# Patient Record
Sex: FEMALE | Race: WHITE | URBAN - METROPOLITAN AREA
[De-identification: names, ages, dates, MRNs, and addresses within clinical notes are randomized per-mention and may not be internally consistent; named-entity substitution may affect disease eponyms.]

---

## 2017-01-17 ENCOUNTER — OUTPATIENT (OUTPATIENT)
Dept: OUTPATIENT SERVICES | Facility: HOSPITAL | Age: 77
LOS: 1 days | Discharge: HOME | End: 2017-01-17

## 2017-06-27 DIAGNOSIS — E21.3 HYPERPARATHYROIDISM, UNSPECIFIED: ICD-10-CM

## 2018-09-27 ENCOUNTER — EMERGENCY (EMERGENCY)
Facility: HOSPITAL | Age: 78
LOS: 0 days | Discharge: HOME | End: 2018-09-27
Attending: EMERGENCY MEDICINE | Admitting: EMERGENCY MEDICINE
Payer: COMMERCIAL

## 2018-09-27 VITALS
HEART RATE: 95 BPM | TEMPERATURE: 97 F | DIASTOLIC BLOOD PRESSURE: 72 MMHG | OXYGEN SATURATION: 100 % | RESPIRATION RATE: 18 BRPM | SYSTOLIC BLOOD PRESSURE: 142 MMHG

## 2018-09-27 DIAGNOSIS — M25.561 PAIN IN RIGHT KNEE: ICD-10-CM

## 2018-09-27 DIAGNOSIS — Z98.890 OTHER SPECIFIED POSTPROCEDURAL STATES: ICD-10-CM

## 2018-09-27 DIAGNOSIS — M25.562 PAIN IN LEFT KNEE: ICD-10-CM

## 2018-09-27 PROCEDURE — 93970 EXTREMITY STUDY: CPT | Mod: 26

## 2018-09-27 NOTE — ED PROVIDER NOTE - ATTENDING CONTRIBUTION TO CARE
Pt is a 79yo female with hx of b/l arthroscopic knee surgery 20 years ago who comes in for 6 months of progressive b/l knee pain.  Worse in the last few days.  No trauma, no fall.  Pain mostly behind knee.  No fever.    Exam: no effusion, no bony tenderness, no calf tenderness, no LE edema, no 2+ DP pulse, stable pelvis, no rash  Imp: chronic pain  Plan: xr, duplex, ortho f/u

## 2018-09-27 NOTE — ED ADULT NURSE NOTE - NSIMPLEMENTINTERV_GEN_ALL_ED
Implemented All Fall Risk Interventions:  [place] to call system. Call bell, personal items and telephone within reach. Instruct patient to call for assistance. Room bathroom lighting operational. Non-slip footwear when patient is off stretcher. Physically safe environment: no spills, clutter or unnecessary equipment. Stretcher in lowest position, wheels locked, appropriate side rails in place. Provide visual cue, wrist band, yellow gown, etc. Monitor gait and stability. Monitor for mental status changes and reorient to person, place, and time. Review medications for side effects contributing to fall risk. Reinforce activity limits and safety measures with patient and family.

## 2018-09-27 NOTE — ED PROVIDER NOTE - MUSCULOSKELETAL, MLM
Spine appears normal, R LE : full ROM no tenderness no anterior or posterior drawer sign no laxity normal sensation 2+ distal pulses; L LE full ROM, no tenderness, no anterior or posterior drawer sign no laxity normal sensation 2+ distal pulses, range of motion is not limited, no muscle or joint tenderness

## 2018-09-27 NOTE — ED PROVIDER NOTE - NEUROLOGICAL, MLM
Alert and oriented x 3, 5/5 UE and LE strength, able to ambulate with help, normal gait, no focal deficits, no motor or sensory deficits.

## 2018-09-27 NOTE — ED PROVIDER NOTE - PROGRESS NOTE DETAILS
Discussed with patient and son need to follow up with Ortho. Will give them a referral. Discussed knee exercises and use of motrin and tylenol for pain. Discussed signs and symptoms to return to the ED for. Patient and son understand and agree with discharge plan

## 2018-09-27 NOTE — ED PROVIDER NOTE - OBJECTIVE STATEMENT
79 yo F with no hx of arthroscopy of bilateral knee, cortisol shot to the left knee 6 months ago, presents today for evaluation of bilateral knee pain, ongoing for 3 months, associated with "inability to bear weight on the left side". No fever, no chills, no headache, no nausea, no vomiting, no diarrhea, no trauma, no abdominal pain, no chest pain, no back pain, no rash, no URI, no recent travel. Patient states that she started slowing down when she walked. Per son at bedside, patient did not want to go see Dr. Joy, so he brought her to the ED. No other symptoms.

## 2020-02-02 ENCOUNTER — EMERGENCY (EMERGENCY)
Facility: HOSPITAL | Age: 80
LOS: 0 days | Discharge: HOME | End: 2020-02-02
Attending: EMERGENCY MEDICINE | Admitting: EMERGENCY MEDICINE
Payer: MEDICARE

## 2020-02-02 VITALS
TEMPERATURE: 98 F | DIASTOLIC BLOOD PRESSURE: 79 MMHG | SYSTOLIC BLOOD PRESSURE: 169 MMHG | RESPIRATION RATE: 16 BRPM | OXYGEN SATURATION: 98 %

## 2020-02-02 VITALS
RESPIRATION RATE: 18 BRPM | OXYGEN SATURATION: 99 % | HEART RATE: 88 BPM | SYSTOLIC BLOOD PRESSURE: 142 MMHG | DIASTOLIC BLOOD PRESSURE: 80 MMHG | TEMPERATURE: 98 F

## 2020-02-02 DIAGNOSIS — M79.89 OTHER SPECIFIED SOFT TISSUE DISORDERS: ICD-10-CM

## 2020-02-02 DIAGNOSIS — R41.82 ALTERED MENTAL STATUS, UNSPECIFIED: ICD-10-CM

## 2020-02-02 DIAGNOSIS — R44.3 HALLUCINATIONS, UNSPECIFIED: ICD-10-CM

## 2020-02-02 DIAGNOSIS — R45.1 RESTLESSNESS AND AGITATION: ICD-10-CM

## 2020-02-02 LAB
ALBUMIN SERPL ELPH-MCNC: 4.1 G/DL — SIGNIFICANT CHANGE UP (ref 3.5–5.2)
ALP SERPL-CCNC: 88 U/L — SIGNIFICANT CHANGE UP (ref 30–115)
ALT FLD-CCNC: 8 U/L — SIGNIFICANT CHANGE UP (ref 0–41)
ANION GAP SERPL CALC-SCNC: 13 MMOL/L — SIGNIFICANT CHANGE UP (ref 7–14)
APPEARANCE UR: CLEAR — SIGNIFICANT CHANGE UP
AST SERPL-CCNC: 15 U/L — SIGNIFICANT CHANGE UP (ref 0–41)
BACTERIA # UR AUTO: NEGATIVE — SIGNIFICANT CHANGE UP
BASOPHILS # BLD AUTO: 0.02 K/UL — SIGNIFICANT CHANGE UP (ref 0–0.2)
BASOPHILS NFR BLD AUTO: 0.3 % — SIGNIFICANT CHANGE UP (ref 0–1)
BILIRUB SERPL-MCNC: 0.7 MG/DL — SIGNIFICANT CHANGE UP (ref 0.2–1.2)
BILIRUB UR-MCNC: NEGATIVE — SIGNIFICANT CHANGE UP
BUN SERPL-MCNC: 18 MG/DL — SIGNIFICANT CHANGE UP (ref 10–20)
CALCIUM SERPL-MCNC: 9.4 MG/DL — SIGNIFICANT CHANGE UP (ref 8.5–10.1)
CHLORIDE SERPL-SCNC: 105 MMOL/L — SIGNIFICANT CHANGE UP (ref 98–110)
CO2 SERPL-SCNC: 23 MMOL/L — SIGNIFICANT CHANGE UP (ref 17–32)
COLOR SPEC: YELLOW — SIGNIFICANT CHANGE UP
CREAT SERPL-MCNC: 0.6 MG/DL — LOW (ref 0.7–1.5)
DIFF PNL FLD: ABNORMAL
EOSINOPHIL # BLD AUTO: 0.05 K/UL — SIGNIFICANT CHANGE UP (ref 0–0.7)
EOSINOPHIL NFR BLD AUTO: 0.8 % — SIGNIFICANT CHANGE UP (ref 0–8)
EPI CELLS # UR: 3 /HPF — SIGNIFICANT CHANGE UP (ref 0–5)
GLUCOSE SERPL-MCNC: 121 MG/DL — HIGH (ref 70–99)
GLUCOSE UR QL: NEGATIVE — SIGNIFICANT CHANGE UP
HCT VFR BLD CALC: 36.6 % — LOW (ref 37–47)
HGB BLD-MCNC: 11.9 G/DL — LOW (ref 12–16)
HYALINE CASTS # UR AUTO: 4 /LPF — SIGNIFICANT CHANGE UP (ref 0–7)
IMM GRANULOCYTES NFR BLD AUTO: 0.6 % — HIGH (ref 0.1–0.3)
KETONES UR-MCNC: SIGNIFICANT CHANGE UP
LACTATE SERPL-SCNC: 1.7 MMOL/L — SIGNIFICANT CHANGE UP (ref 0.7–2)
LEUKOCYTE ESTERASE UR-ACNC: ABNORMAL
LIDOCAIN IGE QN: 11 U/L — SIGNIFICANT CHANGE UP (ref 7–60)
LYMPHOCYTES # BLD AUTO: 1.08 K/UL — LOW (ref 1.2–3.4)
LYMPHOCYTES # BLD AUTO: 16.4 % — LOW (ref 20.5–51.1)
MAGNESIUM SERPL-MCNC: 1.9 MG/DL — SIGNIFICANT CHANGE UP (ref 1.8–2.4)
MCHC RBC-ENTMCNC: 26.3 PG — LOW (ref 27–31)
MCHC RBC-ENTMCNC: 32.5 G/DL — SIGNIFICANT CHANGE UP (ref 32–37)
MCV RBC AUTO: 81 FL — SIGNIFICANT CHANGE UP (ref 81–99)
MONOCYTES # BLD AUTO: 0.34 K/UL — SIGNIFICANT CHANGE UP (ref 0.1–0.6)
MONOCYTES NFR BLD AUTO: 5.2 % — SIGNIFICANT CHANGE UP (ref 1.7–9.3)
NEUTROPHILS # BLD AUTO: 5.04 K/UL — SIGNIFICANT CHANGE UP (ref 1.4–6.5)
NEUTROPHILS NFR BLD AUTO: 76.7 % — HIGH (ref 42.2–75.2)
NITRITE UR-MCNC: NEGATIVE — SIGNIFICANT CHANGE UP
NRBC # BLD: 0 /100 WBCS — SIGNIFICANT CHANGE UP (ref 0–0)
NT-PROBNP SERPL-SCNC: 911 PG/ML — HIGH (ref 0–300)
PH UR: 6.5 — SIGNIFICANT CHANGE UP (ref 5–8)
PLATELET # BLD AUTO: 264 K/UL — SIGNIFICANT CHANGE UP (ref 130–400)
POTASSIUM SERPL-MCNC: 3.7 MMOL/L — SIGNIFICANT CHANGE UP (ref 3.5–5)
POTASSIUM SERPL-SCNC: 3.7 MMOL/L — SIGNIFICANT CHANGE UP (ref 3.5–5)
PROT SERPL-MCNC: 7.3 G/DL — SIGNIFICANT CHANGE UP (ref 6–8)
PROT UR-MCNC: SIGNIFICANT CHANGE UP
RBC # BLD: 4.52 M/UL — SIGNIFICANT CHANGE UP (ref 4.2–5.4)
RBC # FLD: 14.2 % — SIGNIFICANT CHANGE UP (ref 11.5–14.5)
RBC CASTS # UR COMP ASSIST: 3 /HPF — SIGNIFICANT CHANGE UP (ref 0–4)
SODIUM SERPL-SCNC: 141 MMOL/L — SIGNIFICANT CHANGE UP (ref 135–146)
SP GR SPEC: 1.02 — SIGNIFICANT CHANGE UP (ref 1.01–1.02)
TROPONIN T SERPL-MCNC: <0.01 NG/ML — SIGNIFICANT CHANGE UP
UROBILINOGEN FLD QL: ABNORMAL
WBC # BLD: 6.57 K/UL — SIGNIFICANT CHANGE UP (ref 4.8–10.8)
WBC # FLD AUTO: 6.57 K/UL — SIGNIFICANT CHANGE UP (ref 4.8–10.8)
WBC UR QL: 8 /HPF — HIGH (ref 0–5)

## 2020-02-02 PROCEDURE — 93010 ELECTROCARDIOGRAM REPORT: CPT

## 2020-02-02 PROCEDURE — 71046 X-RAY EXAM CHEST 2 VIEWS: CPT | Mod: 26

## 2020-02-02 PROCEDURE — 70450 CT HEAD/BRAIN W/O DYE: CPT | Mod: 26

## 2020-02-02 PROCEDURE — 99285 EMERGENCY DEPT VISIT HI MDM: CPT

## 2020-02-02 NOTE — ED PROVIDER NOTE - OBJECTIVE STATEMENT
78y/o F w/ hx of dementia and anxiety lives at home is brought by family for concern pt with dec sleeping at night, concerns of hallucinations that have been worsening over the past year, worse this week.  pt with baseline leg swelling after knee replacement surgery multiple years ago. no fevers or chills. no urinary symptoms. no cough, congesiton, runny nose.

## 2020-02-02 NOTE — ED ADULT TRIAGE NOTE - CHIEF COMPLAINT QUOTE
Pt BIB family for changes in mental status x 2 days. Pt's family suspect she is hallucinating. Pt also has swelling to b/l lower extremities.

## 2020-02-02 NOTE — ED ADULT NURSE NOTE - OBJECTIVE STATEMENT
Pt brought in by family for change in mental status, state pt was having visual hallucinations, family states pt has a history of this. Family also states pt c/o chest pain. Pt is Icelandic speaking. Denies fever, chills, SOB.

## 2020-02-02 NOTE — ED ADULT TRIAGE NOTE - TEMPERATURE IN CELSIUS (DEGREES C)
Bedside shift change report given to Carmen Gilbert RN (oncoming nurse) by Adan Engle RN (offgoing nurse). Report included the following information SBAR, Kardex, Intake/Output, MAR, Accordion, Recent Results, Med Rec Status and Cardiac Rhythm a fib. 36.6

## 2020-02-02 NOTE — ED PROVIDER NOTE - ATTENDING CONTRIBUTION TO CARE
78 yo F with PMH of demential presents to ED for decreased sleep and hallucinations which have gotten progressively worse over the past few years. Her family states that she hallucinates that people are in the room and that things are crawling on her. She also has become more restless at night and does not sleep through the entire night.   No fever, chills, recent illness, cough, rhinorrhea.     They have not followed up with her neurologist in years.     Concern for worsening of her dementia however, will ru out infection.

## 2020-02-02 NOTE — ED PROVIDER NOTE - CARE PROVIDER_API CALL
Bautista Perrin)  Neurology  [address] [place] [address], [address]  [place] [address]  Phone: [phone]  Fax: [phone]  Follow Up Time:

## 2020-02-02 NOTE — ED PROVIDER NOTE - NS ED ROS FT
Constitutional: (-) fever  Eyes/ENT: (-) blurry vision, (-) epistaxis  Cardiovascular: (-) chest pain, (-) syncope  Respiratory: (-) cough, (-) shortness of breath  Gastrointestinal: (-) vomiting, (-) diarrhea  Musculoskeletal: (-) neck pain, (-) back pain, (-) joint pain  Integumentary: (-) rash, (-) edema  Neurological: (-) headache, (+) altered mental status  Psychiatric: (+) hallucinations  Allergic/Immunologic: (-) pruritus

## 2020-02-02 NOTE — ED PROVIDER NOTE - NSFOLLOWUPCLINICS_GEN_ALL_ED_FT
[facility]  Medicine  [address], [place]   Phone: [phone]  Fax:   Follow Up Time:

## 2020-02-02 NOTE — ED PROVIDER NOTE - PATIENT PORTAL LINK FT
You can access the FollowMyHealth Patient Portal offered by [facility] by registering at the following website: [email or web address]. By joining [employer]’s FollowMyHealth portal, you will also be able to view your health information using other applications (apps) compatible with our system.

## 2020-02-02 NOTE — ED PROVIDER NOTE - CLINICAL SUMMARY MEDICAL DECISION MAKING FREE TEXT BOX
78 yo F with PMH of dementia presented to ED for gradual mental status changes over the past 3 years. Patient does not have any signs of infection. Mental status change is most likely due to progression of her dementia. DC home with neurology fu.

## 2020-02-03 LAB
CULTURE RESULTS: SIGNIFICANT CHANGE UP
SPECIMEN SOURCE: SIGNIFICANT CHANGE UP

## 2020-05-15 ENCOUNTER — EMERGENCY (EMERGENCY)
Facility: HOSPITAL | Age: 80
LOS: 0 days | Discharge: HOME | End: 2020-05-15
Attending: EMERGENCY MEDICINE | Admitting: EMERGENCY MEDICINE
Payer: MEDICARE

## 2020-05-15 VITALS
SYSTOLIC BLOOD PRESSURE: 127 MMHG | OXYGEN SATURATION: 100 % | DIASTOLIC BLOOD PRESSURE: 78 MMHG | HEART RATE: 101 BPM | RESPIRATION RATE: 18 BRPM

## 2020-05-15 VITALS
SYSTOLIC BLOOD PRESSURE: 114 MMHG | HEART RATE: 96 BPM | DIASTOLIC BLOOD PRESSURE: 70 MMHG | RESPIRATION RATE: 18 BRPM | TEMPERATURE: 98 F | OXYGEN SATURATION: 100 %

## 2020-05-15 DIAGNOSIS — F03.90 UNSPECIFIED DEMENTIA WITHOUT BEHAVIORAL DISTURBANCE: ICD-10-CM
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
## 2020-05-15 DIAGNOSIS — Z04.3 ENCOUNTER FOR EXAMINATION AND OBSERVATION FOLLOWING OTHER ACCIDENT: ICD-10-CM

## 2020-05-15 DIAGNOSIS — R53.83 OTHER FATIGUE: ICD-10-CM

## 2020-05-15 DIAGNOSIS — S00.03XA CONTUSION OF SCALP, INITIAL ENCOUNTER: ICD-10-CM

## 2020-05-15 LAB
ALBUMIN SERPL ELPH-MCNC: 3.7 G/DL — SIGNIFICANT CHANGE UP (ref 3.5–5.2)
ALP SERPL-CCNC: 75 U/L — SIGNIFICANT CHANGE UP (ref 30–115)
ALT FLD-CCNC: 9 U/L — SIGNIFICANT CHANGE UP (ref 0–41)
ANION GAP SERPL CALC-SCNC: 18 MMOL/L — HIGH (ref 7–14)
APPEARANCE UR: CLEAR — SIGNIFICANT CHANGE UP
APTT BLD: 24.8 SEC — LOW (ref 27–39.2)
AST SERPL-CCNC: 19 U/L — SIGNIFICANT CHANGE UP (ref 0–41)
BASE EXCESS BLDV CALC-SCNC: 3.9 MMOL/L — HIGH (ref -2–2)
BASOPHILS # BLD AUTO: 0.03 K/UL — SIGNIFICANT CHANGE UP (ref 0–0.2)
BASOPHILS NFR BLD AUTO: 0.3 % — SIGNIFICANT CHANGE UP (ref 0–1)
BILIRUB SERPL-MCNC: 0.8 MG/DL — SIGNIFICANT CHANGE UP (ref 0.2–1.2)
BILIRUB UR-MCNC: NEGATIVE — SIGNIFICANT CHANGE UP
BUN SERPL-MCNC: 16 MG/DL — SIGNIFICANT CHANGE UP (ref 10–20)
CA-I SERPL-SCNC: 1.07 MMOL/L — LOW (ref 1.12–1.3)
CALCIUM SERPL-MCNC: 9.4 MG/DL — SIGNIFICANT CHANGE UP (ref 8.5–10.1)
CHLORIDE SERPL-SCNC: 103 MMOL/L — SIGNIFICANT CHANGE UP (ref 98–110)
CO2 SERPL-SCNC: 21 MMOL/L — SIGNIFICANT CHANGE UP (ref 17–32)
COLOR SPEC: SIGNIFICANT CHANGE UP
CREAT SERPL-MCNC: 0.9 MG/DL — SIGNIFICANT CHANGE UP (ref 0.7–1.5)
DIFF PNL FLD: NEGATIVE — SIGNIFICANT CHANGE UP
EOSINOPHIL # BLD AUTO: 0.16 K/UL — SIGNIFICANT CHANGE UP (ref 0–0.7)
EOSINOPHIL NFR BLD AUTO: 1.8 % — SIGNIFICANT CHANGE UP (ref 0–8)
ETHANOL SERPL-MCNC: <10 MG/DL — SIGNIFICANT CHANGE UP
GAS PNL BLDV: 140 MMOL/L — SIGNIFICANT CHANGE UP (ref 136–145)
GAS PNL BLDV: SIGNIFICANT CHANGE UP
GLUCOSE SERPL-MCNC: 161 MG/DL — HIGH (ref 70–99)
GLUCOSE UR QL: NEGATIVE — SIGNIFICANT CHANGE UP
HCO3 BLDV-SCNC: 27 MMOL/L — SIGNIFICANT CHANGE UP (ref 22–29)
HCT VFR BLD CALC: 44.3 % — SIGNIFICANT CHANGE UP (ref 37–47)
HCT VFR BLDA CALC: 41.4 % — SIGNIFICANT CHANGE UP (ref 34–44)
HGB BLD CALC-MCNC: 13.5 G/DL — LOW (ref 14–18)
HGB BLD-MCNC: 14.6 G/DL — SIGNIFICANT CHANGE UP (ref 12–16)
IMM GRANULOCYTES NFR BLD AUTO: 0.3 % — SIGNIFICANT CHANGE UP (ref 0.1–0.3)
INR BLD: 1.09 RATIO — SIGNIFICANT CHANGE UP (ref 0.65–1.3)
KETONES UR-MCNC: NEGATIVE — SIGNIFICANT CHANGE UP
LACTATE BLDV-MCNC: 1.7 MMOL/L — HIGH (ref 0.5–1.6)
LACTATE SERPL-SCNC: 3.4 MMOL/L — HIGH (ref 0.7–2)
LEUKOCYTE ESTERASE UR-ACNC: NEGATIVE — SIGNIFICANT CHANGE UP
LYMPHOCYTES # BLD AUTO: 1.3 K/UL — SIGNIFICANT CHANGE UP (ref 1.2–3.4)
LYMPHOCYTES # BLD AUTO: 14.5 % — LOW (ref 20.5–51.1)
MCHC RBC-ENTMCNC: 28.3 PG — SIGNIFICANT CHANGE UP (ref 27–31)
MCHC RBC-ENTMCNC: 33 G/DL — SIGNIFICANT CHANGE UP (ref 32–37)
MCV RBC AUTO: 86 FL — SIGNIFICANT CHANGE UP (ref 81–99)
MONOCYTES # BLD AUTO: 0.37 K/UL — SIGNIFICANT CHANGE UP (ref 0.1–0.6)
MONOCYTES NFR BLD AUTO: 4.1 % — SIGNIFICANT CHANGE UP (ref 1.7–9.3)
NEUTROPHILS # BLD AUTO: 7.07 K/UL — HIGH (ref 1.4–6.5)
NEUTROPHILS NFR BLD AUTO: 79 % — HIGH (ref 42.2–75.2)
NITRITE UR-MCNC: NEGATIVE — SIGNIFICANT CHANGE UP
NRBC # BLD: 0 /100 WBCS — SIGNIFICANT CHANGE UP (ref 0–0)
PCO2 BLDV: 37 MMHG — LOW (ref 41–51)
PH BLDV: 7.48 — HIGH (ref 7.26–7.43)
PH UR: 7.5 — SIGNIFICANT CHANGE UP (ref 5–8)
PLATELET # BLD AUTO: 215 K/UL — SIGNIFICANT CHANGE UP (ref 130–400)
PO2 BLDV: 25 MMHG — SIGNIFICANT CHANGE UP (ref 20–40)
POTASSIUM BLDV-SCNC: 6.2 MMOL/L — HIGH (ref 3.3–5.6)
POTASSIUM SERPL-MCNC: 3.9 MMOL/L — SIGNIFICANT CHANGE UP (ref 3.5–5)
POTASSIUM SERPL-SCNC: 3.9 MMOL/L — SIGNIFICANT CHANGE UP (ref 3.5–5)
PROT SERPL-MCNC: 6.6 G/DL — SIGNIFICANT CHANGE UP (ref 6–8)
PROT UR-MCNC: NEGATIVE — SIGNIFICANT CHANGE UP
PROTHROM AB SERPL-ACNC: 12.5 SEC — SIGNIFICANT CHANGE UP (ref 9.95–12.87)
RBC # BLD: 5.15 M/UL — SIGNIFICANT CHANGE UP (ref 4.2–5.4)
RBC # FLD: 15.6 % — HIGH (ref 11.5–14.5)
SAO2 % BLDV: 51 % — SIGNIFICANT CHANGE UP
SODIUM SERPL-SCNC: 142 MMOL/L — SIGNIFICANT CHANGE UP (ref 135–146)
SP GR SPEC: 1.02 — SIGNIFICANT CHANGE UP (ref 1.01–1.02)
TROPONIN T SERPL-MCNC: <0.01 NG/ML — SIGNIFICANT CHANGE UP
UROBILINOGEN FLD QL: SIGNIFICANT CHANGE UP
WBC # BLD: 8.96 K/UL — SIGNIFICANT CHANGE UP (ref 4.8–10.8)
WBC # FLD AUTO: 8.96 K/UL — SIGNIFICANT CHANGE UP (ref 4.8–10.8)

## 2020-05-15 PROCEDURE — 93010 ELECTROCARDIOGRAM REPORT: CPT

## 2020-05-15 PROCEDURE — 74177 CT ABD & PELVIS W/CONTRAST: CPT | Mod: 26

## 2020-05-15 PROCEDURE — 70450 CT HEAD/BRAIN W/O DYE: CPT | Mod: 26

## 2020-05-15 PROCEDURE — 71045 X-RAY EXAM CHEST 1 VIEW: CPT | Mod: 26

## 2020-05-15 PROCEDURE — 71260 CT THORAX DX C+: CPT | Mod: 26

## 2020-05-15 PROCEDURE — 99285 EMERGENCY DEPT VISIT HI MDM: CPT

## 2020-05-15 PROCEDURE — 72125 CT NECK SPINE W/O DYE: CPT | Mod: 26

## 2020-05-15 PROCEDURE — 72170 X-RAY EXAM OF PELVIS: CPT | Mod: 26

## 2020-05-15 RX ORDER — SODIUM CHLORIDE 9 MG/ML
1000 INJECTION INTRAMUSCULAR; INTRAVENOUS; SUBCUTANEOUS ONCE
Refills: 0 | Status: COMPLETED | OUTPATIENT
Start: 2020-05-15 | End: 2020-05-15

## 2020-05-15 RX ADMIN — SODIUM CHLORIDE 1000 MILLILITER(S): 9 INJECTION INTRAMUSCULAR; INTRAVENOUS; SUBCUTANEOUS at 08:30

## 2020-05-15 RX ADMIN — SODIUM CHLORIDE 1000 MILLILITER(S): 9 INJECTION INTRAMUSCULAR; INTRAVENOUS; SUBCUTANEOUS at 09:30

## 2020-05-15 NOTE — ED PROVIDER NOTE - CARE PROVIDER_API CALL
[facility]  [address] [place] [address]  Phone: [phone]  Fax: [phone]  Established Patient  Follow Up Time: 1-3 Days

## 2020-05-15 NOTE — ED ADULT TRIAGE NOTE - CHIEF COMPLAINT QUOTE
Pt was found on floor outside by EMS, does not know how she fell, aox1, disoriented to time, place, situation. GCS 14. Pt unable to state if she is on blood thinners, pt has c collar in place and hematoma to L side of head. Pt c/o "body pain". Trauma alert activated in triage.

## 2020-05-15 NOTE — ED ADULT NURSE NOTE - OBJECTIVE STATEMENT
found outside BIBA, bruising on left side of head and eye, trauma alert initiated at 0810AM. A&Ox1 (self), unable to recall history. found outside her house BIBA for possible fall, bruising on left side of head and eye, trauma alert initiated at 0810AM. A&Ox1 (self), unable to recall history.

## 2020-05-15 NOTE — ED PROVIDER NOTE - CLINICAL SUMMARY MEDICAL DECISION MAKING FREE TEXT BOX
Pt evaluated after sign out. Comfortable. CT/labs reviewed. Spoke with Dr. Vallejo, wants pt discharged, will see her in the office tomorrow. Family is notified and comfortable with plans. Will discharge. Family is coming to pick her up. Pt ambulated in the ED with assistance without difficulty.

## 2020-05-15 NOTE — ED PROVIDER NOTE - OBJECTIVE STATEMENT
81 Y/O F ANXIETY, DEMENTIA FOUND SITTING ON THE GROUND BY BYSTANDERS. PT WAS FOUND WITH HER WALKER. + HEAD TRAUMA. UNKNOWN LOC. PT C/O WHOLE BODY PAIN. PT POOR HISTORIAN. PT DOES RECALL FALLING. AS PER DAUGHTER IN LAW BY TELEPHONE, PT WANDERED FM HER HOME WHERE SHE LIVES WITH HER FAMILY. PT WITH ONE WEEK OF INCREASED CONFUSION AND LETHARGY. NORMAL APPETITE. NORMAL PO INTAKE. NO V/D. NO FEVER, CHILLS, COUGH. 81 Y/O F ANXIETY, DEMENTIA, HYPOTHYROIDIOSM FOUND SITTING ON THE GROUND BY BYSTANDERS. PT WAS FOUND WITH HER WALKER. + HEAD TRAUMA. UNKNOWN LOC. PT C/O WHOLE BODY PAIN. PT POOR HISTORIAN. PT DOES RECALL FALLING. AS PER DAUGHTER IN LAW BY TELEPHONE, PT WANDERED FM HER HOME WHERE SHE LIVES WITH HER FAMILY. PT WITH ONE WEEK OF INCREASED CONFUSION AND LETHARGY. NORMAL APPETITE. NORMAL PO INTAKE. NO V/D. NO FEVER, CHILLS, COUGH.

## 2020-05-15 NOTE — CONSULT NOTE ADULT - ASSESSMENT
ASSESSMENT:  80y old f s/p found down    PLAN:    No evidence of acute traumatic injury ASSESSMENT:  80y old f s/p found down    PLAN:    No evidence of acute traumatic injury  ED planning d/c, cleared from trauma perspective   Pt has f/u with 1ary Dr. Velásquez  D/w  Gave

## 2020-05-15 NOTE — ED PROVIDER NOTE - PROGRESS NOTE DETAILS
TELEPHONE D/W SON, PT WITH PARANOID THOUGHTS AND CONFUSION X 2-3 YEARS, WORSENING RECENTLY, X2-3 WEEKS. JORI MAGANA IS PMD. TELEPHONE D/W SON, PT WITH PARANOID THOUGHTS AND CONFUSION X 2-3 YEARS, WORSENING RECENTLY, X2-3 WEEKS. JORI MAGANA IS PMD. [phone] PT SIGNED OUT TO DR. STEWART, FOLLOW UP CT SCANS, LABS, REASSESS AND DISPO. AG: trauma w/u neg, spoke w/ Dr Vallejo who can see pt tomorrow in his office, spoke w/ son Naren who agrees w/ plan.

## 2020-05-15 NOTE — ED PROVIDER NOTE - NSFOLLOWUPINSTRUCTIONS_ED_ALL_ED_FT
Fall Prevention in the Home, Adult  Falls can cause injuries. They can happen to people of all ages. There are many things you can do to make your home safe and to help prevent falls. Ask for help when making these changes, if needed.    What actions can I take to prevent falls?  General Instructions     Use good lighting in all rooms. Replace any light bulbs that burn out.  Turn on the lights when you go into a dark area. Use night-lights.  Keep items that you use often in easy-to-reach places. Lower the shelves around your home if necessary.  Set up your furniture so you have a clear path. Avoid moving your furniture around.  Do not have throw rugs and other things on the floor that can make you trip.  Avoid walking on wet floors.  If any of your floors are uneven, fix them.  Add color or contrast paint or tape to clearly sharri and help you see:  Any grab bars or handrails.  First and last steps of stairways.  Where the edge of each step is.  If you use a stepladder:  Make sure that it is fully opened. Do not climb a closed stepladder.  Make sure that both sides of the stepladder are locked into place.  Ask someone to hold the stepladder for you while you use it.  If there are any pets around you, be aware of where they are.  What can I do in the bathroom?     Keep the floor dry. Clean up any water that spills onto the floor as soon as it happens.  Remove soap buildup in the tub or shower regularly.  Use non-skid mats or decals on the floor of the tub or shower.  Attach bath mats securely with double-sided, non-slip rug tape.  If you need to sit down in the shower, use a plastic, non-slip stool.  Install grab bars by the toilet and in the tub and shower. Do not use towel bars as grab bars.  What can I do in the bedroom?     Make sure that you have a light by your bed that is easy to reach.  Do not use any sheets or blankets that are too big for your bed. They should not hang down onto the floor.  Have a firm chair that has side arms. You can use this for support while you get dressed.  What can I do in the kitchen?     Clean up any spills right away.  If you need to reach something above you, use a strong step stool that has a grab bar.  Keep electrical cords out of the way.  Do not use floor polish or wax that makes floors slippery. If you must use wax, use non-skid floor wax.  What can I do with my stairs?     Do not leave any items on the stairs.  Make sure that you have a light switch at the top of the stairs and the bottom of the stairs. If you do not have them, ask someone to add them for you.  Make sure that there are handrails on both sides of the stairs, and use them. Fix handrails that are broken or loose. Make sure that handrails are as long as the stairways.  Install non-slip stair treads on all stairs in your home.  Avoid having throw rugs at the top or bottom of the stairs. If you do have throw rugs, attach them to the floor with carpet tape.  Choose a carpet that does not hide the edge of the steps on the stairway.  Check any carpeting to make sure that it is firmly attached to the stairs. Fix any carpet that is loose or worn.  What can I do on the outside of my home?     Use bright outdoor lighting.  Regularly fix the edges of walkways and driveways and fix any cracks.  Remove anything that might make you trip as you walk through a door, such as a raised step or threshold.  Trim any bushes or trees on the path to your home.  Regularly check to see if handrails are loose or broken. Make sure that both sides of any steps have handrails.  Install guardrails along the edges of any raised decks and porches.  Clear walking paths of anything that might make someone trip, such as tools or rocks.  Have any leaves, snow, or ice cleared regularly.  Use sand or salt on walking paths during winter.  Clean up any spills in your garage right away. This includes grease or oil spills.  What other actions can I take?     Wear shoes that:  Have a low heel. Do not wear high heels.  Have rubber bottoms.  Are comfortable and fit you well.  Are closed at the toe. Do not wear open-toe sandals.  Use tools that help you move around (mobility aids) if they are needed. These include:  Canes.  Walkers.  Scooters.  Crutches.  Review your medicines with your doctor. Some medicines can make you feel dizzy. This can increase your chance of falling.  Ask your doctor what other things you can do to help prevent falls.    Where to find more information  Centers for Disease Control and Prevention[place]: https://cdc.gov  [place] on Aging: https://la6fmit.janie.nih.gov  Contact a doctor if:  You are afraid of falling at home.  You feel weak, drowsy, or dizzy at home.  You fall at home.  Summary  There are many simple things that you can do to make your home safe and to help prevent falls.  Ways to make your home safe include removing tripping hazards and installing grab bars in the bathroom.  Ask for help when making these changes in your home.  This information is not intended to replace advice given to you by your health care provider. Make sure you discuss any questions you have with your health care provider.

## 2020-05-15 NOTE — ED PROVIDER NOTE - PHYSICAL EXAMINATION
CONSTITUTIONAL: Well-appearing; well-nourished; in no apparent distress.   HEAD: + abrasions to L frontal scalp and L forehead. + L scalp hematoma.   EYES: PERRL; EOM intact. Conjunctiva normal B/L.   ENT: Normal pharynx with no tonsillar hypertrophy. + dry mucous membranes.   NECK: Supple; non-tender; no cervical lymphadenopathy. No midline c spine tenderness.   CHEST: Normal chest excursion with respiration. Nontender. no crepitus.   CARDIOVASCULAR: Normal S1, S2; no murmurs, rubs, or gallops.   RESPIRATORY: Normal chest excursion with respiration; breath sounds clear and equal bilaterally; no wheezes, rhonchi, or rales.  GI/: Normal bowel sounds; non-distended; non-tender.  BACK: No evidence of trauma or deformity. Non-tender to palpation. No CVA tenderness.   EXT: Normal ROM in all four extremities; non-tender to palpation; distal pulses are normal. No leg edema B/L.   SKIN: Normal for age and race; warm; dry; good turgor.  NEURO: A & O x 4; CN 2-12 intact. Grossly unremarkable.

## 2020-05-15 NOTE — CONSULT NOTE ADULT - SUBJECTIVE AND OBJECTIVE BOX
80y f  80y f    TRAUMA ACTIVATION LEVEL:  2; alert    MECHANISM OF INJURY:      [] Blunt  	[] MVC	[] Fall	[] Pedestrian Struck	[] Motorcycle   [] Assault   [] Bicycle collision  [] Sports injury     [] Penetrating    [x] found down  	[] Gun Shot Wound 		[] Stab Wound    GCS: 	E: 4	V: 4	M: 6      HPI:  80y old f s/p found sitting on sidewalk with walker at her side, BIBEMS after wandering away from home. +HT, ?LOC, -AC. Pt A&O x1, complaining of pain all over her body, does not recall fall.       PAST MEDICAL & SURGICAL HISTORY:      Allergies    No Known Allergies      Home Medications:      ROS: 10-system review is otherwise negative except HPI above.      Primary Survey:    A - airway intact  B - bilateral breath sounds and good chest rise  C - palpable pulses in all extremities  D - GCS 15 on arrival, VALLE  Exposure obtained    Vital Signs Last 24 Hrs  T(C): 35.8 (15 May 2020 08:19), Max: 36.6 (15 May 2020 08:12)  T(F): 96.5 (15 May 2020 08:19), Max: 97.9 (15 May 2020 08:12)  HR: 101 (15 May 2020 08:35) (96 - 115)  BP: 127/78 (15 May 2020 08:35) (102/83 - 140/80)  BP(mean): 98 (15 May 2020 08:35) (98 - 98)  RR: 18 (15 May 2020 08:35) (18 - 18)  SpO2: 100% (15 May 2020 08:35) (100% - 100%)    Secondary Survey:   General: NAD  HEENT: Normocephalic, atraumatic, EOMI, PEERLA. no scalp lacerations. Small forehead abrasion, no hematoma  Neck: Soft, midline trachea. no cspine tenderness  Chest: No chest wall tenderness. or subq  emphysema   Cardiac: S1, S2, RRR  Respiratory: Bilateral breath sounds, clear and equal bilaterally  Abdomen: Soft, non-distended, non-tender, no rebound,   Groin: Normal appearing, pelvis stable   Ext: palp radial b/l UE, b/l DP palp in Lower Extrem.   Back: no TTP, no palpable runoff/stepoff/deformity  Rectal: No gio blood, COLLIN with good tone    FAST    Procedures:    LABS:  Labs:  CAPILLARY BLOOD GLUCOSE      POCT Blood Glucose.: 137 mg/dL (15 May 2020 08:18)                          14.6   8.96  )-----------( 215      ( 15 May 2020 08:23 )             44.3       Auto Immature Granulocyte %: 0.3 % (05-15-20 @ 08:23)  Auto Neutrophil %: 79.0 % (05-15-20 @ 08:23)    05-15    142  |  103  |  16  ----------------------------<  161<H>  3.9   |  21  |  0.9      Calcium, Total Serum: 9.4 mg/dL (05-15-20 @ 08:23)      LFTs:             6.6  | 0.8  | 19       ------------------[75      ( 15 May 2020 08:23 )  3.7  | x    | 9           Lipase:x      Amylase:x         Lactate, Blood: 3.4 mmol/L (05-15-20 @ 08:23)      Coags:     12.50  ----< 1.09    ( 15 May 2020 08:23 )     24.8        CARDIAC MARKERS ( 15 May 2020 08:47 )  x     / <0.01 ng/mL / x     / x     / x            Alcohol, Blood: <10 mg/dL (05-15-20 @ 08:23)    Urinalysis Basic - ( 15 May 2020 08:21 )    Color: Light Yellow / Appearance: Clear / S.021 / pH: x  Gluc: x / Ketone: Negative  / Bili: Negative / Urobili: <2 mg/dL   Blood: x / Protein: Negative / Nitrite: Negative   Leuk Esterase: Negative / RBC: x / WBC x   Sq Epi: x / Non Sq Epi: x / Bacteria: x      Alcohol, Blood: <10 mg/dL (05-15-20 @ 08:23)      RADIOLOGY & ADDITIONAL STUDIES:    < from: CT Abdomen and Pelvis w/ IV Cont (05.15.20 @ 09:13) >  IMPRESSION:     1.  No evidence of acute traumatic injury within the chest, abdomen, or pelvis.    < end of copied text >    < from: CT Cervical Spine No Cont (05.15.20 @ 09:02) >  IMPRESSION:    1.  No acute fracture demonstrated.    2.  Diffuse osteopenia.    3.  Multilevel degenerative changes.      < end of copied text >    < from: CT Head No Cont (05.15.20 @ 09:02) >  IMPRESSION:    In comparison with the prior noncontrast CT scan of the brain dated 2020:    No acute intracranial hemorrhage.    Stable examination.    < end of copied text >      < from: Xray Chest 1 View AP/PA (05.15.20 @ 08:49) >  Impression:      No radiographic evidence of acute cardiopulmonary disease.    < end of copied text >    ---------------------------------------------------------------------------------------

## 2020-05-15 NOTE — ED PROVIDER NOTE - CARE PLAN
Principal Discharge DX:	Fall Principal Discharge DX:	Fall  Secondary Diagnosis:	Hematoma  Secondary Diagnosis:	Traumatic injury of head, initial encounter

## 2020-05-15 NOTE — ED PROVIDER NOTE - PATIENT PORTAL LINK FT
You can access the FollowMyHealth Patient Portal offered by [facility] by registering at the following website: [email or web address]. By joining [employer]’s FollowMyHealth portal, you will also be able to view your health information using other applications (apps) compatible with our system.

## 2020-05-15 NOTE — ED ADULT NURSE NOTE - INTERVENTIONS DEFINITIONS
Physically safe environment: no spills, clutter or unnecessary equipment/Review medications for side effects contributing to fall risk/Non-slip footwear when patient is off stretcher/Monitor for mental status changes and reorient to person, place, and time/Stretcher in lowest position, wheels locked, appropriate side rails in place/Monitor gait and stability/Reinforce activity limits and safety measures with patient and family/Provide visual cue, wrist band, yellow gown, etc./Provide visual clues: red socks

## 2020-05-16 LAB — SARS-COV-2 RNA SPEC QL NAA+PROBE: SIGNIFICANT CHANGE UP

## 2020-06-12 ENCOUNTER — INPATIENT (INPATIENT)
Facility: HOSPITAL | Age: 80
LOS: 3 days | Discharge: NURSING, REHAB OR LONG-TERM CARE | End: 2020-06-16
Payer: MEDICARE

## 2020-06-12 VITALS — WEIGHT: 132.28 LBS

## 2020-06-12 LAB
ALBUMIN SERPL ELPH-MCNC: 3.6 G/DL — SIGNIFICANT CHANGE UP (ref 3.5–5.2)
ALP SERPL-CCNC: 81 U/L — SIGNIFICANT CHANGE UP (ref 30–115)
ALT FLD-CCNC: 5 U/L — SIGNIFICANT CHANGE UP (ref 0–41)
ANION GAP SERPL CALC-SCNC: 14 MMOL/L — SIGNIFICANT CHANGE UP (ref 7–14)
APTT BLD: 23.6 SEC — CRITICAL LOW (ref 27–39.2)
AST SERPL-CCNC: 15 U/L — SIGNIFICANT CHANGE UP (ref 0–41)
BASOPHILS # BLD AUTO: 0.03 K/UL — SIGNIFICANT CHANGE UP (ref 0–0.2)
BASOPHILS NFR BLD AUTO: 0.4 % — SIGNIFICANT CHANGE UP (ref 0–1)
BILIRUB SERPL-MCNC: 0.6 MG/DL — SIGNIFICANT CHANGE UP (ref 0.2–1.2)
BUN SERPL-MCNC: 25 MG/DL — HIGH (ref 10–20)
CALCIUM SERPL-MCNC: 9 MG/DL — SIGNIFICANT CHANGE UP (ref 8.5–10.1)
CHLORIDE SERPL-SCNC: 104 MMOL/L — SIGNIFICANT CHANGE UP (ref 98–110)
CO2 SERPL-SCNC: 20 MMOL/L — SIGNIFICANT CHANGE UP (ref 17–32)
CREAT SERPL-MCNC: 0.7 MG/DL — SIGNIFICANT CHANGE UP (ref 0.7–1.5)
EOSINOPHIL # BLD AUTO: 0.05 K/UL — SIGNIFICANT CHANGE UP (ref 0–0.7)
EOSINOPHIL NFR BLD AUTO: 0.7 % — SIGNIFICANT CHANGE UP (ref 0–8)
GLUCOSE SERPL-MCNC: 173 MG/DL — HIGH (ref 70–99)
HCT VFR BLD CALC: 41.1 % — SIGNIFICANT CHANGE UP (ref 37–47)
HGB BLD-MCNC: 14.1 G/DL — SIGNIFICANT CHANGE UP (ref 12–16)
IMM GRANULOCYTES NFR BLD AUTO: 0.3 % — SIGNIFICANT CHANGE UP (ref 0.1–0.3)
INR BLD: 1.06 RATIO — SIGNIFICANT CHANGE UP (ref 0.65–1.3)
LYMPHOCYTES # BLD AUTO: 0.91 K/UL — LOW (ref 1.2–3.4)
LYMPHOCYTES # BLD AUTO: 12.2 % — LOW (ref 20.5–51.1)
MCHC RBC-ENTMCNC: 30.3 PG — SIGNIFICANT CHANGE UP (ref 27–31)
MCHC RBC-ENTMCNC: 34.3 G/DL — SIGNIFICANT CHANGE UP (ref 32–37)
MCV RBC AUTO: 88.2 FL — SIGNIFICANT CHANGE UP (ref 81–99)
MONOCYTES # BLD AUTO: 0.24 K/UL — SIGNIFICANT CHANGE UP (ref 0.1–0.6)
MONOCYTES NFR BLD AUTO: 3.2 % — SIGNIFICANT CHANGE UP (ref 1.7–9.3)
NEUTROPHILS # BLD AUTO: 6.23 K/UL — SIGNIFICANT CHANGE UP (ref 1.4–6.5)
NEUTROPHILS NFR BLD AUTO: 83.2 % — HIGH (ref 42.2–75.2)
NRBC # BLD: 0 /100 WBCS — SIGNIFICANT CHANGE UP (ref 0–0)
PLATELET # BLD AUTO: 231 K/UL — SIGNIFICANT CHANGE UP (ref 130–400)
POTASSIUM SERPL-MCNC: 4 MMOL/L — SIGNIFICANT CHANGE UP (ref 3.5–5)
POTASSIUM SERPL-SCNC: 4 MMOL/L — SIGNIFICANT CHANGE UP (ref 3.5–5)
PROT SERPL-MCNC: 6.2 G/DL — SIGNIFICANT CHANGE UP (ref 6–8)
PROTHROM AB SERPL-ACNC: 12.2 SEC — SIGNIFICANT CHANGE UP (ref 9.95–12.87)
RBC # BLD: 4.66 M/UL — SIGNIFICANT CHANGE UP (ref 4.2–5.4)
RBC # FLD: 13.8 % — SIGNIFICANT CHANGE UP (ref 11.5–14.5)
SARS-COV-2 RNA SPEC QL NAA+PROBE: SIGNIFICANT CHANGE UP
SODIUM SERPL-SCNC: 138 MMOL/L — SIGNIFICANT CHANGE UP (ref 135–146)
TROPONIN T SERPL-MCNC: <0.01 NG/ML — SIGNIFICANT CHANGE UP
WBC # BLD: 7.48 K/UL — SIGNIFICANT CHANGE UP (ref 4.8–10.8)
WBC # FLD AUTO: 7.48 K/UL — SIGNIFICANT CHANGE UP (ref 4.8–10.8)

## 2020-06-12 PROCEDURE — 99285 EMERGENCY DEPT VISIT HI MDM: CPT

## 2020-06-12 PROCEDURE — 93010 ELECTROCARDIOGRAM REPORT: CPT

## 2020-06-12 PROCEDURE — 70450 CT HEAD/BRAIN W/O DYE: CPT | Mod: 26

## 2020-06-12 PROCEDURE — 71045 X-RAY EXAM CHEST 1 VIEW: CPT | Mod: 26

## 2020-06-12 RX ORDER — ENOXAPARIN SODIUM 100 MG/ML
60 INJECTION SUBCUTANEOUS
Refills: 0 | Status: DISCONTINUED | OUTPATIENT
Start: 2020-06-12 | End: 2020-06-15

## 2020-06-12 RX ORDER — METOPROLOL TARTRATE 50 MG
25 TABLET ORAL
Refills: 0 | Status: DISCONTINUED | OUTPATIENT
Start: 2020-06-12 | End: 2020-06-13

## 2020-06-12 RX ORDER — ENOXAPARIN SODIUM 100 MG/ML
60 INJECTION SUBCUTANEOUS ONCE
Refills: 0 | Status: COMPLETED | OUTPATIENT
Start: 2020-06-12 | End: 2020-06-12

## 2020-06-12 RX ORDER — QUETIAPINE FUMARATE 200 MG/1
50 TABLET, FILM COATED ORAL
Refills: 0 | Status: DISCONTINUED | OUTPATIENT
Start: 2020-06-12 | End: 2020-06-16

## 2020-06-12 RX ADMIN — ENOXAPARIN SODIUM 60 MILLIGRAM(S): 100 INJECTION SUBCUTANEOUS at 18:26

## 2020-06-12 RX ADMIN — Medication 25 MILLIGRAM(S): at 21:30

## 2020-06-12 NOTE — H&P ADULT - NSHPLABSRESULTS_GEN_ALL_CORE
CBC Full  -  ( 12 Jun 2020 16:50 )  WBC Count : 7.48 K/uL  RBC Count : 4.66 M/uL  Hemoglobin : 14.1 g/dL  Hematocrit : 41.1 %  Platelet Count - Automated : 231 K/uL  Mean Cell Volume : 88.2 fL  Mean Cell Hemoglobin : 30.3 pg  Mean Cell Hemoglobin Concentration : 34.3 g/dL  Auto Neutrophil # : 6.23 K/uL  Auto Lymphocyte # : 0.91 K/uL  Auto Monocyte # : 0.24 K/uL  Auto Eosinophil # : 0.05 K/uL  Auto Basophil # : 0.03 K/uL  Auto Neutrophil % : 83.2 %  Auto Lymphocyte % : 12.2 %  Auto Monocyte % : 3.2 %  Auto Eosinophil % : 0.7 %  Auto Basophil % : 0.4 %    06-12    138  |  104  |  25<H>  ----------------------------<  173<H>  4.0   |  20  |  0.7    Ca    9.0      12 Jun 2020 16:50    TPro  6.2  /  Alb  3.6  /  TBili  0.6  /  DBili  x   /  AST  15  /  ALT  5   /  AlkPhos  81  06-12

## 2020-06-12 NOTE — ED PROVIDER NOTE - OBJECTIVE STATEMENT
Pt BIBA s/p syncopal episode in the shower. she is unsure how long it lasted, but called to son who helped her out of tub and called EMS. pt reports prior syncopal episodes for which she did not come to the ED. pt has h/o thyroid disease and is on synthroid; denies other medical problems. pt feels well now and denies fever/chill/HA/dizziness/chest pain/palpitation/sob/abd pain/n/v/d/ black stool/bloody stool/urinary sxs

## 2020-06-12 NOTE — ED ADULT NURSE NOTE - OBJECTIVE STATEMENT
pt states "my whole body hurts and I feel weaker for a whole year. I cant walk and my  takes care of me at home"

## 2020-06-12 NOTE — ED ADULT NURSE NOTE - CHIEF COMPLAINT QUOTE
BIBA for syncopal episode at home. as per EMS, pt was in the shower when she "passed out", pt's son caught her  FS =156 in triage

## 2020-06-12 NOTE — ED ADULT TRIAGE NOTE - CHIEF COMPLAINT QUOTE
BIBA for syncopal episode at home. as per EMS, pt was in the shower when she "passed out", pt's son caught her BIBA for syncopal episode at home. as per EMS, pt was in the shower when she "passed out", pt's son caught her  FS =156 in triage

## 2020-06-12 NOTE — ED PROVIDER NOTE - PROGRESS NOTE DETAILS
additional hx obtained by sonNaren; pt with no known h/o a.fib and not on AC despite 2 prior EKG in EMR (feb and may 2020); also reports pt has recently been experiencing dementia and having hallucinations at night, so has been taking a sleeping pill. last month, was found wandering on the street and had eval in ED. requesting to see psych during admission.

## 2020-06-12 NOTE — ED PROVIDER NOTE - ATTENDING CONTRIBUTION TO CARE
79yo F history of Hypothyroidism Dementia presenting after a syncopal episode inf the shower. No head injury/trauma, was caught. Currently asymptomatic, no complaints. Per discussion with family, they are concerned that her dementia is getting worse, that she is developing paranoia and hallucinations   Constitutional: Well appearing. No acute distress. Non toxic.   Eyes: PERRLA. Extraocular movements intact, no entrapment. Conjunctiva normal.   ENT: No nasal discharge. Moist mucus membranes.   Neck: Supple, non tender, full range of motion.   CV: RRR no murmurs, rubs, or gallops. +S1S2.   Pulm: Clear to auscultation bilaterally. Normal work of breathing.  Abd: soft NT ND +BS.   Ext: Warm and well perfused x4, moving all extremities, no edema.   Psy: Cooperative, appropriate.   Skin: Warm, dry, no rash  Neuro: CN2-12 grossly intact no sensory or motor deficits throughout, no drift

## 2020-06-12 NOTE — H&P ADULT - ASSESSMENT
Mrs Ford is 79 y/o F with PMH of mood disorder with hallucination , hyperthyroidism , Afib ?now presenting with syncope. Per son pt was in shower when she had a witness collapse. pt fell unconscious with uprolling of eyes. son denies any seizure like activity , chest pain or palpitations. On presentation to ED pt was found to be in rapid afib. Per son no h/o of bleeding so she was started on therapeutic lovenox with rate control.    #) Syncope  --  Ddx (neuro mediated vs orthostatic vs cardiovascular)  --  r/o other causes for loc (seizure / tia / metabolic (low glu, o2, co2))  --  Admit tele   --  check monitor for arrhythmic events  --  Check orthostatic vs  --  check eeg,  tte     #) New Onset Afib  - Rate control metoprolol / cardizime  - Lovenox therapeutic    #) Hyperthyroidism  - Contine methimazole 5 mg  - f/u TSH levels    #) Hallucinations  -continue quietapine    #) Diet DASH    DVT PPX on lovenox    Dispo Acute Mrs Ford is 81 y/o F with PMH of mood disorder with hallucination , hyperthyroidism , Afib ?now presenting with syncope. Per son pt was in shower when she had a witness collapse. pt fell unconscious with uprolling of eyes. son denies any seizure like activity , chest pain or palpitations. On presentation to ED pt was found to be in rapid afib. Per son no h/o of bleeding so she was started on therapeutic lovenox with rate control.    #) Syncope  --  Ddx (neuro mediated vs orthostatic vs cardiovascular)  no evid of ACS or acute stroke.  doubt seizure  --  r/o other causes for loc (seizure / tia / metabolic (low glu, o2, co2))  --  Admit tele   --  check monitor for arrhythmic events  --  Check orthostatic vs  --  check eeg,  tte     #) New Onset Afib  - Rate control metoprolol / cardizime  - Lovenox therapeutic    #) Hyperthyroidism  - Contine methimazole 5 mg  - f/u TSH levels    #) Hallucinations  -continue quietapine    #) Diet DASH    DVT PPX on lovenox    Dispo Acute

## 2020-06-12 NOTE — ED ADULT NURSE REASSESSMENT NOTE - NS ED NURSE REASSESS COMMENT FT1
Assumed care of patient. Patient sitting on stretcher AAOx4 in no acute distress. Respirations easy and unlabored. She offers no complaints at this time. She presents for syncopal episode while in the bathroom. No obvious signs of injury or trauma. She is being admitted to telemetry unit for new onset atrial fibrillation. Awaiting admission bed assignment at this time. Continuous cardiac monitor, blood pressure, and oxygen saturation monitoring applied. Heart rate 90's to 100's at this time. Patient denies need for assistance, call bell in reach, will continue to monitor patient.

## 2020-06-12 NOTE — ED PROVIDER NOTE - PHYSICAL EXAMINATION
CONSTITUTIONAL: Well-appearing; well-nourished; in no apparent distress.   EYES: PERRL; EOM intact.   CARDIOVASCULAR: Normal S1, S2; no murmurs, rubs, or gallops.   RESPIRATORY: Normal chest excursion with respiration; breath sounds clear and equal bilaterally; no wheezes, rhonchi, or rales.  GI/: non-distended; non-tender; no palpable organomegaly.   MS: No evidence of trauma or deformity. Non-tender to palpation. No scoliosis. No CVA tenderness. Normal ROM in all four extremities; non-tender to palpation; distal pulses are normal.   SKIN: Normal for age and race; warm; dry; good turgor; no apparent lesions or exudate.   NEURO/PSYCH: A & O x 4; grossly unremarkable. mood and manner are appropriate. Grooming and personal hygiene are appropriate. No apparent thoughts of harm to self or others.

## 2020-06-12 NOTE — ED ADULT NURSE REASSESSMENT NOTE - NS ED NURSE REASSESS COMMENT FT1
Patient has been assigned a telemetry admission bed. Report called to receiving RN. Patient stable and ready for transport. Awaiting patient transporter for assistance with transfer. Assessment unchanged. Patient denies need for assistance, call bell in reach, will continue to monitor patient until transfer to inpatient unit.

## 2020-06-12 NOTE — H&P ADULT - NSHPPHYSICALEXAM_GEN_ALL_CORE
PHYSICAL EXAM:  GENERAL: NAD, well-developed  HEAD:  Atraumatic, Normocephalic  EYES: EOMI, PERRLA, conjunctiva and sclera clear  NECK: Supple, No JVD  CHEST/LUNG: Clear to auscultation bilaterally; No wheeze  HEART: Regular rate and rhythm; No murmurs, rubs, or gallops  ABDOMEN: Soft, Nontender, Nondistended; Bowel sounds present  EXTREMITIES:  2+ Peripheral Pulses, No clubbing, cyanosis, or edema

## 2020-06-12 NOTE — H&P ADULT - HISTORY OF PRESENT ILLNESS
Mrs Ford is 81 y/o F with PMH of mood disorder with hallucination , hyperthyroidism , Afib ?now presenting with syncope. Per son pt was in shower when she had a witness collapse. pt fell unconscious with uprolling of eyes. son denies any seizure like activity , chest pain or palpitations. On presentation to ED pt was found to be in rapid afib. Per son no h/o of bleeding so she was started on therapeutic lovenox with rate control. Mrs Ford is 79 y/o F with PMH of mood disorder with hallucination , hyperthyroidism , Afib ?now presenting with syncope. Per son pt was in shower when she had a witness collapse. pt fell unconscious with uprolling of eyes. son denies any seizure like activity , chest pain or palpitations. On presentation to ED pt was found to be in rapid afib. Per son no h/o of bleeding so she was started on therapeutic lovenox with rate control.  denies cp

## 2020-06-12 NOTE — ED PROVIDER NOTE - NS ED ROS FT
Constitutional: no fever, chills, no recent weight loss, change in appetite or malaise  Eyes: no redness/discharge/pain/vision changes  ENT: no rhinorrhea/ear pain/sore throat  Cardiac: No chest pain, SOB or edema.  Respiratory: No cough or respiratory distress  GI: No nausea, vomiting, diarrhea or abdominal pain.  : No dysuria, frequency, urgency or hematuria  MS: no pain to back or extremities, no loss of ROM, no weakness  Neuro: No headache or weakness.   Skin: No skin rash.  Endocrine: No history of diabetes.  Except as documented in the HPI, all other systems are negative.

## 2020-06-12 NOTE — ED PROVIDER NOTE - CLINICAL SUMMARY MEDICAL DECISION MAKING FREE TEXT BOX
79yo F history of Hypothyroidism Dementia presenting after a syncopal episode inf the shower. No head injury/trauma, was caught. Currently asymptomatic, no complaints. Per discussion with family, they are concerned that her dementia is getting worse, that she is developing paranoia and hallucinations. labs ekg imaging reviewed. ?new onset afib. pt not currently anticoagulated, will start lovenox, admit for further workup

## 2020-06-13 LAB
ANION GAP SERPL CALC-SCNC: 14 MMOL/L — SIGNIFICANT CHANGE UP (ref 7–14)
BUN SERPL-MCNC: 25 MG/DL — HIGH (ref 10–20)
CALCIUM SERPL-MCNC: 9.4 MG/DL — SIGNIFICANT CHANGE UP (ref 8.5–10.1)
CHLORIDE SERPL-SCNC: 104 MMOL/L — SIGNIFICANT CHANGE UP (ref 98–110)
CO2 SERPL-SCNC: 22 MMOL/L — SIGNIFICANT CHANGE UP (ref 17–32)
CREAT SERPL-MCNC: 0.7 MG/DL — SIGNIFICANT CHANGE UP (ref 0.7–1.5)
GLUCOSE SERPL-MCNC: 123 MG/DL — HIGH (ref 70–99)
HCT VFR BLD CALC: 41.9 % — SIGNIFICANT CHANGE UP (ref 37–47)
HGB BLD-MCNC: 13.8 G/DL — SIGNIFICANT CHANGE UP (ref 12–16)
MCHC RBC-ENTMCNC: 28.8 PG — SIGNIFICANT CHANGE UP (ref 27–31)
MCHC RBC-ENTMCNC: 32.9 G/DL — SIGNIFICANT CHANGE UP (ref 32–37)
MCV RBC AUTO: 87.3 FL — SIGNIFICANT CHANGE UP (ref 81–99)
NRBC # BLD: 0 /100 WBCS — SIGNIFICANT CHANGE UP (ref 0–0)
PLATELET # BLD AUTO: 231 K/UL — SIGNIFICANT CHANGE UP (ref 130–400)
POTASSIUM SERPL-MCNC: 3.5 MMOL/L — SIGNIFICANT CHANGE UP (ref 3.5–5)
POTASSIUM SERPL-SCNC: 3.5 MMOL/L — SIGNIFICANT CHANGE UP (ref 3.5–5)
RBC # BLD: 4.8 M/UL — SIGNIFICANT CHANGE UP (ref 4.2–5.4)
RBC # FLD: 13.6 % — SIGNIFICANT CHANGE UP (ref 11.5–14.5)
SODIUM SERPL-SCNC: 140 MMOL/L — SIGNIFICANT CHANGE UP (ref 135–146)
TROPONIN T SERPL-MCNC: <0.01 NG/ML — SIGNIFICANT CHANGE UP
TROPONIN T SERPL-MCNC: <0.01 NG/ML — SIGNIFICANT CHANGE UP
TSH SERPL-MCNC: 0.02 UIU/ML — LOW (ref 0.27–4.2)
WBC # BLD: 7.44 K/UL — SIGNIFICANT CHANGE UP (ref 4.8–10.8)
WBC # FLD AUTO: 7.44 K/UL — SIGNIFICANT CHANGE UP (ref 4.8–10.8)

## 2020-06-13 PROCEDURE — 93306 TTE W/DOPPLER COMPLETE: CPT | Mod: 26

## 2020-06-13 RX ORDER — METOPROLOL TARTRATE 50 MG
50 TABLET ORAL DAILY
Refills: 0 | Status: DISCONTINUED | OUTPATIENT
Start: 2020-06-13 | End: 2020-06-13

## 2020-06-13 RX ORDER — METOPROLOL TARTRATE 50 MG
25 TABLET ORAL ONCE
Refills: 0 | Status: COMPLETED | OUTPATIENT
Start: 2020-06-13 | End: 2020-06-13

## 2020-06-13 RX ORDER — APIXABAN 2.5 MG/1
1 TABLET, FILM COATED ORAL
Qty: 60 | Refills: 0
Start: 2020-06-13 | End: 2020-07-12

## 2020-06-13 RX ORDER — METOPROLOL TARTRATE 50 MG
25 TABLET ORAL ONCE
Refills: 0 | Status: DISCONTINUED | OUTPATIENT
Start: 2020-06-13 | End: 2020-06-13

## 2020-06-13 RX ORDER — METOPROLOL TARTRATE 50 MG
50 TABLET ORAL DAILY
Refills: 0 | Status: DISCONTINUED | OUTPATIENT
Start: 2020-06-14 | End: 2020-06-16

## 2020-06-13 RX ADMIN — Medication 25 MILLIGRAM(S): at 18:07

## 2020-06-13 RX ADMIN — QUETIAPINE FUMARATE 50 MILLIGRAM(S): 200 TABLET, FILM COATED ORAL at 05:31

## 2020-06-13 RX ADMIN — Medication 25 MILLIGRAM(S): at 05:32

## 2020-06-13 RX ADMIN — QUETIAPINE FUMARATE 50 MILLIGRAM(S): 200 TABLET, FILM COATED ORAL at 17:18

## 2020-06-13 RX ADMIN — ENOXAPARIN SODIUM 60 MILLIGRAM(S): 100 INJECTION SUBCUTANEOUS at 17:17

## 2020-06-13 RX ADMIN — ENOXAPARIN SODIUM 60 MILLIGRAM(S): 100 INJECTION SUBCUTANEOUS at 05:31

## 2020-06-13 NOTE — PHYSICAL THERAPY INITIAL EVALUATION ADULT - THERAPY FREQUENCY, PT EVAL
"Colorectal Surgery Follow-up    Subjective     Citlali Gifford is a 63 y.o. female who is due for follow-up colon cancer screening.  Citlali has a history of colon polyps.  She had a polyp removed in the sigmoid colon  that had focal high-grade dysplasia.  In  she had another adenomatous polyp removed from the sigmoid colon.  Her last colonoscopy was 2015 and 4 polyps were removed at that time.  Because of her previous history of high-grade dysplasia and the number of polyps a 3 year colonoscopy was recommended.    She is not having any bowel issues such as blood in the stool or change in her bowel habits.  She has a family history of colon cancer in her paternal grandfather in his early 50s.  Her father had colon polyps.    Medical History:   Past Medical History:   Diagnosis Date   • Hyperthyroidism    • Type 2 diabetes mellitus (CMS/HCC) (HCC)        Surgical History:   Past Surgical History:   Procedure Laterality Date   •  SECTION     • CHOLECYSTECTOMY     • DILATION AND EVACUATION     • GALLBLADDER SURGERY     • HERNIA REPAIR     • HYSTERECTOMY         Allergies: Sulfa (sulfonamide antibiotics); Wellbutrin [bupropion hcl]; and Augmentin [amoxicillin-pot clavulanate]    Current Medications:  •  acarbose  •  aspirin  •  b complex vitamins  •  blood sugar diagnostic  •  cholecalciferol (vitamin D3)  •  choline fenofibrate  •  ESCITALOPRAM OXALATE (LEXAPRO ORAL)  •  estradiol  •  lancets  •  metFORMIN  •  omega-3 fatty acids-fish oil  •  quinapril  •  sertraline  •  simvastatin  •  TRULICITY  •  azelastine  •  DULAGLUTIDE (TRULICITY SUBQ)  •  LORazepam  •  METFORMIN HCL (METFORMIN ORAL)  •  naproxen  •  sertraline    Objective     Physicial Exam  /77 (BP Location: Right forearm, Patient Position: Sitting)   Pulse 82   Temp 36.7 °C (98.1 °F) (Temporal)   Ht 1.549 m (5' 1\")   Wt 97.1 kg (214 lb)   BMI 40.43 kg/m²     Physical Exam   Constitutional: She is oriented to person, " place, and time. She appears well-developed and well-nourished.   HENT:   Head: Normocephalic and atraumatic.   Eyes: Conjunctivae and EOM are normal. Pupils are equal, round, and reactive to light.   Neck: Normal range of motion. Neck supple.   Cardiovascular: Normal rate, regular rhythm and normal heart sounds.    Pulmonary/Chest: Breath sounds normal. She has no wheezes.   Abdominal: Soft. She exhibits no distension and no mass. There is no tenderness.   Musculoskeletal: Normal range of motion.   Lymphadenopathy:     She has no cervical adenopathy.   Neurological: She is alert and oriented to person, place, and time. No cranial nerve deficit.   Skin: Skin is warm and dry. No rash noted.   Psychiatric: She has a normal mood and affect.   Vitals reviewed.          Assessment     Problem List     History of adenomatous polyp of colon - Primary    Current Assessment & Plan     We reviewed her previous colonoscopies and history.  She is due for her next colonoscopy. We discussed the reason for colonoscopy and cancer screening.  We discussed the procedure and the risks and benefits of colonoscopy including the risk of perforation and bleeding from a polyp site.  We discussed bowel prep instructions.  she understands these issues and consents.    She did the MiraLAX prep previously.  Because of her diabetes, she did not want to use Gatorade and we will use smart water.           Relevant Orders    Case request operating room: FLEXIBLE COLONOSCOPY FOR COLORECTAL CANCER SCREENING HIGH RISK (Completed)                Liam Toribio MD     3-5x/week

## 2020-06-13 NOTE — PHYSICAL THERAPY INITIAL EVALUATION ADULT - GAIT DEVIATIONS NOTED, PT EVAL
decreased kennedi/decreased velocity of limb motion/decreased stride length/decreased weight-shifting ability/decreased step length

## 2020-06-13 NOTE — ED ADULT NURSE REASSESSMENT NOTE - NS ED NURSE REASSESS COMMENT FT1
Patient transported to  via stretcher by ED PCA accompanied by this RN with continuous cardiac monitor applied. Patient AAOx4 in no acute distress and assessment unchanged at time of transfer.

## 2020-06-13 NOTE — CONSULT NOTE ADULT - ASSESSMENT
IMPRESSION: Rehab of 79 y/o  f  ptn  rehab  for  syncope  debility      PRECAUTIONS: [ x ] Cardiac  [  ] Respiratory  [  ] Seizures [  ] Contact Isolation  [  ] Droplet Isolation  [ FALL ] Other    Weight Bearing Status:     RECOMMENDATION:    Out of Bed to Chair     DVT/Decubiti Prophylaxis    REHAB PLAN:     [  xx ] Bedside P/T 3-5 times a week   [   ]   Bedside O/T  2-3 times a week             [   ] No Rehab Therapy Indicated                   [   ]  Speech Therapy   Conditioning/ROM                                    ADL  Bed Mobility                                               Conditioning/ROM  Transfers                                                     Bed Mobility  Sitting /Standing Balance                         Transfers                                        Gait Training                                               Sitting/Standing Balance  Stair Training [   ]Applicable                    Home equipment Eval                                                                        Splinting  [   ] Only      GOALS:   ADL   [ x ]   Independent                    Transfers  [  x ] Independent                          Ambulation  [  x ] Independent     [    ] With device                            [x  ]  CG                                                         [x  ]  CG                                                                  [ x  ] CG                            [    ] Min A                                                   [   ] Min A                                                              [   ] Min  A          DISCHARGE PLAN:   [   ]  Good candidate for Intensive Rehabilitation/Hospital based-4A [facility]                                             Will tolerate 3hrs Intensive Rehab Daily                                       [   xx ]  Short Term Rehab in Skilled Nursing Facility                                       [    ]  Home with Outpatient or VN services                                         [    ]  Possible Candidate for Intensive Hospital based Rehab

## 2020-06-13 NOTE — PHYSICAL THERAPY INITIAL EVALUATION ADULT - GENERAL OBSERVATIONS, REHAB EVAL
encountered OOB in chair in [place], in NAD + rock and roll restraint [id number] pt speaks Haitian. Haitian speaking staff person assisted with translation after 2 unsuccessful attempts to use translating service. pt could not hear/understand.

## 2020-06-13 NOTE — CONSULT NOTE ADULT - SUBJECTIVE AND OBJECTIVE BOX
HPI: 79 y/o F with PMH of mood disorder with hallucination , hyperthyroidism , Afib ?now presenting with syncope. Per son pt was in shower when she had a witness collapse. pt fell unconscious with uprolling of eyes. son denies any seizure like activity , chest pain or palpitations. On presentation to ED pt was found to be in rapid a fib. Per son no h/o of bleeding so she was started on therapeutic lovenox with rate control. ptn  seen and exam  at  beds side  oob  to  chair  nad  slight  confused  but  nad  f.u  simple  command    PTN  REFERRED TO ACUTE  REHAB  FOR  EVAL AND  TX   PAST MEDICAL & SURGICAL HISTORY:  Thyroid disease      Hospital Course:    TODAY'S SUBJECTIVE & REVIEW OF SYMPTOMS:     Constitutional WNL   Cardio WNL   Resp WNL   GI WNL  Heme WNL  Endo WNL  Skin WNL  MSK WNL  Neuro WNL  Cognitive WNL  Psych WNL      MEDICATIONS  (STANDING):  enoxaparin Injectable 60 milliGRAM(s) SubCutaneous two times a day  methimazole 5 milliGRAM(s) Oral daily  metoprolol tartrate 25 milliGRAM(s) Oral two times a day  QUEtiapine Oral Tab/Cap - Peds 50 milliGRAM(s) Oral two times a day    MEDICATIONS  (PRN):      FAMILY HISTORY:      Allergies    No Known Allergies    Intolerances        SOCIAL HISTORY:    [  ] Etoh  [  ] Smoking  [  ] Substance abuse     Home Environment:  [  ] Home Alone  [  x] Lives with Family  [  ] Home Health Aid    Dwelling:  [  ] Apartment  [ x ] Private House  [  ] Adult Home  [  ] Skilled Nursing Facility      [  ] Short Term  [  ] Long Term  [x  ] Stairs       Elevator [  ]    FUNCTIONAL STATUS PTA: (Check all that apply)  Ambulation: [ x  ]Independent    [  ] Dependent     [  ] Non-Ambulatory  Assistive Device: [  ] SA Cane  [  ]  Q Cane  [  ] Walker  [  ]  Wheelchair  ADL : [x] Independent  [  ]  Dependent       Vital Signs Last 24 Hrs  T(C): 36.5 (13 Jun 2020 04:47), Max: 36.9 (12 Jun 2020 20:38)  T(F): 97.7 (13 Jun 2020 04:47), Max: 98.4 (12 Jun 2020 20:38)  HR: 106 (13 Jun 2020 04:47) (88 - 107)  BP: 124/80 (13 Jun 2020 04:47) (96/66 - 134/79)  BP(mean): --  RR: 17 (13 Jun 2020 04:47) (16 - 20)  SpO2: 100% (13 Jun 2020 01:00) (96% - 100%)      PHYSICAL EXAM: Alert & Oriented X2  GENERAL: NAD, well-groomed, well-developed  HEAD:  Atraumatic, Normocephalic  EYES: EOMI, PERRLA, conjunctiva and sclera clear  NECK: Supple, No JVD, Normal thyroid  CHEST/LUNG: Clear to percussion bilaterally; No rales, rhonchi, wheezing, or rubs  HEART: Regular rate and rhythm; No murmurs, rubs, or gallops  ABDOMEN: Soft, Nontender, Nondistended; Bowel sounds present  EXTREMITIES:  2+ Peripheral Pulses, No clubbing, cyanosis, or edema    NERVOUS SYSTEM:  Cranial Nerves 2-12 intact [x ] Abnormal  [  ]  ROM: WFL all extremities [ x ]  Abnormal [  ]  Motor Strength: WFL all extremities  [x  ]  Abnormal [  ]  Sensation: intact to light touch [ x ] Abnormal [  ]  Reflexes: Symmetric [ x ]  Abnormal [  ]    FUNCTIONAL STATUS:  Bed Mobility: Independent [  ]  Supervision [  ]  Needs Assistance [ x ]  N/A [  ]  Transfers: Independent [  ]  Supervision [  ]  Needs Assistance [ x ]  N/A [  ]   Ambulation: Independent [  ]  Supervision [  ]  Needs Assistance [x  ]  N/A [  ]  ADL: Independent [  ] Requires Assistance [  ] N/A [ x ]  SEE PT/OT IE NOTES    LABS:                        13.8   7.44  )-----------( 231      ( 13 Jun 2020 07:05 )             41.9     06-13    140  |  104  |  25<H>  ----------------------------<  123<H>  3.5   |  22  |  0.7    Ca    9.4      13 Jun 2020 07:05    TPro  6.2  /  Alb  3.6  /  TBili  0.6  /  DBili  x   /  AST  15  /  ALT  5   /  AlkPhos  81  06-12    PT/INR - ( 12 Jun 2020 16:50 )   PT: 12.20 sec;   INR: 1.06 ratio         PTT - ( 12 Jun 2020 16:50 )  PTT:23.6 sec      RADIOLOGY & ADDITIONAL STUDIES:  < from: CT Head No Cont (06.12.20 @ 17:06) >  IMPRESSION:  1.  No evidence of acute intracranial pathology.  2.  Chronic microvascular ischemic changes.    < end of copied text >    Assesment:

## 2020-06-14 ENCOUNTER — TRANSCRIPTION ENCOUNTER (OUTPATIENT)
Age: 80
End: 2020-06-14

## 2020-06-14 PROCEDURE — 95819 EEG AWAKE AND ASLEEP: CPT | Mod: 26

## 2020-06-14 RX ADMIN — QUETIAPINE FUMARATE 50 MILLIGRAM(S): 200 TABLET, FILM COATED ORAL at 17:25

## 2020-06-14 RX ADMIN — Medication 50 MILLIGRAM(S): at 05:38

## 2020-06-14 RX ADMIN — ENOXAPARIN SODIUM 60 MILLIGRAM(S): 100 INJECTION SUBCUTANEOUS at 17:25

## 2020-06-14 RX ADMIN — ENOXAPARIN SODIUM 60 MILLIGRAM(S): 100 INJECTION SUBCUTANEOUS at 05:37

## 2020-06-14 RX ADMIN — QUETIAPINE FUMARATE 50 MILLIGRAM(S): 200 TABLET, FILM COATED ORAL at 05:38

## 2020-06-14 NOTE — DISCHARGE NOTE NURSING/CASE MANAGEMENT/SOCIAL WORK - PATIENT PORTAL LINK FT
You can access the FollowMyHealth Patient Portal offered by [facility] by registering at the following website: [email or web address]. By joining [employer]’s FollowMyHealth portal, you will also be able to view your health information using other applications (apps) compatible with our system.

## 2020-06-14 NOTE — PROGRESS NOTE ADULT - SUBJECTIVE AND OBJECTIVE BOX
CHILANGO PEMBERTON 80y Female  MRN#: [id number]   CODE STATUS: FULL     SUBJECTIVE  Patient is a 80y old Female who presents with a chief complaint of Syncope (13 Jun 2020 11:24)  Currently admitted to medicine with the primary diagnosis of Syncope    Today is hospital day 2d.    OBJECTIVE  PAST MEDICAL & SURGICAL HISTORY  Thyroid disease    ALLERGIES:  No Known Allergies    MEDICATIONS:  STANDING MEDICATIONS  enoxaparin Injectable 60 milliGRAM(s) SubCutaneous two times a day  methimazole 5 milliGRAM(s) Oral daily  metoprolol succinate ER 50 milliGRAM(s) Oral daily  QUEtiapine Oral Tab/Cap - Peds 50 milliGRAM(s) Oral two times a day    PRN MEDICATIONS      VITAL SIGNS: Last 24 Hours  T(C): 36.1 (14 Jun 2020 05:20), Max: 37 (13 Jun 2020 13:24)  T(F): 97 (14 Jun 2020 05:20), Max: 98.6 (13 Jun 2020 13:24)  HR: 88 (14 Jun 2020 05:20) (88 - 101)  BP: 97/54 (14 Jun 2020 05:20) (97/54 - 139/84)  BP(mean): --  RR: 18 (14 Jun 2020 05:20) (17 - 18)  SpO2: --    LABS:                        13.8   7.44  )-----------( 231      ( 13 Jun 2020 07:05 )             41.9     06-13    140  |  104  |  25<H>  ----------------------------<  123<H>  3.5   |  22  |  0.7    Ca    9.4      13 Jun 2020 07:05    TPro  6.2  /  Alb  3.6  /  TBili  0.6  /  DBili  x   /  AST  15  /  ALT  5   /  AlkPhos  81  06-12    PT/INR - ( 12 Jun 2020 16:50 )   PT: 12.20 sec;   INR: 1.06 ratio         PTT - ( 12 Jun 2020 16:50 )  PTT:23.6 sec      CARDIAC MARKERS ( 13 Jun 2020 07:05 )  x     / <0.01 ng/mL / x     / x     / x      CARDIAC MARKERS ( 12 Jun 2020 23:32 )  x     / <0.01 ng/mL / x     / x     / x      CARDIAC MARKERS ( 12 Jun 2020 16:50 )  x     / <0.01 ng/mL / x     / x     / x          RADIOLOGY:  < from: CT Head No Cont (06.12.20 @ 17:06) >    IMPRESSION:  1.  No evidence of acute intracranial pathology.  2.  Chronic microvascular ischemic changes.    < end of copied text >    CXR  Impression:    Ill-defined hazy opacity is noted in the right upper lobe. An early infiltrate cannot be excluded.    PHYSICAL EXAM:  GENERAL: NAD, well-developed, AAOx3  HEENT:  Atraumatic, Normocephalic. EOMI, PERRLA, conjunctiva and sclera clear, No JVD  PULMONARY: Clear to auscultation bilaterally; No wheeze  CARDIOVASCULAR: Regular rate and rhythm; No murmurs, rubs, or gallops  GASTROINTESTINAL: Soft, Nontender, Nondistended; Bowel sounds present  MUSCULOSKELETAL:  2+ Peripheral Pulses, No clubbing, cyanosis, or edema  NEUROLOGY: non-focal  SKIN: No rashes or lesions      ADMISSION SUMMARY  Patient is a 80y old Female who presents with a chief complaint of Syncope (13 Jun 2020 11:24)  Currently admitted to medicine with the primary diagnosis of Syncope    ASSESSMENT & PLAN  81 y/o F with PMH of mood disorder with hallucination, hyperthyroidism , Afib ?now presenting with syncope. Per son pt was in shower when she had a witness collapse. pt fell unconscious with uprolling of eyes. son denies any seizure like activity , chest pain or palpitations. On presentation to ED pt was found to be in rapid Afib. Per son no h/o of bleeding so she was started on therapeutic lovenox with rate control.    #) Syncope  --  Ddx (neuro mediated vs orthostatic vs cardiovascular)  no evid of ACS or acute stroke.  doubt seizure  -- r/o other causes for loc (seizure / tia / metabolic (low glu, o2, co2))  --  check monitor for arrhythmic events  --  Check orthostatic vs  --  check eeg,  tte     #) New Onset Afib  - rate-controlled with metoprolol  - therapeutic lovenox     #) Hyperthyroidism  - Contine methimazole 5 mg  - TSH levels reduced but need to follow fT3, T4    #) Hallucinations  -continue quetiapine    #) Diet DASH    -DVT PPX- lovenox  -Dispo- Acute CHILANGO PEMBERTON 80y Female  MRN#: [id number]   CODE STATUS: FULL     SUBJECTIVE  Patient is a 80y old Female who presents with a chief complaint of Syncope (13 Jun 2020 11:24)  Currently admitted to medicine with the primary diagnosis of Syncope    Today is hospital day 2d. PCA translated in Italian for interview - patient complains of dizziness.     OBJECTIVE  PAST MEDICAL & SURGICAL HISTORY  Thyroid disease    ALLERGIES:  No Known Allergies    MEDICATIONS:  STANDING MEDICATIONS  enoxaparin Injectable 60 milliGRAM(s) SubCutaneous two times a day  methimazole 5 milliGRAM(s) Oral daily  metoprolol succinate ER 50 milliGRAM(s) Oral daily  QUEtiapine Oral Tab/Cap - Peds 50 milliGRAM(s) Oral two times a day    PRN MEDICATIONS      VITAL SIGNS: Last 24 Hours  T(C): 36.1 (14 Jun 2020 05:20), Max: 37 (13 Jun 2020 13:24)  T(F): 97 (14 Jun 2020 05:20), Max: 98.6 (13 Jun 2020 13:24)  HR: 88 (14 Jun 2020 05:20) (88 - 101)  BP: 97/54 (14 Jun 2020 05:20) (97/54 - 139/84)  BP(mean): --  RR: 18 (14 Jun 2020 05:20) (17 - 18)  SpO2: --    LABS:                        13.8   7.44  )-----------( 231      ( 13 Jun 2020 07:05 )             41.9     06-13    140  |  104  |  25<H>  ----------------------------<  123<H>  3.5   |  22  |  0.7    Ca    9.4      13 Jun 2020 07:05    TPro  6.2  /  Alb  3.6  /  TBili  0.6  /  DBili  x   /  AST  15  /  ALT  5   /  AlkPhos  81  06-12    PT/INR - ( 12 Jun 2020 16:50 )   PT: 12.20 sec;   INR: 1.06 ratio         PTT - ( 12 Jun 2020 16:50 )  PTT:23.6 sec      CARDIAC MARKERS ( 13 Jun 2020 07:05 )  x     / <0.01 ng/mL / x     / x     / x      CARDIAC MARKERS ( 12 Jun 2020 23:32 )  x     / <0.01 ng/mL / x     / x     / x      CARDIAC MARKERS ( 12 Jun 2020 16:50 )  x     / <0.01 ng/mL / x     / x     / x          RADIOLOGY:  < from: CT Head No Cont (06.12.20 @ 17:06) >    IMPRESSION:  1.  No evidence of acute intracranial pathology.  2.  Chronic microvascular ischemic changes.    < end of copied text >    CXR  Impression:    Ill-defined hazy opacity is noted in the right upper lobe. An early infiltrate cannot be excluded.    PHYSICAL EXAM:  GENERAL: NAD, AAOx3 with slight confusion (knows year but not month)   HEENT: EOMI   PULMONARY: Clear to auscultation bilaterally; No wheeze  CARDIOVASCULAR: Regular rate and rhythm  GASTROINTESTINAL: Soft, Nontender, Nondistended  MUSCULOSKELETAL:  2+ Peripheral Pulses. No edema  NEUROLOGY: non-focal  SKIN: No rashes     ADMISSION SUMMARY  Patient is a 80y old Female who presents with a chief complaint of Syncope (13 Jun 2020 11:24)  Currently admitted to medicine with the primary diagnosis of Syncope    ASSESSMENT & PLAN  81 y/o F with PMH of mood disorder with hallucination, hyperthyroidism , Afib ?now presenting with syncope. Per son pt was in shower when she had a witness collapse. pt fell unconscious with uprolling of eyes. son denies any seizure like activity, chest pain or palpitations. On presentation to ED pt was found to be in rapid Afib. Per son no h/o of bleeding so she was started on therapeutic lovenox with rate control.    #) Syncope  - CT head negative for acute changes  - CXR- ill defined hazy opacity   - Check monitor for arrhythmic events  - Check orthostatic vitals   - f/u EEG, TTE  - seen by physiatry, recommend SNF      #) New Onset Afib  - rate-controlled with metoprolol  - therapeutic lovenox     #) Hyperthyroidism  - Contine methimazole 5 mg  - TSH levels reduced but need to follow fT3, T4    #) Hallucinations  -continue quetiapine    #) Diet- DASH    -DVT PPX- lovenox  -Dispo- acute, will likely go to SNF CHILANGO PEMBERTON 80y Female  MRN#: [id number]   CODE STATUS: FULL     SUBJECTIVE  Patient is a 80y old Female who presents with a chief complaint of Syncope (13 Jun 2020 11:24)  Currently admitted to medicine with the primary diagnosis of Syncope    Today is hospital day 2d. PCA translated in Colombian for interview - patient complains of dizziness.     OBJECTIVE  PAST MEDICAL & SURGICAL HISTORY  Thyroid disease    ALLERGIES:  No Known Allergies    MEDICATIONS:  STANDING MEDICATIONS  enoxaparin Injectable 60 milliGRAM(s) SubCutaneous two times a day  methimazole 5 milliGRAM(s) Oral daily  metoprolol succinate ER 50 milliGRAM(s) Oral daily  QUEtiapine Oral Tab/Cap - Peds 50 milliGRAM(s) Oral two times a day    PRN MEDICATIONS      VITAL SIGNS: Last 24 Hours  T(C): 36.1 (14 Jun 2020 05:20), Max: 37 (13 Jun 2020 13:24)  T(F): 97 (14 Jun 2020 05:20), Max: 98.6 (13 Jun 2020 13:24)  HR: 88 (14 Jun 2020 05:20) (88 - 101)  BP: 97/54 (14 Jun 2020 05:20) (97/54 - 139/84)  BP(mean): --  RR: 18 (14 Jun 2020 05:20) (17 - 18)  SpO2: --    LABS:                        13.8   7.44  )-----------( 231      ( 13 Jun 2020 07:05 )             41.9     06-13    140  |  104  |  25<H>  ----------------------------<  123<H>  3.5   |  22  |  0.7    Ca    9.4      13 Jun 2020 07:05    TPro  6.2  /  Alb  3.6  /  TBili  0.6  /  DBili  x   /  AST  15  /  ALT  5   /  AlkPhos  81  06-12    PT/INR - ( 12 Jun 2020 16:50 )   PT: 12.20 sec;   INR: 1.06 ratio         PTT - ( 12 Jun 2020 16:50 )  PTT:23.6 sec      CARDIAC MARKERS ( 13 Jun 2020 07:05 )  x     / <0.01 ng/mL / x     / x     / x      CARDIAC MARKERS ( 12 Jun 2020 23:32 )  x     / <0.01 ng/mL / x     / x     / x      CARDIAC MARKERS ( 12 Jun 2020 16:50 )  x     / <0.01 ng/mL / x     / x     / x          RADIOLOGY:  < from: CT Head No Cont (06.12.20 @ 17:06) >    IMPRESSION:  1.  No evidence of acute intracranial pathology.  2.  Chronic microvascular ischemic changes.    < end of copied text >    CXR  Impression:    Ill-defined hazy opacity is noted in the right upper lobe. An early infiltrate cannot be excluded.    PHYSICAL EXAM:  GENERAL: NAD, AAOx3 with slight confusion (knows year but not month)   HEENT: EOMI   PULMONARY: Clear to auscultation bilaterally; No wheeze  CARDIOVASCULAR: Regular rate and rhythm  GASTROINTESTINAL: Soft, Nontender, Nondistended  MUSCULOSKELETAL:  2+ Peripheral Pulses. No edema  NEUROLOGY: non-focal  SKIN: No rashes     ADMISSION SUMMARY  Patient is a 80y old Female who presents with a chief complaint of Syncope (13 Jun 2020 11:24)  Currently admitted to medicine with the primary diagnosis of Syncope    ASSESSMENT & PLAN  81 y/o F with PMH of mood disorder with hallucination, hyperthyroidism , Afib ?now presenting with syncope. Per son pt was in shower when she had a witness collapse. pt fell unconscious with uprolling of eyes. son denies any seizure like activity, chest pain or palpitations. On presentation to ED pt was found to be in rapid Afib. Per son no h/o of bleeding so she was started on therapeutic lovenox with rate control.    #) Syncope  likely 2/2 AF with RVR 2/2 hyperthyroid.  will increase Methimazole to 5mg po q12, FT4 and T3 are not necessary to increase dose  hyperthyroidism is simply not controlled here  - CT head negative for acute changes  - CXR- ill defined hazy opacity , will follow  - Tele reveals no evidence of malignant arrhythmias   -   - f/u EEG result =Normal  , TTE - nl LVEF and mod to severe MR - nothing acute here  - seen by physiatry, recommend SNF      #) New Onset Afib  - rate-controlled with metoprolol  - therapeutic lovenox , please d/c in AM and start Eliquis 5mg po q12 as no acute stroke on repeat CT brain      #) Hyperthyroidism  - Contine methimazole 5 mg but increase so 5mg po q12, see above      #) Hallucinations  -continue quetiapine    #) Diet- DASH    -DVT PPX- lovenox  -Dispo- acute, will likely go to SNF             PLAN - d/c to Rehab once bed available

## 2020-06-15 ENCOUNTER — TRANSCRIPTION ENCOUNTER (OUTPATIENT)
Age: 80
End: 2020-06-15

## 2020-06-15 LAB
ANION GAP SERPL CALC-SCNC: 16 MMOL/L — HIGH (ref 7–14)
BUN SERPL-MCNC: 19 MG/DL — SIGNIFICANT CHANGE UP (ref 10–20)
CALCIUM SERPL-MCNC: 9.5 MG/DL — SIGNIFICANT CHANGE UP (ref 8.5–10.1)
CHLORIDE SERPL-SCNC: 100 MMOL/L — SIGNIFICANT CHANGE UP (ref 98–110)
CO2 SERPL-SCNC: 23 MMOL/L — SIGNIFICANT CHANGE UP (ref 17–32)
CREAT SERPL-MCNC: 0.6 MG/DL — LOW (ref 0.7–1.5)
GLUCOSE SERPL-MCNC: 84 MG/DL — SIGNIFICANT CHANGE UP (ref 70–99)
HCT VFR BLD CALC: 41.6 % — SIGNIFICANT CHANGE UP (ref 37–47)
HGB BLD-MCNC: 13.3 G/DL — SIGNIFICANT CHANGE UP (ref 12–16)
MAGNESIUM SERPL-MCNC: 1.9 MG/DL — SIGNIFICANT CHANGE UP (ref 1.8–2.4)
MCHC RBC-ENTMCNC: 28.5 PG — SIGNIFICANT CHANGE UP (ref 27–31)
MCHC RBC-ENTMCNC: 32 G/DL — SIGNIFICANT CHANGE UP (ref 32–37)
MCV RBC AUTO: 89.1 FL — SIGNIFICANT CHANGE UP (ref 81–99)
NRBC # BLD: 0 /100 WBCS — SIGNIFICANT CHANGE UP (ref 0–0)
PLATELET # BLD AUTO: 254 K/UL — SIGNIFICANT CHANGE UP (ref 130–400)
POTASSIUM SERPL-MCNC: 3.7 MMOL/L — SIGNIFICANT CHANGE UP (ref 3.5–5)
POTASSIUM SERPL-SCNC: 3.7 MMOL/L — SIGNIFICANT CHANGE UP (ref 3.5–5)
RBC # BLD: 4.67 M/UL — SIGNIFICANT CHANGE UP (ref 4.2–5.4)
RBC # FLD: 13.9 % — SIGNIFICANT CHANGE UP (ref 11.5–14.5)
SODIUM SERPL-SCNC: 139 MMOL/L — SIGNIFICANT CHANGE UP (ref 135–146)
T3FREE SERPL-MCNC: 5.12 PG/ML — HIGH (ref 1.8–4.6)
T4 AB SER-ACNC: 13.1 UG/DL — HIGH (ref 4.6–12)
TSH SERPL-MCNC: 0.01 UIU/ML — LOW (ref 0.27–4.2)
WBC # BLD: 6.75 K/UL — SIGNIFICANT CHANGE UP (ref 4.8–10.8)
WBC # FLD AUTO: 6.75 K/UL — SIGNIFICANT CHANGE UP (ref 4.8–10.8)

## 2020-06-15 RX ORDER — SODIUM CHLORIDE 9 MG/ML
250 INJECTION INTRAMUSCULAR; INTRAVENOUS; SUBCUTANEOUS ONCE
Refills: 0 | Status: COMPLETED | OUTPATIENT
Start: 2020-06-15 | End: 2020-06-15

## 2020-06-15 RX ORDER — METOPROLOL TARTRATE 50 MG
1 TABLET ORAL
Qty: 30 | Refills: 0
Start: 2020-06-15 | End: 2020-07-14

## 2020-06-15 RX ORDER — APIXABAN 2.5 MG/1
5 TABLET, FILM COATED ORAL
Refills: 0 | Status: DISCONTINUED | OUTPATIENT
Start: 2020-06-15 | End: 2020-06-16

## 2020-06-15 RX ORDER — METHIMAZOLE 10 MG/1
1 TABLET ORAL
Qty: 0 | Refills: 0 | DISCHARGE

## 2020-06-15 RX ORDER — METHIMAZOLE 10 MG/1
1 TABLET ORAL
Qty: 0 | Refills: 0 | DISCHARGE
Start: 2020-06-15

## 2020-06-15 RX ADMIN — Medication 50 MILLIGRAM(S): at 06:09

## 2020-06-15 RX ADMIN — ENOXAPARIN SODIUM 60 MILLIGRAM(S): 100 INJECTION SUBCUTANEOUS at 06:09

## 2020-06-15 RX ADMIN — QUETIAPINE FUMARATE 50 MILLIGRAM(S): 200 TABLET, FILM COATED ORAL at 17:59

## 2020-06-15 RX ADMIN — APIXABAN 5 MILLIGRAM(S): 2.5 TABLET, FILM COATED ORAL at 18:00

## 2020-06-15 RX ADMIN — SODIUM CHLORIDE 500 MILLILITER(S): 9 INJECTION INTRAMUSCULAR; INTRAVENOUS; SUBCUTANEOUS at 15:21

## 2020-06-15 RX ADMIN — QUETIAPINE FUMARATE 50 MILLIGRAM(S): 200 TABLET, FILM COATED ORAL at 06:10

## 2020-06-15 NOTE — DISCHARGE NOTE PROVIDER - NSDCCPCAREPLAN_GEN_ALL_CORE_FT
PRINCIPAL DISCHARGE DIAGNOSIS  Diagnosis: Syncope  Assessment and Plan of Treatment: You were admitted for a fall where you lost consciousness. Your work-up revealed that your hyperthyroidism needed a higher dose of medication so we increased your methimazole. You had an echo and an EEG that were generally normal. Please follow up with your primary care doctor. PRINCIPAL DISCHARGE DIAGNOSIS  Diagnosis: Syncope  Assessment and Plan of Treatment: You were admitted for a fall where you lost consciousness. Your work-up revealed that your hyperthyroidism needed a higher dose of medication so we increased your methimazole. You had an echo and an EEG that were normal. Please follow up with your primary care doctor.

## 2020-06-15 NOTE — DISCHARGE NOTE PROVIDER - HOSPITAL COURSE
79 y/o F with PMH of mood disorder with hallucination, hyperthyroidism , Afib ?now presenting with syncope. Per son pt was in shower when she had a witness collapse. pt fell unconscious with uprolling of eyes. son denies any seizure like activity, chest pain or palpitations. On presentation to ED pt was found to be in rapid Afib. Per son no h/o of bleeding so she was started on therapeutic lovenox with rate control.        #) Syncope likely 2/2 AF with RVR 2/2 hyperthyroid.    -increased methimazole to 5mg po q12, FT4 and T3 are not necessary to increase dose, hyperthyroidism is simply not controlled here    - CT head negative for acute changes    - CXR- ill defined hazy opacity, will follow    - Tele reveals no evidence of malignant arrhythmias     - f/u EEG result =Normal    - TTE - nl LVEF and mod to severe MR - nothing acute here    - seen by physiatry, recommend SNF          #) New Onset Afib    - rate-controlled with metoprolol    - restarted Eliquis 5mg po q12 as no acute stroke on repeat CT brain        #) Hyperthyroidism    - Continue methimazole 5 mg but increase so 5mg po q12,         #) Hallucinations    -continue quetiapine        #) Diet- DASH        -DVT PPX- lovenox    -Dispo- acute, will likely go to SNF          PLAN - d/c to Rehab once bed available, will d/w family, unable to reach her son again today.

## 2020-06-15 NOTE — DISCHARGE NOTE PROVIDER - NSDCMRMEDTOKEN_GEN_ALL_CORE_FT
methIMAzole 5 mg oral tablet: 1 tab(s) orally   QUEtiapine 50 mg oral tablet: 1 tab(s) orally 2 times a day methIMAzole 5 mg oral tablet: 1 tab(s) orally   metoprolol succinate 50 mg oral tablet, extended release: 1 tab(s) orally once a day  QUEtiapine 50 mg oral tablet: 1 tab(s) orally 2 times a day apixaban 5 mg oral tablet: 1 tab(s) orally 2 times a day   methIMAzole 5 mg oral tablet: 1 tab(s) orally   metoprolol succinate 50 mg oral tablet, extended release: 1 tab(s) orally once a day  QUEtiapine 50 mg oral tablet: 1 tab(s) orally 2 times a day apixaban 5 mg oral tablet: 1 tab(s) orally 2 times a day   methIMAzole 5 mg oral tablet: 1 tab(s) orally 2 times a day   metoprolol succinate 50 mg oral tablet, extended release: 1 tab(s) orally once a day  QUEtiapine 50 mg oral tablet: 1 tab(s) orally 2 times a day

## 2020-06-15 NOTE — DISCHARGE NOTE PROVIDER - CARE PROVIDER_API CALL
Rupert Vallejo [facility]  [address] [place] [address]  Phone: [phone]  Fax: [phone]  Follow Up Time: 2 weeks

## 2020-06-15 NOTE — PROGRESS NOTE ADULT - SUBJECTIVE AND OBJECTIVE BOX
CHILANGO PEMBERTON 80y Female  MRN#: [id number]   CODE STATUS: FULL       SUBJECTIVE  Patient is a 80y old Female who presents with a chief complaint of Syncope (15 Simon 2020 09:18)  Currently admitted to medicine with the primary diagnosis of Syncope    Today is hospital day 3d.    OBJECTIVE  PAST MEDICAL & SURGICAL HISTORY  Thyroid disease    ALLERGIES:  No Known Allergies    MEDICATIONS:  STANDING MEDICATIONS  apixaban 5 milliGRAM(s) Oral two times a day  methimazole 5 milliGRAM(s) Oral <User Schedule>  metoprolol succinate ER 50 milliGRAM(s) Oral daily  QUEtiapine Oral Tab/Cap - Peds 50 milliGRAM(s) Oral two times a day    PRN MEDICATIONS      VITAL SIGNS: Last 24 Hours  T(C): 36.6 (15 Simon 2020 05:12), Max: 36.6 (15 Simon 2020 05:12)  T(F): 97.8 (15 Simon 2020 05:12), Max: 97.8 (15 Simon 2020 05:12)  HR: 53 (15 Simon 2020 05:12) (53 - 111)  BP: 118/59 (15 Simon 2020 05:12) (117/77 - 119/72)  BP(mean): --  RR: 18 (15 Simon 2020 05:12) (16 - 18)  SpO2: --    LABS:                        13.3   6.75  )-----------( 254      ( 15 Simon 2020 04:52 )             41.6     06-15    139  |  100  |  19  ----------------------------<  84  3.7   |  23  |  0.6<L>    Ca    9.5      15 Simon 2020 04:52  Mg     1.9     06-15    RADIOLOGY:  < from: EEG (06.14.20 @ 15:30) >  Impression  Normal    Clinical Correlation & Recommendations   A normal EEG does not exclude the possibility of seizure disorder. Clinical correlation is recommended.    < end of copied text >      PHYSICAL EXAM:  GENERAL: NAD, well-developed, AAOx3  HEENT:  Atraumatic, Normocephalic. EOMI, PERRLA, conjunctiva and sclera clear, No JVD  PULMONARY: Clear to auscultation bilaterally; No wheeze  CARDIOVASCULAR: Regular rate and rhythm; No murmurs, rubs, or gallops  GASTROINTESTINAL: Soft, Nontender, Nondistended; Bowel sounds present  MUSCULOSKELETAL:  2+ Peripheral Pulses, No clubbing, cyanosis, or edema  NEUROLOGY: non-focal  SKIN: No rashes or lesions      ADMISSION SUMMARY  Patient is a 80y old Female who presents with a chief complaint of Syncope (15 Simon 2020 09:18)  Currently admitted to medicine with the primary diagnosis of Syncope    ASSESSMENT & PLAN CHILANGO PEMBERTON 80y Female  MRN#: [id number]   CODE STATUS: FULL       SUBJECTIVE  Patient is a 80y old Female who presents with a chief complaint of Syncope (15 Simon 2020 09:18)  Currently admitted to medicine with the primary diagnosis of Syncope    Today is hospital day 3d.    OBJECTIVE  PAST MEDICAL & SURGICAL HISTORY  Thyroid disease    ALLERGIES:  No Known Allergies    MEDICATIONS:  STANDING MEDICATIONS  apixaban 5 milliGRAM(s) Oral two times a day  methimazole 5 milliGRAM(s) Oral <User Schedule>  metoprolol succinate ER 50 milliGRAM(s) Oral daily  QUEtiapine Oral Tab/Cap - Peds 50 milliGRAM(s) Oral two times a day    PRN MEDICATIONS      VITAL SIGNS: Last 24 Hours  T(C): 36.6 (15 Simon 2020 05:12), Max: 36.6 (15 Simon 2020 05:12)  T(F): 97.8 (15 Simon 2020 05:12), Max: 97.8 (15 Simon 2020 05:12)  HR: 53 (15 Simon 2020 05:12) (53 - 111)  BP: 118/59 (15 Simon 2020 05:12) (117/77 - 119/72)  BP(mean): --  RR: 18 (15 Simon 2020 05:12) (16 - 18)  SpO2: --    LABS:                        13.3   6.75  )-----------( 254      ( 15 Simon 2020 04:52 )             41.6     06-15    139  |  100  |  19  ----------------------------<  84  3.7   |  23  |  0.6<L>    Ca    9.5      15 Simon 2020 04:52  Mg     1.9     06-15    RADIOLOGY:  < from: EEG (06.14.20 @ 15:30) >  Impression  Normal    Clinical Correlation & Recommendations   A normal EEG does not exclude the possibility of seizure disorder. Clinical correlation is recommended.    < end of copied text >      PHYSICAL EXAM:  GENERAL: NAD, AAOx3 with slight confusion (knows year but not month)   HEENT: EOMI   PULMONARY: Clear to auscultation bilaterally; No wheeze  CARDIOVASCULAR: Regular rate and rhythm  GASTROINTESTINAL: Soft, Nontender, Nondistended  MUSCULOSKELETAL:  2+ Peripheral Pulses. No edema  NEUROLOGY: non-focal  SKIN: No rashes    ADMISSION SUMMARY  Patient is a 80y old Female who presents with a chief complaint of Syncope (15 Simon 2020 09:18)  Currently admitted to medicine with the primary diagnosis of Syncope    ASSESSMENT & PLAN  79 y/o F with PMH of mood disorder with hallucination, hyperthyroidism , Afib ?now presenting with syncope. Per son pt was in shower when she had a witness collapse. pt fell unconscious with uprolling of eyes. son denies any seizure like activity, chest pain or palpitations. On presentation to ED pt was found to be in rapid Afib. Per son no h/o of bleeding so she was started on therapeutic lovenox with rate control.    #) Syncope likely 2/2 AF with RVR 2/2 hyperthyroid.  -increased methimazole to 5mg po q12, FT4 and T3 are not necessary to increase dose, hyperthyroidism is simply not controlled here  - CT head negative for acute changes  - CXR- ill defined hazy opacity, will follow  - Tele reveals no evidence of malignant arrhythmias   - f/u EEG result =Normal  - TTE - nl LVEF and mod to severe MR - nothing acute here  - seen by physiatry, recommend SNF      #) New Onset Afib  - rate-controlled with metoprolol  - restarted Eliquis 5mg po q12 as no acute stroke on repeat CT brain    #) Hyperthyroidism  - Continue methimazole 5 mg but increase so 5mg po q12, see above    #) Hallucinations  -continue quetiapine    #) Diet- DASH    -DVT PPX- lovenox  -Dispo- acute, will likely go to SNF      PLAN - d/c to Rehab once bed available CHILANGO PEMBERTON 80y Female  MRN#: [id number]   CODE STATUS: FULL       SUBJECTIVE  Patient is a 80y old Female who presents with a chief complaint of Syncope (15 Simon 2020 09:18)  Currently admitted to medicine with the primary diagnosis of Syncope    Today is hospital day 3d.    OBJECTIVE  PAST MEDICAL & SURGICAL HISTORY  Thyroid disease    ALLERGIES:  No Known Allergies    MEDICATIONS:  STANDING MEDICATIONS  apixaban 5 milliGRAM(s) Oral two times a day  methimazole 5 milliGRAM(s) Oral <User Schedule>  metoprolol succinate ER 50 milliGRAM(s) Oral daily  QUEtiapine Oral Tab/Cap - Peds 50 milliGRAM(s) Oral two times a day    PRN MEDICATIONS      VITAL SIGNS: Last 24 Hours  T(C): 36.6 (15 Simon 2020 05:12), Max: 36.6 (15 Simon 2020 05:12)  T(F): 97.8 (15 Simon 2020 05:12), Max: 97.8 (15 Simon 2020 05:12)  HR: 53 (15 Simon 2020 05:12) (53 - 111)  BP: 118/59 (15 Simon 2020 05:12) (117/77 - 119/72)  BP(mean): --  RR: 18 (15 Simon 2020 05:12) (16 - 18)  SpO2: --    LABS:                        13.3   6.75  )-----------( 254      ( 15 Simon 2020 04:52 )             41.6     06-15    139  |  100  |  19  ----------------------------<  84  3.7   |  23  |  0.6<L>    Ca    9.5      15 Simon 2020 04:52  Mg     1.9     06-15    RADIOLOGY:  < from: EEG (06.14.20 @ 15:30) >  Impression  Normal    Clinical Correlation & Recommendations   A normal EEG does not exclude the possibility of seizure disorder. Clinical correlation is recommended.    < end of copied text >      PHYSICAL EXAM:  GENERAL: NAD, AAOx3 with slight confusion (knows year but not month)   HEENT: EOMI   PULMONARY: Clear to auscultation bilaterally; No wheeze  CARDIOVASCULAR: Regular rate and rhythm  GASTROINTESTINAL: Soft, Nontender, Nondistended  MUSCULOSKELETAL:  2+ Peripheral Pulses. No edema  NEUROLOGY: non-focal  SKIN: No rashes    ADMISSION SUMMARY  Patient is a 80y old Female who presents with a chief complaint of Syncope (15 Simon 2020 09:18)  Currently admitted to medicine with the primary diagnosis of Syncope    ASSESSMENT & PLAN  79 y/o F with PMH of mood disorder with hallucination, hyperthyroidism , Afib ?now presenting with syncope. Per son pt was in shower when she had a witness collapse. pt fell unconscious with uprolling of eyes. son denies any seizure like activity, chest pain or palpitations. On presentation to ED pt was found to be in rapid Afib. Per son no h/o of bleeding so she was started on therapeutic lovenox with rate control.    #) Syncope likely 2/2 AF with RVR 2/2 hyperthyroid.  -increased methimazole to 5mg po q12, FT4 and T3 are not necessary to increase dose, hyperthyroidism is simply not controlled here  - CT head negative for acute changes  - CXR- ill defined hazy opacity, will follow  - Tele reveals no evidence of malignant arrhythmias   - f/u EEG result =Normal  - TTE - nl LVEF and mod to severe MR - nothing acute here  - seen by physiatry, recommend SNF      #) New Onset Afib  - rate-controlled with metoprolol  - restarted Eliquis 5mg po q12 as no acute stroke on repeat CT brain    #) Hyperthyroidism  - Continue methimazole 5 mg but increase so 5mg po q12,     #) Hallucinations  -continue quetiapine    #) Diet- DASH    -DVT PPX- lovenox  -Dispo- acute, will likely go to SNF      PLAN - d/c to Rehab once bed available, will d/w family, unable to reach her son again today.

## 2020-06-16 VITALS
SYSTOLIC BLOOD PRESSURE: 97 MMHG | RESPIRATION RATE: 18 BRPM | TEMPERATURE: 96 F | DIASTOLIC BLOOD PRESSURE: 59 MMHG | HEART RATE: 99 BPM

## 2020-06-16 LAB
ANION GAP SERPL CALC-SCNC: 14 MMOL/L — SIGNIFICANT CHANGE UP (ref 7–14)
BUN SERPL-MCNC: 16 MG/DL — SIGNIFICANT CHANGE UP (ref 10–20)
CALCIUM SERPL-MCNC: 8.8 MG/DL — SIGNIFICANT CHANGE UP (ref 8.5–10.1)
CHLORIDE SERPL-SCNC: 106 MMOL/L — SIGNIFICANT CHANGE UP (ref 98–110)
CO2 SERPL-SCNC: 21 MMOL/L — SIGNIFICANT CHANGE UP (ref 17–32)
CREAT SERPL-MCNC: 0.5 MG/DL — LOW (ref 0.7–1.5)
GLUCOSE SERPL-MCNC: 80 MG/DL — SIGNIFICANT CHANGE UP (ref 70–99)
HCT VFR BLD CALC: 35.1 % — LOW (ref 37–47)
HGB BLD-MCNC: 11.4 G/DL — LOW (ref 12–16)
MCHC RBC-ENTMCNC: 28.8 PG — SIGNIFICANT CHANGE UP (ref 27–31)
MCHC RBC-ENTMCNC: 32.5 G/DL — SIGNIFICANT CHANGE UP (ref 32–37)
MCV RBC AUTO: 88.6 FL — SIGNIFICANT CHANGE UP (ref 81–99)
NRBC # BLD: 0 /100 WBCS — SIGNIFICANT CHANGE UP (ref 0–0)
PLATELET # BLD AUTO: 225 K/UL — SIGNIFICANT CHANGE UP (ref 130–400)
POTASSIUM SERPL-MCNC: 3.8 MMOL/L — SIGNIFICANT CHANGE UP (ref 3.5–5)
POTASSIUM SERPL-SCNC: 3.8 MMOL/L — SIGNIFICANT CHANGE UP (ref 3.5–5)
RBC # BLD: 3.96 M/UL — LOW (ref 4.2–5.4)
RBC # FLD: 13.7 % — SIGNIFICANT CHANGE UP (ref 11.5–14.5)
SODIUM SERPL-SCNC: 141 MMOL/L — SIGNIFICANT CHANGE UP (ref 135–146)
WBC # BLD: 6.35 K/UL — SIGNIFICANT CHANGE UP (ref 4.8–10.8)
WBC # FLD AUTO: 6.35 K/UL — SIGNIFICANT CHANGE UP (ref 4.8–10.8)

## 2020-06-16 RX ORDER — METHIMAZOLE 10 MG/1
1 TABLET ORAL
Qty: 60 | Refills: 0
Start: 2020-06-16 | End: 2020-07-15

## 2020-06-16 RX ORDER — APIXABAN 2.5 MG/1
1 TABLET, FILM COATED ORAL
Qty: 60 | Refills: 0
Start: 2020-06-16 | End: 2020-07-15

## 2020-06-16 RX ADMIN — APIXABAN 5 MILLIGRAM(S): 2.5 TABLET, FILM COATED ORAL at 05:25

## 2020-06-16 RX ADMIN — QUETIAPINE FUMARATE 50 MILLIGRAM(S): 200 TABLET, FILM COATED ORAL at 05:25

## 2020-06-16 RX ADMIN — Medication 50 MILLIGRAM(S): at 05:25

## 2020-06-16 NOTE — CHART NOTE - NSCHARTNOTEFT_GEN_A_CORE
<<<RESIDENT DISCHARGE NOTE>>>     CHILANGO PEMBERTON  MRN-[id number]    VITAL SIGNS:  T(F): 96.2 (06-16-20 @ 13:01), Max: 97.6 (06-16-20 @ 05:34)  HR: 99 (06-16-20 @ 13:01)  BP: 97/59 (06-16-20 @ 13:01)  SpO2: 96% (06-16-20 @ 08:15)    PHYSICAL EXAMINATION:  patient seen and examined this AM.   doing well - stable for discharge home.  updated son at [phone]     TEST RESULTS:                        11.4   6.35  )-----------( 225      ( 16 Jun 2020 05:31 )             35.1       06-16    141  |  106  |  16  ----------------------------<  80  3.8   |  21  |  0.5<L>    Ca    8.8      16 Jun 2020 05:31  Mg     1.9     06-15        FINAL DISCHARGE INTERVIEW:  Resident(s) Present: (Name: Dejuan Adames DO)     DISCHARGE MEDICATION RECONCILIATION  reviewed with Attending (Name: Dr. Vallejo)     DISPOSITION:   [  ] Home,    [  ] Home with Visiting Nursing Services,   [ X   ]  SNF/ NH,    [   ] Acute Rehab (4A),   [   ] Other (Specify:_________)

## 2020-06-18 DIAGNOSIS — R00.0 TACHYCARDIA, UNSPECIFIED: ICD-10-CM

## 2020-06-18 DIAGNOSIS — R44.3 HALLUCINATIONS, UNSPECIFIED: ICD-10-CM

## 2020-06-18 DIAGNOSIS — R55 SYNCOPE AND COLLAPSE: ICD-10-CM

## 2020-06-18 DIAGNOSIS — E05.90 THYROTOXICOSIS, UNSPECIFIED WITHOUT THYROTOXIC CRISIS OR STORM: ICD-10-CM

## 2020-06-18 DIAGNOSIS — I48.91 UNSPECIFIED ATRIAL FIBRILLATION: ICD-10-CM

## 2020-06-18 DIAGNOSIS — R53.81 OTHER MALAISE: ICD-10-CM

## 2020-06-18 DIAGNOSIS — F39 UNSPECIFIED MOOD [AFFECTIVE] DISORDER: ICD-10-CM

## 2020-06-18 DIAGNOSIS — R26.9 UNSPECIFIED ABNORMALITIES OF GAIT AND MOBILITY: ICD-10-CM

## 2020-07-23 NOTE — ED ADULT NURSE NOTE - NS ED NURSE DC INFO COMPLEXITY
Noted and appreciated  Was she symptomatic? Has she been feeling a racing heart   Will discuss further at follow up appt  OK to schedule sooner as needed     Simple: Patient demonstrates quick and easy understanding

## 2020-08-10 NOTE — ED PROVIDER NOTE - DATE/TIME 3
15-May-2020 11:17 Community stressors that increase the risk of destabilization/Substance abuse/History of being victimized/traumatized

## 2020-12-01 NOTE — ED ADULT NURSE NOTE - PAIN: PRESENCE, MLM
complains of pain/discomfort Hydroxyzine Counseling: Patient advised that the medication is sedating and not to drive a car after taking this medication.  Patient informed of potential adverse effects including but not limited to dry mouth, urinary retention, and blurry vision.  The patient verbalized understanding of the proper use and possible adverse effects of hydroxyzine.  All of the patient's questions and concerns were addressed.

## 2021-11-16 NOTE — ED PROVIDER NOTE - PHYSICAL EXAMINATION
Impression: S/P Posterior Vitrectomy; Laser OD - 1 Day. Encounter for surgical aftercare following surgery on a sense organ  Z48.810. Plan: RTC as scheduled for post op with Dr. Krystal Garcia or ASAP should they experience a decrease in vision, pain, or any worsening of condition.  Continue Ofloxacin and Prednisolone as directed VITAL SIGNS: I have reviewed nursing notes and confirm.  CONSTITUTIONAL: Well-developed; well-nourished; in no acute distress. pt comfortable. alert and orietned x 3  SKIN: skin exam is warm and dry, no acute rash.   HEAD: Normocephalic; atraumatic.  EYES:  EOM intact; conjunctiva and sclera clear.  ENT: No nasal discharge; airway clear. moist oral mucosa;   NECK: Supple; non tender.  CARD: S1, S2 normal; no murmurs, gallops, or rubs. Regular rate and rhythm. posterior tibial and radial pulses 2+  RESP: No wheezes, rales or rhonchi. cta b/l. no use of accessory muscles. no retractions  ABD: Normal bowel sounds; soft; non-distended; non-tender; no rebound. negative psoas, rovsign's and murphys.  EXT: Normal ROM. No  cyanosis or nonpitting edema to legs b/l.   BACK: No cva tenderness  LYMPH: No acute cervical adenopathy.  NEURO: Alert, oriented, grossly unremarkable.  CN 2-12 intact. normal gait. normal romberg's.  sensory grossly intact to face, upper and lower extremity.  5/5 strength to , extension and flexion at elbow, flexion at hip, extension and flexion at knees.  PSYCH: Cooperative, appropriate.  . VITAL SIGNS: I have reviewed nursing notes and confirm.  CONSTITUTIONAL: Well-developed; well-nourished; in no acute distress. pt comfortable. alert and orietned x 3  SKIN: skin exam is warm and dry, no acute rash.   HEAD: Normocephalic; atraumatic.  EYES:  EOM intact; conjunctiva and sclera clear.  ENT: No nasal discharge; airway clear. moist oral mucosa;   NECK: Supple; non tender.  CARD: S1, S2 normal; no murmurs, gallops, or rubs. Regular rate and rhythm. posterior tibial and radial pulses 2+  RESP: No wheezes, rales or rhonchi. cta b/l. no use of accessory muscles. no retractions  ABD: Normal bowel sounds; soft; non-distended; non-tender; no rebound. negative psoas, rovsign's and murphys.  EXT: Normal ROM. No  cyanosis or nonpitting edema to legs b/l.   BACK: No cva tenderness  LYMPH: No acute cervical adenopathy.  NEURO: Alert, oriented, grossly unremarkable.  CN 2-12 intact. normal gait. normal romberg's.  sensory grossly intact to face, upper and lower extremity.  5/5 strength to , extension and flexion at elbow, flexion at hip, extension and flexion at knees. finger to nose b/l.   PSYCH: Cooperative, appropriate.  .

## 2022-03-14 ENCOUNTER — EMERGENCY (EMERGENCY)
Facility: HOSPITAL | Age: 82
LOS: 0 days | Discharge: HOME | End: 2022-03-14
Attending: EMERGENCY MEDICINE | Admitting: EMERGENCY MEDICINE
Payer: MEDICARE

## 2022-03-14 VITALS
SYSTOLIC BLOOD PRESSURE: 122 MMHG | RESPIRATION RATE: 18 BRPM | HEART RATE: 88 BPM | OXYGEN SATURATION: 99 % | DIASTOLIC BLOOD PRESSURE: 67 MMHG

## 2022-03-14 VITALS
DIASTOLIC BLOOD PRESSURE: 67 MMHG | HEART RATE: 89 BPM | SYSTOLIC BLOOD PRESSURE: 118 MMHG | WEIGHT: 115.08 LBS | TEMPERATURE: 96 F | OXYGEN SATURATION: 99 % | RESPIRATION RATE: 18 BRPM

## 2022-03-14 DIAGNOSIS — R55 SYNCOPE AND COLLAPSE: ICD-10-CM

## 2022-03-14 DIAGNOSIS — R45.1 RESTLESSNESS AND AGITATION: ICD-10-CM

## 2022-03-14 DIAGNOSIS — F20.9 SCHIZOPHRENIA, UNSPECIFIED: ICD-10-CM

## 2022-03-14 DIAGNOSIS — I48.91 UNSPECIFIED ATRIAL FIBRILLATION: ICD-10-CM

## 2022-03-14 DIAGNOSIS — I10 ESSENTIAL (PRIMARY) HYPERTENSION: ICD-10-CM

## 2022-03-14 DIAGNOSIS — Z79.01 LONG TERM (CURRENT) USE OF ANTICOAGULANTS: ICD-10-CM

## 2022-03-14 LAB
ALBUMIN SERPL ELPH-MCNC: 3.9 G/DL — SIGNIFICANT CHANGE UP (ref 3.5–5.2)
ALP SERPL-CCNC: 88 U/L — SIGNIFICANT CHANGE UP (ref 30–115)
ALT FLD-CCNC: 8 U/L — SIGNIFICANT CHANGE UP (ref 0–41)
ANION GAP SERPL CALC-SCNC: 14 MMOL/L — SIGNIFICANT CHANGE UP (ref 7–14)
AST SERPL-CCNC: 20 U/L — SIGNIFICANT CHANGE UP (ref 0–41)
BASOPHILS # BLD AUTO: 0.04 K/UL — SIGNIFICANT CHANGE UP (ref 0–0.2)
BASOPHILS NFR BLD AUTO: 0.5 % — SIGNIFICANT CHANGE UP (ref 0–1)
BILIRUB SERPL-MCNC: 0.6 MG/DL — SIGNIFICANT CHANGE UP (ref 0.2–1.2)
BUN SERPL-MCNC: 24 MG/DL — HIGH (ref 10–20)
CALCIUM SERPL-MCNC: 9.4 MG/DL — SIGNIFICANT CHANGE UP (ref 8.5–10.1)
CHLORIDE SERPL-SCNC: 103 MMOL/L — SIGNIFICANT CHANGE UP (ref 98–110)
CO2 SERPL-SCNC: 21 MMOL/L — SIGNIFICANT CHANGE UP (ref 17–32)
CREAT SERPL-MCNC: 0.7 MG/DL — SIGNIFICANT CHANGE UP (ref 0.7–1.5)
EGFR: 88 ML/MIN/1.73M2 — SIGNIFICANT CHANGE UP
EOSINOPHIL # BLD AUTO: 0.07 K/UL — SIGNIFICANT CHANGE UP (ref 0–0.7)
EOSINOPHIL NFR BLD AUTO: 0.8 % — SIGNIFICANT CHANGE UP (ref 0–8)
GLUCOSE SERPL-MCNC: 136 MG/DL — HIGH (ref 70–99)
HCT VFR BLD CALC: 40.5 % — SIGNIFICANT CHANGE UP (ref 37–47)
HGB BLD-MCNC: 13 G/DL — SIGNIFICANT CHANGE UP (ref 12–16)
IMM GRANULOCYTES NFR BLD AUTO: 0.3 % — SIGNIFICANT CHANGE UP (ref 0.1–0.3)
LYMPHOCYTES # BLD AUTO: 2.06 K/UL — SIGNIFICANT CHANGE UP (ref 1.2–3.4)
LYMPHOCYTES # BLD AUTO: 23.4 % — SIGNIFICANT CHANGE UP (ref 20.5–51.1)
MAGNESIUM SERPL-MCNC: 2.2 MG/DL — SIGNIFICANT CHANGE UP (ref 1.8–2.4)
MCHC RBC-ENTMCNC: 27.3 PG — SIGNIFICANT CHANGE UP (ref 27–31)
MCHC RBC-ENTMCNC: 32.1 G/DL — SIGNIFICANT CHANGE UP (ref 32–37)
MCV RBC AUTO: 85.1 FL — SIGNIFICANT CHANGE UP (ref 81–99)
MONOCYTES # BLD AUTO: 0.49 K/UL — SIGNIFICANT CHANGE UP (ref 0.1–0.6)
MONOCYTES NFR BLD AUTO: 5.6 % — SIGNIFICANT CHANGE UP (ref 1.7–9.3)
NEUTROPHILS # BLD AUTO: 6.1 K/UL — SIGNIFICANT CHANGE UP (ref 1.4–6.5)
NEUTROPHILS NFR BLD AUTO: 69.4 % — SIGNIFICANT CHANGE UP (ref 42.2–75.2)
NRBC # BLD: 0 /100 WBCS — SIGNIFICANT CHANGE UP (ref 0–0)
PLATELET # BLD AUTO: 273 K/UL — SIGNIFICANT CHANGE UP (ref 130–400)
POTASSIUM SERPL-MCNC: 4.8 MMOL/L — SIGNIFICANT CHANGE UP (ref 3.5–5)
POTASSIUM SERPL-SCNC: 4.8 MMOL/L — SIGNIFICANT CHANGE UP (ref 3.5–5)
PROT SERPL-MCNC: 6.8 G/DL — SIGNIFICANT CHANGE UP (ref 6–8)
RBC # BLD: 4.76 M/UL — SIGNIFICANT CHANGE UP (ref 4.2–5.4)
RBC # FLD: 14.8 % — HIGH (ref 11.5–14.5)
SODIUM SERPL-SCNC: 138 MMOL/L — SIGNIFICANT CHANGE UP (ref 135–146)
TROPONIN T SERPL-MCNC: <0.01 NG/ML — SIGNIFICANT CHANGE UP
WBC # BLD: 8.79 K/UL — SIGNIFICANT CHANGE UP (ref 4.8–10.8)
WBC # FLD AUTO: 8.79 K/UL — SIGNIFICANT CHANGE UP (ref 4.8–10.8)

## 2022-03-14 PROCEDURE — 70450 CT HEAD/BRAIN W/O DYE: CPT | Mod: 26,MA

## 2022-03-14 PROCEDURE — 71045 X-RAY EXAM CHEST 1 VIEW: CPT | Mod: 26

## 2022-03-14 PROCEDURE — 93010 ELECTROCARDIOGRAM REPORT: CPT

## 2022-03-14 PROCEDURE — 99285 EMERGENCY DEPT VISIT HI MDM: CPT

## 2022-03-14 RX ORDER — SODIUM CHLORIDE 9 MG/ML
500 INJECTION, SOLUTION INTRAVENOUS ONCE
Refills: 0 | Status: COMPLETED | OUTPATIENT
Start: 2022-03-14 | End: 2022-03-14

## 2022-03-14 RX ADMIN — SODIUM CHLORIDE 500 MILLILITER(S): 9 INJECTION, SOLUTION INTRAVENOUS at 10:49

## 2022-03-14 NOTE — ED PROVIDER NOTE - PROGRESS NOTE DETAILS
Discussed all available results with Pt.  Pt understands results, plan of care, outpt follow up as discussed, and signs and symptoms for ED return.  Pt is comfortable with discharge. DC home. BG: Talked to Dr. Vallejo. Dr. Vallejo endorsed pt can be followed outpatient. Will see pt tomorrow. BG: Pt reassessed. Pt. stable and comfortable in bed. Talked to son that urine pending. Son endorsed he does not want to wait and rather do urine at Dr. Vallejo's office. Risk and benefit explained

## 2022-03-14 NOTE — ED PROVIDER NOTE - NSFOLLOWUPINSTRUCTIONS_ED_ALL_ED_FT
raise (elevate) your feet above the level of your heart. Breathe deeply and steadily. Wait until all the symptoms have passed.      •Keep all follow-up visits as told by your health care provider. This is important.        Get help right away if you:    •Have a severe headache.      •Faint once or repeatedly.      •Have pain in your chest, abdomen, or back.      •Have a very fast or irregular heartbeat (palpitations).      •Have pain when you breathe.      •Are bleeding from your mouth or rectum, or you have black or tarry stool.      •Have a seizure.      •Are confused.      •Have trouble walking.      •Have severe weakness.      •Have vision problems.      These symptoms may represent a serious problem that is an emergency. Do not wait to see if your symptoms will go away. Get medical help right away. Call your local emergency services (911 in the [place] Do not drive yourself to the hospital.       Summary    •Syncope refers to a condition in which a person temporarily loses consciousness. It is caused by a sudden decrease in blood flow to the brain.      •Signs that you may be about to faint include dizziness, feeling light-headed, feeling nauseous, sudden vision changes, or cold, clammy skin.      •Although most causes of syncope are not dangerous, syncope can be a sign of a serious medical problem. If you faint, get medical help right away.      This information is not intended to replace advice given to you by your health care provider. Make sure you discuss any questions you have with your health care provider.

## 2022-03-14 NOTE — ED PROVIDER NOTE - DOMESTIC TRAVEL HIGH RISK QUESTION
Impression: Type 2 diabetes mellitus without complications: Q24.4. Plan: Diabetes type II: no background retinopathy, no signs of neovascularization noted. Discussed ocular and systemic benefits of blood sugar control.
No

## 2022-03-14 NOTE — ED PROVIDER NOTE - PHYSICAL EXAMINATION
CONSTITUTIONAL: NAD  SKIN: Warm dry  HEAD: NCAT  EYES: NL inspection  ENT: MMM  NECK: Supple; non tender.  CARD: afib  RESP: CTAB  ABD: S/NT no R/G  EXT: no pedal edema  NEURO: altered, poor historian   PSYCH: Cooperative, appropriate.

## 2022-03-14 NOTE — ED PROVIDER NOTE - CARE PROVIDER_API CALL
Rupert Vallejo (MD)  Medicine  [address] [place] [address]  Phone: [phone]  Fax: [phone]  Follow Up Time: 1-3 Days

## 2022-03-14 NOTE — ED PROVIDER NOTE - OBJECTIVE STATEMENT
81 y f, pmh of schizophernia, afib, htn, pw syncope. Per daughter in law, pt was in a chair, when she had LOC lasting for about a minute, then returned to baseline. Pt is a poor historian, and only answers questions vaguely when asked by daughter in law, however, it was endorsed to be her baseline. Pt had similar syncopal episodes a couple of years ago, but did not last as long. Denies f/c, cough, n/v/d. 81 y f, pmh of schizophernia, afib on eliquis, htn, pw syncope. Per daughter in law, pt was in a chair, when she had LOC lasting for about a minute, then returned to baseline. Pt is a poor historian, and only answers questions vaguely when asked by daughter in law, however, it was endorsed to be her baseline. Pt had similar syncopal episodes a couple of years ago, but did not last as long. Denies f/c, cough, n/v/d.

## 2022-03-14 NOTE — ED PROVIDER NOTE - PATIENT PORTAL LINK FT
You can access the FollowMyHealth Patient Portal offered by [facility] by registering at the following website: [email or web address]. By joining [employer]’s FollowMyHealth portal, you will also be able to view your health information using other applications (apps) compatible with our system.

## 2022-03-14 NOTE — ED PROVIDER NOTE - ATTENDING CONTRIBUTION TO CARE
I personally evaluated patient. I agree with the findings and plan with all documentation on chart except as documented  in my note.    81-year-old female past medical history of A. fib on Eliquis, hypertension, schizophrenia who presents to the emergency department status post syncopal episode.  This was witnessed by daughter along and lasted about a minute.  No witnessed seizure activity.  No signs of significant trauma.  Son and daughter-in-law spoken to at separate times and patient also has increased episodes of agitation at home and hallucinations.  This is been going on for very long time per the son.  Patient has had prior episodes of syncope and was worked up in the emergency department.  No fever or recent illness.  Patient is currently at baseline, and patient is alert and oriented x2 and a poor historian.    On exam, vital signs reviewed.  Patient is nontoxic-appearing.  Patient is a nonfocal neuro exam.  Patient is in rate controlled A. fib.  Patient is clear lungs bilaterally.  Patient denies any suicidal homicidal ideations.  EKG performed, chest x-ray done, labs sent.  ED work-up reviewed and Dr. HSIEH called and case discussed in detail.  He will continue work-up and management of patient.  Patient to follow-up in his office tomorrow.  Son spoken to in detail about plan and ED work-up.  Signs on board with plan.  Will also give follow-up with behavioral health for worsening hallucinations at home.  Son does not believe patient requires placement at this time.  Symptoms have been ongoing for a long time and patient does not require emergent psych evaluation.    Full DC instructions discussed and patient knows when to seek immediate medical attention.  Patient has proper follow up.  All results discussed and patient aware they may require further work up.  Proper follow up ensured. Limitations of ED work up discussed.  Medications administered and prescribed/OTC home meds discussed.  All questions and concerns from patient or family addressed. Understanding of instructions verbalized.

## 2022-03-14 NOTE — ED PROVIDER NOTE - CLINICAL SUMMARY MEDICAL DECISION MAKING FREE TEXT BOX
81-year-old female past medical history of A. fib on Eliquis, hypertension, schizophrenia who presents to the emergency department status post syncopal episode.  This was witnessed by daughter along and lasted about a minute.  No witnessed seizure activity.  No signs of significant trauma.  Son and daughter-in-law spoken to at separate times and patient also has increased episodes of agitation at home and hallucinations.  This is been going on for very long time per the son.  Patient has had prior episodes of syncope and was worked up in the emergency department.  No fever or recent illness.  Patient is currently at baseline, and patient is alert and oriented x2 and a poor historian.    On exam, vital signs reviewed.  Patient is nontoxic-appearing.  Patient is a nonfocal neuro exam.  Patient is in rate controlled A. fib.  Patient is clear lungs bilaterally.  Patient denies any suicidal homicidal ideations.  EKG performed, chest x-ray done, labs sent.  ED work-up reviewed and Dr. HSIEH called and case discussed in detail.  He will continue work-up and management of patient.  Patient to follow-up in his office tomorrow.  Son spoken to in detail about plan and ED work-up.  Signs on board with plan.  Will also give follow-up with behavioral health for worsening hallucinations at home.  Son does not believe patient requires placement at this time.  Symptoms have been ongoing for a long time and patient does not require emergent psych evaluation.    Full DC instructions discussed and patient knows when to seek immediate medical attention.  Patient has proper follow up.  All results discussed and patient aware they may require further work up.  Proper follow up ensured. Limitations of ED work up discussed.  Medications administered and prescribed/OTC home meds discussed.  All questions and concerns from patient or family addressed. Understanding of instructions verbalized.

## 2022-03-14 NOTE — ED ADULT NURSE NOTE - OBJECTIVE STATEMENT
pt had 2 episodes of syncope over the past 2 days. pt with hx of paranoia and is withdrawn at times as per daughter in law.

## 2022-03-14 NOTE — ED PROVIDER NOTE - NSFOLLOWUPCLINICS_GEN_ALL_ED_FT
[facility]  OP [facility]  [address] [place] [id number]  Phone: [phone]  Fax:   Follow Up Time: 1-3 Days

## 2022-03-15 PROBLEM — E07.9 DISORDER OF THYROID, UNSPECIFIED: Chronic | Status: ACTIVE | Noted: 2020-06-12

## 2022-04-16 NOTE — ED PROVIDER NOTE - NSFOLLOWUPINSTRUCTIONS_ED_ALL_ED_FT
18
Dementia Caregiver Guide  Dementia is a term used to describe a number of symptoms that affect memory and thinking. The most common symptoms include:  Memory loss.Trouble with language and communication.Trouble concentrating.Poor judgment.Problems with reasoning.Child-like behavior and language.Extreme anxiety.Angry outbursts.Wandering from home or public places.Dementia usually gets worse slowly over time. In the early stages, people with dementia can stay independent and safe with some help. In later stages, they need help with daily tasks such as dressing, grooming, and using the bathroom.  How to help the person with dementia cope  Dementia can be frightening and confusing. Here are some tips to help the person with dementia cope with changes caused by the disease.  General tips     Keep the person on track with his or her routine.Try to identify areas where the person may need help.Be supportive, patient, calm, and encouraging.Gently remind the person that adjusting to changes takes time.Help with the tasks that the person has asked for help with.Keep the person involved in daily tasks and decisions as much as possible.Encourage conversation, but try not to get frustrated or harried if the person struggles to find words or does not seem to appreciate your help.Communication tips     When the person is talking or seems frustrated, make eye contact and hold the person's hand.Ask specific questions that need yes or no answers.Use simple words, short sentences, and a calm voice. Only give one direction at a time.When offering choices, limit them to just 1 or 2.Avoid correcting the person in a negative way.If the person is struggling to find the right words, gently try to help him or her.How to recognize symptoms of stress  Symptoms of stress in caregivers include:  Feeling frustrated or angry with the person with dementia.Denying that the person has dementia or that his or her symptoms will not improve.Feeling hopeless and unappreciated.Difficulty sleeping.Difficulty concentrating.Feeling anxious, irritable, or depressed.Developing stress-related health problems.Feeling like you have too little time for your own life.Follow these instructions at home:     Make sure that you and the person you are caring for:  Get regular sleep.Exercise regularly.Eat regular, nutritious meals.Drink enough fluid to keep your urine clear or pale yellow.Take over-the-counter and prescription medicines only as told by your health care providers.Attend all scheduled health care appointments.Join a support group with others who are caregivers.Ask about respite care resources so that you can have a regular break from the stress of caregiving.Look for signs of stress in yourself and in the person you are caring for. If you notice signs of stress, take steps to manage it.Consider any safety risks and take steps to avoid them.Organize medications in a pill box for each day of the week.Create a plan to handle any legal or financial matters. Get legal or financial advice if needed.Keep a calendar in a central location to remind the person of appointments or other activities.Tips for reducing the risk of injury  Keep floors clear of clutter. Remove rugs, magazine racks, and floor lamps.Keep hallways well lit, especially at night.Put a handrail and nonslip mat in the bathtub or shower.Put childproof locks on cabinets that contain dangerous items, such as medicines, alcohol, guns, toxic cleaning items, sharp tools or utensils, matches, and lighters.Put the locks in places where the person cannot see or reach them easily. This will help ensure that the person does not wander out of the house and get lost.Be prepared for emergencies. Keep a list of emergency phone numbers and addresses in a convenient area.Remove car keys and lock garage doors so that the person does not try to get in the car and drive.Have the person wear a bracelet that tracks locations and identifies the person as having memory problems. This should be worn at all times for safety.Where to find support:  Many individuals and organizations offer support. These include:  Support groups for people with dementia and for caregivers.Counselors or therapists.Home health care services.Adult day care centers.Where to find more information  Alzheimer[employer] Association: www.alz.org  Contact a health care provider if:  The person's health is rapidly getting worse.You are no longer able to care for the person.Caring for the person is affecting your physical and emotional health.The person threatens himself or herself, you, or anyone else.Summary  Dementia is a term used to describe a number of symptoms that affect memory and thinking.Dementia usually gets worse slowly over time.Take steps to reduce the person's risk of injury, and to plan for future care

## 2023-06-30 NOTE — ED ADULT NURSE NOTE - BREATHING
Anesthesia Pre Eval Note    Anesthesia ROS/Med Hx        Anesthetic Complication History:  Patient does not have a history of anesthetic complications      Pulmonary Review:    Positive for asthma    Neuro/Psych Review:    Positive for psychiatric history - Anxiety (ADHD)    Cardiovascular Review:  Patient does not have a cardiovascular history       GI/HEPATIC/RENAL Review:  Patient does not have a GI/hepatic/renalhistory       End/Other Review:  Patient does not have an endo/other history    Additional Results:     ALLERGIES:  No Known Allergies       No results found for: WBC, RBC, HGB, HCT, MCV, MCH, MCHC, RDWCV, SODIUM, POTASSIUM, CHLORIDE, CO2, GLUCOSE, BUN, CREATININE, GFRESTIMATE, EGFRNONAFR, GFRA, GFRNA, CALCIUM, HCG, PLT, PTT, INR   Past Medical History:  No date: Allergy  No date: Anxiety  No date: Asthma  No date: RAD (reactive airway disease)    History reviewed. No pertinent surgical history.       Prior to Admission medications :  Medication albuterol 108 (90 Base) MCG/ACT inhaler, Sig INHALE 1 PUFF INTO THE LUNGS EVERY 4 HOURS AS NEEDED FOR SHORTNESS OF BREATH OR WHEEZING, Start Date 5/4/23, End Date , Taking? , Authorizing Provider Kaelyn Wright MD         Patient Vitals in the past 24 hrs:  06/30/23 0956, Height:5' 5.75\" (1.67 m), Weight:(!) 77 kg (169 lb 12.1 oz)  06/30/23 0948, BP:113/70, Temp:37.5 °C (99.5 °F), Temp src:Oral, Pulse:92, Resp:16, SpO2:100 %  06/30/23 0941, Height:5' 5.75\" (1.67 m), Weight:(!) 77 kg (169 lb 12.1 oz)      Relevant Problems   Development   (+) ADHD (attention deficit hyperactivity disorder), combined type      Endo   (+) Obesity without serious comorbidity with body mass index (BMI) in 95th to 98th percentile for age in pediatric patient      Neuro/Psych   (+) ADHD (attention deficit hyperactivity disorder), combined type      Pulmonary   (+) Mild intermittent asthma       Physical Exam     Airway   Mallampati: II  TM Distance: >3 FB  Neck ROM: Full  Neck:  Non-tender and Able to place in sniff position  TMJ Mobility: Good    Cardiovascular  Cardiovascular exam normal  Cardio Rhythm: Regular  Cardio Rate: Normal    Head Assessment  Head assessment: Normocephalic and Atraumatic    General Assessment  General Assessment: Alert and oriented and No acute distress    Dental Exam    Patient has:  Denied broken/chipped/loose teeth    Pulmonary Exam  Pulmonary exam normal  Breath sounds clear to auscultation:  Yes    Abdominal Exam  Abdominal exam normal      Anesthesia Plan:    ASA Status: 2  Anesthesia Type: General    Induction: Intravenous  Preferred Airway Type: LMA  Maintenance: Inhalational    Post-op Pain Management: Per Surgeon      Checklist  Reviewed: NPO Status, Allergies, Medications, Problem list and Past Med History  Consent/Risks Discussed Statement:  The proposed anesthetic plan, including its risks and benefits, have been discussed with the Patient and Mother along with the risks and benefits of alternatives. Questions were encouraged and answered and the patient and/or representative understands and agrees to proceed.        I discussed with the patient (and/or patient's legal representative) the risks and benefits of the proposed anesthesia plan, General, which may include services performed by other anesthesia providers.    Alternative anesthesia plans, if available, were reviewed with the patient (and/or patient's legal representative). Discussion has been held with the patient (and/or patient's legal representative) regarding risks of anesthesia, which include Nausea, Vomiting, Dental Injury, Hypotension, Post-op Intubation, Sore Throat and Allergic Reaction and emergent situations that may require change in anesthesia plan.    The patient (and/or patient's legal representative) has indicated understanding, his/her questions have been answered, and he/she wishes to proceed with the planned anesthetic.    Blood Products: Not Anticipated     spontaneous

## 2023-07-20 NOTE — ED PROVIDER NOTE - CONDITION AT DISCHARGE:
Satisfactory Libtayo Counseling- I discussed with the patient the risks of Libtayo including but not limited to nausea, vomiting, diarrhea, and bone or muscle pain.  The patient verbalized understanding of the proper use and possible adverse effects of Libtayo.  All of the patient's questions and concerns were addressed.

## 2023-09-12 ENCOUNTER — INPATIENT (INPATIENT)
Facility: HOSPITAL | Age: 83
LOS: 14 days | Discharge: NURSING, REHAB OR LONG-TERM CARE | DRG: 23 | End: 2023-09-27
Attending: STUDENT IN AN ORGANIZED HEALTH CARE EDUCATION/TRAINING PROGRAM | Admitting: NEUROLOGICAL SURGERY
Payer: MEDICARE

## 2023-09-12 ENCOUNTER — TRANSCRIPTION ENCOUNTER (OUTPATIENT)
Age: 83
End: 2023-09-12

## 2023-09-12 VITALS
HEART RATE: 90 BPM | RESPIRATION RATE: 17 BRPM | WEIGHT: 102.74 LBS | DIASTOLIC BLOOD PRESSURE: 80 MMHG | TEMPERATURE: 98 F | OXYGEN SATURATION: 96 % | SYSTOLIC BLOOD PRESSURE: 155 MMHG

## 2023-09-12 DIAGNOSIS — I63.9 CEREBRAL INFARCTION, UNSPECIFIED: ICD-10-CM

## 2023-09-12 LAB
ALBUMIN SERPL ELPH-MCNC: 3.5 G/DL — SIGNIFICANT CHANGE UP (ref 3.5–5.2)
ALBUMIN SERPL ELPH-MCNC: 3.9 G/DL — SIGNIFICANT CHANGE UP (ref 3.5–5.2)
ALP SERPL-CCNC: 81 U/L — SIGNIFICANT CHANGE UP (ref 30–115)
ALP SERPL-CCNC: 91 U/L — SIGNIFICANT CHANGE UP (ref 30–115)
ALT FLD-CCNC: 6 U/L — SIGNIFICANT CHANGE UP (ref 0–41)
ALT FLD-CCNC: 6 U/L — SIGNIFICANT CHANGE UP (ref 0–41)
ANION GAP SERPL CALC-SCNC: 15 MMOL/L — HIGH (ref 7–14)
ANION GAP SERPL CALC-SCNC: 16 MMOL/L — HIGH (ref 7–14)
APTT BLD: 25.7 SEC — LOW (ref 27–39.2)
APTT BLD: 32.8 SEC — SIGNIFICANT CHANGE UP (ref 27–39.2)
AST SERPL-CCNC: 13 U/L — SIGNIFICANT CHANGE UP (ref 0–41)
AST SERPL-CCNC: 13 U/L — SIGNIFICANT CHANGE UP (ref 0–41)
BASOPHILS # BLD AUTO: 0.02 K/UL — SIGNIFICANT CHANGE UP (ref 0–0.2)
BASOPHILS # BLD AUTO: 0.05 K/UL — SIGNIFICANT CHANGE UP (ref 0–0.2)
BASOPHILS NFR BLD AUTO: 0.1 % — SIGNIFICANT CHANGE UP (ref 0–1)
BASOPHILS NFR BLD AUTO: 0.5 % — SIGNIFICANT CHANGE UP (ref 0–1)
BILIRUB SERPL-MCNC: 0.7 MG/DL — SIGNIFICANT CHANGE UP (ref 0.2–1.2)
BILIRUB SERPL-MCNC: 0.9 MG/DL — SIGNIFICANT CHANGE UP (ref 0.2–1.2)
BLD GP AB SCN SERPL QL: SIGNIFICANT CHANGE UP
BUN SERPL-MCNC: 22 MG/DL — HIGH (ref 10–20)
BUN SERPL-MCNC: 28 MG/DL — HIGH (ref 10–20)
CALCIUM SERPL-MCNC: 8.2 MG/DL — LOW (ref 8.4–10.5)
CALCIUM SERPL-MCNC: 9.3 MG/DL — SIGNIFICANT CHANGE UP (ref 8.4–10.4)
CHLORIDE SERPL-SCNC: 106 MMOL/L — SIGNIFICANT CHANGE UP (ref 98–110)
CHLORIDE SERPL-SCNC: 107 MMOL/L — SIGNIFICANT CHANGE UP (ref 98–110)
CK MB CFR SERPL CALC: 1.2 NG/ML — SIGNIFICANT CHANGE UP (ref 0.6–6.3)
CK SERPL-CCNC: 24 U/L — SIGNIFICANT CHANGE UP (ref 0–225)
CO2 SERPL-SCNC: 18 MMOL/L — SIGNIFICANT CHANGE UP (ref 17–32)
CO2 SERPL-SCNC: 22 MMOL/L — SIGNIFICANT CHANGE UP (ref 17–32)
CREAT SERPL-MCNC: 0.5 MG/DL — LOW (ref 0.7–1.5)
CREAT SERPL-MCNC: 0.7 MG/DL — SIGNIFICANT CHANGE UP (ref 0.7–1.5)
EGFR: 87 ML/MIN/1.73M2 — SIGNIFICANT CHANGE UP
EGFR: 94 ML/MIN/1.73M2 — SIGNIFICANT CHANGE UP
EOSINOPHIL # BLD AUTO: 0.01 K/UL — SIGNIFICANT CHANGE UP (ref 0–0.7)
EOSINOPHIL # BLD AUTO: 0.2 K/UL — SIGNIFICANT CHANGE UP (ref 0–0.7)
EOSINOPHIL NFR BLD AUTO: 0.1 % — SIGNIFICANT CHANGE UP (ref 0–8)
EOSINOPHIL NFR BLD AUTO: 1.9 % — SIGNIFICANT CHANGE UP (ref 0–8)
GAS PNL BLDA: SIGNIFICANT CHANGE UP
GLUCOSE BLDC GLUCOMTR-MCNC: 182 MG/DL — HIGH (ref 70–99)
GLUCOSE SERPL-MCNC: 156 MG/DL — HIGH (ref 70–99)
GLUCOSE SERPL-MCNC: 196 MG/DL — HIGH (ref 70–99)
HCT VFR BLD CALC: 37.9 % — SIGNIFICANT CHANGE UP (ref 37–47)
HCT VFR BLD CALC: 43.2 % — SIGNIFICANT CHANGE UP (ref 37–47)
HGB BLD-MCNC: 12.4 G/DL — SIGNIFICANT CHANGE UP (ref 12–16)
HGB BLD-MCNC: 14.1 G/DL — SIGNIFICANT CHANGE UP (ref 12–16)
IMM GRANULOCYTES NFR BLD AUTO: 0.3 % — SIGNIFICANT CHANGE UP (ref 0.1–0.3)
IMM GRANULOCYTES NFR BLD AUTO: 0.5 % — HIGH (ref 0.1–0.3)
INR BLD: 1.01 RATIO — SIGNIFICANT CHANGE UP (ref 0.65–1.3)
INR BLD: 1.08 RATIO — SIGNIFICANT CHANGE UP (ref 0.65–1.3)
LYMPHOCYTES # BLD AUTO: 0.86 K/UL — LOW (ref 1.2–3.4)
LYMPHOCYTES # BLD AUTO: 2.66 K/UL — SIGNIFICANT CHANGE UP (ref 1.2–3.4)
LYMPHOCYTES # BLD AUTO: 25 % — SIGNIFICANT CHANGE UP (ref 20.5–51.1)
LYMPHOCYTES # BLD AUTO: 6.1 % — LOW (ref 20.5–51.1)
MAGNESIUM SERPL-MCNC: 1.9 MG/DL — SIGNIFICANT CHANGE UP (ref 1.8–2.4)
MCHC RBC-ENTMCNC: 29.4 PG — SIGNIFICANT CHANGE UP (ref 27–31)
MCHC RBC-ENTMCNC: 29.4 PG — SIGNIFICANT CHANGE UP (ref 27–31)
MCHC RBC-ENTMCNC: 32.6 G/DL — SIGNIFICANT CHANGE UP (ref 32–37)
MCHC RBC-ENTMCNC: 32.7 G/DL — SIGNIFICANT CHANGE UP (ref 32–37)
MCV RBC AUTO: 89.8 FL — SIGNIFICANT CHANGE UP (ref 81–99)
MCV RBC AUTO: 90 FL — SIGNIFICANT CHANGE UP (ref 81–99)
MONOCYTES # BLD AUTO: 0.64 K/UL — HIGH (ref 0.1–0.6)
MONOCYTES # BLD AUTO: 0.68 K/UL — HIGH (ref 0.1–0.6)
MONOCYTES NFR BLD AUTO: 4.6 % — SIGNIFICANT CHANGE UP (ref 1.7–9.3)
MONOCYTES NFR BLD AUTO: 6.4 % — SIGNIFICANT CHANGE UP (ref 1.7–9.3)
NEUTROPHILS # BLD AUTO: 12.39 K/UL — HIGH (ref 1.4–6.5)
NEUTROPHILS # BLD AUTO: 7.04 K/UL — HIGH (ref 1.4–6.5)
NEUTROPHILS NFR BLD AUTO: 65.9 % — SIGNIFICANT CHANGE UP (ref 42.2–75.2)
NEUTROPHILS NFR BLD AUTO: 88.6 % — HIGH (ref 42.2–75.2)
NRBC # BLD: 0 /100 WBCS — SIGNIFICANT CHANGE UP (ref 0–0)
NRBC # BLD: 0 /100 WBCS — SIGNIFICANT CHANGE UP (ref 0–0)
PHOSPHATE SERPL-MCNC: 3.7 MG/DL — SIGNIFICANT CHANGE UP (ref 2.1–4.9)
PLATELET # BLD AUTO: 243 K/UL — SIGNIFICANT CHANGE UP (ref 130–400)
PLATELET # BLD AUTO: 258 K/UL — SIGNIFICANT CHANGE UP (ref 130–400)
PMV BLD: 9.1 FL — SIGNIFICANT CHANGE UP (ref 7.4–10.4)
PMV BLD: 9.2 FL — SIGNIFICANT CHANGE UP (ref 7.4–10.4)
POTASSIUM SERPL-MCNC: 3.8 MMOL/L — SIGNIFICANT CHANGE UP (ref 3.5–5)
POTASSIUM SERPL-MCNC: 3.9 MMOL/L — SIGNIFICANT CHANGE UP (ref 3.5–5)
POTASSIUM SERPL-SCNC: 3.8 MMOL/L — SIGNIFICANT CHANGE UP (ref 3.5–5)
POTASSIUM SERPL-SCNC: 3.9 MMOL/L — SIGNIFICANT CHANGE UP (ref 3.5–5)
PROT SERPL-MCNC: 6 G/DL — SIGNIFICANT CHANGE UP (ref 6–8)
PROT SERPL-MCNC: 7 G/DL — SIGNIFICANT CHANGE UP (ref 6–8)
PROTHROM AB SERPL-ACNC: 11.5 SEC — SIGNIFICANT CHANGE UP (ref 9.95–12.87)
PROTHROM AB SERPL-ACNC: 12.3 SEC — SIGNIFICANT CHANGE UP (ref 9.95–12.87)
RBC # BLD: 4.22 M/UL — SIGNIFICANT CHANGE UP (ref 4.2–5.4)
RBC # BLD: 4.8 M/UL — SIGNIFICANT CHANGE UP (ref 4.2–5.4)
RBC # FLD: 12.7 % — SIGNIFICANT CHANGE UP (ref 11.5–14.5)
RBC # FLD: 12.8 % — SIGNIFICANT CHANGE UP (ref 11.5–14.5)
SODIUM SERPL-SCNC: 141 MMOL/L — SIGNIFICANT CHANGE UP (ref 135–146)
SODIUM SERPL-SCNC: 143 MMOL/L — SIGNIFICANT CHANGE UP (ref 135–146)
TROPONIN T SERPL-MCNC: <0.01 NG/ML — SIGNIFICANT CHANGE UP
TROPONIN T SERPL-MCNC: <0.01 NG/ML — SIGNIFICANT CHANGE UP
WBC # BLD: 10.66 K/UL — SIGNIFICANT CHANGE UP (ref 4.8–10.8)
WBC # BLD: 13.99 K/UL — HIGH (ref 4.8–10.8)
WBC # FLD AUTO: 10.66 K/UL — SIGNIFICANT CHANGE UP (ref 4.8–10.8)
WBC # FLD AUTO: 13.99 K/UL — HIGH (ref 4.8–10.8)

## 2023-09-12 PROCEDURE — 94003 VENT MGMT INPAT SUBQ DAY: CPT

## 2023-09-12 PROCEDURE — 97530 THERAPEUTIC ACTIVITIES: CPT | Mod: GO

## 2023-09-12 PROCEDURE — 82553 CREATINE MB FRACTION: CPT

## 2023-09-12 PROCEDURE — 87040 BLOOD CULTURE FOR BACTERIA: CPT

## 2023-09-12 PROCEDURE — 82330 ASSAY OF CALCIUM: CPT

## 2023-09-12 PROCEDURE — 86901 BLOOD TYPING SEROLOGIC RH(D): CPT

## 2023-09-12 PROCEDURE — 80048 BASIC METABOLIC PNL TOTAL CA: CPT

## 2023-09-12 PROCEDURE — 80053 COMPREHEN METABOLIC PANEL: CPT

## 2023-09-12 PROCEDURE — 81001 URINALYSIS AUTO W/SCOPE: CPT

## 2023-09-12 PROCEDURE — 92523 SPEECH SOUND LANG COMPREHEN: CPT | Mod: GN

## 2023-09-12 PROCEDURE — 93306 TTE W/DOPPLER COMPLETE: CPT

## 2023-09-12 PROCEDURE — 87641 MR-STAPH DNA AMP PROBE: CPT

## 2023-09-12 PROCEDURE — 85610 PROTHROMBIN TIME: CPT

## 2023-09-12 PROCEDURE — 70498 CT ANGIOGRAPHY NECK: CPT | Mod: 26,MA

## 2023-09-12 PROCEDURE — 97166 OT EVAL MOD COMPLEX 45 MIN: CPT | Mod: GO

## 2023-09-12 PROCEDURE — 83540 ASSAY OF IRON: CPT

## 2023-09-12 PROCEDURE — 83986 ASSAY PH BODY FLUID NOS: CPT

## 2023-09-12 PROCEDURE — 83935 ASSAY OF URINE OSMOLALITY: CPT

## 2023-09-12 PROCEDURE — 76937 US GUIDE VASCULAR ACCESS: CPT | Mod: 26

## 2023-09-12 PROCEDURE — 84100 ASSAY OF PHOSPHORUS: CPT

## 2023-09-12 PROCEDURE — 85730 THROMBOPLASTIN TIME PARTIAL: CPT

## 2023-09-12 PROCEDURE — 0225U NFCT DS DNA&RNA 21 SARSCOV2: CPT

## 2023-09-12 PROCEDURE — 97167 OT EVAL HIGH COMPLEX 60 MIN: CPT | Mod: GO

## 2023-09-12 PROCEDURE — 80061 LIPID PANEL: CPT

## 2023-09-12 PROCEDURE — 85014 HEMATOCRIT: CPT

## 2023-09-12 PROCEDURE — 84300 ASSAY OF URINE SODIUM: CPT

## 2023-09-12 PROCEDURE — C1887: CPT

## 2023-09-12 PROCEDURE — 82962 GLUCOSE BLOOD TEST: CPT

## 2023-09-12 PROCEDURE — 87640 STAPH A DNA AMP PROBE: CPT

## 2023-09-12 PROCEDURE — 84443 ASSAY THYROID STIM HORMONE: CPT

## 2023-09-12 PROCEDURE — C1757: CPT

## 2023-09-12 PROCEDURE — 92610 EVALUATE SWALLOWING FUNCTION: CPT | Mod: GN

## 2023-09-12 PROCEDURE — 71045 X-RAY EXAM CHEST 1 VIEW: CPT

## 2023-09-12 PROCEDURE — 97116 GAIT TRAINING THERAPY: CPT | Mod: GP

## 2023-09-12 PROCEDURE — 83605 ASSAY OF LACTIC ACID: CPT

## 2023-09-12 PROCEDURE — 61645 PERQ ART M-THROMBECT &/NFS: CPT

## 2023-09-12 PROCEDURE — 0042T: CPT | Mod: MA

## 2023-09-12 PROCEDURE — 97129 THER IVNTJ 1ST 15 MIN: CPT | Mod: GO

## 2023-09-12 PROCEDURE — 92526 ORAL FUNCTION THERAPY: CPT | Mod: GN

## 2023-09-12 PROCEDURE — 92507 TX SP LANG VOICE COMM INDIV: CPT | Mod: GN

## 2023-09-12 PROCEDURE — 82550 ASSAY OF CK (CPK): CPT

## 2023-09-12 PROCEDURE — 36415 COLL VENOUS BLD VENIPUNCTURE: CPT

## 2023-09-12 PROCEDURE — 61645 PERQ ART M-THROMBECT &/NFS: CPT | Mod: 80

## 2023-09-12 PROCEDURE — 83880 ASSAY OF NATRIURETIC PEPTIDE: CPT

## 2023-09-12 PROCEDURE — 84145 PROCALCITONIN (PCT): CPT

## 2023-09-12 PROCEDURE — 84133 ASSAY OF URINE POTASSIUM: CPT

## 2023-09-12 PROCEDURE — 85025 COMPLETE CBC W/AUTO DIFF WBC: CPT

## 2023-09-12 PROCEDURE — 82570 ASSAY OF URINE CREATININE: CPT

## 2023-09-12 PROCEDURE — 84295 ASSAY OF SERUM SODIUM: CPT

## 2023-09-12 PROCEDURE — 86900 BLOOD TYPING SEROLOGIC ABO: CPT

## 2023-09-12 PROCEDURE — 82803 BLOOD GASES ANY COMBINATION: CPT

## 2023-09-12 PROCEDURE — 82436 ASSAY OF URINE CHLORIDE: CPT

## 2023-09-12 PROCEDURE — 83550 IRON BINDING TEST: CPT

## 2023-09-12 PROCEDURE — 84132 ASSAY OF SERUM POTASSIUM: CPT

## 2023-09-12 PROCEDURE — 70450 CT HEAD/BRAIN W/O DYE: CPT | Mod: 26,77,59

## 2023-09-12 PROCEDURE — 70496 CT ANGIOGRAPHY HEAD: CPT | Mod: 26,MA

## 2023-09-12 PROCEDURE — 94640 AIRWAY INHALATION TREATMENT: CPT

## 2023-09-12 PROCEDURE — 82728 ASSAY OF FERRITIN: CPT

## 2023-09-12 PROCEDURE — 85018 HEMOGLOBIN: CPT

## 2023-09-12 PROCEDURE — 84481 FREE ASSAY (FT-3): CPT

## 2023-09-12 PROCEDURE — 70450 CT HEAD/BRAIN W/O DYE: CPT

## 2023-09-12 PROCEDURE — 97110 THERAPEUTIC EXERCISES: CPT | Mod: GP

## 2023-09-12 PROCEDURE — 99291 CRITICAL CARE FIRST HOUR: CPT | Mod: 25

## 2023-09-12 PROCEDURE — 92612 ENDOSCOPY SWALLOW (FEES) VID: CPT | Mod: GN

## 2023-09-12 PROCEDURE — 83036 HEMOGLOBIN GLYCOSYLATED A1C: CPT

## 2023-09-12 PROCEDURE — 87186 SC STD MICRODIL/AGAR DIL: CPT

## 2023-09-12 PROCEDURE — 93005 ELECTROCARDIOGRAM TRACING: CPT

## 2023-09-12 PROCEDURE — 93010 ELECTROCARDIOGRAM REPORT: CPT

## 2023-09-12 PROCEDURE — 97162 PT EVAL MOD COMPLEX 30 MIN: CPT | Mod: GP

## 2023-09-12 PROCEDURE — 84439 ASSAY OF FREE THYROXINE: CPT

## 2023-09-12 PROCEDURE — 94760 N-INVAS EAR/PLS OXIMETRY 1: CPT

## 2023-09-12 PROCEDURE — 74176 CT ABD & PELVIS W/O CONTRAST: CPT

## 2023-09-12 PROCEDURE — C9399: CPT

## 2023-09-12 PROCEDURE — C1769: CPT

## 2023-09-12 PROCEDURE — 87086 URINE CULTURE/COLONY COUNT: CPT

## 2023-09-12 PROCEDURE — 85027 COMPLETE CBC AUTOMATED: CPT

## 2023-09-12 PROCEDURE — 70450 CT HEAD/BRAIN W/O DYE: CPT | Mod: 26,MA,59

## 2023-09-12 PROCEDURE — 84445 ASSAY OF TSI GLOBULIN: CPT

## 2023-09-12 PROCEDURE — 97535 SELF CARE MNGMENT TRAINING: CPT | Mod: GO

## 2023-09-12 PROCEDURE — 71045 X-RAY EXAM CHEST 1 VIEW: CPT | Mod: 26,76

## 2023-09-12 PROCEDURE — 84156 ASSAY OF PROTEIN URINE: CPT

## 2023-09-12 PROCEDURE — 61645 PERQ ART M-THROMBECT &/NFS: CPT | Mod: RT

## 2023-09-12 PROCEDURE — 31500 INSERT EMERGENCY AIRWAY: CPT

## 2023-09-12 PROCEDURE — 87070 CULTURE OTHR SPECIMN AEROBIC: CPT

## 2023-09-12 PROCEDURE — 93970 EXTREMITY STUDY: CPT

## 2023-09-12 PROCEDURE — 86850 RBC ANTIBODY SCREEN: CPT

## 2023-09-12 PROCEDURE — 84540 ASSAY OF URINE/UREA-N: CPT

## 2023-09-12 PROCEDURE — 84484 ASSAY OF TROPONIN QUANT: CPT

## 2023-09-12 PROCEDURE — 84480 ASSAY TRIIODOTHYRONINE (T3): CPT

## 2023-09-12 PROCEDURE — 83735 ASSAY OF MAGNESIUM: CPT

## 2023-09-12 PROCEDURE — 81003 URINALYSIS AUTO W/O SCOPE: CPT

## 2023-09-12 PROCEDURE — 83615 LACTATE (LD) (LDH) ENZYME: CPT

## 2023-09-12 PROCEDURE — C1894: CPT

## 2023-09-12 PROCEDURE — 84436 ASSAY OF TOTAL THYROXINE: CPT

## 2023-09-12 RX ORDER — POLYETHYLENE GLYCOL 3350 17 G/17G
17 POWDER, FOR SOLUTION ORAL EVERY 12 HOURS
Refills: 0 | Status: DISCONTINUED | OUTPATIENT
Start: 2023-09-12 | End: 2023-09-16

## 2023-09-12 RX ORDER — DILTIAZEM HCL 120 MG
5 CAPSULE, EXT RELEASE 24 HR ORAL ONCE
Refills: 0 | Status: COMPLETED | OUTPATIENT
Start: 2023-09-12 | End: 2023-09-12

## 2023-09-12 RX ORDER — FAMOTIDINE 10 MG/ML
20 INJECTION INTRAVENOUS
Refills: 0 | Status: DISCONTINUED | OUTPATIENT
Start: 2023-09-12 | End: 2023-09-13

## 2023-09-12 RX ORDER — TENECTEPLASE 50 MG
12 KIT INTRAVENOUS ONCE
Refills: 0 | Status: COMPLETED | OUTPATIENT
Start: 2023-09-12 | End: 2023-09-12

## 2023-09-12 RX ORDER — ROCURONIUM BROMIDE 10 MG/ML
100 VIAL (ML) INTRAVENOUS ONCE
Refills: 0 | Status: COMPLETED | OUTPATIENT
Start: 2023-09-12 | End: 2023-09-12

## 2023-09-12 RX ORDER — PROPOFOL 10 MG/ML
20 INJECTION, EMULSION INTRAVENOUS
Qty: 1000 | Refills: 0 | Status: DISCONTINUED | OUTPATIENT
Start: 2023-09-12 | End: 2023-09-12

## 2023-09-12 RX ORDER — SODIUM CHLORIDE 9 MG/ML
10 INJECTION INTRAMUSCULAR; INTRAVENOUS; SUBCUTANEOUS ONCE
Refills: 0 | Status: COMPLETED | OUTPATIENT
Start: 2023-09-12 | End: 2023-09-12

## 2023-09-12 RX ORDER — ONDANSETRON 8 MG/1
4 TABLET, FILM COATED ORAL ONCE
Refills: 0 | Status: COMPLETED | OUTPATIENT
Start: 2023-09-12 | End: 2023-09-12

## 2023-09-12 RX ORDER — MAGNESIUM SULFATE 500 MG/ML
2 VIAL (ML) INJECTION ONCE
Refills: 0 | Status: COMPLETED | OUTPATIENT
Start: 2023-09-12 | End: 2023-09-12

## 2023-09-12 RX ORDER — PHENYLEPHRINE HYDROCHLORIDE 10 MG/ML
0.2 INJECTION INTRAVENOUS
Qty: 40 | Refills: 0 | Status: DISCONTINUED | OUTPATIENT
Start: 2023-09-12 | End: 2023-09-14

## 2023-09-12 RX ORDER — SENNA PLUS 8.6 MG/1
2 TABLET ORAL AT BEDTIME
Refills: 0 | Status: DISCONTINUED | OUTPATIENT
Start: 2023-09-12 | End: 2023-09-16

## 2023-09-12 RX ORDER — SODIUM CHLORIDE 9 MG/ML
1000 INJECTION INTRAMUSCULAR; INTRAVENOUS; SUBCUTANEOUS
Refills: 0 | Status: DISCONTINUED | OUTPATIENT
Start: 2023-09-12 | End: 2023-09-14

## 2023-09-12 RX ORDER — SODIUM CHLORIDE 9 MG/ML
1000 INJECTION INTRAMUSCULAR; INTRAVENOUS; SUBCUTANEOUS ONCE
Refills: 0 | Status: COMPLETED | OUTPATIENT
Start: 2023-09-12 | End: 2023-09-12

## 2023-09-12 RX ORDER — SUGAMMADEX 100 MG/ML
200 INJECTION, SOLUTION INTRAVENOUS ONCE
Refills: 0 | Status: COMPLETED | OUTPATIENT
Start: 2023-09-12 | End: 2023-09-12

## 2023-09-12 RX ORDER — DEXMEDETOMIDINE HYDROCHLORIDE IN 0.9% SODIUM CHLORIDE 4 UG/ML
0.2 INJECTION INTRAVENOUS
Qty: 400 | Refills: 0 | Status: DISCONTINUED | OUTPATIENT
Start: 2023-09-12 | End: 2023-09-14

## 2023-09-12 RX ORDER — POTASSIUM CHLORIDE 20 MEQ
40 PACKET (EA) ORAL ONCE
Refills: 0 | Status: COMPLETED | OUTPATIENT
Start: 2023-09-12 | End: 2023-09-12

## 2023-09-12 RX ORDER — ETOMIDATE 2 MG/ML
14 INJECTION INTRAVENOUS ONCE
Refills: 0 | Status: COMPLETED | OUTPATIENT
Start: 2023-09-12 | End: 2023-09-12

## 2023-09-12 RX ORDER — CHLORHEXIDINE GLUCONATE 213 G/1000ML
15 SOLUTION TOPICAL EVERY 12 HOURS
Refills: 0 | Status: DISCONTINUED | OUTPATIENT
Start: 2023-09-12 | End: 2023-09-14

## 2023-09-12 RX ADMIN — ETOMIDATE 14 MILLIGRAM(S): 2 INJECTION INTRAVENOUS at 17:53

## 2023-09-12 RX ADMIN — TENECTEPLASE 1728 MILLIGRAM(S): KIT at 17:43

## 2023-09-12 RX ADMIN — Medication 100 MILLIGRAM(S): at 17:54

## 2023-09-12 RX ADMIN — SUGAMMADEX 200 MILLIGRAM(S): 100 INJECTION, SOLUTION INTRAVENOUS at 21:30

## 2023-09-12 RX ADMIN — SODIUM CHLORIDE 10 MILLILITER(S): 9 INJECTION INTRAMUSCULAR; INTRAVENOUS; SUBCUTANEOUS at 17:43

## 2023-09-12 RX ADMIN — PHENYLEPHRINE HYDROCHLORIDE 3.5 MICROGRAM(S)/KG/MIN: 10 INJECTION INTRAVENOUS at 22:00

## 2023-09-12 RX ADMIN — ONDANSETRON 4 MILLIGRAM(S): 8 TABLET, FILM COATED ORAL at 17:20

## 2023-09-12 NOTE — H&P ADULT - ASSESSMENT
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 Received TNK @ 1743. Taken to IR for thrombectomy, s/p thrombectomy of right SCA/BA with TICI 2C x3 passes. Admitted to Neuro ICU for further monitoring.     PLAN:    NEURO:  Neuro checks per protocol   Neuroendovascular following  CT Head post thrombectomy performed - pending read  No AC/AP at this time  Analgesia: Precedex for RASS goal 0 to -1   Stroke core measures   Activity: [] OOB as tolerated [X] Bedrest [x] PT [x] OT [] PMNR [] Bed in Chair Position     PULM:  Lung protective measures  SAT/SBT as tolerated  CXR in am   HOB 30 degrees    CV: Afib  SBP goal: 110-140 - on amadeo; wean as tolerates   Echo - pending  EKG - showing Afib with RVR   Cardiac enzymes - pending    RENAL:  Fluids: NS @ 50 cc/hr x 10 hours   Primafit - bladder scans Q6 hours  Replete electrolytes as needed   Normonatremia     GI:  Diet: NPO x meds   GI prophylaxis [] not indicated [x] PPI [] other:  Bowel regimen [] colace [x] senna [x] other: Miralax     ENDO:   Goal euglycemia (-180)  TSH/Free t4 pending  Lipid profile - pending  HgBA1C pending     HEME/ONC:  VTE prophylaxis: [X] SCDs [] chemoprophylaxis  Holding AC/AP for now   LE duplex - pending    ID:  Maintain normothermia       CODE STATUS:  [X] Full Code [] DNR [] DNI [] Palliative/Comfort Care    DISPOSITION:  [X] ICU [] Stroke Unit [] Floor [] EMU [] RCU [] PCU    [] Patient is at high risk of neurologic deterioration/death due to:     Time seen:  Time spent: ___ [] critical care minutes

## 2023-09-12 NOTE — ED ADULT TRIAGE NOTE - CHIEF COMPLAINT QUOTE
Pt here with AMS starting 1615. pt went to bathroom daughter noticed she was taking longer and found pt slumped over and minimally responsive. stroke code activated.

## 2023-09-12 NOTE — ED PROVIDER NOTE - OBJECTIVE STATEMENT
83-year-old female past medical history of schizophrenia, A-fib, HTN, presents to the ED due to unresponsiveness.  Patient was last seen normal at 4 PM today.  Patient walked herself to the bathroom.  Patient was then found slumped in the bathroom.  Patient is not on anticoagulation.  As per EMS, patient has been moving her right upper and lower extremities but has not moved her left side.  Stroke code called in triage.

## 2023-09-12 NOTE — ED PROVIDER NOTE - NS_EDPROVIDERDISPOUSERTYPE_ED_A_ED
"Patient called and he is screaming \"shut up. Shut up.\" and then he says \"get the hell out of the car. Shut the Fuck up. You are annoying me.\" asked Miller who he was talking to. He said these caretakers. \"i'm so done with them and i'm withdrawing 3,200 to give them to go away. \" Miller reports that he is done with this writer and Jannae. We are nice, but.. then started yelling at caretakers again.   Kathryn requested the call be ended so she can get him home safely and him to calm down.   Will call patient back.    Message to DR Schneider, dr noyola, dr mohr and olena.   " Attending Attestation (For Attendings USE Only)...

## 2023-09-12 NOTE — BRIEF OPERATIVE NOTE - OPERATION/FINDINGS
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   Preliminary findings:   Official IR neuro procedure note to follow.

## 2023-09-12 NOTE — BRIEF OPERATIVE NOTE - COMMENTS
Please follow post NI orders for neuro checks, distal pulses, vitals, and arteriotomy site checks placed for total of 24 hour recovery period following procedure.   Keep HOB elevated, right wrist straight and immobile for x 6hr. Continue monitoring distal pulses of left radial/ulnar access. TR band inflated post procedure.      Patient brought to neuro ICU post procedure s/p [facility] transported with IR nursing, ACP, anesthesia, and respiratory teams.   Left snuffbox radial and left ulnar wrist site CDI, TR band in place at time of transport to ICU. Mild swelling left radial site. Pulses intact.     Please notify provider with any signs of bleeding or hematoma at arteriotomy site, change in mental status, vitals outside parameters, or absent distal pulses.   Management per neuro ICU. Signout given via spectra and at bedside.   [id number]    Time of occlusion visualization - 1839  Time at occlusion - 1841   First pass - 1853   Second pass - 1913   Third pass - 1921   Time of TICI recanalization TICI 2C 1928

## 2023-09-12 NOTE — H&P ADULT - HISTORY OF PRESENT ILLNESS
The patient is an 83-year-old female with a past history of mood disorder, hallucination, hyperthyroidism, Afib, with a prior admission for syncope/ collapse at home, who presented with dysarthria, confusion, and left weakness and was noted to have a right perfusion deficit on CTP. The patient is a code NI actual, and will be taken for a planned diagnostic cerebral angiogram + mechanical thrombectomy with Dr. Mckeon.     Admission scores:   NIHSS 32

## 2023-09-12 NOTE — PRE-ANESTHESIA EVALUATION ADULT - NSANTHPMHFT_GEN_ALL_CORE
Note delayed due to emergent patient care.    83F with PMH schizophrenia, BPD, hyperthyroidism on no home meds presented as NI actual with facial droop, intubated and sedated in ER. Found to be in rapid AFib. Brought to IR suite for emergent thrombectomy.

## 2023-09-12 NOTE — ED PROVIDER NOTE - ATTENDING CONTRIBUTION TO CARE
83yF BIBEMS as stroke code - pt was last seen normal ~4pm - went to the bathroom and was found slumped in her chair (or on the ground?).  Not on a/c.  Pt unresponsive and not moving her L side.

## 2023-09-12 NOTE — ED ADULT NURSE NOTE - OBJECTIVE STATEMENT
Patient is a 83 year old female coming in for AMS- as per daughter patient was found in the bathroom altered- stroke code called

## 2023-09-12 NOTE — H&P ADULT - NSHPPHYSICALEXAM_GEN_ALL_CORE
PHYSICAL EXAM:  GENERAL: well built, well nourished  HEAD:  Atraumatic, Normocephalic  EYES: EOMI, PERRLA, conjunctiva and sclera clear  ENT: No tonsillar erythema, exudates, or enlargement; Moist mucous membranes, Good dentition, No lesions  NECK: Supple, No JVD, Normal thyroid, no enlarged nodes  CHEST/LUNG: B/L good air entry; No rales, rhonchi, or wheezing  HEART: S1S2 normal, no S3, Regular rate and rhythm; No murmurs, rubs, or gallops  ABDOMEN: Soft, Nontender, Nondistended; Bowel sounds present  EXTREMITIES:  2+ Peripheral Pulses, No clubbing, cyanosis, or edema  LYMPH: No lymphadenopathy noted  SKIN: No rashes or lesions  NEURO: Intubated, off sedation, PERRL, no command following, no gag/cough/corneals, no movement in all extremities - patient given paralytics prior to arrival to ICU - will ask anesthesia to reverse

## 2023-09-12 NOTE — ED PROVIDER NOTE - PROGRESS NOTE DETAILS
EK - pt w/ NIHSS 32 - CTH w/o hemorrhage - d/w neuro and neurointervention - top of basilar artery occlusion occluding PCA.  D/w pt son on the phone and daughter in law - pt's son is HCP and agrees to intubation (pt unconscious, now w/ snoring and shallow respirations and recurrent vomiting unable to protect her airway) and agrees to TNK and neurointervention.  Pt intubated and taken to IR.

## 2023-09-12 NOTE — ED PROVIDER NOTE - PHYSICAL EXAMINATION
PHYSICAL EXAM: I have reviewed current vital signs.  GENERAL: NAD, well-nourished; well-developed.  HEAD:  Normocephalic, atraumatic.  EYES: PERRL, conjunctiva and sclera clear.  ENT: MMM, no erythema/exudates.  NECK: Supple, no JVD.  CHEST/LUNG: Patient is breathing spontaneously, clear to auscultation bilaterally.  HEART: Regular rate and rhythm, normal S1 and S2; no murmurs, rubs, or gallops.  ABDOMEN: Soft, nondistended.  EXTREMITIES:  2+ peripheral pulses; no clubbing, cyanosis, or edema.  PSYCH: Patient is unresponsive.  NEUROLOGY: Unresponsive to stimulus.  SKIN: Warm and dry.

## 2023-09-12 NOTE — ED ADULT NURSE NOTE - NSFALLHARMRISKINTERV_ED_ALL_ED
Assistance OOB with selected safe patient handling equipment if applicable/Communicate risk of Fall with Harm to all staff, patient, and family/Provide visual cue: red socks, yellow wristband, yellow gown, etc/Reinforce activity limits and safety measures with patient and family/Bed in lowest position, wheels locked, appropriate side rails in place/Call bell, personal items and telephone in reach/Instruct patient to call for assistance before getting out of bed/chair/stretcher/Non-slip footwear applied when patient is off stretcher/[place] to call system/Physically safe environment - no spills, clutter or unnecessary equipment/Purposeful Proactive Rounding/Room/bathroom lighting operational, light cord in reach

## 2023-09-12 NOTE — STROKE CODE NOTE - NSMDCONSULT QTN_Y FT
The patient is an 83 year old female with PMH of schizophrenia/bipolar disorder who presented to the ED BIBEMS due to unresponsiveness, not following commands, not moving left side, nonverbal. Stroke prenotification activated at 1659. Patient arrived shortly after.   Patient found to have fixed right gaze preference, left facial droop, left sided plegia, nonverbal, not following commands. NIHSS 32.   As per family at bedside, patient lives with them and walks without walker at baseline, also A&Ox3. Patient was last seen normal at 16:15 when she was talking and walking. She walked to the bathroom at this time and shortly after noticed that she did not come back for The patient is an 83 year old female with PMH of schizophrenia/bipolar disorder who presented to the ED BIBEMS due to unresponsiveness, not following commands, not moving left side, nonverbal. Stroke prenotification activated at 1659. Patient arrived shortly after.   As per family at bedside, patient lives with them and walks without walker at baseline, also A&Ox3. Patient was last seen normal at 16:15 when she was talking and walking. She walked to the bathroom at this time and shortly after noticed that she did not come back for while so they went to check on her and she was unresponsive and slumped over on the toilet, therefore, called EMS.   Patient found to have fixed right gaze preference, left facial droop, left sided plegia, nonverbal, not following commands. NIHSS 32.   As per EMR, patient had documented afib in the past and was previously on AC, however, was stopped many years ago due to the patient inability to tolerate the medication as per family. The patient is only taking mirtazapine and risperidone as per family.   CTH with no hemorrhage. CTP with The patient is an 83 year old female with PMH of schizophrenia/bipolar disorder who presented to the ED BIBEMS due to unresponsiveness, not following commands, not moving left side, nonverbal. Stroke prenotification activated at 1659. Patient arrived shortly after.   As per family at bedside, patient lives with them and walks without walker at baseline, also A&Ox3. Patient was last seen normal at 16:15 when she was talking and walking. She walked to the bathroom at this time and shortly after noticed that she did not come back for while so they went to check on her and she was unresponsive and slumped over on the toilet, therefore, called EMS.   Patient found to have fixed right gaze preference, left facial droop, left sided plegia, nonverbal, not following commands. NIHSS 32.   As per EMR, patient had documented afib in the past and was previously on AC, however, was stopped many years ago due to the patient inability to tolerate the medication as per family. The patient is only taking mirtazapine and risperidone as per family.   While in the CT scanner, Code NI Alert was activated.   CTH with no hemorrhage. CTA with proximal right PCA occlusion with distal reconstitution & severe right superior cerebellar stenosis. Code NI actual was activated and Neuro IR team was called who came to evaluate the patient.   Prior to being taken for thrombectomy, patient was given tenecteplase at 1743. Risk benefit discussion occurred between patient's family members including decision maker who is the patient's son. The patient is an 83 year old female with PMH of schizophrenia/bipolar disorder who presented to the ED BIBEMS due to unresponsiveness, not following commands, not moving left side, nonverbal. Stroke prenotification activated at 1659. Patient arrived shortly after.   As per family at bedside, patient lives with them and walks without walker at baseline, also A&Ox3. Patient was last seen normal at 16:15 when she was talking and walking. She walked to the bathroom at this time and shortly after noticed that she did not come back for while so they went to check on her and she was unresponsive and slumped over on the toilet, therefore, called EMS.   Patient found to have fixed right gaze preference, left facial droop, left sided plegia, nonverbal, not following commands. NIHSS 32.   As per EMR, patient had documented afib in the past and was previously on AC, however, was stopped many years ago due to the patient inability to tolerate the medication as per family. The patient is only taking mirtazapine and risperidone as per family.   While in the CT scanner, Code NI Alert was activated.   CTH with no hemorrhage. CTA with proximal right PCA occlusion with distal reconstitution & severe right superior cerebellar stenosis. Code NI actual was activated and Neuro IR team was called who came to evaluate the patient.   Prior to being taken for thrombectomy, patient was given tenecteplase at 1743. Risk benefit discussion occurred between patient's family members including decision maker who is the patient's son. Dose (11.5mg) and weight (46 kg) was confirmed with RN and ED attending Dr. Klerman. The patient is an 83 year old female with PMH of schizophrenia/bipolar disorder who presented to the ED BIBEMS due to unresponsiveness, not following commands, not moving left side, nonverbal. Stroke prenotification activated at 1659. Patient arrived shortly after.   As per family at bedside, patient lives with them and walks without walker at baseline, also A&Ox3. Patient was last seen normal at 16:15 when she was talking and walking. She walked to the bathroom at this time and shortly after noticed that she did not come back for while so they went to check on her and she was unresponsive and slumped over on the toilet, therefore, called EMS.   Patient found to have fixed right gaze preference, left facial droop, left sided plegia, nonverbal, not following commands. NIHSS 32.   As per EMR, patient had documented afib in the past and was previously on AC, however, was stopped many years ago due to the patient inability to tolerate the medication as per family. The patient is only taking mirtazapine and risperidone as per family.   While in the CT scanner, Code NI Alert was activated.   CTH with no hemorrhage. CTA with proximal right PCA occlusion with distal reconstitution & severe right superior cerebellar stenosis. Code NI actual was activated and Neuro IR team was called who came to evaluate the patient.   Prior to being taken for thrombectomy, patient was given tenecteplase at 1743. Risk benefit discussion occurred between patient's family members including decision maker who is the patient's son. Dose (12 mg), mL (2.4mL) and weight (46.6 kg) was confirmed with RN and ED attending Dr. Klerman. BP was 131/81 prior to administration of tenecteplase.   Patient was then taken to Neuro IR for mechanical thrombectomy.    The above was discussed with stroke attending, Dr. Cordoba.

## 2023-09-12 NOTE — ED PROVIDER NOTE - CLINICAL SUMMARY MEDICAL DECISION MAKING FREE TEXT BOX
83yF dementia (afib formerly on a/c but not currently) p/w unresponsiveness that started <1hr prior to arrival.  Pt w/ NIHSS 32 on arrival, still protecting her airway, then decompensated during initial imaging (recurrent vomiting, increasingly shallow respirations w/ irreg chest wall/abd movements) and was intubated on return from CT.  TNK given.  D/w neuro and neuro interventional and pt taken for emergent intervention.  Will adm to neuro crit.

## 2023-09-12 NOTE — H&P ADULT - NSHPLABSRESULTS_GEN_ALL_CORE
ICU Vital Signs Last 24 Hrs  T(C): 36.4 (12 Sep 2023 19:29), Max: 36.4 (12 Sep 2023 17:00)  T(F): 97.5 (12 Sep 2023 17:58), Max: 97.5 (12 Sep 2023 17:00)  HR: 133 (12 Sep 2023 19:29) (89 - 133)  BP: 143/91 (12 Sep 2023 19:29) (131/81 - 214/107)  BP(mean): --  ABP: --  ABP(mean): --  RR: 20 (12 Sep 2023 19:29) (16 - 20)  SpO2: 100% (12 Sep 2023 19:29) (96% - 100%)      LABS:  Na: 143 (09-12 @ 17:19)  K: 3.8 (09-12 @ 17:19)  Cl: 106 (09-12 @ 17:19)  CO2: 22 (09-12 @ 17:19)  BUN: 28 (09-12 @ 17:19)  Cr: 0.7 (09-12 @ 17:19)  Glu: 156(09-12 @ 17:19)    Hgb: 14.1 (09-12 @ 17:19)  Hct: 43.2 (09-12 @ 17:19)  WBC: 10.66 (09-12 @ 17:19)  Plt: 258 (09-12 @ 17:19)    INR: 1.01 09-12-23 @ 17:19  PTT: 25.7 09-12-23 @ 17:19          LIVER FUNCTIONS - ( 12 Sep 2023 17:19 )  Alb: 3.9 g/dL / Pro: 7.0 g/dL / ALK PHOS: 91 U/L / ALT: 6 U/L / AST: 13 U/L / GGT: x           ABG - ( 12 Sep 2023 19:28 )  pH, Arterial: 7.43  pH, Blood: x     /  pCO2: 32    /  pO2: 194   / HCO3: 21    / Base Excess: -2.6  /  SaO2: 100.0     Mode: AC/ CMV (Assist Control/ Continuous Mandatory Ventilation), RR (machine): 14, TV (machine): 350, FiO2: 100, PEEP: 8, ITime: 1, MAP: 11, PIP: 21    MEDICATIONS  (STANDING):  chlorhexidine 0.12% Liquid 15 milliLiter(s) Oral Mucosa every 12 hours  phenylephrine    Infusion 0.2 MICROgram(s)/kG/Min (3.5 mL/Hr) IV Continuous <Continuous>  propofol Infusion 20 MICROgram(s)/kG/Min (5.59 mL/Hr) IV Continuous <Continuous>  sugammadex Injectable 200 milliGRAM(s) IV Push once    MEDICATIONS  (PRN):

## 2023-09-12 NOTE — CHART NOTE - NSCHARTNOTEFT_GEN_A_CORE
The patient is an 83-year-old female with a past history of mood disorder, hallucination, hyperthyroidism, Afib, with a prior admission for syncope/ collapse at home, who presented with dysarthria, confusion, and left weakness and was noted to have a right perfusion deficit on CTP. The patient is a code NI actual, and will be taken for a planned diagnostic cerebral angiogram + mechanical thrombectomy with Dr. Mckeon.   NIHSS 32 The patient is an 83-year-old female with a past history of mood disorder, hallucination, hyperthyroidism, Afib, with a prior admission for syncope/ collapse at home, who presented with dysarthria, confusion, and left weakness and was noted to have a right perfusion deficit on CTP. The patient is a code NI actual, and will be taken for a planned diagnostic cerebral angiogram + mechanical thrombectomy with Dr. Mckeon.   NIHSS 32    < from: CT Brain Stroke Protocol (09.12.23 @ 17:19) >    IMPRESSION:  No acute intracranial pathology. No evidence of midline shift or   intracranial hemorrhage.  Chronic microvascular changes. Chronic left parieto-occipital infarct.  < end of copied text >    CTP with perfusion mismatch right posterior core 11cc mismatch ratio 5.7cc. CTA with evidence of basilar artery occlusion.   TNK administered 5:43 pm     Pt intubated for airway protection in ED and brought to interventional radiology for emergent mechanical thrombectomy with the neuroendovascular team.   To be admitted to neuro ICU post procedure.  [id number]

## 2023-09-13 LAB
A1C WITH ESTIMATED AVERAGE GLUCOSE RESULT: 5.3 % — SIGNIFICANT CHANGE UP (ref 4–5.6)
ALBUMIN SERPL ELPH-MCNC: 3.2 G/DL — LOW (ref 3.5–5.2)
ALP SERPL-CCNC: 77 U/L — SIGNIFICANT CHANGE UP (ref 30–115)
ALT FLD-CCNC: 6 U/L — SIGNIFICANT CHANGE UP (ref 0–41)
ANION GAP SERPL CALC-SCNC: 10 MMOL/L — SIGNIFICANT CHANGE UP (ref 7–14)
ANION GAP SERPL CALC-SCNC: 12 MMOL/L — SIGNIFICANT CHANGE UP (ref 7–14)
APPEARANCE UR: CLEAR — SIGNIFICANT CHANGE UP
APTT BLD: 27 SEC — SIGNIFICANT CHANGE UP (ref 27–39.2)
AST SERPL-CCNC: 17 U/L — SIGNIFICANT CHANGE UP (ref 0–41)
BASOPHILS # BLD AUTO: 0 K/UL — SIGNIFICANT CHANGE UP (ref 0–0.2)
BASOPHILS NFR BLD AUTO: 0 % — SIGNIFICANT CHANGE UP (ref 0–1)
BILIRUB SERPL-MCNC: 0.6 MG/DL — SIGNIFICANT CHANGE UP (ref 0.2–1.2)
BILIRUB UR-MCNC: NEGATIVE — SIGNIFICANT CHANGE UP
BUN SERPL-MCNC: 17 MG/DL — SIGNIFICANT CHANGE UP (ref 10–20)
BUN SERPL-MCNC: 19 MG/DL — SIGNIFICANT CHANGE UP (ref 10–20)
CALCIUM SERPL-MCNC: 8 MG/DL — LOW (ref 8.4–10.4)
CALCIUM SERPL-MCNC: 8.2 MG/DL — LOW (ref 8.4–10.5)
CHLORIDE SERPL-SCNC: 110 MMOL/L — SIGNIFICANT CHANGE UP (ref 98–110)
CHLORIDE SERPL-SCNC: 111 MMOL/L — HIGH (ref 98–110)
CHOLEST SERPL-MCNC: 143 MG/DL — SIGNIFICANT CHANGE UP
CO2 SERPL-SCNC: 18 MMOL/L — SIGNIFICANT CHANGE UP (ref 17–32)
CO2 SERPL-SCNC: 20 MMOL/L — SIGNIFICANT CHANGE UP (ref 17–32)
COLOR SPEC: YELLOW — SIGNIFICANT CHANGE UP
CREAT SERPL-MCNC: 0.5 MG/DL — LOW (ref 0.7–1.5)
CREAT SERPL-MCNC: 0.6 MG/DL — LOW (ref 0.7–1.5)
DIFF PNL FLD: ABNORMAL
EGFR: 90 ML/MIN/1.73M2 — SIGNIFICANT CHANGE UP
EGFR: 94 ML/MIN/1.73M2 — SIGNIFICANT CHANGE UP
EOSINOPHIL # BLD AUTO: 0 K/UL — SIGNIFICANT CHANGE UP (ref 0–0.7)
EOSINOPHIL NFR BLD AUTO: 0 % — SIGNIFICANT CHANGE UP (ref 0–8)
ESTIMATED AVERAGE GLUCOSE: 105 MG/DL — SIGNIFICANT CHANGE UP (ref 68–114)
GAS PNL BLDA: SIGNIFICANT CHANGE UP
GLUCOSE BLDC GLUCOMTR-MCNC: 110 MG/DL — HIGH (ref 70–99)
GLUCOSE SERPL-MCNC: 105 MG/DL — HIGH (ref 70–99)
GLUCOSE SERPL-MCNC: 124 MG/DL — HIGH (ref 70–99)
GLUCOSE UR QL: NEGATIVE MG/DL — SIGNIFICANT CHANGE UP
HCT VFR BLD CALC: 36.7 % — LOW (ref 37–47)
HDLC SERPL-MCNC: 41 MG/DL — LOW
HGB BLD-MCNC: 11.7 G/DL — LOW (ref 12–16)
IMM GRANULOCYTES NFR BLD AUTO: 0.4 % — HIGH (ref 0.1–0.3)
INR BLD: 1 RATIO — SIGNIFICANT CHANGE UP (ref 0.65–1.3)
KETONES UR-MCNC: 15 MG/DL
LACTATE SERPL-SCNC: 2.8 MMOL/L — HIGH (ref 0.7–2)
LACTATE SERPL-SCNC: 3.5 MMOL/L — HIGH (ref 0.7–2)
LEUKOCYTE ESTERASE UR-ACNC: NEGATIVE — SIGNIFICANT CHANGE UP
LIPID PNL WITH DIRECT LDL SERPL: 78 MG/DL — SIGNIFICANT CHANGE UP
LYMPHOCYTES # BLD AUTO: 0.77 K/UL — LOW (ref 1.2–3.4)
LYMPHOCYTES # BLD AUTO: 5.1 % — LOW (ref 20.5–51.1)
MAGNESIUM SERPL-MCNC: 2.4 MG/DL — SIGNIFICANT CHANGE UP (ref 1.8–2.4)
MCHC RBC-ENTMCNC: 29.5 PG — SIGNIFICANT CHANGE UP (ref 27–31)
MCHC RBC-ENTMCNC: 31.9 G/DL — LOW (ref 32–37)
MCV RBC AUTO: 92.4 FL — SIGNIFICANT CHANGE UP (ref 81–99)
MONOCYTES # BLD AUTO: 0.87 K/UL — HIGH (ref 0.1–0.6)
MONOCYTES NFR BLD AUTO: 5.8 % — SIGNIFICANT CHANGE UP (ref 1.7–9.3)
MRSA PCR RESULT.: NEGATIVE — SIGNIFICANT CHANGE UP
NEUTROPHILS # BLD AUTO: 13.29 K/UL — HIGH (ref 1.4–6.5)
NEUTROPHILS NFR BLD AUTO: 88.7 % — HIGH (ref 42.2–75.2)
NITRITE UR-MCNC: NEGATIVE — SIGNIFICANT CHANGE UP
NON HDL CHOLESTEROL: 102 MG/DL — SIGNIFICANT CHANGE UP
NRBC # BLD: 0 /100 WBCS — SIGNIFICANT CHANGE UP (ref 0–0)
NT-PROBNP SERPL-SCNC: 984 PG/ML — HIGH (ref 0–300)
PH UR: 6 — SIGNIFICANT CHANGE UP (ref 5–8)
PHOSPHATE SERPL-MCNC: 3.2 MG/DL — SIGNIFICANT CHANGE UP (ref 2.1–4.9)
PLATELET # BLD AUTO: 214 K/UL — SIGNIFICANT CHANGE UP (ref 130–400)
PMV BLD: 9.1 FL — SIGNIFICANT CHANGE UP (ref 7.4–10.4)
POTASSIUM SERPL-MCNC: 4.7 MMOL/L — SIGNIFICANT CHANGE UP (ref 3.5–5)
POTASSIUM SERPL-MCNC: 5.4 MMOL/L — HIGH (ref 3.5–5)
POTASSIUM SERPL-SCNC: 4.7 MMOL/L — SIGNIFICANT CHANGE UP (ref 3.5–5)
POTASSIUM SERPL-SCNC: 5.4 MMOL/L — HIGH (ref 3.5–5)
PROT SERPL-MCNC: 5.9 G/DL — LOW (ref 6–8)
PROT UR-MCNC: NEGATIVE MG/DL — SIGNIFICANT CHANGE UP
PROTHROM AB SERPL-ACNC: 11.4 SEC — SIGNIFICANT CHANGE UP (ref 9.95–12.87)
RBC # BLD: 3.97 M/UL — LOW (ref 4.2–5.4)
RBC # FLD: 12.8 % — SIGNIFICANT CHANGE UP (ref 11.5–14.5)
SODIUM SERPL-SCNC: 140 MMOL/L — SIGNIFICANT CHANGE UP (ref 135–146)
SODIUM SERPL-SCNC: 141 MMOL/L — SIGNIFICANT CHANGE UP (ref 135–146)
SP GR SPEC: >1.03 — HIGH (ref 1–1.03)
T4 FREE SERPL-MCNC: 2.1 NG/DL — HIGH (ref 0.9–1.8)
TRIGL SERPL-MCNC: 120 MG/DL — SIGNIFICANT CHANGE UP
TROPONIN T SERPL-MCNC: <0.01 NG/ML — SIGNIFICANT CHANGE UP
TSH SERPL-MCNC: 0.03 UIU/ML — LOW (ref 0.27–4.2)
UROBILINOGEN FLD QL: 1 MG/DL — SIGNIFICANT CHANGE UP (ref 0.2–1)
WBC # BLD: 14.99 K/UL — HIGH (ref 4.8–10.8)
WBC # FLD AUTO: 14.99 K/UL — HIGH (ref 4.8–10.8)

## 2023-09-13 PROCEDURE — 70450 CT HEAD/BRAIN W/O DYE: CPT | Mod: 26

## 2023-09-13 PROCEDURE — 93970 EXTREMITY STUDY: CPT | Mod: 26

## 2023-09-13 PROCEDURE — 93306 TTE W/DOPPLER COMPLETE: CPT | Mod: 26

## 2023-09-13 PROCEDURE — 99291 CRITICAL CARE FIRST HOUR: CPT

## 2023-09-13 RX ORDER — INSULIN HUMAN 100 [IU]/ML
10 INJECTION, SOLUTION SUBCUTANEOUS ONCE
Refills: 0 | Status: COMPLETED | OUTPATIENT
Start: 2023-09-13 | End: 2023-09-13

## 2023-09-13 RX ORDER — SODIUM CHLORIDE 9 MG/ML
1000 INJECTION INTRAMUSCULAR; INTRAVENOUS; SUBCUTANEOUS ONCE
Refills: 0 | Status: COMPLETED | OUTPATIENT
Start: 2023-09-13 | End: 2023-09-13

## 2023-09-13 RX ORDER — FENTANYL CITRATE 50 UG/ML
25 INJECTION INTRAVENOUS ONCE
Refills: 0 | Status: DISCONTINUED | OUTPATIENT
Start: 2023-09-13 | End: 2023-09-13

## 2023-09-13 RX ORDER — ENOXAPARIN SODIUM 100 MG/ML
30 INJECTION SUBCUTANEOUS EVERY 24 HOURS
Refills: 0 | Status: DISCONTINUED | OUTPATIENT
Start: 2023-09-13 | End: 2023-09-14

## 2023-09-13 RX ORDER — DEXTROSE 50 % IN WATER 50 %
50 SYRINGE (ML) INTRAVENOUS ONCE
Refills: 0 | Status: COMPLETED | OUTPATIENT
Start: 2023-09-13 | End: 2023-09-13

## 2023-09-13 RX ORDER — ACETAMINOPHEN 500 MG
650 TABLET ORAL EVERY 6 HOURS
Refills: 0 | Status: DISCONTINUED | OUTPATIENT
Start: 2023-09-13 | End: 2023-09-16

## 2023-09-13 RX ORDER — FAMOTIDINE 10 MG/ML
20 INJECTION INTRAVENOUS DAILY
Refills: 0 | Status: DISCONTINUED | OUTPATIENT
Start: 2023-09-13 | End: 2023-09-15

## 2023-09-13 RX ADMIN — SENNA PLUS 2 TABLET(S): 8.6 TABLET ORAL at 21:57

## 2023-09-13 RX ADMIN — Medication 40 MILLIEQUIVALENT(S): at 00:50

## 2023-09-13 RX ADMIN — SODIUM CHLORIDE 1000 MILLILITER(S): 9 INJECTION INTRAMUSCULAR; INTRAVENOUS; SUBCUTANEOUS at 00:51

## 2023-09-13 RX ADMIN — FENTANYL CITRATE 25 MICROGRAM(S): 50 INJECTION INTRAVENOUS at 21:16

## 2023-09-13 RX ADMIN — CHLORHEXIDINE GLUCONATE 15 MILLILITER(S): 213 SOLUTION TOPICAL at 17:41

## 2023-09-13 RX ADMIN — POLYETHYLENE GLYCOL 3350 17 GRAM(S): 17 POWDER, FOR SOLUTION ORAL at 05:17

## 2023-09-13 RX ADMIN — SODIUM CHLORIDE 1000 MILLILITER(S): 9 INJECTION INTRAMUSCULAR; INTRAVENOUS; SUBCUTANEOUS at 05:48

## 2023-09-13 RX ADMIN — Medication 650 MILLIGRAM(S): at 18:50

## 2023-09-13 RX ADMIN — Medication 650 MILLIGRAM(S): at 12:15

## 2023-09-13 RX ADMIN — Medication 650 MILLIGRAM(S): at 12:47

## 2023-09-13 RX ADMIN — INSULIN HUMAN 10 UNIT(S): 100 INJECTION, SOLUTION SUBCUTANEOUS at 08:49

## 2023-09-13 RX ADMIN — FENTANYL CITRATE 25 MICROGRAM(S): 50 INJECTION INTRAVENOUS at 21:31

## 2023-09-13 RX ADMIN — CHLORHEXIDINE GLUCONATE 15 MILLILITER(S): 213 SOLUTION TOPICAL at 05:17

## 2023-09-13 RX ADMIN — SODIUM CHLORIDE 1000 MILLILITER(S): 9 INJECTION INTRAMUSCULAR; INTRAVENOUS; SUBCUTANEOUS at 01:31

## 2023-09-13 RX ADMIN — Medication 25 GRAM(S): at 00:51

## 2023-09-13 RX ADMIN — Medication 650 MILLIGRAM(S): at 19:20

## 2023-09-13 RX ADMIN — POLYETHYLENE GLYCOL 3350 17 GRAM(S): 17 POWDER, FOR SOLUTION ORAL at 17:41

## 2023-09-13 RX ADMIN — FAMOTIDINE 20 MILLIGRAM(S): 10 INJECTION INTRAVENOUS at 12:11

## 2023-09-13 RX ADMIN — SODIUM CHLORIDE 50 MILLILITER(S): 9 INJECTION INTRAMUSCULAR; INTRAVENOUS; SUBCUTANEOUS at 22:51

## 2023-09-13 RX ADMIN — Medication 50 MILLILITER(S): at 08:46

## 2023-09-13 NOTE — PROGRESS NOTE ADULT - SUBJECTIVE AND OBJECTIVE BOX
SUMMARY:HPI:  The patient is an 83-year-old female with a past history of mood disorder, hallucination, hyperthyroidism, Afib, with a prior admission for syncope/ collapse at home, who presented with dysarthria, confusion, and left weakness and was noted to have a right perfusion deficit on CTP. The patient is a code NI actual, and will be taken for a planned diagnostic cerebral angiogram + mechanical thrombectomy with Dr. Mckeon.     S/P TNK - 1743 - 9/12/23    Admission scores:   NIHSS 32  mRS- 1      OVERNIGHT EVENTS:  RVR / Hypotensive  ( while on propfol )  transitioned to precedex and placed on neosynephrine .         REVIEW OF SYSTEMS: [ ] Unable to Assess due to neurologic exam   [ ] All ROS addressed below are non-contributory, except:  Neuro: [ ] Headache [ ] Back pain [ ] Numbness [ ] Weakness [ ] Ataxia [ ] Dizziness [ ] Aphasia [ ] Dysarthria [ ] Visual disturbance  Resp: [ ] Shortness of breath/dyspnea, [ ] Orthopnea [ ] Cough  CV: [ ] Chest pain [ ] Palpitation [ ] Lightheadedness [ ] Syncope  Renal: [ ] Thirst [ ] Edema  GI: [ ] Nausea [ ] Emesis [ ] Abdominal pain [ ] Constipation [ ] Diarrhea  Hem: [ ] Hematemesis [ ] bright red blood per rectum  ID: [ ] Fever [ ] Chills [ ] Dysuria  ENT: [ ] Rhinorrhea      VITALS: [X ] Reviewed  Vital Signs Last 24 Hrs  T(C): 36.7 (13 Sep 2023 12:00), Max: 37.6 (13 Sep 2023 08:00)  T(F): 98.1 (13 Sep 2023 12:00), Max: 99.7 (13 Sep 2023 08:00)  HR: 117 (13 Sep 2023 12:30) (79 - 140)  BP: 142/77 (13 Sep 2023 12:30) (88/55 - 214/107)  BP(mean): 102 (13 Sep 2023 12:30) (64 - 117)  RR: 25 (13 Sep 2023 12:30) (4 - 32)  SpO2: 100% (13 Sep 2023 12:30) (88% - 100%)    Parameters below as of 13 Sep 2023 12:00  Patient On (Oxygen Delivery Method): room air    A/C - TV- 350/ 14/40/8   ABG - ( 13 Sep 2023 03:43 )  pH, Arterial: 7.39  pH, Blood: x     /  pCO2: 28    /  pO2: 227   / HCO3: 17    / Base Excess: -6.9  /  SaO2: 99.3        12 Sep 2023 07:01  -  13 Sep 2023 07:00  --------------------------------------------------------  IN:    Dexmedetomidine: 16.4 mL    Enteral Tube Flush: 50 mL    IV PiggyBack: 50 mL    Phenylephrine: 145.4 mL    sodium chloride 0.9%: 450 mL    Sodium Chloride 0.9% Bolus: 2000 mL  Total IN: 2711.8 mL    OUT:    Indwelling Catheter - Urethral (mL): 1320 mL  Total OUT: 1320 mL    Total NET: 1391.8 mL      13 Sep 2023 07:01  -  13 Sep 2023 12:48  --------------------------------------------------------  IN:    Phenylephrine: 72.4 mL    sodium chloride 0.9%: 250 mL  Total IN: 322.4 mL    OUT:    Indwelling Catheter - Urethral (mL): 400 mL  Total OUT: 400 mL    Total NET: -77.6 mL    MEDICATIONS  (STANDING):  chlorhexidine 0.12% Liquid 15 milliLiter(s) Oral Mucosa every 12 hours  dexMEDEtomidine Infusion 0.2 MICROgram(s)/kG/Hr (2.33 mL/Hr) IV Continuous <Continuous>  famotidine    Tablet 20 milliGRAM(s) Oral daily  phenylephrine    Infusion 0.2 MICROgram(s)/kG/Min (3.5 mL/Hr) IV Continuous <Continuous>  polyethylene glycol 3350 17 Gram(s) Oral every 12 hours  senna 2 Tablet(s) Oral at bedtime  sodium chloride 0.9%. 1000 milliLiter(s) (50 mL/Hr) IV Continuous <Continuous>    MEDICATIONS  (PRN):  acetaminophen     Tablet .. 650 milliGRAM(s) Oral every 6 hours PRN Temp greater or equal to 38C (100.4F), Mild Pain (1 - 3)          09-13    140  |  110  |  19  ----------------------------<  124<H>  5.4<H>   |  18  |  0.6<L>    Ca    8.0<L>      13 Sep 2023 06:10  Phos  3.2     09-13  Mg     2.4     09-13    TPro  5.9<L>  /  Alb  3.2<L>  /  TBili  0.6  /  DBili  x   /  AST  17  /  ALT  6   /  AlkPhos  77  09-13                          11.7   14.99 )-----------( 214      ( 13 Sep 2023 06:10 )             36.7         CAPILLARY BLOOD GLUCOSE  133 (12 Sep 2023 18:24)      POCT Blood Glucose.: 110 mg/dL (13 Sep 2023 08:47)  POCT Blood Glucose.: 182 mg/dL (12 Sep 2023 22:26)  POCT Blood Glucose.: 133 mg/dL (12 Sep 2023 17:05)        LABS:  PT/INR - ( 13 Sep 2023 06:10 )   PT: 11.40 sec;   INR: 1.00 ratio         PTT - ( 13 Sep 2023 06:10 )  PTT:27.0 sec      STROKE CORE MEASURES:   HGBA1C  LDL- 78   TRig- 120   TSH       IMAGING/DATA: [ X] Reviewed     Xray Chest 1 View-PORTABLE IMMEDIATE (Xray Chest 1 View-PORTABLE IMMEDIATE .) (09.12.23 @ 22:26) >  Impression:    No radiographic evidence of acute cardiopulmonary disease.    CT Head No Cont (09.12.23 @ 20:33) >  IMPRESSION:  THE FOLLOWING REFLECTS THE AMENDED FINAL IMPRESSION:    1.  Residual IV contrast present from CTA and angiogram/thrombectomy.    2.  Parenchymal hyperdensity within bilateral thalami, left occipital and   temporal lobes, and cerebellar hemispheres; and sulcal hyperdensity   within the left occipital region. This likely predominantly reflects   contrast staining; underlying hemorrhage cannotbe excluded.    3.  Patchy appearance of the cerebellum suspicious for underlying   infarcts.    4.  Stable chronic microvascular ischemic changes and chronic left   parieto-occipital infarct.        : CT Angio Neck Stroke Protocol w/ IV Cont (09.12.23 @ 17:40) >  IMPRESSION:    CT PERFUSION:  Ischemic tissue volume estimate of 63 cc in the area of the right   occipital/cerebellar areas, with core infarct estimate of 11 cc in the   area of the right occipital/cerebellar areas.    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    Severe stenosis of the right superior cerebellar artery.            EXAMINATION:  PHYSICAL EXAM:    Constitutional: No Acute Distress     Neurological: Awake, alert oriented to person, place and time, Following Commands, PERRL, EOMI, No Gaze Preference, Face Symmetrical, Speech Fluent, No dysmetria, No ataxia, No nystagmus     Motor exam:          Upper extremity                         Delt     Bicep     Tricep    HG                                                 R         5/5        5/5        5/5       5/5                                               L          5/5        5/5        5/5       5/5          Lower extremity                        HF         KF        KE       DF         PF                                                  R        5/5        5/5        5/5       5/5         5/5                                               L         5/5        5/5       5/5       5/5          5/5                                                 Sensation: [ ] intact to light touch  [ ] decreased:     Reflexes: Deep Tendon Reflexes Intact     Pulmonary: Clear to Auscultation, No rales, No rhonchi, No wheezes     Cardiovascular: S1, S2, Regular rate and rhythm     Gastrointestinal: Soft, Non-tender, Non-distended     Extremities: No calf tenderness     Incision:

## 2023-09-13 NOTE — PROGRESS NOTE ADULT - ASSESSMENT
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 Received TNK @ 1743. Taken to IR for thrombectomy, s/p thrombectomy of right SCA/BA with TICI 2C x3 passes. Admitted to Neuro ICU for further monitoring.     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   Neuro checks per protocol   Neuroendovascular following  CT Head post thrombectomy this afternoon   Stroke Core Measures- as above   Start AC in 24- 48 hrs after review CT/MRI   Analgesia: Precedex for RASS goal 0 to -1   Stroke core measures   Activity:  [X] Bedrest [x] PT [x] OT [] PMNR [] Bed in Chair Position     PULM: Resp failure secondary to neuro injury   Lung protective measures   SAT/SBT as tolerated  Wean FIO2 to maintain Sats > 94%  CXR - Clear   HOB 30 degrees    CV: Afib/ flutter - S/P RVR  S/P Hypotension   Wean Neosynephrine   SBP goal: 110-140 - on amadeo; wean as tolerates   SHEILA   ProBNP   Troponin - < .01 x2 neg   Echo -   TTE Echo Complete w/o Contrast w/ Doppler (09.13.23 @ 08:30) >  Summary:   1. LV Ejection Fraction by Ibarra's Method with a biplane EF of 63 %.   2. Normal global left ventricular systolic function.   3. Moderate to severe mitral valve regurgitation.   4. Mild-moderate tricuspid regurgitation.   5. Estimated pulmonary artery systolic pressure is 35.9 mmHg assuming a   right atrial pressure of 8 mmHg, which is consistent with borderline   pulmonary hypertension.   6. Normal left atrial size.   7. LA volume Index is 27.0 ml/m² ml/m2.   8. Normal right atrial size.   9. Compared to 6/13/2020 echo there is no significant change.  EKG - showing Afib with RVR   Cardiac enzymes - pending    RENAL: Urinary retention / Hyperkalemia NAG met acidosis   Chem -7 with lactate - 4pm   D/C merida this PM   Replete electrolytes as needed   Normonatremic     GI:  Diet: NPO x meds   GI prophylaxis [] not indicated [x] PPI [] other:  Bowel regimen [] colace [x] senna [x] other: Miralax     ENDO:   Goal euglycemia (-180)  TSH/Free t4 pending  Lipid profile - pending  HgBA1C pending     HEME/ONC:  VTE prophylaxis: [X] SCDs [] chemoprophylaxis- hold AC due recent TNK - F/U CT brain if Neg start AC   Holding AC/AP for now   LE duplex - pending    ID:  Maintain normothermia   procalcitonin   UA, CXR , - neg   LFT - NL   Hold ABX for Now       CODE STATUS:  [X] Full Code [] DNR [] DNI [] Palliative/Comfort Care    DISPOSITION:  [X] ICU [] Stroke Unit [] Floor [] EMU [] RCU [] PCU

## 2023-09-13 NOTE — PROGRESS NOTE ADULT - SUBJECTIVE AND OBJECTIVE BOX
Neuroendovascular Progress Note:    HPI:  The patient is an 83-year-old female with a past history of mood disorder, hallucination, hyperthyroidism, Afib, with a prior admission for syncope/ collapse at home, who presented with dysarthria, confusion, and left weakness and was noted to have a right perfusion deficit on CTP. The patient is a code NI actual, and will be taken for a planned diagnostic cerebral angiogram + mechanical thrombectomy with Dr. Mckeon and Dr. Mendez. TICI 2c achieved in 3 passes. Pt is also s/p TNK administration. Dressing changed today. Encounter conducted with family/ interpretation for [place]ian language assistance (patient is more responsive to family at bedside than to interpeter).    Admission scores:   NIHSS 32   (12 Sep 2023 21:42)    Past Medical History:  Thyroid disease    24-Hour Events: None    Medication:  chlorhexidine 0.12% Liquid 15 milliLiter(s) Oral Mucosa every 12 hours  dexMEDEtomidine Infusion 0.2 MICROgram(s)/kG/Hr IV Continuous <Continuous>  famotidine    Tablet 20 milliGRAM(s) Oral daily  phenylephrine    Infusion 0.2 MICROgram(s)/kG/Min IV Continuous <Continuous>  polyethylene glycol 3350 17 Gram(s) Oral every 12 hours  senna 2 Tablet(s) Oral at bedtime  sodium chloride 0.9%. 1000 milliLiter(s) IV Continuous <Continuous>    No Known Allergies    Recent Vitals:  T(F): 100.6 (09-13-23 @ 20:00), Max: 101.1 (09-13-23 @ 14:15)  HR: 114 (09-13-23 @ 20:00) (79 - 140)  BP: 140/69 (09-13-23 @ 20:00) (88/55 - 165/82)  RR: 17 (09-13-23 @ 20:00) (4 - 32)  SpO2: 100% (09-13-23 @ 20:00) (88% - 100%)    Recent Labs:                        11.7   14.99 )-----------( 214      ( 13 Sep 2023 06:10 )             36.7     09-13    141  |  111<H>  |  17  ----------------------------<  105<H>  4.7   |  20  |  0.5<L>    Ca    8.2<L>      13 Sep 2023 18:28  Phos  3.2     09-13  Mg     2.4     09-13    TPro  5.9<L>  /  Alb  3.2<L>  /  TBili  0.6  /  DBili  x   /  AST  17  /  ALT  6   /  AlkPhos  77  09-13    PT/INR - ( 13 Sep 2023 06:10 )   PT: 11.40 sec;   INR: 1.00 ratio       PTT - ( 13 Sep 2023 06:10 )  PTT:27.0 sec  Urinalysis Basic - ( 13 Sep 2023 18:28 )    Color: x / Appearance: x / SG: x / pH: x  Gluc: 105 mg/dL / Ketone: x  / Bili: x / Urobili: x   Blood: x / Protein: x / Nitrite: x   Leuk Esterase: x / RBC: x / WBC x   Sq Epi: x / Non Sq Epi: x / Bacteria: x    LIVER FUNCTIONS - ( 13 Sep 2023 06:10 )  Alb: 3.2 g/dL / Pro: 5.9 g/dL / ALK PHOS: 77 U/L / ALT: 6 U/L / AST: 17 U/L / GGT: x           Exam:  General: Intubated off sedation  Neuro: EO to voice, moves fingers/ toes and opens/ closes eyes on command, PERRL, RUE some movement antigravity with drift to bed. RLE drift to bed, LUE some movement within plane of bed, LLE drift to bed. WD x 4 extremities to noxious, +blink to threat x 4 quadrants b/l.   Extremities: LUE arteriotomy site c/d/i with no sign of bleeding/ hematoma formation, +ecchymosis surrounding arteriotomy site, b/l hands cold with no temperature or color discrepancies, LUE radial pulse obtained with doppler.     Radiology:   CT Head No Cont:   ACC: [id number] EXAM:  CT BRAIN   ORDERED BY: YOVANY DESIR     PROCEDURE DATE:  09/12/2023    INTERPRETATION:  CLINICAL INDICATION: Stroke, status post thrombectomy.    TECHNIQUE: CT of the head was performed without the administration of   intravenous contrast.    COMPARISON: CT head 9/12/2023 at 5:13 PM    FINDINGS:    Contrast from recent CTA and angiogram/thrombectomy is visualized   throughout the cerebral vasculature and dural reflections.    Hyperdensity within the bilateral thalami, left occipital and temporal   lobes, cerebellum, and left occipital sulci.    Patchy appearance of the cerebellar hemispheres.    Stable left parieto-occipital chronic infarct. Stable chronic   microvascular changes.    There is no evidence of hydrocephalus or midline shift.    The visualized paranasal sinuses and mastoids are clear.      IMPRESSION:  THE FOLLOWING REFLECTS THE AMENDED FINAL IMPRESSION:    1.  Residual IV contrast present from CTA and angiogram/thrombectomy.    2.  Parenchymal hyperdensity within bilateral thalami, left occipital and   temporal lobes, and cerebellar hemispheres; and sulcal hyperdensity   within the left occipital region. This likely predominantly reflects   contrast staining; underlying hemorrhage cannotbe excluded.    3.  Patchy appearance of the cerebellum suspicious for underlying   infarcts.    4.  Stable chronic microvascular ischemic changes and chronic left   parieto-occipital infarct.    --- End of Report ---    MEKA WISDOM MD; Resident Radiologist  This document has been electronically signed.  HERI SPANGLER MD; Attending Radiologist  This document has been electronically signed. Sep 13 2023  8:44AM (09-12-23 @ 20:33)    Assessment:   The patient is an 83-year-old female with a past history of mood disorder, hallucination, hyperthyroidism, Afib, with a prior admission for syncope/ collapse at home, who presented with dysarthria, confusion, and left weakness and was noted to have a right perfusion deficit on CTP. The patient is a code NI actual, and will be taken for a planned diagnostic cerebral angiogram + mechanical thrombectomy with Dr. Mckeon and Dr. Mendez. TICI 2c achieved in 3 passes. Pt is also s/p TNK administration.    Suggestions:  - Awaiting MRI brain  - Continue to monitor the LUE arteriotomy site for signs of bleeding/ hematoma formation/ infection/ absent or diminished radial pulses or temperature/ color changes.  - [id number] Neuroendovascular if there is any acute change in NIHSS/ exam

## 2023-09-14 LAB
ALBUMIN SERPL ELPH-MCNC: 2.8 G/DL — LOW (ref 3.5–5.2)
ALP SERPL-CCNC: 68 U/L — SIGNIFICANT CHANGE UP (ref 30–115)
ALT FLD-CCNC: <5 U/L — SIGNIFICANT CHANGE UP (ref 0–41)
ANION GAP SERPL CALC-SCNC: 12 MMOL/L — SIGNIFICANT CHANGE UP (ref 7–14)
APPEARANCE UR: CLEAR — SIGNIFICANT CHANGE UP
APTT BLD: 119.5 SEC — CRITICAL HIGH (ref 27–39.2)
AST SERPL-CCNC: 17 U/L — SIGNIFICANT CHANGE UP (ref 0–41)
BASOPHILS # BLD AUTO: 0.03 K/UL — SIGNIFICANT CHANGE UP (ref 0–0.2)
BASOPHILS NFR BLD AUTO: 0.2 % — SIGNIFICANT CHANGE UP (ref 0–1)
BILIRUB SERPL-MCNC: 0.7 MG/DL — SIGNIFICANT CHANGE UP (ref 0.2–1.2)
BILIRUB UR-MCNC: NEGATIVE — SIGNIFICANT CHANGE UP
BUN SERPL-MCNC: 18 MG/DL — SIGNIFICANT CHANGE UP (ref 10–20)
CALCIUM SERPL-MCNC: 8.3 MG/DL — LOW (ref 8.4–10.5)
CHLORIDE SERPL-SCNC: 109 MMOL/L — SIGNIFICANT CHANGE UP (ref 98–110)
CO2 SERPL-SCNC: 17 MMOL/L — SIGNIFICANT CHANGE UP (ref 17–32)
COLOR SPEC: YELLOW — SIGNIFICANT CHANGE UP
CREAT SERPL-MCNC: 0.5 MG/DL — LOW (ref 0.7–1.5)
DIFF PNL FLD: NEGATIVE — SIGNIFICANT CHANGE UP
EGFR: 94 ML/MIN/1.73M2 — SIGNIFICANT CHANGE UP
EOSINOPHIL # BLD AUTO: 0.03 K/UL — SIGNIFICANT CHANGE UP (ref 0–0.7)
EOSINOPHIL NFR BLD AUTO: 0.2 % — SIGNIFICANT CHANGE UP (ref 0–8)
GAS PNL BLDA: SIGNIFICANT CHANGE UP
GLUCOSE SERPL-MCNC: 105 MG/DL — HIGH (ref 70–99)
GLUCOSE UR QL: NEGATIVE MG/DL — SIGNIFICANT CHANGE UP
GRAM STN FLD: SIGNIFICANT CHANGE UP
HCT VFR BLD CALC: 31.7 % — LOW (ref 37–47)
HGB BLD-MCNC: 10.1 G/DL — LOW (ref 12–16)
IMM GRANULOCYTES NFR BLD AUTO: 0.5 % — HIGH (ref 0.1–0.3)
KETONES UR-MCNC: 15 MG/DL
LEUKOCYTE ESTERASE UR-ACNC: NEGATIVE — SIGNIFICANT CHANGE UP
LYMPHOCYTES # BLD AUTO: 1.74 K/UL — SIGNIFICANT CHANGE UP (ref 1.2–3.4)
LYMPHOCYTES # BLD AUTO: 11.3 % — LOW (ref 20.5–51.1)
MAGNESIUM SERPL-MCNC: 2.2 MG/DL — SIGNIFICANT CHANGE UP (ref 1.8–2.4)
MCHC RBC-ENTMCNC: 29.6 PG — SIGNIFICANT CHANGE UP (ref 27–31)
MCHC RBC-ENTMCNC: 31.9 G/DL — LOW (ref 32–37)
MCV RBC AUTO: 93 FL — SIGNIFICANT CHANGE UP (ref 81–99)
MONOCYTES # BLD AUTO: 1.29 K/UL — HIGH (ref 0.1–0.6)
MONOCYTES NFR BLD AUTO: 8.4 % — SIGNIFICANT CHANGE UP (ref 1.7–9.3)
NEUTROPHILS # BLD AUTO: 12.24 K/UL — HIGH (ref 1.4–6.5)
NEUTROPHILS NFR BLD AUTO: 79.4 % — HIGH (ref 42.2–75.2)
NITRITE UR-MCNC: NEGATIVE — SIGNIFICANT CHANGE UP
NRBC # BLD: 0 /100 WBCS — SIGNIFICANT CHANGE UP (ref 0–0)
OSMOLALITY UR: 849 MOS/KG — SIGNIFICANT CHANGE UP (ref 50–1200)
PH UR: 5.5 — SIGNIFICANT CHANGE UP (ref 5–8)
PHOSPHATE SERPL-MCNC: 2.9 MG/DL — SIGNIFICANT CHANGE UP (ref 2.1–4.9)
PLATELET # BLD AUTO: 163 K/UL — SIGNIFICANT CHANGE UP (ref 130–400)
PMV BLD: 9.2 FL — SIGNIFICANT CHANGE UP (ref 7.4–10.4)
POTASSIUM SERPL-MCNC: 4.5 MMOL/L — SIGNIFICANT CHANGE UP (ref 3.5–5)
POTASSIUM SERPL-SCNC: 4.5 MMOL/L — SIGNIFICANT CHANGE UP (ref 3.5–5)
POTASSIUM UR-SCNC: 63 MMOL/L — SIGNIFICANT CHANGE UP
PROCALCITONIN SERPL-MCNC: 0.15 NG/ML — HIGH (ref 0.02–0.1)
PROT SERPL-MCNC: 5.4 G/DL — LOW (ref 6–8)
PROT UR-MCNC: NEGATIVE MG/DL — SIGNIFICANT CHANGE UP
RBC # BLD: 3.41 M/UL — LOW (ref 4.2–5.4)
RBC # FLD: 13.1 % — SIGNIFICANT CHANGE UP (ref 11.5–14.5)
SODIUM SERPL-SCNC: 138 MMOL/L — SIGNIFICANT CHANGE UP (ref 135–146)
SODIUM UR-SCNC: 141 MMOL/L — SIGNIFICANT CHANGE UP
SP GR SPEC: 1.02 — SIGNIFICANT CHANGE UP (ref 1–1.03)
SPECIMEN SOURCE: SIGNIFICANT CHANGE UP
UROBILINOGEN FLD QL: 1 MG/DL — SIGNIFICANT CHANGE UP (ref 0.2–1)
WBC # BLD: 15.41 K/UL — HIGH (ref 4.8–10.8)
WBC # FLD AUTO: 15.41 K/UL — HIGH (ref 4.8–10.8)

## 2023-09-14 PROCEDURE — 99291 CRITICAL CARE FIRST HOUR: CPT

## 2023-09-14 PROCEDURE — 71045 X-RAY EXAM CHEST 1 VIEW: CPT | Mod: 26

## 2023-09-14 PROCEDURE — 99222 1ST HOSP IP/OBS MODERATE 55: CPT

## 2023-09-14 RX ORDER — SODIUM CHLORIDE 9 MG/ML
250 INJECTION INTRAMUSCULAR; INTRAVENOUS; SUBCUTANEOUS ONCE
Refills: 0 | Status: COMPLETED | OUTPATIENT
Start: 2023-09-14 | End: 2023-09-14

## 2023-09-14 RX ORDER — CHLORHEXIDINE GLUCONATE 213 G/1000ML
1 SOLUTION TOPICAL DAILY
Refills: 0 | Status: DISCONTINUED | OUTPATIENT
Start: 2023-09-14 | End: 2023-09-14

## 2023-09-14 RX ORDER — LABETALOL HCL 100 MG
10 TABLET ORAL ONCE
Refills: 0 | Status: COMPLETED | OUTPATIENT
Start: 2023-09-14 | End: 2023-09-14

## 2023-09-14 RX ORDER — SODIUM CHLORIDE 9 MG/ML
500 INJECTION INTRAMUSCULAR; INTRAVENOUS; SUBCUTANEOUS ONCE
Refills: 0 | Status: COMPLETED | OUTPATIENT
Start: 2023-09-14 | End: 2023-09-14

## 2023-09-14 RX ORDER — CHLORHEXIDINE GLUCONATE 213 G/1000ML
1 SOLUTION TOPICAL DAILY
Refills: 0 | Status: DISCONTINUED | OUTPATIENT
Start: 2023-09-14 | End: 2023-09-27

## 2023-09-14 RX ORDER — SODIUM CHLORIDE 9 MG/ML
1000 INJECTION INTRAMUSCULAR; INTRAVENOUS; SUBCUTANEOUS
Refills: 0 | Status: DISCONTINUED | OUTPATIENT
Start: 2023-09-14 | End: 2023-09-16

## 2023-09-14 RX ORDER — HEPARIN SODIUM 5000 [USP'U]/ML
550 INJECTION INTRAVENOUS; SUBCUTANEOUS
Qty: 25000 | Refills: 0 | Status: DISCONTINUED | OUTPATIENT
Start: 2023-09-14 | End: 2023-09-19

## 2023-09-14 RX ADMIN — SODIUM CHLORIDE 500 MILLILITER(S): 9 INJECTION INTRAMUSCULAR; INTRAVENOUS; SUBCUTANEOUS at 03:40

## 2023-09-14 RX ADMIN — ENOXAPARIN SODIUM 30 MILLIGRAM(S): 100 INJECTION SUBCUTANEOUS at 00:00

## 2023-09-14 RX ADMIN — POLYETHYLENE GLYCOL 3350 17 GRAM(S): 17 POWDER, FOR SOLUTION ORAL at 19:23

## 2023-09-14 RX ADMIN — SODIUM CHLORIDE 500 MILLILITER(S): 9 INJECTION INTRAMUSCULAR; INTRAVENOUS; SUBCUTANEOUS at 07:15

## 2023-09-14 RX ADMIN — POLYETHYLENE GLYCOL 3350 17 GRAM(S): 17 POWDER, FOR SOLUTION ORAL at 05:30

## 2023-09-14 RX ADMIN — Medication 10 MILLIGRAM(S): at 10:23

## 2023-09-14 RX ADMIN — Medication 650 MILLIGRAM(S): at 16:45

## 2023-09-14 RX ADMIN — SENNA PLUS 2 TABLET(S): 8.6 TABLET ORAL at 21:33

## 2023-09-14 RX ADMIN — CHLORHEXIDINE GLUCONATE 1 APPLICATION(S): 213 SOLUTION TOPICAL at 12:19

## 2023-09-14 RX ADMIN — HEPARIN SODIUM 5.5 UNIT(S)/HR: 5000 INJECTION INTRAVENOUS; SUBCUTANEOUS at 12:24

## 2023-09-14 RX ADMIN — SODIUM CHLORIDE 1000 MILLILITER(S): 9 INJECTION INTRAMUSCULAR; INTRAVENOUS; SUBCUTANEOUS at 23:48

## 2023-09-14 RX ADMIN — Medication 650 MILLIGRAM(S): at 16:12

## 2023-09-14 RX ADMIN — CHLORHEXIDINE GLUCONATE 15 MILLILITER(S): 213 SOLUTION TOPICAL at 05:30

## 2023-09-14 NOTE — DIETITIAN INITIAL EVALUATION ADULT - PERTINENT MEDS FT
MEDICATIONS  (STANDING):  chlorhexidine 2% Cloths 1 Application(s) Topical daily  famotidine    Tablet 20 milliGRAM(s) Oral daily  heparin  Infusion 550 Unit(s)/Hr (3.5 mL/Hr) IV Continuous <Continuous>  methimazole 5 milliGRAM(s) Oral <User Schedule>  polyethylene glycol 3350 17 Gram(s) Oral every 12 hours  senna 2 Tablet(s) Oral at bedtime  sodium chloride 0.9%. 1000 milliLiter(s) (60 mL/Hr) IV Continuous <Continuous>    MEDICATIONS  (PRN):  acetaminophen     Tablet .. 650 milliGRAM(s) Oral every 6 hours PRN Temp greater or equal to 38C (100.4F), Mild Pain (1 - 3)

## 2023-09-14 NOTE — DIETITIAN INITIAL EVALUATION ADULT - NSFNSGIIOFT_GEN_A_CORE
BMI calculated using 9/12 dosing wt 46.6 kg.    IBW: 45.4 kg.    09-13-23 @ 07:01  -  09-14-23 @ 07:00  --------------------------------------------------------  OUT:  Total OUT: 0 mL    Total NET: 220 mL      09-14-23 @ 07:01  -  09-14-23 @ 23:26  --------------------------------------------------------  OUT:  Total OUT: 0 mL    Total NET: 80 mL

## 2023-09-14 NOTE — PROGRESS NOTE ADULT - SUBJECTIVE AND OBJECTIVE BOX
SUMMARY:HPI:  The patient is an 83-year-old female with a past history of mood disorder, hallucination, hyperthyroidism, Afib, with a prior admission for syncope/ collapse at home, who presented with dysarthria, confusion, and left weakness and was noted to have a right perfusion deficit on CTP. The patient is a code NI actual, and will be taken for a planned diagnostic cerebral angiogram + mechanical thrombectomy with Dr. Mckeon.     S/P TNK - 1743 - 9/12/23    Admission scores:   NIHSS 32  mRS- 1      OVERNIGHT EVENTS:  RVR / Hypotensive  ( while on propfol )  transitioned to precedex and placed on neosynephrine .         REVIEW OF SYSTEMS: [ ] Unable to Assess due to neurologic exam   [ ] All ROS addressed below are non-contributory, except:  Neuro: [ ] Headache [ ] Back pain [ ] Numbness [ ] Weakness [ ] Ataxia [ ] Dizziness [ ] Aphasia [ ] Dysarthria [ ] Visual disturbance  Resp: [ ] Shortness of breath/dyspnea, [ ] Orthopnea [ ] Cough  CV: [ ] Chest pain [ ] Palpitation [ ] Lightheadedness [ ] Syncope  Renal: [ ] Thirst [ ] Edema  GI: [ ] Nausea [ ] Emesis [ ] Abdominal pain [ ] Constipation [ ] Diarrhea  Hem: [ ] Hematemesis [ ] bright red blood per rectum  ID: [ ] Fever [ ] Chills [ ] Dysuria  ENT: [ ] Rhinorrhea    ICU Vital Signs Last 24 Hrs  T(C): 37.9 (14 Sep 2023 04:00), Max: 38.4 (13 Sep 2023 14:15)  T(F): 100.2 (14 Sep 2023 04:00), Max: 101.1 (13 Sep 2023 14:15)  HR: 109 (14 Sep 2023 07:00) (91 - 127)  BP: 144/76 (14 Sep 2023 07:00) (103/57 - 160/82)  BP(mean): 103 (14 Sep 2023 07:00) (75 - 113)  RR: 16 (14 Sep 2023 07:00) (14 - 27)  SpO2: 100% (14 Sep 2023 07:00) (95% - 100%)    O2 Parameters below as of 14 Sep 2023 06:00  Patient On (Oxygen Delivery Method): ventilator    O2 Concentration (%): 30        Parameters below as of 13 Sep 2023 12:00  Patient On (Oxygen Delivery Method): room air    A/C - TV- 350/ 14/40/8   ABG - ( 14 Sep 2023 03:10 )  pH, Arterial: 7.43  pH, Blood: x     /  pCO2: 28    /  pO2: 161   / HCO3: 19    / Base Excess: -4.5  /  SaO2: 99.3                    13 Sep 2023 07:01  -  14 Sep 2023 07:00  --------------------------------------------------------  IN:    Peptamen A.F.: 220 mL    Phenylephrine: 105.8 mL    sodium chloride 0.9%: 1000 mL    sodium chloride 0.9%: 240 mL    Sodium Chloride 0.9% Bolus: 500 mL  Total IN: 2065.8 mL    OUT:    Indwelling Catheter - Urethral (mL): 1005 mL  Total OUT: 1005 mL    Total NET: 1060.8 mL        MEDICATIONS  (STANDING):  chlorhexidine 0.12% Liquid 15 milliLiter(s) Oral Mucosa every 12 hours  chlorhexidine 2% Cloths 1 Application(s) Topical daily  chlorhexidine 2% Cloths 1 Application(s) Topical daily  dexMEDEtomidine Infusion 0.2 MICROgram(s)/kG/Hr (2.33 mL/Hr) IV Continuous <Continuous>  enoxaparin Injectable 30 milliGRAM(s) SubCutaneous every 24 hours  famotidine    Tablet 20 milliGRAM(s) Oral daily  phenylephrine    Infusion 0.2 MICROgram(s)/kG/Min (3.5 mL/Hr) IV Continuous <Continuous>  polyethylene glycol 3350 17 Gram(s) Oral every 12 hours  senna 2 Tablet(s) Oral at bedtime  sodium chloride 0.9%. 1000 milliLiter(s) (60 mL/Hr) IV Continuous <Continuous>    MEDICATIONS  (PRN):  acetaminophen     Tablet .. 650 milliGRAM(s) Oral every 6 hours PRN Temp greater or equal to 38C (100.4F), Mild Pain (1 - 3)              09-13    140  |  110  |  19  ----------------------------<  124<H>  5.4<H>   |  18  |  0.6<L>    Ca    8.0<L>      13 Sep 2023 06:10  Phos  3.2     09-13  Mg     2.4     09-13    TPro  5.9<L>  /  Alb  3.2<L>  /  TBili  0.6  /  DBili  x   /  AST  17  /  ALT  6   /  AlkPhos  77  09-13                          11.7   14.99 )-----------( 214      ( 13 Sep 2023 06:10 )             36.7         CAPILLARY BLOOD GLUCOSE  CAPILLARY BLOOD GLUCOSE      POCT Blood Glucose.: 110 mg/dL (13 Sep 2023 08:47)  )        STROKE CORE MEASURES:   HGBA1C  LDL- 78   TRig- 120   TSH       IMAGING/DATA: [ X] Reviewed     Xray Chest 1 View-PORTABLE IMMEDIATE (Xray Chest 1 View-PORTABLE IMMEDIATE .) (09.12.23 @ 22:26) >  Impression:    No radiographic evidence of acute cardiopulmonary disease.    CT Head No Cont (09.12.23 @ 20:33) >  IMPRESSION:  THE FOLLOWING REFLECTS THE AMENDED FINAL IMPRESSION:    1.  Residual IV contrast present from CTA and angiogram/thrombectomy.    2.  Parenchymal hyperdensity within bilateral thalami, left occipital and   temporal lobes, and cerebellar hemispheres; and sulcal hyperdensity   within the left occipital region. This likely predominantly reflects   contrast staining; underlying hemorrhage cannotbe excluded.    3.  Patchy appearance of the cerebellum suspicious for underlying   infarcts.    4.  Stable chronic microvascular ischemic changes and chronic left   parieto-occipital infarct.        : CT Angio Neck Stroke Protocol w/ IV Cont (09.12.23 @ 17:40) >  IMPRESSION:    CT PERFUSION:  Ischemic tissue volume estimate of 63 cc in the area of the right   occipital/cerebellar areas, with core infarct estimate of 11 cc in the   area of the right occipital/cerebellar areas.    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    Severe stenosis of the right superior cerebellar artery.            EXAMINATION:  PHYSICAL EXAM:    Constitutional: No Acute Distress     Neurological: Awake, alert oriented to person, place and time, Following Commands, PERRL, EOMI, No Gaze Preference, Face Symmetrical, Speech Fluent, No dysmetria, No ataxia, No nystagmus     Motor exam:          Upper extremity                         Delt     Bicep     Tricep    HG                                                 R         5/5        5/5        5/5       5/5                                               L          5/5        5/5        5/5       5/5          Lower extremity                        HF         KF        KE       DF         PF                                                  R        5/5 5/5 5/5 5/5 5/5                                               L         5/5 5/5 5/5 5/5 5/5                                                 Sensation: [ ] intact to light touch  [ ] decreased:     Reflexes: Deep Tendon Reflexes Intact     Pulmonary: Clear to Auscultation, No rales, No rhonchi, No wheezes     Cardiovascular: S1, S2, Regular rate and rhythm     Gastrointestinal: Soft, Non-tender, Non-distended     Extremities: No calf tenderness     Incision:    SUMMARY:HPI:  The patient is an 83-year-old female with a past history of mood disorder, hallucination, hyperthyroidism, Afib, with a prior admission for syncope/ collapse at home, who presented with dysarthria, confusion, and left weakness and was noted to have a right perfusion deficit on CTP. The patient is a code NI actual, and will be taken for a planned diagnostic cerebral angiogram + mechanical thrombectomy with Dr. Mckeon.     S/P TNK - 1743 - 9/12/23    Admission scores:   NIHSS 32  mRS- 1      OVERNIGHT EVENTS:  Low urine output given 250cc x2         REVIEW OF SYSTEMS: [ X] Unable to Assess due to neurologic exam     Neuro: [ ] Headache [ ] Back pain [ ] Numbness [ ] Weakness [ ] Ataxia [ ] Dizziness [ ] Aphasia [ ] Dysarthria [ ] Visual disturbance  Resp: [ ] Shortness of breath/dyspnea, [ ] Orthopnea [ ] Cough  CV: [ ] Chest pain [ ] Palpitation [ ] Lightheadedness [ ] Syncope  Renal: [ ] Thirst [ ] Edema  GI: [ ] Nausea [ ] Emesis [ ] Abdominal pain [ ] Constipation [ ] Diarrhea  Hem: [ ] Hematemesis [ ] bright red blood per rectum  ID: [ ] Fever [ ] Chills [ ] Dysuria  ENT: [ ] Rhinorrhea    ICU Vital Signs Last 24 Hrs  T(C): 37.9 (14 Sep 2023 04:00), Max: 38.4 (13 Sep 2023 14:15)  T(F): 100.2 (14 Sep 2023 04:00), Max: 101.1 (13 Sep 2023 14:15)  HR: 109 (14 Sep 2023 07:00) (91 - 127)  BP: 144/76 (14 Sep 2023 07:00) (103/57 - 160/82)  BP(mean): 103 (14 Sep 2023 07:00) (75 - 113)  RR: 16 (14 Sep 2023 07:00) (14 - 27)  SpO2: 100% (14 Sep 2023 07:00) (95% - 100%)      O2 Concentration (%): 30    A/C - TV- 350/ 14/40/8   ABG - ( 14 Sep 2023 03:10 )  pH, Arterial: 7.43  pH, Blood: x     /  pCO2: 28    /  pO2: 161   / HCO3: 19    / Base Excess: -4.5  /  SaO2: 99.3  - Metabolic acidosis with Resp alkalosis   CPAP - 0900 on 9/14/23      13 Sep 2023 07:01  -  14 Sep 2023 07:00  --------------------------------------------------------  IN:    Peptamen A.F.: 220 mL    Phenylephrine: 105.8 mL    sodium chloride 0.9%: 1000 mL    sodium chloride 0.9%: 240 mL    Sodium Chloride 0.9% Bolus: 500 mL  Total IN: 2065.8 mL    OUT:    Indwelling Catheter - Urethral (mL): 1005 mL  Total OUT: 1005 mL    Total NET: 1060.8 mL        MEDICATIONS  (STANDING):  chlorhexidine 0.12% Liquid 15 milliLiter(s) Oral Mucosa every 12 hours  chlorhexidine 2% Cloths 1 Application(s) Topical daily  chlorhexidine 2% Cloths 1 Application(s) Topical daily  dexMEDEtomidine Infusion 0.2 MICROgram(s)/kG/Hr (2.33 mL/Hr) IV Continuous <Continuous>  enoxaparin Injectable 30 milliGRAM(s) SubCutaneous every 24 hours  famotidine    Tablet 20 milliGRAM(s) Oral daily  phenylephrine    Infusion 0.2 MICROgram(s)/kG/Min (3.5 mL/Hr) IV Continuous <Continuous>  polyethylene glycol 3350 17 Gram(s) Oral every 12 hours  senna 2 Tablet(s) Oral at bedtime  sodium chloride 0.9%. 1000 milliLiter(s) (60 mL/Hr) IV Continuous <Continuous>    MEDICATIONS  (PRN):  acetaminophen     Tablet .. 650 milliGRAM(s) Oral every 6 hours PRN Temp greater or equal to 38C (100.4F), Mild Pain (1 - 3)        09-14    138  |  109  |  18  ----------------------------<  105<H>  4.5   |  17  |  0.5<L>    Ca    8.3<L>      14 Sep 2023 05:19  Phos  2.9     09-14  Mg     2.2     09-14    TPro  5.4<L>  /  Alb  2.8<L>  /  TBili  0.7  /  DBili  x   /  AST  17  /  ALT  <5  /  AlkPhos  68  09-14 09-13    140  |  110  |  19  ----------------------------<  124<H> AG- 12  5.4<H>   |  18  |  0.6<L>    Ca    8.0<L>      13 Sep 2023 06:10  Phos  3.2     09-13  Mg     2.4     09-13    TPro  5.9<L>  /  Alb  3.2<L>  /  TBili  0.6  /  DBili  x   /  AST  17  /  ALT  6   /  AlkPhos  77  09-13                          10.1   15.41 )-----------( 163      ( 14 Sep 2023 05:19 )             31.7                             11.7   14.99 )-----------( 214      ( 13 Sep 2023 06:10 )             36.7         CAPILLARY BLOOD GLUCOSE  CAPILLARY BLOOD GLUCOSE      POCT Blood Glucose.: 110 mg/dL (13 Sep 2023 08:47)        STROKE CORE MEASURES:   HGBA1C- 5.3  LDL- 78   TRig- 120   TSH-.03   Free T4 - 2.1       IMAGING/DATA: [ X] Reviewed     Xray Chest 1 View-PORTABLE IMMEDIATE (Xray Chest 1 View-PORTABLE IMMEDIATE .) (09.12.23 @ 22:26) >  Impression:    No radiographic evidence of acute cardiopulmonary disease.    CT Head No Cont (09.12.23 @ 20:33) >  IMPRESSION:  THE FOLLOWING REFLECTS THE AMENDED FINAL IMPRESSION:    1.  Residual IV contrast present from CTA and angiogram/thrombectomy.    2.  Parenchymal hyperdensity within bilateral thalami, left occipital and   temporal lobes, and cerebellar hemispheres; and sulcal hyperdensity   within the left occipital region. This likely predominantly reflects   contrast staining; underlying hemorrhage cannotbe excluded.    3.  Patchy appearance of the cerebellum suspicious for underlying   infarcts.    4.  Stable chronic microvascular ischemic changes and chronic left   parieto-occipital infarct.        CT Angio Neck Stroke Protocol w/ IV Cont (09.12.23 @ 17:40) >  IMPRESSION:    CT PERFUSION:  Ischemic tissue volume estimate of 63 cc in the area of the right   occipital/cerebellar areas, with core infarct estimate of 11 cc in the   area of the right occipital/cerebellar areas.    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    Severe stenosis of the right superior cerebellar artery.            EXAMINATION:  PHYSICAL EXAM:    Constitutional: No Acute Distress     Neurological: Awake, alert oriented to person, place and time, Following Commands, PERRL, EOMI, No Gaze Preference, Face Symmetrical, Speech Fluent, No dysmetria, No ataxia, No nystagmus     Motor exam:          Upper extremity                         Delt     Bicep     Tricep    HG                                                 R         5/5        5/5        5/5       5/5                                               L          5/5        5/5        5/5       5/5          Lower extremity                        HF         KF        KE       DF         PF                                                  R        5/5        5/5        5/5       5/5         5/5                                               L         5/5        5/5       5/5       5/5          5/5                                                 Sensation: [ ] intact to light touch  [ ] decreased:     Reflexes: Deep Tendon Reflexes Intact     Pulmonary: Clear to Auscultation, No rales, No rhonchi, No wheezes     Cardiovascular: S1, S2, Regular rate and rhythm     Gastrointestinal: Soft, Non-tender, Non-distended     Extremities: No calf tenderness     Incision:

## 2023-09-14 NOTE — CONSULT NOTE ADULT - ASSESSMENT
83-year-old female with a past history of mood disorder, hallucination, hyperthyroidism, Afib, with a prior admission for syncope/ collapse at home, who presented with dysarthria, confusion, and left weakness and was noted to have a right perfusion deficit on CTP currently intubated     Impression  #BA ischemic stroke s/p TNK s/p thrombectomy  #SHEILA evaluation     Plan  - NPO AM   - patient is clear for SHEILA   83-year-old female with a past history of mood disorder, hallucination, hyperthyroidism, Afib, with a prior admission for syncope/ collapse at home, who presented with dysarthria, confusion, and left weakness and was noted to have a right perfusion deficit on CTP currently intubated .    Impression  #BA ischemic stroke s/p TNK s/p thrombectomy  #SHEILA evaluation     Plan  - NPO past midnight  - There are no current contraindications for this patient to proceed to SHEILA

## 2023-09-14 NOTE — DIETITIAN INITIAL EVALUATION ADULT - ADD RECOMMEND
Male
Recommendation:  (1) If pt to remain intubated, and plan to initiate tube feed, would initiate Peptamen AF at 10 mL/hr. Increase by 10 mL q4-6hrs as tolerated to goal rate 40 mL/hr. Provide 50 mL flushes q4hrs.Tube feed regimen at goal to provide 1152 kcal, 72 g protein, 1078 mL free H2O.  (2) If pt extubated, consult SLP services prior to diet advance to po to evaluate swallow function. Diet order texture/consistency per SLP.

## 2023-09-14 NOTE — DIETITIAN INITIAL EVALUATION ADULT - NSPROEDAREADYLEARN_GEN_A_NUR
Education deferred at this time; pt unable to participate in nutrition interview in setting of intubation.

## 2023-09-14 NOTE — DIETITIAN INITIAL EVALUATION ADULT - OTHER INFO
Pertinent Medical Information: Pt presented with dysarthria, confusion, and left weakness and was noted to have a right perfusion deficit on CTP.    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 Pt now POD #2  s/p thrombectomy of right SCA/BA with TICI 2C x3 passes. Respiratory failure 2/2 neuro injury; intubated at this time.    PMH includes mood disorder, hallucination, hyperthyroidism, Afib, with a prior admission for syncope/ collapse at home.

## 2023-09-14 NOTE — PROGRESS NOTE ADULT - ASSESSMENT
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 Received TNK @ 1743. Taken to IR for thrombectomy, s/p thrombectomy of right SCA/BA with TICI 2C x3 passes. Admitted to Neuro ICU for further monitoring.     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   Neuro checks per protocol   Neuroendovascular following  CT Head post thrombectomy this afternoon   Stroke Core Measures- as above   Start AC in 24- 48 hrs after review CT/MRI   Analgesia: Precedex for RASS goal 0 to -1   Stroke core measures   Activity:  [X] Bedrest [x] PT [x] OT [] PMNR [] Bed in Chair Position     PULM: Resp failure secondary to neuro injury   Lung protective measures   SAT/SBT as tolerated  Wean FIO2 to maintain Sats > 94%  CXR - Clear   HOB 30 degrees    CV: Afib/ flutter - S/P RVR  S/P Hypotension   Wean Neosynephrine   SBP goal: 110-140 - on amadeo; wean as tolerates   SHEILA   ProBNP   Troponin - < .01 x2 neg   Echo -   TTE Echo Complete w/o Contrast w/ Doppler (09.13.23 @ 08:30) >  Summary:   1. LV Ejection Fraction by Ibarra's Method with a biplane EF of 63 %.   2. Normal global left ventricular systolic function.   3. Moderate to severe mitral valve regurgitation.   4. Mild-moderate tricuspid regurgitation.   5. Estimated pulmonary artery systolic pressure is 35.9 mmHg assuming a   right atrial pressure of 8 mmHg, which is consistent with borderline   pulmonary hypertension.   6. Normal left atrial size.   7. LA volume Index is 27.0 ml/m² ml/m2.   8. Normal right atrial size.   9. Compared to 6/13/2020 echo there is no significant change.  EKG - showing Afib with RVR   Cardiac enzymes - pending    RENAL: Urinary retention / Hyperkalemia NAG met acidosis   Chem -7 with lactate - 4pm   D/C merida this PM   Replete electrolytes as needed   Normonatremic     GI:  Diet: NPO x meds   GI prophylaxis [] not indicated [x] PPI [] other:  Bowel regimen [] colace [x] senna [x] other: Miralax     ENDO:   Goal euglycemia (-180)  TSH/Free t4 pending  Lipid profile - pending  HgBA1C pending     HEME/ONC:  VTE prophylaxis: [X] SCDs [] chemoprophylaxis- hold AC due recent TNK - F/U CT brain if Neg start AC   Holding AC/AP for now   LE duplex - pending    ID:  Maintain normothermia   procalcitonin   UA, CXR , - neg   LFT - NL   Hold ABX for Now       CODE STATUS:  [X] Full Code [] DNR [] DNI [] Palliative/Comfort Care    DISPOSITION:  [X] ICU [] Stroke Unit [] Floor [] EMU [] RCU [] PCU     83-year-old female with a past history of mood disorder, hallucination, hyperthyroidism, Afib no a/c  who presented with dysarthria, confusion, and left weakness, NIH 32, CTA showed complete occlusion of basilar artery. Received TNK @ 1743. Taken to IR for thrombectomy,  POD #2  s/p thrombectomy of right SCA/BA with TICI 2C x3 passes.     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 No Bolus   F/U CT in 24 hrs or less if Change in Exam   Stroke Core Measures- as above   MRI of brain  Analgesia: Precedex for RASS goal 0 to -1   Activity:  [X] Activities as tolerated [x] PT [x] OT     PULM:  S/P Resp failure secondary to neuro injury   Lung protective measures   SAT/SBT as tolerated  Wean FIO2 to maintain Sats > 94%  Trial on T piece - check ABG - 1-2 hr  CXR - Clear   HOB 30 degrees    CV: Afib/ flutter - S/P RVR  S/P Hypotension   SBP goal: 110-140 - on amadeo; wean as tolerates   ProBNP   Troponin - < .01 x2 neg   Echo -   TTE Echo Complete w/o Contrast w/ Doppler (09.13.23 @ 08:30) >  Summary:   1. LV Ejection Fraction by Ibarra's Method with a biplane EF of 63 %.   2. Normal global left ventricular systolic function.   3. Moderate to severe mitral valve regurgitation.   4. Mild-moderate tricuspid regurgitation.   5. Estimated pulmonary artery systolic pressure is 35.9 mmHg assuming a   right atrial pressure of 8 mmHg, which is consistent with borderline   pulmonary hypertension.   6. Normal left atrial size.   7. LA volume Index is 27.0 ml/m² ml/m2.   8. Normal right atrial size.   9. Compared to 6/13/2020 echo there is no significant change.  EKG - showing Afib with RVR   Cardiac enzymes - pending    RENAL: Urinary retention / Hyperkalemia NAG met acidosis  Check urine - electrolytes - Na, K, Cl and PH    Chem -7 with lactate - 4pm   D/C merida this PM   Replete electrolytes as needed   Na- 135- 145     GI:  Diet: NPO  for possible extubation   GI prophylaxis[x] famotidine   Bowel regimen [] colace [x] senna [x] other: Miralax     ENDO:  Hyperthyroid  Methimazole - 5mg q 12- monitor CBC   Goal euglycemia (-180)  Lipid profile - as above   HgBA1C- 5.3     HEME/ONC:  VTE prophylaxis: [X] SCDs [X] chemoprophylaxis- on AC   LE duplex - pending    ID:  Maintain normothermia   Cultures 9/12/23  procalcitonin- 0.15  Blood culture- neg   MRSA - neg    UA- neg , CXR , - neg   LFT - NL   Hold ABX for Now       CODE STATUS:  [X] Full Code [] DNR [] DNI [] Palliative/Comfort Care    DISPOSITION:  [X] ICU

## 2023-09-14 NOTE — CONSULT NOTE ADULT - SUBJECTIVE AND OBJECTIVE BOX
Outpt cardiologist:    HPI:  The patient is an 83-year-old female with a past history of mood disorder, hallucination, hyperthyroidism, Afib, with a prior admission for syncope/ collapse at home, who presented with dysarthria, confusion, and left weakness and was noted to have a right perfusion deficit on CTP. The patient is a code NI actual, and will be taken for a planned diagnostic cerebral angiogram + mechanical thrombectomy with Dr. Mckeon.     Admission scores:   NIHSS 32   (12 Sep 2023 21:42)    ---  cardio fellow additional notes:      Cardiology consulted for evaluation of SHEILA   Patient was seen and examined at bedside. Patient is currently intubated and sedated  Spoke with family - Naren Bolanos - Patient has no Hx of cardiac problems - no hx of CP, SOB, PARKER, orthopnea, LE swelling,  No hx of anemia, Liver cirrhosis or gastric problems    Telemetry review showed NSR  Trops x3 neg -    TTE EF 63% - NL LVSF - Mod/sev MR   LE duplex negative for DVT         PAST MEDICAL & SURGICAL HISTORY  Thyroid disease        FAMILY HISTORY:  FAMILY HISTORY:      SOCIAL HISTORY:  Social History:      ALLERGIES:  No Known Allergies      MEDICATIONS:  chlorhexidine 2% Cloths 1 Application(s) Topical daily  dexMEDEtomidine Infusion 0.2 MICROgram(s)/kG/Hr (2.33 mL/Hr) IV Continuous <Continuous>  famotidine    Tablet 20 milliGRAM(s) Oral daily  heparin  Infusion 550 Unit(s)/Hr (5.5 mL/Hr) IV Continuous <Continuous>  methimazole 5 milliGRAM(s) Oral <User Schedule>  phenylephrine    Infusion 0.2 MICROgram(s)/kG/Min (3.5 mL/Hr) IV Continuous <Continuous>  polyethylene glycol 3350 17 Gram(s) Oral every 12 hours  senna 2 Tablet(s) Oral at bedtime  sodium chloride 0.9%. 1000 milliLiter(s) (60 mL/Hr) IV Continuous <Continuous>    PRN:  acetaminophen     Tablet .. 650 milliGRAM(s) Oral every 6 hours PRN      HOME MEDICATIONS:  Home Medications:  QUEtiapine 50 mg oral tablet: 1 tab(s) orally 2 times a day (12 Jun 2020 20:55)      VITALS:   T(F): 100.4 (09-14 @ 16:00), Max: 101.1 (09-13 @ 14:15)  HR: 103 (09-14 @ 16:00) (79 - 140)  BP: 124/79 (09-14 @ 16:00) (88/55 - 214/107)  BP(mean): 97 (09-14 @ 16:00) (64 - 117)  RR: 20 (09-14 @ 16:00) (4 - 32)  SpO2: 100% (09-14 @ 16:00) (88% - 100%)    I&O's Summary    13 Sep 2023 07:01  -  14 Sep 2023 07:00  --------------------------------------------------------  IN: 2065.8 mL / OUT: 1005 mL / NET: 1060.8 mL    14 Sep 2023 07:01  -  14 Sep 2023 16:39  --------------------------------------------------------  IN: 445.5 mL / OUT: 25 mL / NET: 420.5 mL        REVIEW OF SYSTEMS:  Unable to obtain - patient is intubated     PHYSICAL EXAM:  General: Not in distress.  Non-toxic appearing.   Cardio: regular, S1, S2, no murmur  Pulm: B/L BS.  No wheezing / crackles / rales  Abdomen: Soft, non-tender, non-distended. Normoactive bowel sounds  Extremities: No edema b/l le  Neuro: A&O x3. No focal deficits    LABS:                        10.1   15.41 )-----------( 163      ( 14 Sep 2023 05:19 )             31.7     09-14    138  |  109  |  18  ----------------------------<  105<H>  4.5   |  17  |  0.5<L>    Ca    8.3<L>      14 Sep 2023 05:19  Phos  2.9     09-14  Mg     2.2     09-14    TPro  5.4<L>  /  Alb  2.8<L>  /  TBili  0.7  /  DBili  x   /  AST  17  /  ALT  <5  /  AlkPhos  68  09-14    PT/INR - ( 13 Sep 2023 06:10 )   PT: 11.40 sec;   INR: 1.00 ratio         PTT - ( 13 Sep 2023 06:10 )  PTT:27.0 sec  Troponin T, Serum: <0.01 ng/mL (09-13-23 @ 18:28)  Lactate, Blood: 2.8 mmol/L *H* (09-13-23 @ 18:28)    CARDIAC MARKERS ( 13 Sep 2023 18:28 )  x     / <0.01 ng/mL / x     / x     / x      CARDIAC MARKERS ( 12 Sep 2023 22:33 )  x     / <0.01 ng/mL / 24 U/L / x     / 1.2 ng/mL  CARDIAC MARKERS ( 12 Sep 2023 17:19 )  x     / <0.01 ng/mL / x     / x     / x            Troponin trend:      09-13 Chol 143 LDL -- HDL 41<L> Trig 120      RADIOLOGY:  -CXR:  -TTE:  -CCTA:  -STRESS TEST:  -CATHETERIZATION:  -OTHER:  ECG:      TELEMETRY EVENTS:   Outpt cardiologist:    HPI:  The patient is an 83-year-old female with a past history of mood disorder, hallucination, hyperthyroidism, Afib, with a prior admission for syncope/ collapse at home, who presented with dysarthria, confusion, and left weakness and was noted to have a right perfusion deficit on CTP. The patient is a code NI actual, and will be taken for a planned diagnostic cerebral angiogram + mechanical thrombectomy with Dr. Mckeon.     Admission scores:   NIHSS 32   (12 Sep 2023 21:42)    ---  cardio fellow additional notes:      Cardiology consulted for evaluation of SHEILA   Patient was seen and examined at bedside. Patient is currently intubated and sedated  Spoke with family - Naren Bolanos - Patient has no Hx of cardiac problems - no hx of CP, SOB, PARKER, orthopnea, LE swelling,  No hx of anemia, Liver cirrhosis or gastric problems    Telemetry review showed NSR  Trops x3 neg -    TTE EF 63% - NL LVSF - Mod/sev MR   LE duplex negative for DVT         PAST MEDICAL & SURGICAL HISTORY  Thyroid disease        FAMILY HISTORY:  FAMILY HISTORY: unable to obtain      SOCIAL HISTORY:  Social History: unable to obtain      ALLERGIES:  No Known Allergies      MEDICATIONS:  chlorhexidine 2% Cloths 1 Application(s) Topical daily  dexMEDEtomidine Infusion 0.2 MICROgram(s)/kG/Hr (2.33 mL/Hr) IV Continuous <Continuous>  famotidine    Tablet 20 milliGRAM(s) Oral daily  heparin  Infusion 550 Unit(s)/Hr (5.5 mL/Hr) IV Continuous <Continuous>  methimazole 5 milliGRAM(s) Oral <User Schedule>  phenylephrine    Infusion 0.2 MICROgram(s)/kG/Min (3.5 mL/Hr) IV Continuous <Continuous>  polyethylene glycol 3350 17 Gram(s) Oral every 12 hours  senna 2 Tablet(s) Oral at bedtime  sodium chloride 0.9%. 1000 milliLiter(s) (60 mL/Hr) IV Continuous <Continuous>    PRN:  acetaminophen     Tablet .. 650 milliGRAM(s) Oral every 6 hours PRN      HOME MEDICATIONS:  Home Medications:  QUEtiapine 50 mg oral tablet: 1 tab(s) orally 2 times a day (12 Jun 2020 20:55)      VITALS:   T(F): 100.4 (09-14 @ 16:00), Max: 101.1 (09-13 @ 14:15)  HR: 103 (09-14 @ 16:00) (79 - 140)  BP: 124/79 (09-14 @ 16:00) (88/55 - 214/107)  BP(mean): 97 (09-14 @ 16:00) (64 - 117)  RR: 20 (09-14 @ 16:00) (4 - 32)  SpO2: 100% (09-14 @ 16:00) (88% - 100%)    I&O's Summary    13 Sep 2023 07:01  -  14 Sep 2023 07:00  --------------------------------------------------------  IN: 2065.8 mL / OUT: 1005 mL / NET: 1060.8 mL    14 Sep 2023 07:01  -  14 Sep 2023 16:39  --------------------------------------------------------  IN: 445.5 mL / OUT: 25 mL / NET: 420.5 mL        REVIEW OF SYSTEMS:  Unable to obtain - patient is intubated     PHYSICAL EXAM:  General: Not in distress.  Non-toxic appearing  Skin: intact, warm  HEENT: EOMI, PERRLA  Cardio: regular, S1, S2, no murmur  Pulm: B/L BS.  No wheezing / crackles / rales  Abdomen: Soft, non-tender, non-distended. Normoactive bowel sounds  Extremities: No edema b/l le  Neuro: A&O x3. No focal deficits    LABS:                        10.1   15.41 )-----------( 163      ( 14 Sep 2023 05:19 )             31.7     09-14    138  |  109  |  18  ----------------------------<  105<H>  4.5   |  17  |  0.5<L>    Ca    8.3<L>      14 Sep 2023 05:19  Phos  2.9     09-14  Mg     2.2     09-14    TPro  5.4<L>  /  Alb  2.8<L>  /  TBili  0.7  /  DBili  x   /  AST  17  /  ALT  <5  /  AlkPhos  68  09-14    PT/INR - ( 13 Sep 2023 06:10 )   PT: 11.40 sec;   INR: 1.00 ratio         PTT - ( 13 Sep 2023 06:10 )  PTT:27.0 sec  Troponin T, Serum: <0.01 ng/mL (09-13-23 @ 18:28)  Lactate, Blood: 2.8 mmol/L *H* (09-13-23 @ 18:28)    CARDIAC MARKERS ( 13 Sep 2023 18:28 )  x     / <0.01 ng/mL / x     / x     / x      CARDIAC MARKERS ( 12 Sep 2023 22:33 )  x     / <0.01 ng/mL / 24 U/L / x     / 1.2 ng/mL  CARDIAC MARKERS ( 12 Sep 2023 17:19 )  x     / <0.01 ng/mL / x     / x     / x            Troponin trend:      09-13 Chol 143 LDL -- HDL 41<L> Trig 120      RADIOLOGY:  -CXR:  -TTE:  -CCTA:  -STRESS TEST:  -CATHETERIZATION:  -OTHER:  ECG:      TELEMETRY EVENTS:

## 2023-09-14 NOTE — DIETITIAN INITIAL EVALUATION ADULT - OTHER CALCULATIONS
Energy: 1193 kcal/day ([facility] 2003b using Ve 7.3 & Tmax 24 hours 38.1 C)

## 2023-09-14 NOTE — DIETITIAN INITIAL EVALUATION ADULT - NSFNSGIASSESSMENTFT_GEN_A_CORE
last BM 9/14 (loose); no nausea/vomiting reported at this time; no abd distention reported at this time

## 2023-09-14 NOTE — DIETITIAN INITIAL EVALUATION ADULT - PERTINENT LABORATORY DATA
09-14    138  |  109  |  18  ----------------------------<  105<H>  4.5   |  17  |  0.5<L>    Ca    8.3<L>      14 Sep 2023 05:19  Phos  2.9     09-14  Mg     2.2     09-14    TPro  5.4<L>  /  Alb  2.8<L>  /  TBili  0.7  /  DBili  x   /  AST  17  /  ALT  <5  /  AlkPhos  68  09-14  A1C with Estimated Average Glucose Result: 5.3 % (09-13-23 @ 05:32)

## 2023-09-14 NOTE — DIETITIAN INITIAL EVALUATION ADULT - NUTRITIONGOAL OUTCOME1
Nutrition regimen determined & initiated as soon as medically feasible; pt to meet >85% & <105% of estimated nutrient needs over next 3-5 days.    Pt at high nutrition risk; RD to follow-up in 3-5 days.    Monitor: Skin, labs, BM, wt, nutrition focused physical findings, body composition, diet order, GI.

## 2023-09-14 NOTE — OCCUPATIONAL THERAPY INITIAL EVALUATION ADULT - SPECIFY REASON(S)
Case discussed in Neurocrit IDT rounds, per team, pt not medically cleared to participate in OT today. Will hold and initiate OT when pt medically cleared.
Case discussed with neurocrit team pt intubated and sedated, will hold today

## 2023-09-15 LAB
ALBUMIN SERPL ELPH-MCNC: 2.7 G/DL — LOW (ref 3.5–5.2)
ALP SERPL-CCNC: 63 U/L — SIGNIFICANT CHANGE UP (ref 30–115)
ALT FLD-CCNC: <5 U/L — SIGNIFICANT CHANGE UP (ref 0–41)
ANION GAP SERPL CALC-SCNC: 12 MMOL/L — SIGNIFICANT CHANGE UP (ref 7–14)
APPEARANCE UR: CLEAR — SIGNIFICANT CHANGE UP
APTT BLD: 28.1 SEC — SIGNIFICANT CHANGE UP (ref 27–39.2)
AST SERPL-CCNC: 17 U/L — SIGNIFICANT CHANGE UP (ref 0–41)
BACTERIA # UR AUTO: NEGATIVE /HPF — SIGNIFICANT CHANGE UP
BASOPHILS # BLD AUTO: 0.03 K/UL — SIGNIFICANT CHANGE UP (ref 0–0.2)
BASOPHILS # BLD AUTO: 0.03 K/UL — SIGNIFICANT CHANGE UP (ref 0–0.2)
BASOPHILS NFR BLD AUTO: 0.2 % — SIGNIFICANT CHANGE UP (ref 0–1)
BASOPHILS NFR BLD AUTO: 0.3 % — SIGNIFICANT CHANGE UP (ref 0–1)
BILIRUB SERPL-MCNC: 0.7 MG/DL — SIGNIFICANT CHANGE UP (ref 0.2–1.2)
BILIRUB UR-MCNC: NEGATIVE — SIGNIFICANT CHANGE UP
BLD GP AB SCN SERPL QL: SIGNIFICANT CHANGE UP
BUN SERPL-MCNC: 12 MG/DL — SIGNIFICANT CHANGE UP (ref 10–20)
CALCIUM SERPL-MCNC: 8.1 MG/DL — LOW (ref 8.4–10.5)
CAST: 0 /LPF — SIGNIFICANT CHANGE UP (ref 0–4)
CHLORIDE SERPL-SCNC: 110 MMOL/L — SIGNIFICANT CHANGE UP (ref 98–110)
CHLORIDE UR-SCNC: 132 — SIGNIFICANT CHANGE UP
CO2 SERPL-SCNC: 17 MMOL/L — SIGNIFICANT CHANGE UP (ref 17–32)
COLOR SPEC: YELLOW — SIGNIFICANT CHANGE UP
CREAT ?TM UR-MCNC: 26 MG/DL — SIGNIFICANT CHANGE UP
CREAT ?TM UR-MCNC: 72 MG/DL — SIGNIFICANT CHANGE UP
CREAT SERPL-MCNC: <0.5 MG/DL — LOW (ref 0.7–1.5)
DIFF PNL FLD: NEGATIVE — SIGNIFICANT CHANGE UP
EGFR: 106 ML/MIN/1.73M2 — SIGNIFICANT CHANGE UP
EOSINOPHIL # BLD AUTO: 0.09 K/UL — SIGNIFICANT CHANGE UP (ref 0–0.7)
EOSINOPHIL # BLD AUTO: 0.12 K/UL — SIGNIFICANT CHANGE UP (ref 0–0.7)
EOSINOPHIL NFR BLD AUTO: 0.8 % — SIGNIFICANT CHANGE UP (ref 0–8)
EOSINOPHIL NFR BLD AUTO: 0.9 % — SIGNIFICANT CHANGE UP (ref 0–8)
GLUCOSE SERPL-MCNC: 81 MG/DL — SIGNIFICANT CHANGE UP (ref 70–99)
GLUCOSE UR QL: NEGATIVE MG/DL — SIGNIFICANT CHANGE UP
HCT VFR BLD CALC: 26.5 % — LOW (ref 37–47)
HCT VFR BLD CALC: 28.9 % — LOW (ref 37–47)
HGB BLD-MCNC: 8.5 G/DL — LOW (ref 12–16)
HGB BLD-MCNC: 9.1 G/DL — LOW (ref 12–16)
IMM GRANULOCYTES NFR BLD AUTO: 0.5 % — HIGH (ref 0.1–0.3)
IMM GRANULOCYTES NFR BLD AUTO: 0.8 % — HIGH (ref 0.1–0.3)
KETONES UR-MCNC: 40 MG/DL
LEUKOCYTE ESTERASE UR-ACNC: ABNORMAL
LYMPHOCYTES # BLD AUTO: 1.46 K/UL — SIGNIFICANT CHANGE UP (ref 1.2–3.4)
LYMPHOCYTES # BLD AUTO: 12.8 % — LOW (ref 20.5–51.1)
LYMPHOCYTES # BLD AUTO: 16.6 % — LOW (ref 20.5–51.1)
LYMPHOCYTES # BLD AUTO: 2.13 K/UL — SIGNIFICANT CHANGE UP (ref 1.2–3.4)
MAGNESIUM SERPL-MCNC: 1.9 MG/DL — SIGNIFICANT CHANGE UP (ref 1.8–2.4)
MCHC RBC-ENTMCNC: 28.8 PG — SIGNIFICANT CHANGE UP (ref 27–31)
MCHC RBC-ENTMCNC: 29.3 PG — SIGNIFICANT CHANGE UP (ref 27–31)
MCHC RBC-ENTMCNC: 31.5 G/DL — LOW (ref 32–37)
MCHC RBC-ENTMCNC: 32.1 G/DL — SIGNIFICANT CHANGE UP (ref 32–37)
MCV RBC AUTO: 91.4 FL — SIGNIFICANT CHANGE UP (ref 81–99)
MCV RBC AUTO: 91.5 FL — SIGNIFICANT CHANGE UP (ref 81–99)
MONOCYTES # BLD AUTO: 0.79 K/UL — HIGH (ref 0.1–0.6)
MONOCYTES # BLD AUTO: 0.87 K/UL — HIGH (ref 0.1–0.6)
MONOCYTES NFR BLD AUTO: 6.8 % — SIGNIFICANT CHANGE UP (ref 1.7–9.3)
MONOCYTES NFR BLD AUTO: 6.9 % — SIGNIFICANT CHANGE UP (ref 1.7–9.3)
NEUTROPHILS # BLD AUTO: 8.96 K/UL — HIGH (ref 1.4–6.5)
NEUTROPHILS # BLD AUTO: 9.58 K/UL — HIGH (ref 1.4–6.5)
NEUTROPHILS NFR BLD AUTO: 75 % — SIGNIFICANT CHANGE UP (ref 42.2–75.2)
NEUTROPHILS NFR BLD AUTO: 78.4 % — HIGH (ref 42.2–75.2)
NITRITE UR-MCNC: NEGATIVE — SIGNIFICANT CHANGE UP
NRBC # BLD: 0 /100 WBCS — SIGNIFICANT CHANGE UP (ref 0–0)
NRBC # BLD: 0 /100 WBCS — SIGNIFICANT CHANGE UP (ref 0–0)
OSMOLALITY UR: 416 MOS/KG — SIGNIFICANT CHANGE UP (ref 50–1200)
PH UR: 5.5 — SIGNIFICANT CHANGE UP (ref 5–8)
PHOSPHATE SERPL-MCNC: 2 MG/DL — LOW (ref 2.1–4.9)
PLATELET # BLD AUTO: 166 K/UL — SIGNIFICANT CHANGE UP (ref 130–400)
PLATELET # BLD AUTO: 199 K/UL — SIGNIFICANT CHANGE UP (ref 130–400)
PMV BLD: 10 FL — SIGNIFICANT CHANGE UP (ref 7.4–10.4)
PMV BLD: 9.5 FL — SIGNIFICANT CHANGE UP (ref 7.4–10.4)
POTASSIUM SERPL-MCNC: 3.9 MMOL/L — SIGNIFICANT CHANGE UP (ref 3.5–5)
POTASSIUM SERPL-SCNC: 3.9 MMOL/L — SIGNIFICANT CHANGE UP (ref 3.5–5)
POTASSIUM UR-SCNC: 22 MMOL/L — SIGNIFICANT CHANGE UP
PROT ?TM UR-MCNC: 13 MG/DLG/24H — SIGNIFICANT CHANGE UP
PROT ?TM UR-MCNC: 5 MG/DLG/24H — SIGNIFICANT CHANGE UP
PROT SERPL-MCNC: 4.8 G/DL — LOW (ref 6–8)
PROT UR-MCNC: NEGATIVE MG/DL — SIGNIFICANT CHANGE UP
PROT/CREAT UR-RTO: 0.2 RATIO — SIGNIFICANT CHANGE UP (ref 0–0.2)
PROT/CREAT UR-RTO: 0.2 RATIO — SIGNIFICANT CHANGE UP (ref 0–0.2)
RBC # BLD: 2.9 M/UL — LOW (ref 4.2–5.4)
RBC # BLD: 3.16 M/UL — LOW (ref 4.2–5.4)
RBC # FLD: 12.8 % — SIGNIFICANT CHANGE UP (ref 11.5–14.5)
RBC # FLD: 12.8 % — SIGNIFICANT CHANGE UP (ref 11.5–14.5)
RBC CASTS # UR COMP ASSIST: 0 /HPF — SIGNIFICANT CHANGE UP (ref 0–4)
SODIUM SERPL-SCNC: 139 MMOL/L — SIGNIFICANT CHANGE UP (ref 135–146)
SODIUM UR-SCNC: 107 MMOL/L — SIGNIFICANT CHANGE UP
SP GR SPEC: 1.01 — SIGNIFICANT CHANGE UP (ref 1–1.03)
SQUAMOUS # UR AUTO: 1 /HPF — SIGNIFICANT CHANGE UP (ref 0–5)
UROBILINOGEN FLD QL: 1 MG/DL — SIGNIFICANT CHANGE UP (ref 0.2–1)
UUN UR-MCNC: 327 MG/DL — SIGNIFICANT CHANGE UP
UUN UR-MCNC: 861 MG/DL — SIGNIFICANT CHANGE UP
WBC # BLD: 11.42 K/UL — HIGH (ref 4.8–10.8)
WBC # BLD: 12.8 K/UL — HIGH (ref 4.8–10.8)
WBC # FLD AUTO: 11.42 K/UL — HIGH (ref 4.8–10.8)
WBC # FLD AUTO: 12.8 K/UL — HIGH (ref 4.8–10.8)
WBC UR QL: 2 /HPF — SIGNIFICANT CHANGE UP (ref 0–5)

## 2023-09-15 PROCEDURE — 74176 CT ABD & PELVIS W/O CONTRAST: CPT | Mod: 26

## 2023-09-15 PROCEDURE — 71045 X-RAY EXAM CHEST 1 VIEW: CPT | Mod: 26

## 2023-09-15 PROCEDURE — 71045 X-RAY EXAM CHEST 1 VIEW: CPT | Mod: 26,77

## 2023-09-15 PROCEDURE — 99232 SBSQ HOSP IP/OBS MODERATE 35: CPT

## 2023-09-15 PROCEDURE — 99291 CRITICAL CARE FIRST HOUR: CPT

## 2023-09-15 RX ORDER — MAGNESIUM SULFATE 500 MG/ML
2 VIAL (ML) INJECTION ONCE
Refills: 0 | Status: COMPLETED | OUTPATIENT
Start: 2023-09-15 | End: 2023-09-15

## 2023-09-15 RX ORDER — DILTIAZEM HCL 120 MG
10 CAPSULE, EXT RELEASE 24 HR ORAL ONCE
Refills: 0 | Status: COMPLETED | OUTPATIENT
Start: 2023-09-15 | End: 2023-09-15

## 2023-09-15 RX ORDER — OLANZAPINE 15 MG/1
2.5 TABLET, FILM COATED ORAL ONCE
Refills: 0 | Status: COMPLETED | OUTPATIENT
Start: 2023-09-15 | End: 2023-09-15

## 2023-09-15 RX ORDER — SODIUM,POTASSIUM PHOSPHATES 278-250MG
1 POWDER IN PACKET (EA) ORAL EVERY 6 HOURS
Refills: 0 | Status: COMPLETED | OUTPATIENT
Start: 2023-09-15 | End: 2023-09-16

## 2023-09-15 RX ORDER — METOPROLOL TARTRATE 50 MG
25 TABLET ORAL EVERY 12 HOURS
Refills: 0 | Status: DISCONTINUED | OUTPATIENT
Start: 2023-09-15 | End: 2023-09-16

## 2023-09-15 RX ORDER — POTASSIUM CHLORIDE 20 MEQ
20 PACKET (EA) ORAL ONCE
Refills: 0 | Status: COMPLETED | OUTPATIENT
Start: 2023-09-15 | End: 2023-09-15

## 2023-09-15 RX ORDER — METOPROLOL TARTRATE 50 MG
2.5 TABLET ORAL ONCE
Refills: 0 | Status: COMPLETED | OUTPATIENT
Start: 2023-09-15 | End: 2023-09-15

## 2023-09-15 RX ORDER — IPRATROPIUM/ALBUTEROL SULFATE 18-103MCG
3 AEROSOL WITH ADAPTER (GRAM) INHALATION EVERY 6 HOURS
Refills: 0 | Status: DISCONTINUED | OUTPATIENT
Start: 2023-09-15 | End: 2023-09-16

## 2023-09-15 RX ORDER — RISPERIDONE 4 MG/1
1 TABLET ORAL
Refills: 0 | DISCHARGE

## 2023-09-15 RX ORDER — QUETIAPINE FUMARATE 200 MG/1
1 TABLET, FILM COATED ORAL
Qty: 0 | Refills: 0 | DISCHARGE

## 2023-09-15 RX ADMIN — OLANZAPINE 2.5 MILLIGRAM(S): 15 TABLET, FILM COATED ORAL at 18:35

## 2023-09-15 RX ADMIN — Medication 25 GRAM(S): at 05:46

## 2023-09-15 RX ADMIN — Medication 1 PACKET(S): at 23:09

## 2023-09-15 RX ADMIN — POLYETHYLENE GLYCOL 3350 17 GRAM(S): 17 POWDER, FOR SOLUTION ORAL at 05:14

## 2023-09-15 RX ADMIN — Medication 2.5 MILLIGRAM(S): at 15:51

## 2023-09-15 RX ADMIN — Medication 25 MILLIGRAM(S): at 18:38

## 2023-09-15 RX ADMIN — HEPARIN SODIUM 4 UNIT(S)/HR: 5000 INJECTION INTRAVENOUS; SUBCUTANEOUS at 05:15

## 2023-09-15 RX ADMIN — Medication 20 MILLIEQUIVALENT(S): at 08:14

## 2023-09-15 RX ADMIN — Medication 10 MILLIGRAM(S): at 18:50

## 2023-09-15 RX ADMIN — CHLORHEXIDINE GLUCONATE 1 APPLICATION(S): 213 SOLUTION TOPICAL at 05:14

## 2023-09-15 RX ADMIN — Medication 10 MILLIGRAM(S): at 23:57

## 2023-09-15 RX ADMIN — Medication 25 MILLIGRAM(S): at 12:50

## 2023-09-15 RX ADMIN — Medication 3 MILLILITER(S): at 20:45

## 2023-09-15 RX ADMIN — Medication 1 PACKET(S): at 18:38

## 2023-09-15 NOTE — PHYSICAL THERAPY INITIAL EVALUATION ADULT - GAIT TRAINING, PT EVAL
Pt will ambulate using RW or least restrictive AD for 50 ft with Min A by discharge to facilitate return to PLOF.

## 2023-09-15 NOTE — OCCUPATIONAL THERAPY INITIAL EVALUATION ADULT - NS ASR FOLLOW COMMAND OT EVAL
benefits from visual/tactile cues, + dysarthria, +perseveration, decreased sequencing, requires increased processing time/75% of the time/able to follow single-step instructions

## 2023-09-15 NOTE — PROGRESS NOTE ADULT - SUBJECTIVE AND OBJECTIVE BOX
Neuroendovascular Progress Note:     HPI:  The patient is an 83-year-old female with a past history of mood disorder, hallucination, hyperthyroidism, Afib, with a prior admission for syncope/ collapse at home, who presented with dysarthria, confusion, and left weakness and was noted to have a right perfusion deficit on CTP. The patient is a code NI actual, and will be taken for a planned diagnostic cerebral angiogram + mechanical thrombectomy with Dr. Mckeon and Dr. Mendez. TICI 2c achieved in 3 passes. Pt is also s/p TNK administration. The arteriotomy site was inspected today and the patient was examined by the neuroendovascular ACP and Dr. Mendez.     Admission scores:   NIHSS 32    Past Medical History:   Thyroid disease    24-Hour Events: None    Medication:  albuterol/ipratropium for Nebulization 3 milliLiter(s) Nebulizer every 6 hours  chlorhexidine 2% Cloths 1 Application(s) Topical daily  heparin  Infusion 550 Unit(s)/Hr IV Continuous <Continuous>  methimazole 5 milliGRAM(s) Oral <User Schedule>  metoprolol tartrate 25 milliGRAM(s) Oral every 12 hours  polyethylene glycol 3350 17 Gram(s) Oral every 12 hours  potassium phosphate / sodium phosphate Powder (PHOS-NaK) 1 Packet(s) Oral every 6 hours  senna 2 Tablet(s) Oral at bedtime  sodium chloride 0.9%. 1000 milliLiter(s) IV Continuous <Continuous>    No Known Allergies    Recent Vitals:  T(F): 97.7 (09-15-23 @ 16:00), Max: 99 (09-15-23 @ 00:00)  HR: 95 (09-15-23 @ 19:00) (95 - 140)  BP: 112/61 (09-15-23 @ 19:00) (110/67 - 142/96)  RR: 31 (09-15-23 @ 19:00) (20 - 42)  SpO2: 100% (09-15-23 @ 19:00) (95% - 100%)    Recent Labs:                        9.1    11.42 )-----------( 199      ( 15 Sep 2023 11:46 )             28.9     09-15    139  |  110  |  12  ----------------------------<  81  3.9   |  17  |  <0.5<L>    Ca    8.1<L>      15 Sep 2023 01:49  Phos  2.0     -15  Mg     1.9     -15    TPro  4.8<L>  /  Alb  2.7<L>  /  TBili  0.7  /  DBili  x   /  AST  17  /  ALT  <5  /  AlkPhos  63  09-15    PTT - ( 15 Sep 2023 01:49 )  PTT:28.1 sec  Urinalysis Basic - ( 15 Sep 2023 15:56 )    Color: Yellow / Appearance: Clear / S.012 / pH: x  Gluc: x / Ketone: 40 mg/dL  / Bili: Negative / Urobili: 1.0 mg/dL   Blood: x / Protein: Negative mg/dL / Nitrite: Negative   Leuk Esterase: Trace / RBC: 0 /HPF / WBC 2 /HPF   Sq Epi: x / Non Sq Epi: 1 /HPF / Bacteria: Negative /HPF    LIVER FUNCTIONS - ( 15 Sep 2023 01:49 )  Alb: 2.7 g/dL / Pro: 4.8 g/dL / ALK PHOS: 63 U/L / ALT: <5 U/L / AST: 17 U/L / GGT: x           Exam:  General: Lying in bed, appears to be in no acute distress.  Neuro: Awake, oriented to name with prompting, hypophonic speech, spontaneous EO, tracking, PERRL, moving all extremities within the plane of the bed. + Blink to threat x4 quadrants.   Extremities: LUE arteriotomy site c/d/i with no sign of bleeding/ hematoma formation, +ecchymosis surrounding arteriotomy site, b/l hands cold with no temperature or color discrepancies, LUE radial pulse obtained with doppler.     Radiology:     ACC: [id number] EXAM: CT BRAIN ORDERED BY: ROSE VILLALBA    PROCEDURE DATE: 2023    IMPRESSION:    1. The previously seen residual IV contrast within the vasculature has been resorbed.    2. The previously seen areas of contrast staining have resolved, with no evidence of acute intracranial hemorrhage.    3. 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    --- End of Report ---    HERI SPANGLER MD; Attending Radiologist  This document has been electronically signed. Sep 14 2023 8:08AM    Assessment:   The patient is an 83-year-old female with a past history of mood disorder, hallucination, hyperthyroidism, Afib, with a prior admission for syncope/ collapse at home, who presented with dysarthria, confusion, and left weakness and was noted to have a right perfusion deficit on CTP. The patient is a code NI actual, and will be taken for a planned diagnostic cerebral angiogram + mechanical thrombectomy with Dr. Mckeon and Dr. Mendez. TICI 2c achieved in 3 passes. Pt is also s/p TNK administration. The arteriotomy site was inspected today and the patient was examined by the neuroendovascular ACP and Dr. Mendez.     Suggestions:  - Continue to monitor the left upper extremity arteriotomy site for signs of bleeding/ hematoma formation and for absent/ diminished left upper extremity pulses or changes in temperature/ color.   - No additional neuroendovascular intervention is planned at this time. The patient should follow up outpatient with Dr. Mendez or Dr. Mckeon after discharge.  - [id number] Neuroendovascular if there are any acute changes in the patient's neurological status/ exam or with any additional questions or concerns on this admission.

## 2023-09-15 NOTE — SWALLOW BEDSIDE ASSESSMENT ADULT - SLP GENERAL OBSERVATIONS
Pt received in bed awake and alert on O2 NC, NGT in situ, decreased attn L, unintelligible responses,  unable to understand ot ?true words

## 2023-09-15 NOTE — OCCUPATIONAL THERAPY INITIAL EVALUATION ADULT - PATIENT PROFILE REVIEW, REHAB EVAL
Eval Attempted: 9:22am; pt chart thoroughly reviewed prior to OT evaluation/yes
Evaluation Attempted: 15:00; pt chart thoroughly reviewed prior to OT evaluation/yes
Evaluation Time: 12:10-12:40am ; pt chart thoroughly reviewed prior to OT evaluation/yes

## 2023-09-15 NOTE — PHYSICAL THERAPY INITIAL EVALUATION ADULT - LEVEL OF INDEPENDENCE: SIT/SUPINE, REHAB EVAL
able to maintain sitting position unsupported at EOB, static sitting balance F -/moderate assist (50% patients effort)

## 2023-09-15 NOTE — OCCUPATIONAL THERAPY INITIAL EVALUATION ADULT - PERTINENT HX OF CURRENT PROBLEM, REHAB EVAL
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 Received TNK @ 3607. Taken to IR for thrombectomy,

## 2023-09-15 NOTE — PHYSICAL THERAPY INITIAL EVALUATION ADULT - GENERAL OBSERVATIONS, REHAB EVAL
12:00-12:30. chart reviewed. Pt received semi-colon at B/S, confused, restless RASS +1, oriented X2, able to follow one step instructions, Pt is [place] speaking, grand-daughter was present for translation and agreeable to PT evaluation. + IV, + monitoring, + NG, + primafit, + B/L hand Mittens, VSS< NAD.

## 2023-09-15 NOTE — OCCUPATIONAL THERAPY INITIAL EVALUATION ADULT - PLANNED THERAPY INTERVENTIONS, OT EVAL
ADL retraining/IADL retraining/balance training/bed mobility training/cognitive, visual perceptual/fine motor coordination training/motor coordination training/neuromuscular re-education/parent/caregiver training.../ROM/strengthening/stretching/transfer training

## 2023-09-15 NOTE — PHYSICAL THERAPY INITIAL EVALUATION ADULT - DID THE PATIENT HAVE SURGERY?
Diagnostic cerebral angiogram + emergent mechanical thrombectomy of right SCA/BA with TICI 2C in 3 passes/yes

## 2023-09-15 NOTE — OCCUPATIONAL THERAPY INITIAL EVALUATION ADULT - LEVEL OF INDEPENDENCE: BED TO CHAIR, REHAB EVAL
Pt +tachycardia and a fib, returned to bed - tolerated sitting EOB ~10 minutes with +ability to maintain upright with moderate verbal cues

## 2023-09-15 NOTE — PROGRESS NOTE ADULT - SUBJECTIVE AND OBJECTIVE BOX
SUMMARY:HPI:  The patient is an 83-year-old female with a past history of mood disorder, hallucination, hyperthyroidism, Afib, with a prior admission for syncope/ collapse at home, who presented with dysarthria, confusion, and left weakness and was noted to have a right perfusion deficit on CTP. The patient is a code NI actual, and will be taken for a planned diagnostic cerebral angiogram + mechanical thrombectomy with Dr. Mckeon.     S/P TNK - 1743 - 9/12/23    Admission scores:   NIHSS 32  mRS- 1      OVERNIGHT EVENTS:  Low urine output given 500 x 1         REVIEW OF SYSTEMS: [ X] Unable to Assess due to neurologic exam     Neuro: [ ] Headache [ ] Back pain [ ] Numbness [ ] Weakness [ ] Ataxia [ ] Dizziness [ ] Aphasia [ ] Dysarthria [ ] Visual disturbance  Resp: [ ] Shortness of breath/dyspnea, [ ] Orthopnea [ ] Cough  CV: [ ] Chest pain [ ] Palpitation [ ] Lightheadedness [ ] Syncope  Renal: [ ] Thirst [ ] Edema  GI: [ ] Nausea [ ] Emesis [ ] Abdominal pain [ ] Constipation [ ] Diarrhea  Hem: [ ] Hematemesis [ ] bright red blood per rectum  ID: [ ] Fever [ ] Chills [ ] Dysuria  ENT: [ ] Rhinorrhea      ICU Vital Signs Last 24 Hrs  T(C): 37.2 (15 Sep 2023 04:00), Max: 38 (14 Sep 2023 16:00)  T(F): 98.9 (15 Sep 2023 04:00), Max: 100.4 (14 Sep 2023 16:00)  HR: 103 (15 Sep 2023 04:00) (93 - 115)  BP: 138/66 (15 Sep 2023 04:00) (106/62 - 150/70)  BP(mean): 95 (15 Sep 2023 04:00) (75 - 104)  RR: 22 (15 Sep 2023 04:00) (14 - 25)  SpO2: 100% (15 Sep 2023 04:00) (98% - 100%)      ABG - ( 14 Sep 2023 12:58 )  pH, Arterial: 7.42  pH, Blood: x     /  pCO2: 30    /  pO2: 129   / HCO3: 20    / Base Excess: -4.2  /  SaO2: 98.9      09-14-23 @ 07:01  -  09-15-23 @ 05:54  --------------------------------------------------------  IN: 1908.5 mL / OUT: 364 mL / NET: 1544.5 mL    09-13-23 @ 07:01  -  09-14-23 @ 07:00  --------------------------------------------------------  IN: 2065.8 mL / OUT: 1005 mL / NET: 1060.8 mL        acetaminophen     Tablet .. 650 milliGRAM(s) Oral every 6 hours PRN  chlorhexidine 2% Cloths 1 Application(s) Topical daily  famotidine    Tablet 20 milliGRAM(s) Oral daily  heparin  Infusion 550 Unit(s)/Hr (4 mL/Hr) IV Continuous <Continuous>  methimazole 5 milliGRAM(s) Oral <User Schedule>  polyethylene glycol 3350 17 Gram(s) Oral every 12 hours  potassium chloride   Powder 20 milliEquivalent(s) Oral once  senna 2 Tablet(s) Oral at bedtime  sodium chloride 0.9%. 1000 milliLiter(s) (60 mL/Hr) IV Continuous <Continuous>    09-15    139  |  110  |  12  ----------------------------<  81  AG - 12  3.9   |  17  |  <0.5<L>    Ca    8.1<L>      15 Sep 2023 01:49  Phos  2.0     09-15  Mg     1.9     09-15    TPro  4.8<L>  /  Alb  2.7<L>  /  TBili  0.7  /  DBili  x   /  AST  17  /  ALT  <5  /  AlkPhos  63  09-15        LABS:  Na: 139 (09-15 @ 01:49), 138 (09-14 @ 05:19), 141 (09-13 @ 18:28), 140 (09-13 @ 06:10), 141 (09-12 @ 22:33), 143 (09-12 @ 17:19)  K: 3.9 (09-15 @ 01:49), 4.5 (09-14 @ 05:19), 4.7 (09-13 @ 18:28), 5.4 (09-13 @ 06:10), 3.9 (09-12 @ 22:33), 3.8 (09-12 @ 17:19)  Cl: 110 (09-15 @ 01:49), 109 (09-14 @ 05:19), 111 (09-13 @ 18:28), 110 (09-13 @ 06:10), 107 (09-12 @ 22:33), 106 (09-12 @ 17:19)  CO2: 17 (09-15 @ 01:49), 17 (09-14 @ 05:19), 20 (09-13 @ 18:28), 18 (09-13 @ 06:10), 18 (09-12 @ 22:33), 22 (09-12 @ 17:19)  BUN: 12 (09-15 @ 01:49), 18 (09-14 @ 05:19), 17 (09-13 @ 18:28), 19 (09-13 @ 06:10), 22 (09-12 @ 22:33), 28 (09-12 @ 17:19)  Cr: <0.5 (09-15 @ 01:49), 0.5 (09-14 @ 05:19), 0.5 (09-13 @ 18:28), 0.6 (09-13 @ 06:10), 0.5 (09-12 @ 22:33), 0.7 (09-12 @ 17:19)  Glu: 81(09-15 @ 01:49), 105(09-14 @ 05:19), 105(09-13 @ 18:28), 124(09-13 @ 06:10), 196(09-12 @ 22:33), 156(09-12 @ 17:19)    Hgb: 8.5 (09-15 @ 01:49), 10.1 (09-14 @ 05:19), 11.7 (09-13 @ 06:10), 12.4 (09-12 @ 22:33), 14.1 (09-12 @ 17:19)  Hct: 26.5 (09-15 @ 01:49), 31.7 (09-14 @ 05:19), 36.7 (09-13 @ 06:10), 37.9 (09-12 @ 22:33), 43.2 (09-12 @ 17:19)  WBC: 12.80 (09-15 @ 01:49), 15.41 (09-14 @ 05:19), 14.99 (09-13 @ 06:10), 13.99 (09-12 @ 22:33), 10.66 (09-12 @ 17:19)  Plt: 166 (09-15 @ 01:49), 163 (09-14 @ 05:19), 214 (09-13 @ 06:10), 243 (09-12 @ 22:33), 258 (09-12 @ 17:19)    INR: 1.00 09-13-23 @ 06:10, 1.08 09-12-23 @ 22:33, 1.01 09-12-23 @ 17:19  PTT: 28.1 09-15-23 @ 01:49, 119.5 09-14-23 @ 17:49, 27.0 09-13-23 @ 06:10, 32.8 09-12-23 @ 22:33, 25.7 09-12-23 @ 17:19          STROKE CORE MEASURES:   HGBA1C- 5.3  LDL- 78   TRig- 120   TSH-.03   Free T4 - 2.1       IMAGING/DATA: [ X] Reviewed     VA Duplex Lower Ext Vein Scan, Bilat (09.13.23 @ 13:02) >  IMPRESSION:  No evidence of deep venous thrombosis in either lower extremity.    < end of copied text >       Xray Chest 1 View-PORTABLE IMMEDIATE (Xray Chest 1 View-PORTABLE IMMEDIATE .) (09.12.23 @ 22:26) >  Impression:    No radiographic evidence of acute cardiopulmonary disease.    CT Head No Cont (09.12.23 @ 20:33) >  IMPRESSION:  THE FOLLOWING REFLECTS THE AMENDED FINAL IMPRESSION:    1.  Residual IV contrast present from CTA and angiogram/thrombectomy.    2.  Parenchymal hyperdensity within bilateral thalami, left occipital and   temporal lobes, and cerebellar hemispheres; and sulcal hyperdensity   within the left occipital region. This likely predominantly reflects   contrast staining; underlying hemorrhage cannotbe excluded.    3.  Patchy appearance of the cerebellum suspicious for underlying   infarcts.    4.  Stable chronic microvascular ischemic changes and chronic left   parieto-occipital infarct.        CT Angio Neck Stroke Protocol w/ IV Cont (09.12.23 @ 17:40) >  IMPRESSION:    CT PERFUSION:  Ischemic tissue volume estimate of 63 cc in the area of the right   occipital/cerebellar areas, with core infarct estimate of 11 cc in the   area of the right occipital/cerebellar areas.    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    Severe stenosis of the right superior cerebellar artery.            EXAMINATION:  PHYSICAL EXAM:    Constitutional: No Acute Distress     Neurological: Awake, alert oriented to person, place and time, Following Commands, PERRL, EOMI, No Gaze Preference, Face Symmetrical, Speech Fluent, No dysmetria, No ataxia, No nystagmus     Motor exam:          Upper extremity                         Delt     Bicep     Tricep    HG                                                 R         5/5        5/5        5/5       5/5                                               L          5/5        5/5        5/5       5/5          Lower extremity                        HF         KF        KE       DF         PF                                                  R        5/5        5/5        5/5       5/5         5/5                                               L         5/5        5/5       5/5       5/5          5/5                                                 Sensation: [ ] intact to light touch  [ ] decreased:     Reflexes: Deep Tendon Reflexes Intact     Pulmonary: Clear to Auscultation, No rales, No rhonchi, No wheezes     Cardiovascular: S1, S2, Regular rate and rhythm     Gastrointestinal: Soft, Non-tender, Non-distended     Extremities: No calf tenderness     Incision:    SUMMARY:HPI:  The patient is an 83-year-old female with a past history of mood disorder, hallucination, hyperthyroidism, Afib, with a prior admission for syncope/ collapse at home, who presented with dysarthria, confusion, and left weakness and was noted to have a right perfusion deficit on CTP. The patient is a code NI actual, and will be taken for a planned diagnostic cerebral angiogram + mechanical thrombectomy with Dr. Mckeon.     S/P TNK - 1743 - 9/12/23    Admission scores:   NIHSS 32  mRS- 1      OVERNIGHT EVENTS:  Low urine output given 500 x 1         REVIEW OF SYSTEMS: [ X] Unable to Assess due to neurologic exam     Neuro: [ ] Headache [ ] Back pain [ ] Numbness [ ] Weakness [ ] Ataxia [ ] Dizziness [ ] Aphasia [ ] Dysarthria [ ] Visual disturbance  Resp: [ ] Shortness of breath/dyspnea, [ ] Orthopnea [ ] Cough  CV: [ ] Chest pain [ ] Palpitation [ ] Lightheadedness [ ] Syncope  Renal: [ ] Thirst [ ] Edema  GI: [ ] Nausea [ ] Emesis [ ] Abdominal pain [ ] Constipation [ ] Diarrhea  Hem: [ ] Hematemesis [ ] bright red blood per rectum  ID: [ ] Fever [ ] Chills [ ] Dysuria  ENT: [ ] Rhinorrhea      ICU Vital Signs Last 24 Hrs  T(C): 37.2 (15 Sep 2023 04:00), Max: 38 (14 Sep 2023 16:00)  T(F): 98.9 (15 Sep 2023 04:00), Max: 100.4 (14 Sep 2023 16:00)  HR: 103 (15 Sep 2023 04:00) (93 - 115)  BP: 138/66 (15 Sep 2023 04:00) (106/62 - 150/70)  BP(mean): 95 (15 Sep 2023 04:00) (75 - 104)  RR: 22 (15 Sep 2023 04:00) (14 - 25)  SpO2: 100% (15 Sep 2023 04:00) (98% - 100%)      ABG - ( 14 Sep 2023 12:58 )  pH, Arterial: 7.42  pH, Blood: x     /  pCO2: 30    /  pO2: 129   / HCO3: 20    / Base Excess: -4.2  /  SaO2: 98.9      09-14-23 @ 07:01  -  09-15-23 @ 05:54  --------------------------------------------------------  IN: 1908.5 mL / OUT: 364 mL / NET: 1544.5 mL    09-13-23 @ 07:01  -  09-14-23 @ 07:00  --------------------------------------------------------  IN: 2065.8 mL / OUT: 1005 mL / NET: 1060.8 mL        acetaminophen     Tablet .. 650 milliGRAM(s) Oral every 6 hours PRN  chlorhexidine 2% Cloths 1 Application(s) Topical daily  famotidine    Tablet 20 milliGRAM(s) Oral daily  heparin  Infusion 550 Unit(s)/Hr (4 mL/Hr) IV Continuous <Continuous>  methimazole 5 milliGRAM(s) Oral <User Schedule>  polyethylene glycol 3350 17 Gram(s) Oral every 12 hours  potassium chloride   Powder 20 milliEquivalent(s) Oral once  senna 2 Tablet(s) Oral at bedtime  sodium chloride 0.9%. 1000 milliLiter(s) (60 mL/Hr) IV Continuous <Continuous>    09-15    139  |  110  |  12  ----------------------------<  81  AG - 12  3.9   |  17  |  <0.5<L>    Ca    8.1<L>      15 Sep 2023 01:49  Phos  2.0     09-15  Mg     1.9     09-15    TPro  4.8<L>  /  Alb  2.7<L>  /  TBili  0.7  /  DBili  x   /  AST  17  /  ALT  <5  /  AlkPhos  63  09-15        LABS:  Na: 139 (09-15 @ 01:49), 138 (09-14 @ 05:19), 141 (09-13 @ 18:28), 140 (09-13 @ 06:10), 141 (09-12 @ 22:33), 143 (09-12 @ 17:19)  K: 3.9 (09-15 @ 01:49), 4.5 (09-14 @ 05:19), 4.7 (09-13 @ 18:28), 5.4 (09-13 @ 06:10), 3.9 (09-12 @ 22:33), 3.8 (09-12 @ 17:19)  Cl: 110 (09-15 @ 01:49), 109 (09-14 @ 05:19), 111 (09-13 @ 18:28), 110 (09-13 @ 06:10), 107 (09-12 @ 22:33), 106 (09-12 @ 17:19)  CO2: 17 (09-15 @ 01:49), 17 (09-14 @ 05:19), 20 (09-13 @ 18:28), 18 (09-13 @ 06:10), 18 (09-12 @ 22:33), 22 (09-12 @ 17:19)  BUN: 12 (09-15 @ 01:49), 18 (09-14 @ 05:19), 17 (09-13 @ 18:28), 19 (09-13 @ 06:10), 22 (09-12 @ 22:33), 28 (09-12 @ 17:19)  Cr: <0.5 (09-15 @ 01:49), 0.5 (09-14 @ 05:19), 0.5 (09-13 @ 18:28), 0.6 (09-13 @ 06:10), 0.5 (09-12 @ 22:33), 0.7 (09-12 @ 17:19)  Glu: 81(09-15 @ 01:49), 105(09-14 @ 05:19), 105(09-13 @ 18:28), 124(09-13 @ 06:10), 196(09-12 @ 22:33), 156(09-12 @ 17:19)    Hgb: 8.5 (09-15 @ 01:49), 10.1 (09-14 @ 05:19), 11.7 (09-13 @ 06:10), 12.4 (09-12 @ 22:33), 14.1 (09-12 @ 17:19)  Hct: 26.5 (09-15 @ 01:49), 31.7 (09-14 @ 05:19), 36.7 (09-13 @ 06:10), 37.9 (09-12 @ 22:33), 43.2 (09-12 @ 17:19)  WBC: 12.80 (09-15 @ 01:49), 15.41 (09-14 @ 05:19), 14.99 (09-13 @ 06:10), 13.99 (09-12 @ 22:33), 10.66 (09-12 @ 17:19)  Plt: 166 (09-15 @ 01:49), 163 (09-14 @ 05:19), 214 (09-13 @ 06:10), 243 (09-12 @ 22:33), 258 (09-12 @ 17:19)    INR: 1.00 09-13-23 @ 06:10, 1.08 09-12-23 @ 22:33, 1.01 09-12-23 @ 17:19  PTT: 28.1 09-15-23 @ 01:49, 119.5 09-14-23 @ 17:49, 27.0 09-13-23 @ 06:10, 32.8 09-12-23 @ 22:33, 25.7 09-12-23 @ 17:19          STROKE CORE MEASURES:   HGBA1C- 5.3  LDL- 78   TRig- 120   TSH-.03   Free T4 - 2.1       IMAGING/DATA: [ X] Reviewed     VA Duplex Lower Ext Vein Scan, Bilat (09.13.23 @ 13:02) >  IMPRESSION:  No evidence of deep venous thrombosis in either lower extremity.    < end of copied text >       Xray Chest 1 View-PORTABLE IMMEDIATE (Xray Chest 1 View-PORTABLE IMMEDIATE .) (09.12.23 @ 22:26) >  Impression:    No radiographic evidence of acute cardiopulmonary disease.    CT Head No Cont (09.12.23 @ 20:33) >  IMPRESSION:  THE FOLLOWING REFLECTS THE AMENDED FINAL IMPRESSION:    1.  Residual IV contrast present from CTA and angiogram/thrombectomy.    2.  Parenchymal hyperdensity within bilateral thalami, left occipital and   temporal lobes, and cerebellar hemispheres; and sulcal hyperdensity   within the left occipital region. This likely predominantly reflects   contrast staining; underlying hemorrhage cannotbe excluded.    3.  Patchy appearance of the cerebellum suspicious for underlying   infarcts.    4.  Stable chronic microvascular ischemic changes and chronic left   parieto-occipital infarct.        CT Angio Neck Stroke Protocol w/ IV Cont (09.12.23 @ 17:40) >  IMPRESSION:    CT PERFUSION:  Ischemic tissue volume estimate of 63 cc in the area of the right   occipital/cerebellar areas, with core infarct estimate of 11 cc in the   area of the right occipital/cerebellar areas.    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    Severe stenosis of the right superior cerebellar artery.            EXAMINATION:  PHYSICAL EXAM:    Constitutional: No Acute Distress     Neurological: Awake, hypophonic, tracks B/L pupils 2mm and reactive B/L , oriented to name     Motor exam:          Upper extremity                         Delt     Bicep     Tricep    HG                                                 R         5/5        5/5        5/5       5/5                                               L          5/5        5/5        5/5       5/5          Lower extremity                        HF         KF        KE       DF         PF                                                  R        5/5        5/5        5/5       5/5         5/5                                               L         5/5        5/5       5/5       5/5          5/5                                                 Sensation: [ ] intact to light touch  [ ] decreased:       Pulmonary: Clear to Auscultation, No rales, No rhonchi, No wheezes     Cardiovascular: S1, S2, Regular rate and rhythm     Gastrointestinal: Soft, Non-tender, Non-distended     Extremities: No calf tenderness , bruising

## 2023-09-15 NOTE — OCCUPATIONAL THERAPY INITIAL EVALUATION ADULT - GENERAL OBSERVATIONS, REHAB EVAL
Pt received semi colon in bed in NAD, +tele, +BP cuff, +pulse oxi, +primafit, +IV drip, graddaughter present to provide translation thorughout session, left semi colon inbed at request of JAVIER Florian 2* to pt tachycardic during session.

## 2023-09-15 NOTE — PROGRESS NOTE ADULT - ASSESSMENT
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 Received TNK @ 1743. Taken to IR for thrombectomy,  POD #2  s/p thrombectomy of right SCA/BA with TICI 2C x3 passes.     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 No Bolus   F/U CT in 24 hrs or less if Change in Exam   Stroke Core Measures- as above   MRI of brain  Analgesia: Precedex for RASS goal 0 to -1   Activity:  [X] Activities as tolerated [x] PT [x] OT     PULM:  S/P Resp failure secondary to neuro injury   Extubated 9/14/23  Wean FIO2 to maintain Sats > 94%  CXR - Clear   HOB 30 degrees    CV: Afib/ flutter - S/P RVR  S/P Hypotension   SBP goal: 110-140 - on amadeo; wean as tolerates   ProBNP   Troponin - < .01 x2 neg   Echo -   TTE Echo Complete w/o Contrast w/ Doppler (09.13.23 @ 08:30) >  Summary:   1. LV Ejection Fraction by Ibarra's Method with a biplane EF of 63 %.   2. Normal global left ventricular systolic function.   3. Moderate to severe mitral valve regurgitation.   4. Mild-moderate tricuspid regurgitation.   5. Estimated pulmonary artery systolic pressure is 35.9 mmHg assuming a   right atrial pressure of 8 mmHg, which is consistent with borderline   pulmonary hypertension.   6. Normal left atrial size.   7. LA volume Index is 27.0 ml/m² ml/m2.   8. Normal right atrial size.   9. Compared to 6/13/2020 echo there is no significant change.  EKG - showing Afib with RVR   Cardiac enzymes - pending    RENAL: Urinary retention / NAG met acidosis  Check urine - electrolytes - Na, K, Cl and PH     D/C merida this PM   Replete electrolytes as needed   Na- 135- 145     GI:  Diet: Speech and swallow eval   GI prophylaxis[x] famotidine   Bowel regimen [] colace [x] senna [x] other: Miralax     ENDO:  Hyperthyroid  Methimazole - 5mg q 12- monitor CBC   Goal euglycemia (-180)  Lipid profile - as above   HgBA1C- 5.3     HEME/ONC:  VTE prophylaxis: [X] SCDs [X] chemoprophylaxis- on AC   LE duplex - 9/13- neg for DVT    ID: S/P Fever- likely central   Maintain normothermia   procalcitonin- 0.15  Blood culture- neg   MRSA - neg    UA- neg , CXR , - neg   LFT - NL   Hold ABX for Now       CODE STATUS:  [X] Full Code    DISPOSITION:  [X] Stroke Unit     83-year-old female with a past history of mood disorder, hallucination, hyperthyroidism, Afib no a/c  who presented with dysarthria, confusion, and left weakness, NIH 32, 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 Received TNK @ 1743. Taken to IR for thrombectomy,  POD #2  s/p thrombectomy of right SCA/BA with TICI 2C x3 passes.     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/ Hx of afib - noncompliant on meds in past   Gen neuro q 2   Neuroendovascular following  F/U CT in 24 hrs or less if Change in Exam   Stroke Core Measures- as above   MRI of brain  Activity:  [X] Activities as tolerated [x] PT [x] OT     PULM:  S/P Resp failure secondary to neuro injury   Extubated 9/14/23  Wean FIO2 to maintain Sats > 94%  CXR - Clear   HOB 30 degrees    CV: Afib/ flutter - S/P RVR  S/P Hypotension   SBP goal: 110-140 - on amadeo; wean as tolerates   ProBNP - 984  Lopressor 25mg q 12   Troponin - < .01 x2 neg   Echo -   TTE Echo Complete w/o Contrast w/ Doppler (09.13.23 @ 08:30) >  Summary:   1. LV Ejection Fraction by Ibarra's Method with a biplane EF of 63 %.   2. Normal global left ventricular systolic function.   3. Moderate to severe mitral valve regurgitation.   4. Mild-moderate tricuspid regurgitation.   5. Estimated pulmonary artery systolic pressure is 35.9 mmHg assuming a   right atrial pressure of 8 mmHg, which is consistent with borderline   pulmonary hypertension.   6. Normal left atrial size.   7. LA volume Index is 27.0 ml/m² ml/m2.   8. Normal right atrial size.   9. Compared to 6/13/2020 echo there is no significant change.  EKG - showing Afib with RVR     RENAL: Urinary retention / NAG met acidosis  Monitor UO   Check urine - electrolytes - Na, K, Cl and PH     D/C merida this PM   Replete electrolytes as needed   Na- 135- 145     GI:  Diet: Speech and swallow eval   Start enteral feeding   GI prophylaxis[- not indicated   Bowel regimen [] colace [x] senna [x] other: Miralax     ENDO:  Hyperthyroid  Methimazole - 5 mg q 12- monitor CBC   Goal euglycemia (-180)  Lipid profile - as above   HgBA1C- 5.3     HEME/ONC: Anemic -   Repeat CBC   Heparin D/C'd 0900 this am   VTE prophylaxis: [X] SCDs [X] chemoprophylaxis- Held AC   LE duplex - 9/13- neg for DVT    ID: S/P Fever- likely central   Maintain normothermia   procalcitonin- 0.15  Blood culture- neg   MRSA - neg    UA- neg , CXR , - neg   LFT - NL   Hold ABX for Now       CODE STATUS:  [X] Full Code    DISPOSITION:  [X] Stroke Unit     83-year-old female with a past history of mood disorder, hallucination, hyperthyroidism, Afib no a/c  who presented with dysarthria, confusion, and left weakness, NIH 32, 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 Received TNK @ 1743. Taken to IR for thrombectomy,  POD #2  s/p thrombectomy of right SCA/BA with TICI 2C x3 passes.     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/ Hx of afib - noncompliant on meds in past   Gen neuro q 2   Neuroendovascular following  F/U CT in 24 hrs or less if Change in Exam   Stroke Core Measures- as above   MRI of brain  Activity:  [X] Activities as tolerated [x] PT [x] OT     PULM:  S/P Resp failure secondary to neuro injury   Extubated 9/14/23  Wean FIO2 to maintain Sats > 94%  CXR - Clear   HOB 30 degrees    CV: Afib/ flutter - S/P RVR  S/P Hypotension   SBP goal: 110-140 - on amadeo; wean as tolerates   ProBNP - 984  Lopressor 25mg q 12   Troponin - < .01 x2 neg   Echo -   TTE Echo Complete w/o Contrast w/ Doppler (09.13.23 @ 08:30) >  Summary:   1. LV Ejection Fraction by Ibarra's Method with a biplane EF of 63 %.   2. Normal global left ventricular systolic function.   3. Moderate to severe mitral valve regurgitation.   4. Mild-moderate tricuspid regurgitation.   5. Estimated pulmonary artery systolic pressure is 35.9 mmHg assuming a   right atrial pressure of 8 mmHg, which is consistent with borderline   pulmonary hypertension.   6. Normal left atrial size.   7. LA volume Index is 27.0 ml/m² ml/m2.   8. Normal right atrial size.   9. Compared to 6/13/2020 echo there is no significant change.  EKG - showing Afib with RVR     RENAL: Urinary retention / NAG met acidosis  Monitor UO   Check urine - electrolytes - Na, K, Cl and PH     D/C merida this PM   Replete electrolytes as needed   Na- 135- 145     GI:  Diet: Speech and swallow eval   Start enteral feeding   GI prophylaxis[- not indicated   Bowel regimen [] colace [x] senna [x] other: Miralax     ENDO:  Hyperthyroid  Methimazole - 5 mg q 12- monitor CBC   Goal euglycemia (-180)  Lipid profile - as above   HgBA1C- 5.3     HEME/ONC: Anemic -   Repeat CBC   Heparin D/C'd 0900 this am - the 28sec PTT - while on heparin .  VTE prophylaxis: [X] SCDs [X] chemoprophylaxis- Held AC   LE duplex - 9/13- neg for DVT    ID: S/P Fever- likely central   Maintain normothermia   procalcitonin- 0.15  Blood culture- neg   MRSA - neg    UA- neg , CXR , - neg   LFT - NL   Hold ABX for Now       CODE STATUS:  [X] Full Code    DISPOSITION:  [X] Stroke Unit

## 2023-09-15 NOTE — PHYSICAL THERAPY INITIAL EVALUATION ADULT - PERTINENT HX OF CURRENT PROBLEM, REHAB EVAL
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 Received TNK @ 5070. Taken to IR for thrombectomy,

## 2023-09-15 NOTE — OCCUPATIONAL THERAPY INITIAL EVALUATION ADULT - GROSSLY INTACT, SENSORY
pt not following commands for formal assessment appears WFL during functional tasks will continue to assess

## 2023-09-15 NOTE — PHYSICAL THERAPY INITIAL EVALUATION ADULT - ADDITIONAL COMMENTS
Pt resides with family in 1st floor of a private house using 1 step to enter, no other stairs to negotiate. Pt used to be independent with BADLs, able to ambulate using Rollator indoor/outdoors.

## 2023-09-15 NOTE — PROGRESS NOTE ADULT - ASSESSMENT
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 Received TNK @ 1743. Taken to IR for thrombectomy,  POD #2  s/p thrombectomy of right SCA/BA with TICI 2C x3 passes.     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 No Bolus   F/U CT in 24 hrs or less if Change in Exam   Stroke Core Measures- as above   MRI of brain  Analgesia: Precedex for RASS goal 0 to -1   Activity:  [X] Activities as tolerated [x] PT [x] OT     PULM:  S/P Resp failure secondary to neuro injury   Lung protective measures   SAT/SBT as tolerated  Wean FIO2 to maintain Sats > 94%  Trial on T piece - check ABG - 1-2 hr  CXR - Clear   HOB 30 degrees    CV: Afib/ flutter - S/P RVR  S/P Hypotension   SBP goal: 110-140 - on amadeo; wean as tolerates   ProBNP   Troponin - < .01 x2 neg   Echo -   TTE Echo Complete w/o Contrast w/ Doppler (09.13.23 @ 08:30) >  Summary:   1. LV Ejection Fraction by Ibarra's Method with a biplane EF of 63 %.   2. Normal global left ventricular systolic function.   3. Moderate to severe mitral valve regurgitation.   4. Mild-moderate tricuspid regurgitation.   5. Estimated pulmonary artery systolic pressure is 35.9 mmHg assuming a   right atrial pressure of 8 mmHg, which is consistent with borderline   pulmonary hypertension.   6. Normal left atrial size.   7. LA volume Index is 27.0 ml/m² ml/m2.   8. Normal right atrial size.   9. Compared to 6/13/2020 echo there is no significant change.  EKG - showing Afib with RVR   Cardiac enzymes - pending    RENAL: Urinary retention / Hyperkalemia NAG met acidosis  Check urine - electrolytes - Na, K, Cl and PH    Chem -7 with lactate - 4pm   D/C merida this PM   Replete electrolytes as needed   Na- 135- 145     GI:  Diet: NPO  for possible extubation   GI prophylaxis[x] famotidine   Bowel regimen [] colace [x] senna [x] other: Miralax     ENDO:  Hyperthyroid  Methimazole - 5mg q 12- monitor CBC   Goal euglycemia (-180)  Lipid profile - as above   HgBA1C- 5.3     HEME/ONC:  VTE prophylaxis: [X] SCDs [X] chemoprophylaxis- on AC   LE duplex - pending    ID:  Maintain normothermia   Cultures 9/12/23  procalcitonin- 0.15  Blood culture- neg   MRSA - neg    UA- neg , CXR , - neg   LFT - NL   Hold ABX for Now       CODE STATUS:  [X] Full Code [] DNR [] DNI [] Palliative/Comfort Care    DISPOSITION:  [X] ICU

## 2023-09-15 NOTE — OCCUPATIONAL THERAPY INITIAL EVALUATION ADULT - NSOTDISCHREC_GEN_A_CORE
Pt requires assistance with ADLs and functional transfers. Will benefit from rehab facility upon d/c

## 2023-09-15 NOTE — PROGRESS NOTE ADULT - SUBJECTIVE AND OBJECTIVE BOX
SUMMARY:HPI:  The patient is an 83-year-old female with a past history of mood disorder, hallucination, hyperthyroidism, Afib, with a prior admission for syncope/ collapse at home, who presented with dysarthria, confusion, and left weakness and was noted to have a right perfusion deficit on CTP. The patient is a code NI actual, and will be taken for a planned diagnostic cerebral angiogram + mechanical thrombectomy with Dr. Mckeon.     S/P TNK - 1743 - 9/12/23    Admission scores:   NIHSS 32  mRS- 1      OVERNIGHT EVENTS:  Low urine output given 500 x 1         REVIEW OF SYSTEMS: [ X] Unable to Assess due to neurologic exam     Neuro: [ ] Headache [ ] Back pain [ ] Numbness [ ] Weakness [ ] Ataxia [ ] Dizziness [ ] Aphasia [ ] Dysarthria [ ] Visual disturbance  Resp: [ ] Shortness of breath/dyspnea, [ ] Orthopnea [ ] Cough  CV: [ ] Chest pain [ ] Palpitation [ ] Lightheadedness [ ] Syncope  Renal: [ ] Thirst [ ] Edema  GI: [ ] Nausea [ ] Emesis [ ] Abdominal pain [ ] Constipation [ ] Diarrhea  Hem: [ ] Hematemesis [ ] bright red blood per rectum  ID: [ ] Fever [ ] Chills [ ] Dysuria  ENT: [ ] Rhinorrhea    ICU Vital Signs Last 24 Hrs  T(C): 37.9 (14 Sep 2023 04:00), Max: 38.4 (13 Sep 2023 14:15)  T(F): 100.2 (14 Sep 2023 04:00), Max: 101.1 (13 Sep 2023 14:15)  HR: 109 (14 Sep 2023 07:00) (91 - 127)  BP: 144/76 (14 Sep 2023 07:00) (103/57 - 160/82)  BP(mean): 103 (14 Sep 2023 07:00) (75 - 113)  RR: 16 (14 Sep 2023 07:00) (14 - 27)  SpO2: 100% (14 Sep 2023 07:00) (95% - 100%)      O2 Concentration (%): 30    A/C - TV- 350/ 14/40/8   ABG - ( 14 Sep 2023 03:10 )  pH, Arterial: 7.43  pH, Blood: x     /  pCO2: 28    /  pO2: 161   / HCO3: 19    / Base Excess: -4.5  /  SaO2: 99.3  - Metabolic acidosis with Resp alkalosis   CPAP - 0900 on 9/14/23      13 Sep 2023 07:01  -  14 Sep 2023 07:00  --------------------------------------------------------  IN:    Peptamen A.F.: 220 mL    Phenylephrine: 105.8 mL    sodium chloride 0.9%: 1000 mL    sodium chloride 0.9%: 240 mL    Sodium Chloride 0.9% Bolus: 500 mL  Total IN: 2065.8 mL    OUT:    Indwelling Catheter - Urethral (mL): 1005 mL  Total OUT: 1005 mL    Total NET: 1060.8 mL        MEDICATIONS  (STANDING):  chlorhexidine 0.12% Liquid 15 milliLiter(s) Oral Mucosa every 12 hours  chlorhexidine 2% Cloths 1 Application(s) Topical daily  chlorhexidine 2% Cloths 1 Application(s) Topical daily  dexMEDEtomidine Infusion 0.2 MICROgram(s)/kG/Hr (2.33 mL/Hr) IV Continuous <Continuous>  enoxaparin Injectable 30 milliGRAM(s) SubCutaneous every 24 hours  famotidine    Tablet 20 milliGRAM(s) Oral daily  phenylephrine    Infusion 0.2 MICROgram(s)/kG/Min (3.5 mL/Hr) IV Continuous <Continuous>  polyethylene glycol 3350 17 Gram(s) Oral every 12 hours  senna 2 Tablet(s) Oral at bedtime  sodium chloride 0.9%. 1000 milliLiter(s) (60 mL/Hr) IV Continuous <Continuous>    MEDICATIONS  (PRN):  acetaminophen     Tablet .. 650 milliGRAM(s) Oral every 6 hours PRN Temp greater or equal to 38C (100.4F), Mild Pain (1 - 3)        09-14    138  |  109  |  18  ----------------------------<  105<H>  4.5   |  17  |  0.5<L>    Ca    8.3<L>      14 Sep 2023 05:19  Phos  2.9     09-14  Mg     2.2     09-14    TPro  5.4<L>  /  Alb  2.8<L>  /  TBili  0.7  /  DBili  x   /  AST  17  /  ALT  <5  /  AlkPhos  68  09-14 09-13    140  |  110  |  19  ----------------------------<  124<H> AG- 12  5.4<H>   |  18  |  0.6<L>    Ca    8.0<L>      13 Sep 2023 06:10  Phos  3.2     09-13  Mg     2.4     09-13    TPro  5.9<L>  /  Alb  3.2<L>  /  TBili  0.6  /  DBili  x   /  AST  17  /  ALT  6   /  AlkPhos  77  09-13                          10.1   15.41 )-----------( 163      ( 14 Sep 2023 05:19 )             31.7                             11.7   14.99 )-----------( 214      ( 13 Sep 2023 06:10 )             36.7         CAPILLARY BLOOD GLUCOSE  CAPILLARY BLOOD GLUCOSE      POCT Blood Glucose.: 110 mg/dL (13 Sep 2023 08:47)        STROKE CORE MEASURES:   HGBA1C- 5.3  LDL- 78   TRig- 120   TSH-.03   Free T4 - 2.1       IMAGING/DATA: [ X] Reviewed     Xray Chest 1 View-PORTABLE IMMEDIATE (Xray Chest 1 View-PORTABLE IMMEDIATE .) (09.12.23 @ 22:26) >  Impression:    No radiographic evidence of acute cardiopulmonary disease.    CT Head No Cont (09.12.23 @ 20:33) >  IMPRESSION:  THE FOLLOWING REFLECTS THE AMENDED FINAL IMPRESSION:    1.  Residual IV contrast present from CTA and angiogram/thrombectomy.    2.  Parenchymal hyperdensity within bilateral thalami, left occipital and   temporal lobes, and cerebellar hemispheres; and sulcal hyperdensity   within the left occipital region. This likely predominantly reflects   contrast staining; underlying hemorrhage cannotbe excluded.    3.  Patchy appearance of the cerebellum suspicious for underlying   infarcts.    4.  Stable chronic microvascular ischemic changes and chronic left   parieto-occipital infarct.        CT Angio Neck Stroke Protocol w/ IV Cont (09.12.23 @ 17:40) >  IMPRESSION:    CT PERFUSION:  Ischemic tissue volume estimate of 63 cc in the area of the right   occipital/cerebellar areas, with core infarct estimate of 11 cc in the   area of the right occipital/cerebellar areas.    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    Severe stenosis of the right superior cerebellar artery.            EXAMINATION:  PHYSICAL EXAM:    Constitutional: No Acute Distress     Neurological: Awake, alert oriented to person, place and time, Following Commands, PERRL, EOMI, No Gaze Preference, Face Symmetrical, Speech Fluent, No dysmetria, No ataxia, No nystagmus     Motor exam:          Upper extremity                         Delt     Bicep     Tricep    HG                                                 R         5/5        5/5        5/5       5/5                                               L          5/5        5/5        5/5       5/5          Lower extremity                        HF         KF        KE       DF         PF                                                  R        5/5        5/5        5/5       5/5         5/5                                               L         5/5        5/5       5/5       5/5          5/5                                                 Sensation: [ ] intact to light touch  [ ] decreased:     Reflexes: Deep Tendon Reflexes Intact     Pulmonary: Clear to Auscultation, No rales, No rhonchi, No wheezes     Cardiovascular: S1, S2, Regular rate and rhythm     Gastrointestinal: Soft, Non-tender, Non-distended     Extremities: No calf tenderness     Incision:    SUMMARY:HPI:  The patient is an 83-year-old female with a past history of mood disorder, hallucination, hyperthyroidism, Afib, with a prior admission for syncope/ collapse at home, who presented with dysarthria, confusion, and left weakness and was noted to have a right perfusion deficit on CTP. The patient is a code NI actual, and will be taken for a planned diagnostic cerebral angiogram + mechanical thrombectomy with Dr. Mckeon.     S/P TNK - 1743 - 9/12/23    Admission scores:   NIHSS 32  mRS- 1      OVERNIGHT EVENTS:  Low urine output given 500 x 1         REVIEW OF SYSTEMS: [ X] Unable to Assess due to neurologic exam     Neuro: [ ] Headache [ ] Back pain [ ] Numbness [ ] Weakness [ ] Ataxia [ ] Dizziness [ ] Aphasia [ ] Dysarthria [ ] Visual disturbance  Resp: [ ] Shortness of breath/dyspnea, [ ] Orthopnea [ ] Cough  CV: [ ] Chest pain [ ] Palpitation [ ] Lightheadedness [ ] Syncope  Renal: [ ] Thirst [ ] Edema  GI: [ ] Nausea [ ] Emesis [ ] Abdominal pain [ ] Constipation [ ] Diarrhea  Hem: [ ] Hematemesis [ ] bright red blood per rectum  ID: [ ] Fever [ ] Chills [ ] Dysuria  ENT: [ ] Rhinorrhea      ICU Vital Signs Last 24 Hrs  T(C): 37.2 (15 Sep 2023 04:00), Max: 38 (14 Sep 2023 16:00)  T(F): 98.9 (15 Sep 2023 04:00), Max: 100.4 (14 Sep 2023 16:00)  HR: 103 (15 Sep 2023 04:00) (93 - 115)  BP: 138/66 (15 Sep 2023 04:00) (106/62 - 150/70)  BP(mean): 95 (15 Sep 2023 04:00) (75 - 104)  ABP: --  ABP(mean): --  RR: 22 (15 Sep 2023 04:00) (14 - 25)  SpO2: 100% (15 Sep 2023 04:00) (98% - 100%)      09-13-23 @ 07:01  -  09-14-23 @ 07:00  --------------------------------------------------------  IN: 2065.8 mL / OUT: 1005 mL / NET: 1060.8 mL    09-14-23 @ 07:01  -  09-15-23 @ 05:54  --------------------------------------------------------  IN: 1908.5 mL / OUT: 364 mL / NET: 1544.5 mL        Mode: CPAP with PS, FiO2: 30, PEEP: 8, PS: 5, MAP: 10, PIP: 22    acetaminophen     Tablet .. 650 milliGRAM(s) Oral every 6 hours PRN  chlorhexidine 2% Cloths 1 Application(s) Topical daily  famotidine    Tablet 20 milliGRAM(s) Oral daily  heparin  Infusion 550 Unit(s)/Hr (4 mL/Hr) IV Continuous <Continuous>  methimazole 5 milliGRAM(s) Oral <User Schedule>  polyethylene glycol 3350 17 Gram(s) Oral every 12 hours  potassium chloride   Powder 20 milliEquivalent(s) Oral once  senna 2 Tablet(s) Oral at bedtime  sodium chloride 0.9%. 1000 milliLiter(s) (60 mL/Hr) IV Continuous <Continuous>      LABS:  Na: 139 (09-15 @ 01:49), 138 (09-14 @ 05:19), 141 (09-13 @ 18:28), 140 (09-13 @ 06:10), 141 (09-12 @ 22:33), 143 (09-12 @ 17:19)  K: 3.9 (09-15 @ 01:49), 4.5 (09-14 @ 05:19), 4.7 (09-13 @ 18:28), 5.4 (09-13 @ 06:10), 3.9 (09-12 @ 22:33), 3.8 (09-12 @ 17:19)  Cl: 110 (09-15 @ 01:49), 109 (09-14 @ 05:19), 111 (09-13 @ 18:28), 110 (09-13 @ 06:10), 107 (09-12 @ 22:33), 106 (09-12 @ 17:19)  CO2: 17 (09-15 @ 01:49), 17 (09-14 @ 05:19), 20 (09-13 @ 18:28), 18 (09-13 @ 06:10), 18 (09-12 @ 22:33), 22 (09-12 @ 17:19)  BUN: 12 (09-15 @ 01:49), 18 (09-14 @ 05:19), 17 (09-13 @ 18:28), 19 (09-13 @ 06:10), 22 (09-12 @ 22:33), 28 (09-12 @ 17:19)  Cr: <0.5 (09-15 @ 01:49), 0.5 (09-14 @ 05:19), 0.5 (09-13 @ 18:28), 0.6 (09-13 @ 06:10), 0.5 (09-12 @ 22:33), 0.7 (09-12 @ 17:19)  Glu: 81(09-15 @ 01:49), 105(09-14 @ 05:19), 105(09-13 @ 18:28), 124(09-13 @ 06:10), 196(09-12 @ 22:33), 156(09-12 @ 17:19)    Hgb: 8.5 (09-15 @ 01:49), 10.1 (09-14 @ 05:19), 11.7 (09-13 @ 06:10), 12.4 (09-12 @ 22:33), 14.1 (09-12 @ 17:19)  Hct: 26.5 (09-15 @ 01:49), 31.7 (09-14 @ 05:19), 36.7 (09-13 @ 06:10), 37.9 (09-12 @ 22:33), 43.2 (09-12 @ 17:19)  WBC: 12.80 (09-15 @ 01:49), 15.41 (09-14 @ 05:19), 14.99 (09-13 @ 06:10), 13.99 (09-12 @ 22:33), 10.66 (09-12 @ 17:19)  Plt: 166 (09-15 @ 01:49), 163 (09-14 @ 05:19), 214 (09-13 @ 06:10), 243 (09-12 @ 22:33), 258 (09-12 @ 17:19)    INR: 1.00 09-13-23 @ 06:10, 1.08 09-12-23 @ 22:33, 1.01 09-12-23 @ 17:19  PTT: 28.1 09-15-23 @ 01:49, 119.5 09-14-23 @ 17:49, 27.0 09-13-23 @ 06:10, 32.8 09-12-23 @ 22:33, 25.7 09-12-23 @ 17:19          LIVER FUNCTIONS - ( 15 Sep 2023 01:49 )  Alb: 2.7 g/dL / Pro: 4.8 g/dL / ALK PHOS: 63 U/L / ALT: <5 U/L / AST: 17 U/L / GGT: x           ABG - ( 14 Sep 2023 12:58 )  pH, Arterial: 7.42  pH, Blood: x     /  pCO2: 30    /  pO2: 129   / HCO3: 20    / Base Excess: -4.2  /  SaO2: 98.9          STROKE CORE MEASURES:   HGBA1C- 5.3  LDL- 78   TRig- 120   TSH-.03   Free T4 - 2.1       IMAGING/DATA: [ X] Reviewed     Xray Chest 1 View-PORTABLE IMMEDIATE (Xray Chest 1 View-PORTABLE IMMEDIATE .) (09.12.23 @ 22:26) >  Impression:    No radiographic evidence of acute cardiopulmonary disease.    CT Head No Cont (09.12.23 @ 20:33) >  IMPRESSION:  THE FOLLOWING REFLECTS THE AMENDED FINAL IMPRESSION:    1.  Residual IV contrast present from CTA and angiogram/thrombectomy.    2.  Parenchymal hyperdensity within bilateral thalami, left occipital and   temporal lobes, and cerebellar hemispheres; and sulcal hyperdensity   within the left occipital region. This likely predominantly reflects   contrast staining; underlying hemorrhage cannotbe excluded.    3.  Patchy appearance of the cerebellum suspicious for underlying   infarcts.    4.  Stable chronic microvascular ischemic changes and chronic left   parieto-occipital infarct.        CT Angio Neck Stroke Protocol w/ IV Cont (09.12.23 @ 17:40) >  IMPRESSION:    CT PERFUSION:  Ischemic tissue volume estimate of 63 cc in the area of the right   occipital/cerebellar areas, with core infarct estimate of 11 cc in the   area of the right occipital/cerebellar areas.    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    Severe stenosis of the right superior cerebellar artery.            EXAMINATION:  PHYSICAL EXAM:    Constitutional: No Acute Distress     Neurological: Awake, alert oriented to person, place and time, Following Commands, PERRL, EOMI, No Gaze Preference, Face Symmetrical, Speech Fluent, No dysmetria, No ataxia, No nystagmus     Motor exam:          Upper extremity                         Delt     Bicep     Tricep    HG                                                 R         5/5        5/5        5/5       5/5                                               L          5/5        5/5        5/5       5/5          Lower extremity                        HF         KF        KE       DF         PF                                                  R        5/5        5/5        5/5       5/5         5/5                                               L         5/5        5/5       5/5       5/5          5/5                                                 Sensation: [ ] intact to light touch  [ ] decreased:     Reflexes: Deep Tendon Reflexes Intact     Pulmonary: Clear to Auscultation, No rales, No rhonchi, No wheezes     Cardiovascular: S1, S2, Regular rate and rhythm     Gastrointestinal: Soft, Non-tender, Non-distended     Extremities: No calf tenderness     Incision:

## 2023-09-16 LAB
ALBUMIN SERPL ELPH-MCNC: 2.9 G/DL — LOW (ref 3.5–5.2)
ALP SERPL-CCNC: 64 U/L — SIGNIFICANT CHANGE UP (ref 30–115)
ALT FLD-CCNC: 6 U/L — SIGNIFICANT CHANGE UP (ref 0–41)
ANION GAP SERPL CALC-SCNC: 11 MMOL/L — SIGNIFICANT CHANGE UP (ref 7–14)
APTT BLD: 27.2 SEC — SIGNIFICANT CHANGE UP (ref 27–39.2)
APTT BLD: 30.2 SEC — SIGNIFICANT CHANGE UP (ref 27–39.2)
APTT BLD: 32.3 SEC — SIGNIFICANT CHANGE UP (ref 27–39.2)
AST SERPL-CCNC: 21 U/L — SIGNIFICANT CHANGE UP (ref 0–41)
BASOPHILS # BLD AUTO: 0.04 K/UL — SIGNIFICANT CHANGE UP (ref 0–0.2)
BASOPHILS NFR BLD AUTO: 0.4 % — SIGNIFICANT CHANGE UP (ref 0–1)
BILIRUB SERPL-MCNC: 0.5 MG/DL — SIGNIFICANT CHANGE UP (ref 0.2–1.2)
BUN SERPL-MCNC: 11 MG/DL — SIGNIFICANT CHANGE UP (ref 10–20)
CALCIUM SERPL-MCNC: 8.1 MG/DL — LOW (ref 8.4–10.5)
CHLORIDE SERPL-SCNC: 109 MMOL/L — SIGNIFICANT CHANGE UP (ref 98–110)
CO2 SERPL-SCNC: 20 MMOL/L — SIGNIFICANT CHANGE UP (ref 17–32)
CREAT SERPL-MCNC: <0.5 MG/DL — LOW (ref 0.7–1.5)
CULTURE RESULTS: SIGNIFICANT CHANGE UP
EGFR: 99 ML/MIN/1.73M2 — SIGNIFICANT CHANGE UP
EOSINOPHIL # BLD AUTO: 0.27 K/UL — SIGNIFICANT CHANGE UP (ref 0–0.7)
EOSINOPHIL NFR BLD AUTO: 2.6 % — SIGNIFICANT CHANGE UP (ref 0–8)
GLUCOSE BLDC GLUCOMTR-MCNC: 120 MG/DL — HIGH (ref 70–99)
GLUCOSE BLDC GLUCOMTR-MCNC: 76 MG/DL — SIGNIFICANT CHANGE UP (ref 70–99)
GLUCOSE SERPL-MCNC: 70 MG/DL — SIGNIFICANT CHANGE UP (ref 70–99)
HCT VFR BLD CALC: 28.1 % — LOW (ref 37–47)
HGB BLD-MCNC: 9 G/DL — LOW (ref 12–16)
IMM GRANULOCYTES NFR BLD AUTO: 0.7 % — HIGH (ref 0.1–0.3)
LYMPHOCYTES # BLD AUTO: 2.72 K/UL — SIGNIFICANT CHANGE UP (ref 1.2–3.4)
LYMPHOCYTES # BLD AUTO: 26.4 % — SIGNIFICANT CHANGE UP (ref 20.5–51.1)
MAGNESIUM SERPL-MCNC: 1.9 MG/DL — SIGNIFICANT CHANGE UP (ref 1.8–2.4)
MCHC RBC-ENTMCNC: 29.3 PG — SIGNIFICANT CHANGE UP (ref 27–31)
MCHC RBC-ENTMCNC: 32 G/DL — SIGNIFICANT CHANGE UP (ref 32–37)
MCV RBC AUTO: 91.5 FL — SIGNIFICANT CHANGE UP (ref 81–99)
MONOCYTES # BLD AUTO: 0.83 K/UL — HIGH (ref 0.1–0.6)
MONOCYTES NFR BLD AUTO: 8 % — SIGNIFICANT CHANGE UP (ref 1.7–9.3)
NEUTROPHILS # BLD AUTO: 6.39 K/UL — SIGNIFICANT CHANGE UP (ref 1.4–6.5)
NEUTROPHILS NFR BLD AUTO: 61.9 % — SIGNIFICANT CHANGE UP (ref 42.2–75.2)
NRBC # BLD: 0 /100 WBCS — SIGNIFICANT CHANGE UP (ref 0–0)
PH UR: 5.5 — SIGNIFICANT CHANGE UP (ref 5–8)
PHOSPHATE SERPL-MCNC: 2.6 MG/DL — SIGNIFICANT CHANGE UP (ref 2.1–4.9)
PLATELET # BLD AUTO: 210 K/UL — SIGNIFICANT CHANGE UP (ref 130–400)
PMV BLD: 9.1 FL — SIGNIFICANT CHANGE UP (ref 7.4–10.4)
POTASSIUM SERPL-MCNC: 3.8 MMOL/L — SIGNIFICANT CHANGE UP (ref 3.5–5)
POTASSIUM SERPL-SCNC: 3.8 MMOL/L — SIGNIFICANT CHANGE UP (ref 3.5–5)
PROT SERPL-MCNC: 5 G/DL — LOW (ref 6–8)
RBC # BLD: 3.07 M/UL — LOW (ref 4.2–5.4)
RBC # FLD: 12.8 % — SIGNIFICANT CHANGE UP (ref 11.5–14.5)
SODIUM SERPL-SCNC: 140 MMOL/L — SIGNIFICANT CHANGE UP (ref 135–146)
SPECIMEN SOURCE: SIGNIFICANT CHANGE UP
WBC # BLD: 10.32 K/UL — SIGNIFICANT CHANGE UP (ref 4.8–10.8)
WBC # FLD AUTO: 10.32 K/UL — SIGNIFICANT CHANGE UP (ref 4.8–10.8)

## 2023-09-16 PROCEDURE — 99291 CRITICAL CARE FIRST HOUR: CPT

## 2023-09-16 RX ORDER — POLYETHYLENE GLYCOL 3350 17 G/17G
17 POWDER, FOR SOLUTION ORAL EVERY 12 HOURS
Refills: 0 | Status: DISCONTINUED | OUTPATIENT
Start: 2023-09-16 | End: 2023-09-16

## 2023-09-16 RX ORDER — INFLUENZA VIRUS VACCINE 15; 15; 15; 15 UG/.5ML; UG/.5ML; UG/.5ML; UG/.5ML
0.7 SUSPENSION INTRAMUSCULAR ONCE
Refills: 0 | Status: DISCONTINUED | OUTPATIENT
Start: 2023-09-16 | End: 2023-09-27

## 2023-09-16 RX ORDER — ACETAMINOPHEN 500 MG
650 TABLET ORAL EVERY 6 HOURS
Refills: 0 | Status: DISCONTINUED | OUTPATIENT
Start: 2023-09-16 | End: 2023-09-27

## 2023-09-16 RX ORDER — POTASSIUM CHLORIDE 20 MEQ
20 PACKET (EA) ORAL ONCE
Refills: 0 | Status: COMPLETED | OUTPATIENT
Start: 2023-09-16 | End: 2023-09-16

## 2023-09-16 RX ORDER — METOPROLOL TARTRATE 50 MG
25 TABLET ORAL EVERY 12 HOURS
Refills: 0 | Status: DISCONTINUED | OUTPATIENT
Start: 2023-09-16 | End: 2023-09-17

## 2023-09-16 RX ORDER — SENNA PLUS 8.6 MG/1
2 TABLET ORAL AT BEDTIME
Refills: 0 | Status: DISCONTINUED | OUTPATIENT
Start: 2023-09-16 | End: 2023-09-16

## 2023-09-16 RX ORDER — METOPROLOL TARTRATE 50 MG
12.5 TABLET ORAL ONCE
Refills: 0 | Status: COMPLETED | OUTPATIENT
Start: 2023-09-16 | End: 2023-09-16

## 2023-09-16 RX ORDER — MAGNESIUM SULFATE 500 MG/ML
2 VIAL (ML) INJECTION ONCE
Refills: 0 | Status: COMPLETED | OUTPATIENT
Start: 2023-09-16 | End: 2023-09-16

## 2023-09-16 RX ORDER — IPRATROPIUM/ALBUTEROL SULFATE 18-103MCG
3 AEROSOL WITH ADAPTER (GRAM) INHALATION EVERY 6 HOURS
Refills: 0 | Status: DISCONTINUED | OUTPATIENT
Start: 2023-09-16 | End: 2023-09-18

## 2023-09-16 RX ORDER — DOXAZOSIN MESYLATE 4 MG
1 TABLET ORAL AT BEDTIME
Refills: 0 | Status: DISCONTINUED | OUTPATIENT
Start: 2023-09-16 | End: 2023-09-18

## 2023-09-16 RX ORDER — SODIUM CHLORIDE 9 MG/ML
250 INJECTION INTRAMUSCULAR; INTRAVENOUS; SUBCUTANEOUS ONCE
Refills: 0 | Status: COMPLETED | OUTPATIENT
Start: 2023-09-16 | End: 2023-09-16

## 2023-09-16 RX ADMIN — Medication 25 GRAM(S): at 08:04

## 2023-09-16 RX ADMIN — SODIUM CHLORIDE 500 MILLILITER(S): 9 INJECTION INTRAMUSCULAR; INTRAVENOUS; SUBCUTANEOUS at 16:36

## 2023-09-16 RX ADMIN — HEPARIN SODIUM 7.5 UNIT(S)/HR: 5000 INJECTION INTRAVENOUS; SUBCUTANEOUS at 20:17

## 2023-09-16 RX ADMIN — Medication 1 MILLIGRAM(S): at 21:26

## 2023-09-16 RX ADMIN — Medication 20 MILLIEQUIVALENT(S): at 08:04

## 2023-09-16 RX ADMIN — Medication 12.5 MILLIGRAM(S): at 17:33

## 2023-09-16 RX ADMIN — Medication 1 PACKET(S): at 05:23

## 2023-09-16 RX ADMIN — CHLORHEXIDINE GLUCONATE 1 APPLICATION(S): 213 SOLUTION TOPICAL at 05:23

## 2023-09-16 RX ADMIN — Medication 25 MILLIGRAM(S): at 05:22

## 2023-09-16 RX ADMIN — Medication 650 MILLIGRAM(S): at 12:56

## 2023-09-16 RX ADMIN — Medication 650 MILLIGRAM(S): at 13:26

## 2023-09-16 RX ADMIN — Medication 3 MILLILITER(S): at 08:36

## 2023-09-16 NOTE — PROGRESS NOTE ADULT - ASSESSMENT
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 Received TNK @ 1743. Taken to IR for thrombectomy,  POD #2  s/p thrombectomy of right SCA/BA with TICI 2C x3 passes.     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/ Hx of afib - noncompliant on meds in past   Gen neuro q 2   Neuroendovascular following  Ct in am - brain if no heme - start eloquis - 5mg q 12   Stroke Core Measures- as above   Activity:  [X] Activities as tolerated [x] PT [x] OT     PULM:  S/P Resp failure secondary to neuro injury   Extubated 9/14/23  Wean FIO2 to maintain Sats > 94%   HOB 30 degrees    CV: Afib/ flutter - S/P RVR  S/P Hypotension   SBP goal: 110-140 - on amadeo; wean as tolerates   ProBNP - 984  Lopressor 25mg q 12   Troponin - < .01 x2 neg   Echo -   TTE Echo Complete w/o Contrast w/ Doppler (09.13.23 @ 08:30) >  Summary:   1. LV Ejection Fraction by Ibarra's Method with a biplane EF of 63 %.   2. Normal global left ventricular systolic function.   3. Moderate to severe mitral valve regurgitation.   4. Mild-moderate tricuspid regurgitation.   5. Estimated pulmonary artery systolic pressure is 35.9 mmHg assuming a   right atrial pressure of 8 mmHg, which is consistent with borderline   pulmonary hypertension.   6. Normal left atrial size.   7. LA volume Index is 27.0 ml/m² ml/m2.   8. Normal right atrial size.   9. Compared to 6/13/2020 echo there is no significant change.  EKG - showing Afib with RVR     RENAL: Urinary retention / NAG met acidosis- diarrhea  Monitor UO   Cardura 1 mg qhs   D/C merida this PM   Replete electrolytes as needed   Na- 135- 145     GI:  Diet:  failed Speech and swallow eval   Peptamen af - goal 40cc/hr.    GI prophylaxis[- not indicated   Bowel regimen - held for diarrhea    ENDO:  Hyperthyroid  Methimazole - 5 mg q 12- monitor CBC   Goal euglycemia (-180)  Lipid profile - as above   HgBA1C- 5.3     HEME/ONC: Anemic -   Repeat CBC - stable   Heparin D- PTT - goal 50-70sec  Repeat at 0930.  VTE prophylaxis: [X] SCDs [X] chemoprophylaxis- Held AC   LE duplex - 9/13- neg for DVT    ID: S/P Fever- likely central   Maintain normothermia   procalcitonin- 0.15  Blood culture- neg   MRSA - neg    UA- neg , CXR , - neg   LFT - NL   Hold ABX for Now       CODE STATUS:  [X] Full Code    DISPOSITION:  [X] Stroke Unit

## 2023-09-16 NOTE — PROGRESS NOTE ADULT - SUBJECTIVE AND OBJECTIVE BOX
SUMMARY:HPI:  The patient is an 83-year-old female with a past history of mood disorder, hallucination, hyperthyroidism, Afib, with a prior admission for syncope/ collapse at home, who presented with dysarthria, confusion, and left weakness and was noted to have a right perfusion deficit on CTP. The patient is a code NI actual, and will be taken for a planned diagnostic cerebral angiogram + mechanical thrombectomy with Dr. Mckeon.     S/P TNK - 1743 - 9/12/23    Admission scores:   NIHSS 32  mRS- 1      OVERNIGHT EVENTS:    No  new events  Cardiazem x1 for increased HR        REVIEW OF SYSTEMS: [ X] Unable to Assess due to neurologic exam     Neuro: [ ] Headache [ ] Back pain [ ] Numbness [ ] Weakness [ ] Ataxia [ ] Dizziness [ ] Aphasia [ ] Dysarthria [ ] Visual disturbance  Resp: [ ] Shortness of breath/dyspnea, [ ] Orthopnea [ ] Cough  CV: [ ] Chest pain [ ] Palpitation [ ] Lightheadedness [ ] Syncope  Renal: [ ] Thirst [ ] Edema  GI: [ ] Nausea [ ] Emesis [ ] Abdominal pain [ ] Constipation [ ] Diarrhea  Hem: [ ] Hematemesis [ ] bright red blood per rectum  ID: [ ] Fever [ ] Chills [ ] Dysuria  ENT: [ ] Rhinorrhea      ICU Vital Signs Last 24 Hrs  ICU Vital Signs Last 24 Hrs  T(C): 36.4 (16 Sep 2023 08:00), Max: 37.8 (15 Sep 2023 20:00)  T(F): 97.6 (16 Sep 2023 08:00), Max: 100.1 (15 Sep 2023 20:00)  HR: 123 (16 Sep 2023 08:00) (95 - 140)  BP: 124/66 (16 Sep 2023 08:00) (104/54 - 157/61)  BP(mean): 89 (16 Sep 2023 08:00) (73 - 116)  RR: 18 (16 Sep 2023 08:00) (16 - 42)  SpO2: 100% (16 Sep 2023 08:00) (95% - 100%)    O2 Parameters below as of 16 Sep 2023 08:00  Patient On (Oxygen Delivery Method): nasal cannula  O2 Flow (L/min): 3      15 Sep 2023 07:01  -  16 Sep 2023 07:00  --------------------------------------------------------  IN:    Heparin: 63 mL    Oral Fluid: 150 mL    Peptamen A.F.: 180 mL    sodium chloride 0.9%: 1440 mL  Total IN: 1833 mL    OUT:    Intermittent Catheterization - Urethral (mL): 1200 mL    Voided (mL): 350 mL  Total OUT: 1550 mL    Total NET: 283 mL      16 Sep 2023 07:01  -  16 Sep 2023 09:13  --------------------------------------------------------  IN:    Enteral Tube Flush: 120 mL    Heparin: 9 mL    IV PiggyBack: 50 mL    Peptamen A.F.: 40 mL    sodium chloride 0.9%: 120 mL  Total IN: 339 mL    OUT:  Total OUT: 0 mL    Total NET: 339 mL    MEDICATIONS  (STANDING):  albuterol/ipratropium for Nebulization 3 milliLiter(s) Nebulizer every 6 hours prn wheezing   chlorhexidine 2% Cloths 1 Application(s) Topical daily  heparin  Infusion 450 Unit(s)/Hr (4.5 mL/Hr) IV Continuous <Continuous>  influenza  Vaccine (HIGH DOSE) 0.7 milliLiter(s) IntraMuscular once  sodium chloride 0.9%. 1000 milliLiter(s) (60 mL/Hr) IV Continuous <Continuous>    MEDICATIONS  (PRN):    09-16    140  |  109  |  11  ----------------------------<  70 AG - 11  3.8   |  20  |  <0.5<L>    Ca    8.1<L>      16 Sep 2023 05:13 corrected ca- 8.9  Phos  2.6     09-16  Mg     1.9     09-16                        9.0    10.32 )-----------( 210      ( 16 Sep 2023 05:13 )             28.1       Na+ K - Cl= 129-132= - 3    TPro  5.0<L>  /  Alb  2.9<L>  /  TBili  0.5  /  DBili  x   /  AST  21  /  ALT  6   /  AlkPhos  64  09-16        LABS:  Na: 139 (09-15 @ 01:49), 138 (09-14 @ 05:19), 141 (09-13 @ 18:28), 140 (09-13 @ 06:10), 141 (09-12 @ 22:33), 143 (09-12 @ 17:19)  K: 3.9 (09-15 @ 01:49), 4.5 (09-14 @ 05:19), 4.7 (09-13 @ 18:28), 5.4 (09-13 @ 06:10), 3.9 (09-12 @ 22:33), 3.8 (09-12 @ 17:19)  Cl: 110 (09-15 @ 01:49), 109 (09-14 @ 05:19), 111 (09-13 @ 18:28), 110 (09-13 @ 06:10), 107 (09-12 @ 22:33), 106 (09-12 @ 17:19)  CO2: 17 (09-15 @ 01:49), 17 (09-14 @ 05:19), 20 (09-13 @ 18:28), 18 (09-13 @ 06:10), 18 (09-12 @ 22:33), 22 (09-12 @ 17:19)  BUN: 12 (09-15 @ 01:49), 18 (09-14 @ 05:19), 17 (09-13 @ 18:28), 19 (09-13 @ 06:10), 22 (09-12 @ 22:33), 28 (09-12 @ 17:19)  Cr: <0.5 (09-15 @ 01:49), 0.5 (09-14 @ 05:19), 0.5 (09-13 @ 18:28), 0.6 (09-13 @ 06:10), 0.5 (09-12 @ 22:33), 0.7 (09-12 @ 17:19)  Glu: 81(09-15 @ 01:49), 105(09-14 @ 05:19), 105(09-13 @ 18:28), 124(09-13 @ 06:10), 196(09-12 @ 22:33), 156(09-12 @ 17:19)    Hgb: 8.5 (09-15 @ 01:49), 10.1 (09-14 @ 05:19), 11.7 (09-13 @ 06:10), 12.4 (09-12 @ 22:33), 14.1 (09-12 @ 17:19)  Hct: 26.5 (09-15 @ 01:49), 31.7 (09-14 @ 05:19), 36.7 (09-13 @ 06:10), 37.9 (09-12 @ 22:33), 43.2 (09-12 @ 17:19)  WBC: 12.80 (09-15 @ 01:49), 15.41 (09-14 @ 05:19), 14.99 (09-13 @ 06:10), 13.99 (09-12 @ 22:33), 10.66 (09-12 @ 17:19)  Plt: 166 (09-15 @ 01:49), 163 (09-14 @ 05:19), 214 (09-13 @ 06:10), 243 (09-12 @ 22:33), 258 (09-12 @ 17:19)    INR: 1.00 09-13-23 @ 06:10, 1.08 09-12-23 @ 22:33, 1.01 09-12-23 @ 17:19  PTT: 28.1 09-15-23 @ 01:49, 119.5 09-14-23 @ 17:49, 27.0 09-13-23 @ 06:10, 32.8 09-12-23 @ 22:33, 25.7 09-12-23 @ 17:19          STROKE CORE MEASURES:   HGBA1C- 5.3  LDL- 78   TRig- 120   TSH-.03   Free T4 - 2.1       IMAGING/DATA: [ X] Reviewed     VA Duplex Lower Ext Vein Scan, Bilat (09.13.23 @ 13:02) >  IMPRESSION:  No evidence of deep venous thrombosis in either lower extremity.      CT Abdomen and Pelvis No Cont (09.15.23 @ 16:46) >  IMPRESSION:    Multiple acute right lateraland posterior rib fractures as described.    Right greater than left pleural effusions. Bilateral bronchocentric   consolidative opacities, possibly infectious in etiology. Recommend   follow-up to resolution.    No evidence of retroperitoneal hematoma.           Xray Chest 1 View-PORTABLE IMMEDIATE (Xray Chest 1 View-PORTABLE IMMEDIATE .) (09.12.23 @ 22:26) >  Impression:    No radiographic evidence of acute cardiopulmonary disease.    Xray Chest 1 View-PORTABLE IMMEDIATE (Xray Chest 1 View-PORTABLE IMMEDIATE .) (09.15.23 @ 17:34) >    Impression:    Stable cardiomegaly. Bilateral opacities/pleural effusions, increased..       Xray Chest 1 View-PORTABLE IMMEDIATE (Xray Chest 1 View-PORTABLE IMMEDIATE .) (09.15.23 @ 17:34) >    Impression:    Stable cardiomegaly. Bilateral opacities/pleural effusions, increased..        CT Head No Cont (09.12.23 @ 20:33) >  IMPRESSION:  THE FOLLOWING REFLECTS THE AMENDED FINAL IMPRESSION:    1.  Residual IV contrast present from CTA and angiogram/thrombectomy.    2.  Parenchymal hyperdensity within bilateral thalami, left occipital and   temporal lobes, and cerebellar hemispheres; and sulcal hyperdensity   within the left occipital region. This likely predominantly reflects   contrast staining; underlying hemorrhage cannotbe excluded.    3.  Patchy appearance of the cerebellum suspicious for underlying   infarcts.    4.  Stable chronic microvascular ischemic changes and chronic left   parieto-occipital infarct.        CT Angio Neck Stroke Protocol w/ IV Cont (09.12.23 @ 17:40) >  IMPRESSION:    CT PERFUSION:  Ischemic tissue volume estimate of 63 cc in the area of the right   occipital/cerebellar areas, with core infarct estimate of 11 cc in the   area of the right occipital/cerebellar areas.    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    Severe stenosis of the right superior cerebellar artery.            EXAMINATION:  PHYSICAL EXAM:    Constitutional: No Acute Distress     Neurological: Awake, hypophonic, tracks B/L pupils 2mm and reactive B/L , oriented to name     Motor exam:          Upper extremity                         Delt     Bicep     Tricep    HG                                                 R         5/5        5/5        5/5       5/5                                               L          5/5        5/5        5/5       5/5          Lower extremity                        HF         KF        KE       DF         PF                                                  R        5/5        5/5        5/5       5/5         5/5                                               L         5/5        5/5       5/5       5/5          5/5                                                 Sensation: [ ] intact to light touch  [ ] decreased:       Pulmonary: Clear to Auscultation, No rales, No rhonchi, No wheezes     Cardiovascular: S1, S2, Regular rate and rhythm     Gastrointestinal: Soft, Non-tender, Non-distended     Extremities: No calf tenderness , bruising

## 2023-09-16 NOTE — CHART NOTE - NSCHARTNOTEFT_GEN_A_CORE
Transfer Note    Transfer from: Neuro ICU     Transfer to: (  ) Medicine    (  ) Telemetry    (  ) RCU                               (  ) Palliative    ( X ) Stroke Unit    (  ) MICU    (  ) __________________    Accepting Physician: Dr. Guy     Signout given to:     HPI COURSE:          Vital Signs Last 24 Hrs  T(C): 36.4 (16 Sep 2023 08:00), Max: 37.8 (15 Sep 2023 20:00)  T(F): 97.6 (16 Sep 2023 08:00), Max: 100.1 (15 Sep 2023 20:00)  HR: 123 (16 Sep 2023 08:00) (95 - 140)  BP: 124/66 (16 Sep 2023 08:00) (104/54 - 157/61)  BP(mean): 89 (16 Sep 2023 08:00) (73 - 116)  RR: 18 (16 Sep 2023 08:00) (16 - 42)  SpO2: 100% (16 Sep 2023 08:00) (95% - 100%)    Parameters below as of 16 Sep 2023 08:00  Patient On (Oxygen Delivery Method): nasal cannula  O2 Flow (L/min): 3      I&O's Summary    15 Sep 2023 07:01  -  16 Sep 2023 07:00  --------------------------------------------------------  IN: 1833 mL / OUT: 1550 mL / NET: 283 mL    16 Sep 2023 07:01  -  16 Sep 2023 09:25  --------------------------------------------------------  IN: 433.5 mL / OUT: 0 mL / NET: 433.5 mL        LABS:                               9.0    10.32 )-----------( 210      ( 16 Sep 2023 05:13 )             28.1       09-16    140  |  109  |  11  ----------------------------<  70  3.8   |  20  |  <0.5<L>    Ca    8.1<L>      16 Sep 2023 05:13  Phos  2.6     09-16  Mg     1.9     09-16    TPro  5.0<L>  /  Alb  2.9<L>  /  TBili  0.5  /  DBili  x   /  AST  21  /  ALT  6   /  AlkPhos  64  09-16      PTT - ( 16 Sep 2023 00:26 )  PTT:30.2 sec    ABG - ( 14 Sep 2023 12:58 )  pH, Arterial: 7.42  pH, Blood: x     /  pCO2: 30    /  pO2: 129   / HCO3: 20    / Base Excess: -4.2  /  SaO2: 98.9            ASSESSMENT & PLAN:           FOR FOLLOW UP:  [ ] CT Head 9/17 (order placed) for anticipation for oral AC   [ ] Continue pain management for Right sided rib fx found on CT 9/15     ex 8901 Transfer Note    Transfer from: Neuro ICU     Transfer to: (  ) Medicine    (  ) Telemetry    (  ) RCU                               (  ) Palliative    ( X ) Stroke Unit    (  ) MICU    (  ) __________________    Accepting Physician: Dr. uGy     Signout given to:     HPI COURSE:          Vital Signs Last 24 Hrs  T(C): 36.4 (16 Sep 2023 08:00), Max: 37.8 (15 Sep 2023 20:00)  T(F): 97.6 (16 Sep 2023 08:00), Max: 100.1 (15 Sep 2023 20:00)  HR: 123 (16 Sep 2023 08:00) (95 - 140)  BP: 124/66 (16 Sep 2023 08:00) (104/54 - 157/61)  BP(mean): 89 (16 Sep 2023 08:00) (73 - 116)  RR: 18 (16 Sep 2023 08:00) (16 - 42)  SpO2: 100% (16 Sep 2023 08:00) (95% - 100%)    Parameters below as of 16 Sep 2023 08:00  Patient On (Oxygen Delivery Method): nasal cannula  O2 Flow (L/min): 3      I&O's Summary    15 Sep 2023 07:01  -  16 Sep 2023 07:00  --------------------------------------------------------  IN: 1833 mL / OUT: 1550 mL / NET: 283 mL    16 Sep 2023 07:01  -  16 Sep 2023 09:25  --------------------------------------------------------  IN: 433.5 mL / OUT: 0 mL / NET: 433.5 mL        LABS:                               9.0    10.32 )-----------( 210      ( 16 Sep 2023 05:13 )             28.1       09-16    140  |  109  |  11  ----------------------------<  70  3.8   |  20  |  <0.5<L>    Ca    8.1<L>      16 Sep 2023 05:13  Phos  2.6     09-16  Mg     1.9     09-16    TPro  5.0<L>  /  Alb  2.9<L>  /  TBili  0.5  /  DBili  x   /  AST  21  /  ALT  6   /  AlkPhos  64  09-16      PTT - ( 16 Sep 2023 00:26 )  PTT:30.2 sec    ABG - ( 14 Sep 2023 12:58 )  pH, Arterial: 7.42  pH, Blood: x     /  pCO2: 30    /  pO2: 129   / HCO3: 20    / Base Excess: -4.2  /  SaO2: 98.9            ASSESSMENT & PLAN:           FOR FOLLOW UP:  [ ] CT Head 9/17 (order placed) for anticipation for oral AC   [ ] Continue pain management for Right sided rib fx found on CT 9/15   [ ] bladder scan q 4 hour for urinary retention     ex 3150 Transfer Note    Transfer from: Neuro ICU     Transfer to: (  ) Medicine    (  ) Telemetry    (  ) RCU                               (  ) Palliative    ( X ) Stroke Unit    (  ) MICU    (  ) __________________    Accepting Physician: Dr. Guy     Signout given to:     HPI COURSE:    The patient is an 83-year-old female with a past history of mood disorder, hallucination, hyperthyroidism, Afib, with a prior admission for syncope/ collapse at home, who presented with dysarthria, confusion, and left weakness and was noted to have a right perfusion deficit on CTP. The patient is a code NI actual, and will be taken for a planned diagnostic cerebral angiogram + mechanical thrombectomy with Dr. Mckeon and Dr. Mendez. TICI 2c achieved in 3 passes. Pt is also s/p TNK administration upon admission. Patient admitted to Neuro ICU intubated, extubated 9/14. Upon admission patient presented with fever. Fever workup was negative. 9/15 h&h downtrending. Patient was scanned, RP ruled out. Patient now stable for downgrade to stroke unit.           Vital Signs Last 24 Hrs  T(C): 36.4 (16 Sep 2023 08:00), Max: 37.8 (15 Sep 2023 20:00)  T(F): 97.6 (16 Sep 2023 08:00), Max: 100.1 (15 Sep 2023 20:00)  HR: 123 (16 Sep 2023 08:00) (95 - 140)  BP: 124/66 (16 Sep 2023 08:00) (104/54 - 157/61)  BP(mean): 89 (16 Sep 2023 08:00) (73 - 116)  RR: 18 (16 Sep 2023 08:00) (16 - 42)  SpO2: 100% (16 Sep 2023 08:00) (95% - 100%)    Parameters below as of 16 Sep 2023 08:00  Patient On (Oxygen Delivery Method): nasal cannula  O2 Flow (L/min): 3      I&O's Summary    15 Sep 2023 07:01  -  16 Sep 2023 07:00  --------------------------------------------------------  IN: 1833 mL / OUT: 1550 mL / NET: 283 mL    16 Sep 2023 07:01  -  16 Sep 2023 09:25  --------------------------------------------------------  IN: 433.5 mL / OUT: 0 mL / NET: 433.5 mL        LABS:                               9.0    10.32 )-----------( 210      ( 16 Sep 2023 05:13 )             28.1       09-16    140  |  109  |  11  ----------------------------<  70  3.8   |  20  |  <0.5<L>    Ca    8.1<L>      16 Sep 2023 05:13  Phos  2.6     09-16  Mg     1.9     09-16    TPro  5.0<L>  /  Alb  2.9<L>  /  TBili  0.5  /  DBili  x   /  AST  21  /  ALT  6   /  AlkPhos  64  09-16      PTT - ( 16 Sep 2023 00:26 )  PTT:30.2 sec    ABG - ( 14 Sep 2023 12:58 )  pH, Arterial: 7.42  pH, Blood: x     /  pCO2: 30    /  pO2: 129   / HCO3: 20    / Base Excess: -4.2  /  SaO2: 98.9      Exam:  General: Awake, interactive, b/l UE bruising, +4 pulses UE   Neuro: EO spontaneously open, patient able to track, with  patient able to state name, says "yes" when given options to where she currently is, Patient denies pain, follows commands, b/l UE no drift, AG, b/l le AG, R>L    ASSESSMENT & PLAN:     83-year-old female with a past history of mood disorder, hallucination, hyperthyroidism, Afib no a/c  who presented with dysarthria, confusion, and left weakness, NIH 32, CTA showed complete occlusion of basilar artery. Received TNK @ 5028. Taken to IR for thrombectomy,  POD #2  s/p thrombectomy of right SCA/BA with TICI 2C x3 passes.     PLAN:    #Stroke secondary to embolus/ Hx of afib - noncompliant on meds in past   - Maintain Gen neuro q 2 while in stroke unit   - Neuroendovascular following  - Ct in am - brain if no heme - start eliquis - 5mg q 12   - Stroke Core Measures- hemoglobin a1c 5.3, TSH, Serum: 0.03 uIU/mL (09.13.23 @ 05:32) Lipid Profile in AM (09.13.23 @ 05:32), Cholesterol: 143 mg/dL, HDL Cholesterol: 41 mg/dL, Non HDL Cholesterol: 102  Activity:  [X] Activities as tolerated [x] PT [x] OT     #S/P Resp failure secondary to neuro injury   Extubated 9/14/23  Wean FIO2 to maintain Sats > 94%   HOB 30 degrees  Monitor for aspiration     #Afib/ flutter - S/P RVR  S/P Hypotension   SBP goal: 110-140 - on amadeo; wean as tolerates   ProBNP - 984  Lopressor 25mg q 12   Troponin - < .01 x2 neg   Echo -   TTE Echo Complete w/o Contrast w/ Doppler (09.13.23 @ 08:30) >  Summary:   1. LV Ejection Fraction by Ibarra's Method with a biplane EF of 63 %.   2. Normal global left ventricular systolic function.   3. Moderate to severe mitral valve regurgitation.   4. Mild-moderate tricuspid regurgitation.   5. Estimated pulmonary artery systolic pressure is 35.9 mmHg assuming a   right atrial pressure of 8 mmHg, which is consistent with borderline   pulmonary hypertension.   6. Normal left atrial size.   7. LA volume Index is 27.0 ml/m² ml/m2.   8. Normal right atrial size.   9. Compared to 6/13/2020 echo there is no significant change.  EKG - showing Afib with RVR     #Urinary retention / NAG met acidosis- diarrhea  Monitor UO q 4 hours   Cardura 1 mg qhs, monitor bp, can increase as needed  Replete electrolytes as needed   Na- 135- 145     #Dysphagia  Diet:  failed Speech and swallow eval   TF Peptamen goal 40 cc    #Hyperthyroidism   Methimazole - 5 mg q 12- monitor CBC   Goal euglycemia (-180)  Lipid profile - as above   HgBA1C- 5.3     #Anemia  Repeat CBC - stable       CODE STATUS:  [X] Full Code    DVT ppx: SCD, Currently on heparin drip for afib       FOR FOLLOW UP:  [ ] CT Head 9/17 (order placed) for anticipation for oral AC   [ ] Continue pain management for Right sided rib fx found on CT 9/15   [ ] bladder scan q 4 hour for urinary retention     ex 4112 Transfer Note    Transfer from: Neuro ICU     Transfer to: Stroke Unit    Accepting Physician: Dr. Davis    Signout given to: Shashi Escobar ACP, sign-out given 9/19/2023 @ 8606    [facility] COURSE:    The patient is an 83-year-old female with a past history of mood disorder, hallucination, hyperthyroidism, Afib, with a prior admission for syncope/ collapse at home, who presented with dysarthria, confusion, and left weakness and was noted to have a right perfusion deficit on CTP. The patient is a code NI actual, and will be taken for a planned diagnostic cerebral angiogram + mechanical thrombectomy with Dr. Mckeon and Dr. Mendez. TICI 2c achieved in 3 passes. Pt is also s/p TNK administration upon admission. Patient admitted to Neuro ICU intubated, extubated 9/14. Upon admission patient presented with fever. Fever workup was negative. 9/15 h&h downtrending. Patient was scanned, RP ruled out. Patient now stable for downgrade to stroke unit.         Vital Signs Last 24 Hrs:  T(C): 36.7 (19 Sep 2023 00:00), Max: 37.3 (18 Sep 2023 04:00)  T(F): 98 (19 Sep 2023 00:00), Max: 99.2 (18 Sep 2023 04:00)  HR: 105 (19 Sep 2023 00:00) (95 - 143)  BP: 157/72 (19 Sep 2023 00:00) (90/57 - 162/98)  BP(mean): 104 (19 Sep 2023 00:00) (70 - 917)  RR: 25 (19 Sep 2023 00:00) (19 - 29)  SpO2: 99% (19 Sep 2023 00:00) (97% - 99%)    Parameters below as of 19 Sep 2023 00:00  Patient On (Oxygen Delivery Method): room air        I&O's Summary:  17 Sep 2023 07:01  -  18 Sep 2023 07:00  --------------------------------------------------------  IN: 1046 mL / OUT: 1750 mL / NET: -704 mL    18 Sep 2023 07:01  -  19 Sep 2023 01:50  --------------------------------------------------------  IN: 558 mL / OUT: 1600 mL / NET: -1042 mL        LABS:                         9.8    10.16 )-----------( 305      ( 18 Sep 2023 05:03 )             30.3       09-18    139  |  105  |  12  ----------------------------<  126<H>  4.1   |  24  |  <0.5<L>    Ca    8.3<L>      18 Sep 2023 05:03  Phos  1.9     09-18  Mg     1.8     09-18    TPro  5.1<L>  /  Alb  3.0<L>  /  TBili  0.4  /  DBili  x   /  AST  22  /  ALT  8   /  AlkPhos  70  09-18  PTT - ( 19 Sep 2023 00:02 )  PTT:132.2 sec          Exam:  General: Awake, interactive, b/l UE bruising, +4 pulses UE   Neuro: EO spontaneously open, patient able to track, with  patient able to state name, says "yes" when given options to where she currently is, Patient denies pain, follows commands, b/l UE no drift, AG, b/l le AG, R>L          Assessment	  83-year-old female with a past history of mood disorder, hallucination, hyperthyroidism, Afib no a/c  who presented with dysarthria, confusion, and left weakness, NIH 32, 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 Received TNK @ 9043. Taken to IR for thrombectomy,    s/p thrombectomy of right SCA/BA with TICI 2C x3 passes.     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 LV Ejection Fraction by Ibarra's Method with a biplane EF of 63 %.   2. Normal global left ventricular systolic function.   3. Moderate to severe mitral valve regurgitation.   4. Mild-moderate tricuspid regurgitation.   5. Estimated pulmonary artery systolic pressure is 35.9 mmHg assuming a   right atrial pressure of 8 mmHg, which is consistent with borderline   pulmonary hypertension.   6. Normal left atrial size.   7. LA volume Index is 27.0 ml/m² ml/m2.   8. Normal right atrial size.   9. Compared to 6/13/2020 echo there is no significant change.  EKG - showing Afib with RVR     RENAL: Urinary retention / S/P NAG met acidosis- diarrhea  Monitor UO   D/C Cardura  Bladder scan q6   Replete electrolytes as needed   Na- 135- 145     GI:  Diet:  failed Speech and swallow eval   -FEES Today, replace NGT if she fails  GI prophylaxis- not indicated   Bowel regimen - Senna    ENDO:  Hyperthyroid  Methimazole - 5 mg q 12- monitor CBC   Goal euglycemia (-180)  Lipid profile - as above   HgBA1C- 5.3     HEME/ONC: Anemic -  Fe def - Labs reviewed   Repeat CBC - stable   Heparin D- PTT - goal 50-70sec  - CT A/P negative for RP bleed, + acute rib fractures  VTE prophylaxis: [X] SCDs [ ] chemoprophylaxis-    LE duplex - 9/13- neg for DVT    ID: S/P Fever- likely central   Maintain normothermia   procalcitonin- 0.15  Blood culture- neg   MRSA - neg    UA- neg , CXR , - neg   LFT - NL   Hold ABX for Now       CODE STATUS:  [X] Full Code    DISPOSITION:  [X] Stroke Unit      FOR FOLLOW UP:  [ ] MRI brain  [ ] Continue pain management for Right sided rib fx found on CT 9/15   [ ] bladder scan q 4 hour for urinary retention   [ ] Heparin gtt to goal PTT 50-70    ex 4167

## 2023-09-17 LAB
ALBUMIN SERPL ELPH-MCNC: 3 G/DL — LOW (ref 3.5–5.2)
ALP SERPL-CCNC: 65 U/L — SIGNIFICANT CHANGE UP (ref 30–115)
ALT FLD-CCNC: 7 U/L — SIGNIFICANT CHANGE UP (ref 0–41)
ANION GAP SERPL CALC-SCNC: 8 MMOL/L — SIGNIFICANT CHANGE UP (ref 7–14)
APTT BLD: 32.4 SEC — SIGNIFICANT CHANGE UP (ref 27–39.2)
APTT BLD: 38.4 SEC — SIGNIFICANT CHANGE UP (ref 27–39.2)
APTT BLD: 44.4 SEC — HIGH (ref 27–39.2)
APTT BLD: 68.5 SEC — HIGH (ref 27–39.2)
AST SERPL-CCNC: 21 U/L — SIGNIFICANT CHANGE UP (ref 0–41)
BASOPHILS # BLD AUTO: 0.03 K/UL — SIGNIFICANT CHANGE UP (ref 0–0.2)
BASOPHILS NFR BLD AUTO: 0.3 % — SIGNIFICANT CHANGE UP (ref 0–1)
BILIRUB SERPL-MCNC: 0.4 MG/DL — SIGNIFICANT CHANGE UP (ref 0.2–1.2)
BUN SERPL-MCNC: 10 MG/DL — SIGNIFICANT CHANGE UP (ref 10–20)
CALCIUM SERPL-MCNC: 8.3 MG/DL — LOW (ref 8.4–10.5)
CHLORIDE SERPL-SCNC: 109 MMOL/L — SIGNIFICANT CHANGE UP (ref 98–110)
CO2 SERPL-SCNC: 25 MMOL/L — SIGNIFICANT CHANGE UP (ref 17–32)
CREAT SERPL-MCNC: <0.5 MG/DL — LOW (ref 0.7–1.5)
EGFR: 99 ML/MIN/1.73M2 — SIGNIFICANT CHANGE UP
EOSINOPHIL # BLD AUTO: 0.33 K/UL — SIGNIFICANT CHANGE UP (ref 0–0.7)
EOSINOPHIL NFR BLD AUTO: 3.7 % — SIGNIFICANT CHANGE UP (ref 0–8)
GLUCOSE SERPL-MCNC: 114 MG/DL — HIGH (ref 70–99)
HCT VFR BLD CALC: 29.5 % — LOW (ref 37–47)
HGB BLD-MCNC: 9.4 G/DL — LOW (ref 12–16)
IMM GRANULOCYTES NFR BLD AUTO: 0.8 % — HIGH (ref 0.1–0.3)
IRON SATN MFR SERPL: 19 % — SIGNIFICANT CHANGE UP (ref 15–50)
IRON SATN MFR SERPL: 37 UG/DL — SIGNIFICANT CHANGE UP (ref 35–150)
LDH SERPL L TO P-CCNC: 269 — HIGH (ref 50–242)
LYMPHOCYTES # BLD AUTO: 2.05 K/UL — SIGNIFICANT CHANGE UP (ref 1.2–3.4)
LYMPHOCYTES # BLD AUTO: 22.7 % — SIGNIFICANT CHANGE UP (ref 20.5–51.1)
MAGNESIUM SERPL-MCNC: 2.1 MG/DL — SIGNIFICANT CHANGE UP (ref 1.8–2.4)
MCHC RBC-ENTMCNC: 28.9 PG — SIGNIFICANT CHANGE UP (ref 27–31)
MCHC RBC-ENTMCNC: 31.9 G/DL — LOW (ref 32–37)
MCV RBC AUTO: 90.8 FL — SIGNIFICANT CHANGE UP (ref 81–99)
MONOCYTES # BLD AUTO: 0.66 K/UL — HIGH (ref 0.1–0.6)
MONOCYTES NFR BLD AUTO: 7.3 % — SIGNIFICANT CHANGE UP (ref 1.7–9.3)
NEUTROPHILS # BLD AUTO: 5.88 K/UL — SIGNIFICANT CHANGE UP (ref 1.4–6.5)
NEUTROPHILS NFR BLD AUTO: 65.2 % — SIGNIFICANT CHANGE UP (ref 42.2–75.2)
NRBC # BLD: 0 /100 WBCS — SIGNIFICANT CHANGE UP (ref 0–0)
PHOSPHATE SERPL-MCNC: 2.3 MG/DL — SIGNIFICANT CHANGE UP (ref 2.1–4.9)
PLATELET # BLD AUTO: 248 K/UL — SIGNIFICANT CHANGE UP (ref 130–400)
PMV BLD: 9.3 FL — SIGNIFICANT CHANGE UP (ref 7.4–10.4)
POTASSIUM SERPL-MCNC: 4.1 MMOL/L — SIGNIFICANT CHANGE UP (ref 3.5–5)
POTASSIUM SERPL-SCNC: 4.1 MMOL/L — SIGNIFICANT CHANGE UP (ref 3.5–5)
PROT SERPL-MCNC: 4.9 G/DL — LOW (ref 6–8)
RBC # BLD: 3.25 M/UL — LOW (ref 4.2–5.4)
RBC # FLD: 12.8 % — SIGNIFICANT CHANGE UP (ref 11.5–14.5)
SODIUM SERPL-SCNC: 142 MMOL/L — SIGNIFICANT CHANGE UP (ref 135–146)
TIBC SERPL-MCNC: 199 UG/DL — LOW (ref 220–430)
UIBC SERPL-MCNC: 162 UG/DL — SIGNIFICANT CHANGE UP (ref 110–370)
WBC # BLD: 9.02 K/UL — SIGNIFICANT CHANGE UP (ref 4.8–10.8)
WBC # FLD AUTO: 9.02 K/UL — SIGNIFICANT CHANGE UP (ref 4.8–10.8)

## 2023-09-17 PROCEDURE — 70450 CT HEAD/BRAIN W/O DYE: CPT | Mod: 26

## 2023-09-17 PROCEDURE — 99291 CRITICAL CARE FIRST HOUR: CPT

## 2023-09-17 PROCEDURE — 71045 X-RAY EXAM CHEST 1 VIEW: CPT | Mod: 26

## 2023-09-17 RX ORDER — METOPROLOL TARTRATE 50 MG
25 TABLET ORAL EVERY 8 HOURS
Refills: 0 | Status: DISCONTINUED | OUTPATIENT
Start: 2023-09-17 | End: 2023-09-18

## 2023-09-17 RX ADMIN — HEPARIN SODIUM 11 UNIT(S)/HR: 5000 INJECTION INTRAVENOUS; SUBCUTANEOUS at 23:36

## 2023-09-17 RX ADMIN — Medication 1 MILLIGRAM(S): at 21:58

## 2023-09-17 RX ADMIN — CHLORHEXIDINE GLUCONATE 1 APPLICATION(S): 213 SOLUTION TOPICAL at 06:33

## 2023-09-17 RX ADMIN — Medication 25 MILLIGRAM(S): at 14:48

## 2023-09-17 RX ADMIN — HEPARIN SODIUM 9 UNIT(S)/HR: 5000 INJECTION INTRAVENOUS; SUBCUTANEOUS at 02:58

## 2023-09-17 RX ADMIN — Medication 25 MILLIGRAM(S): at 21:58

## 2023-09-17 RX ADMIN — HEPARIN SODIUM 7.5 UNIT(S)/HR: 5000 INJECTION INTRAVENOUS; SUBCUTANEOUS at 01:11

## 2023-09-17 NOTE — PROGRESS NOTE ADULT - SUBJECTIVE AND OBJECTIVE BOX
SUMMARY:HPI:  The patient is an 83-year-old female with a past history of mood disorder, hallucination, hyperthyroidism, Afib, with a prior admission for syncope/ collapse at home, who presented with dysarthria, confusion, and left weakness and was noted to have a right perfusion deficit on CTP. The patient is a code NI actual, and will be taken for a planned diagnostic cerebral angiogram + mechanical thrombectomy with Dr. Mckeon.     S/P TNK - 1743 - 9/12/23    Admission scores:   NIHSS 32  mRS- 1      OVERNIGHT EVENTS:    No  new events  Cardiazem x1 for increased HR        REVIEW OF SYSTEMS: [ X] Unable to Assess due to neurologic exam     ICU Vital Signs Last 24 Hrs  T(C): 36.8 (17 Sep 2023 08:00), Max: 37.2 (16 Sep 2023 12:00)  T(F): 98.2 (17 Sep 2023 08:00), Max: 98.9 (16 Sep 2023 12:00)  HR: 103 (17 Sep 2023 10:00) (92 - 129)- afib   BP: 98/60 (17 Sep 2023 10:00) (96/51 - 138/59)  BP(mean): 73 (17 Sep 2023 10:00) (70 - 767)  RR: 17 (17 Sep 2023 10:00) (14 - 32)  SpO2: 99% (17 Sep 2023 10:00) (97% - 100%)    O2 Parameters below as of 17 Sep 2023 10:00  Patient On (Oxygen Delivery Method): room air      16 Sep 2023 07:01  -  17 Sep 2023 07:00  --------------------------------------------------------  IN:    Enteral Tube Flush: 340 mL    Heparin: 160.5 mL    IV PiggyBack: 300 mL    Peptamen A.F.: 890 mL    sodium chloride 0.9%: 180 mL  Total IN: 1870.5 mL    OUT:    Voided (mL): 1725 mL  Total OUT: 1725 mL    Total NET: 145.5 mL      17 Sep 2023 07:01  -  17 Sep 2023 11:13  --------------------------------------------------------  IN:    Heparin: 36 mL    Peptamen A.F.: 40 mL  Total IN: 76 mL    OUT:    Voided (mL): 100 mL  Total OUT: 100 mL    Total NET: -24 mL    MEDICATIONS  (STANDING):  chlorhexidine 2% Cloths 1 Application(s) Topical daily  doxazosin 1 milliGRAM(s) Oral at bedtime  heparin  Infusion 550 Unit(s)/Hr (9 mL/Hr) IV Continuous <Continuous>  influenza  Vaccine (HIGH DOSE) 0.7 milliLiter(s) IntraMuscular once  methimazole 5 milliGRAM(s) Oral <User Schedule>  metoprolol tartrate 25 milliGRAM(s) Oral every 12 hours    MEDICATIONS  (PRN):  acetaminophen     Tablet .. 650 milliGRAM(s) Oral every 6 hours PRN Temp greater or equal to 38C (100.4F), Mild Pain (1 - 3)  albuterol/ipratropium for Nebulization 3 milliLiter(s) Nebulizer every 6 hours PRN Shortness of Breath and/or Wheezing      09-17    142  |  109  |  10  ----------------------------<  114<H>  4.1   |  25  |  <0.5<L>    Ca    8.3<L>      17 Sep 2023 05:26  Phos  2.3     09-17  Mg     2.1     09-17    TPro  4.9<L>  /  Alb  3.0<L>  /  TBili  0.4  /  DBili  x   /  AST  21  /  ALT  7   /  AlkPhos  65  09-17                          9.4    9.02  )-----------( 248      ( 17 Sep 2023 05:26 )             29.5       STROKE CORE MEASURES:   HGBA1C- 5.3  LDL- 78   TRig- 120   TSH-.03   Free T4 - 2.1       IMAGING/DATA: [ X] Reviewed     VA Duplex Lower Ext Vein Scan, Bil[place] (09.13.23 @ 13:02) >  IMPRESSION:  No evidence of deep venous thrombosis in either lower extremity.      CT Abdomen and Pelvis No Cont (09.15.23 @ 16:46) >  IMPRESSION:    Multiple acute right lateraland posterior rib fractures as described.    Right greater than left pleural effusions. Bilateral bronchocentric   consolidative opacities, possibly infectious in etiology. Recommend   follow-up to resolution.    No evidence of retroperitoneal hematoma.           Xray Chest 1 View-PORTABLE IMMEDIATE (Xray Chest 1 View-PORTABLE IMMEDIATE .) (09.12.23 @ 22:26) >  Impression:    No radiographic evidence of acute cardiopulmonary disease.    Xray Chest 1 View-PORTABLE IMMEDIATE (Xray Chest 1 View-PORTABLE IMMEDIATE .) (09.15.23 @ 17:34) >    Impression:    Stable cardiomegaly. Bilateral opacities/pleural effusions, increased..       Xray Chest 1 View-PORTABLE IMMEDIATE (Xray Chest 1 View-PORTABLE IMMEDIATE .) (09.15.23 @ 17:34) >    Impression:    Stable cardiomegaly. Bilateral opacities/pleural effusions, increased..        CT Head No Cont (09.12.23 @ 20:33) >  IMPRESSION:  THE FOLLOWING REFLECTS THE AMENDED FINAL IMPRESSION:    1.  Residual IV contrast present from CTA and angiogram/thrombectomy.    2.  Parenchymal hyperdensity within bilateral thalami, left occipital and   temporal lobes, and cerebellar hemispheres; and sulcal hyperdensity   within the left occipital region. This likely predominantly reflects   contrast staining; underlying hemorrhage cannotbe excluded.    3.  Patchy appearance of the cerebellum suspicious for underlying   infarcts.    4.  Stable chronic microvascular ischemic changes and chronic left   parieto-occipital infarct.        CT Angio Neck Stroke Protocol w/ IV Cont (09.12.23 @ 17:40) >  IMPRESSION:    CT PERFUSION:  Ischemic tissue volume estimate of 63 cc in the area of the right   occipital/cerebellar areas, with core infarct estimate of 11 cc in the   area of the right occipital/cerebellar areas.    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    Severe stenosis of the right superior cerebellar artery.            EXAMINATION:  PHYSICAL EXAM:    Constitutional: No Acute Distress     Neurological: Awake, hypophonic, tracks B/L pupils 2mm and reactive B/L , oriented to name     Motor exam:          Upper extremity                         Delt     Bicep     Tricep    HG                                                 R         5/5        5/5        5/5       5/5                                               L          5/5        5/5        5/5       5/5          Lower extremity                        HF         KF        KE       DF         PF                                                  R        5/5        5/5        5/5       5/5         5/5                                               L         5/5 5/5 5/5 5/5 5/5                                                 Sensation: [ ] intact to light touch  [ ] decreased:       Pulmonary: Clear to Auscultation, No rales, No rhonchi, No wheezes     Cardiovascular: S1, S2, Regular rate and rhythm     Gastrointestinal: Soft, Non-tender, Non-distended     Extremities: No calf tenderness , bruising      SUMMARY:HPI:  The patient is an 83-year-old female with a past history of mood disorder, hallucination, hyperthyroidism, Afib, with a prior admission for syncope/ collapse at home, who presented with dysarthria, confusion, and left weakness and was noted to have a right perfusion deficit on CTP. The patient is a code NI actual, and will be taken for a planned diagnostic cerebral angiogram + mechanical thrombectomy with Dr. Mckeon.     S/P TNK - 1743 - 9/12/23    Admission scores:   NIHSS 32  mRS- 1      OVERNIGHT EVENTS:    No  new events  Cardiazem x1 for increased HR        REVIEW OF SYSTEMS: [ X] Unable to Assess due to neurologic exam     ICU Vital Signs Last 24 Hrs  T(C): 36.8 (17 Sep 2023 08:00), Max: 37.2 (16 Sep 2023 12:00)  T(F): 98.2 (17 Sep 2023 08:00), Max: 98.9 (16 Sep 2023 12:00)  HR: 103 (17 Sep 2023 10:00) (92 - 129)- afib   BP: 98/60 (17 Sep 2023 10:00) (96/51 - 138/59)  BP(mean): 73 (17 Sep 2023 10:00) (70 - 767)  RR: 17 (17 Sep 2023 10:00) (14 - 32)  SpO2: 99% (17 Sep 2023 10:00) (97% - 100%)    O2 Parameters below as of 17 Sep 2023 10:00  Patient On (Oxygen Delivery Method): room air      16 Sep 2023 07:01  -  17 Sep 2023 07:00  --------------------------------------------------------  IN:    Enteral Tube Flush: 340 mL    Heparin: 160.5 mL    IV PiggyBack: 300 mL    Peptamen A.F.: 890 mL    sodium chloride 0.9%: 180 mL  Total IN: 1870.5 mL    OUT:    Voided (mL): 1725 mL  Total OUT: 1725 mL    Total NET: 145.5 mL      17 Sep 2023 07:01  -  17 Sep 2023 11:13  --------------------------------------------------------  IN:    Heparin: 36 mL    Peptamen A.F.: 40 mL  Total IN: 76 mL    OUT:    Voided (mL): 100 mL  Total OUT: 100 mL    Total NET: -24 mL    MEDICATIONS  (STANDING):  chlorhexidine 2% Cloths 1 Application(s) Topical daily  doxazosin 1 milliGRAM(s) Oral at bedtime  heparin  Infusion 550 Unit(s)/Hr (9 mL/Hr) IV Continuous <Continuous>  influenza  Vaccine (HIGH DOSE) 0.7 milliLiter(s) IntraMuscular once  methimazole 5 milliGRAM(s) Oral <User Schedule>  metoprolol tartrate 25 milliGRAM(s) Oral every 12 hours    MEDICATIONS  (PRN):  acetaminophen     Tablet .. 650 milliGRAM(s) Oral every 6 hours PRN Temp greater or equal to 38C (100.4F), Mild Pain (1 - 3)  albuterol/ipratropium for Nebulization 3 milliLiter(s) Nebulizer every 6 hours PRN Shortness of Breath and/or Wheezing      09-17    142  |  109  |  10  ----------------------------<  114<H>  4.1   |  25  |  <0.5<L>    Ca    8.3<L>      17 Sep 2023 05:26  Phos  2.3     09-17  Mg     2.1     09-17    TPro  4.9<L>  /  Alb  3.0<L>  /  TBili  0.4  /  DBili  x   /  AST  21  /  ALT  7   /  AlkPhos  65  09-17                          9.4    9.02  )-----------( 248      ( 17 Sep 2023 05:26 )             29.5     PTT - ( 17 Sep 2023 02:26 )  PTT:38.4 sec      STROKE CORE MEASURES:   HGBA1C- 5.3  LDL- 78   TRig- 120   TSH-.03   Free T4 - 2.1       IMAGING/DATA: [ X] Reviewed     VA Duplex Lower Ext Vein Scan, [place] (09.13.23 @ 13:02) >  IMPRESSION:  No evidence of deep venous thrombosis in either lower extremity.      CT Abdomen and Pelvis No Cont (09.15.23 @ 16:46) >  IMPRESSION:    Multiple acute right lateraland posterior rib fractures as described.    Right greater than left pleural effusions. Bilateral bronchocentric   consolidative opacities, possibly infectious in etiology. Recommend   follow-up to resolution.    No evidence of retroperitoneal hematoma.           Xray Chest 1 View-PORTABLE IMMEDIATE (Xray Chest 1 View-PORTABLE IMMEDIATE .) (09.12.23 @ 22:26) >  Impression:    No radiographic evidence of acute cardiopulmonary disease.    Xray Chest 1 View-PORTABLE IMMEDIATE (Xray Chest 1 View-PORTABLE IMMEDIATE .) (09.15.23 @ 17:34) >    Impression:    Stable cardiomegaly. Bilateral opacities/pleural effusions, increased..       Xray Chest 1 View-PORTABLE IMMEDIATE (Xray Chest 1 View-PORTABLE IMMEDIATE .) (09.15.23 @ 17:34) >    Impression:    Stable cardiomegaly. Bilateral opacities/pleural effusions, increased..        CT Head No Cont (09.12.23 @ 20:33) >  IMPRESSION:  THE FOLLOWING REFLECTS THE AMENDED FINAL IMPRESSION:    1.  Residual IV contrast present from CTA and angiogram/thrombectomy.    2.  Parenchymal hyperdensity within bilateral thalami, left occipital and   temporal lobes, and cerebellar hemispheres; and sulcal hyperdensity   within the left occipital region. This likely predominantly reflects   contrast staining; underlying hemorrhage cannotbe excluded.    3.  Patchy appearance of the cerebellum suspicious for underlying   infarcts.    4.  Stable chronic microvascular ischemic changes and chronic left   parieto-occipital infarct.        CT Angio Neck Stroke Protocol w/ IV Cont (09.12.23 @ 17:40) >  IMPRESSION:    CT PERFUSION:  Ischemic tissue volume estimate of 63 cc in the area of the right   occipital/cerebellar areas, with core infarct estimate of 11 cc in the   area of the right occipital/cerebellar areas.    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    Severe stenosis of the right superior cerebellar artery.            EXAMINATION:  PHYSICAL EXAM:    Constitutional: No Acute Distress     Neurological: Awake, hypophonic, tracks B/L pupils 2mm and reactive B/L , oriented to name     Motor exam:          Upper extremity                         Delt     Bicep     Tricep    HG                                                 R         5/5        5/5        5/5       5/5                                               L          5/5        5/5        5/5       5/5          Lower extremity                        HF         KF        KE       DF         PF                                                  R        5/5 5/5 5/5 5/5 5/5                                               L         5/5 5/5 5/5 5/5 5/5                                                 Sensation: [ ] intact to light touch  [ ] decreased:       Pulmonary: Clear to Auscultation, No rales, No rhonchi, No wheezes     Cardiovascular: S1, S2, Regular rate and rhythm     Gastrointestinal: Soft, Non-tender, Non-distended     Extremities: No calf tenderness , bruising

## 2023-09-17 NOTE — PROGRESS NOTE ADULT - ASSESSMENT
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 Received TNK @ 1743. Taken to IR for thrombectomy,  POD #2  s/p thrombectomy of right SCA/BA with TICI 2C x3 passes.     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/ Hx of afib - noncompliant on meds in past   Gen neuro q 2   Neuroendovascular following  Ct in am - brain if no heme - start eloquis - 5mg q 12   Stroke Core Measures- as above   Activity:  [X] Activities as tolerated [x] PT [x] OT     PULM:  S/P Resp failure secondary to neuro injury   Extubated 9/14/23  Wean FIO2 to maintain Sats > 94%   HOB 30 degrees    CV: Afib/ flutter - S/P RVR  S/P Hypotension   SBP goal: 110-140 - on amadeo; wean as tolerates   ProBNP - 984  Lopressor 25mg q 12   Troponin - < .01 x2 neg   Echo -   TTE Echo Complete w/o Contrast w/ Doppler (09.13.23 @ 08:30) >  Summary:   1. LV Ejection Fraction by Ibarra's Method with a biplane EF of 63 %.   2. Normal global left ventricular systolic function.   3. Moderate to severe mitral valve regurgitation.   4. Mild-moderate tricuspid regurgitation.   5. Estimated pulmonary artery systolic pressure is 35.9 mmHg assuming a   right atrial pressure of 8 mmHg, which is consistent with borderline   pulmonary hypertension.   6. Normal left atrial size.   7. LA volume Index is 27.0 ml/m² ml/m2.   8. Normal right atrial size.   9. Compared to 6/13/2020 echo there is no significant change.  EKG - showing Afib with RVR     RENAL: Urinary retention / NAG met acidosis- diarrhea  Monitor UO   Cardura 1 mg qhs   D/C merida this PM   Replete electrolytes as needed   Na- 135- 145     GI:  Diet:  failed Speech and swallow eval   Peptamen af - goal 40cc/hr.    GI prophylaxis[- not indicated   Bowel regimen - held for diarrhea    ENDO:  Hyperthyroid  Methimazole - 5 mg q 12- monitor CBC   Goal euglycemia (-180)  Lipid profile - as above   HgBA1C- 5.3     HEME/ONC: Anemic -   Repeat CBC - stable   Heparin D- PTT - goal 50-70sec  Repeat at 0930.  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 Received TNK @ 1743. Taken to IR for thrombectomy,  POD #2  s/p thrombectomy of right SCA/BA with TICI 2C x3 passes.     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/ Hx of afib - noncompliant on meds in past   Gen neuro q 2   CT of Brain this am  MRI in am    Neuroendovascular following  Ct in am - brain if no heme - start eloquis - 5mg q 12   Stroke Core Measures- as above   Activity:  [X] Activities as tolerated [x] PT [x] OT     PULM:  S/P Resp failure secondary to neuro injury   Extubated 9/14/23  Wean FIO2 to maintain Sats > 94%   HOB 30 degrees    CV: Afib/ flutter - S/P RVR  S/P Hypotension   Heparin for afib  Monitor PTT   SBP goal: 110-140 - on amadeo; wean as tolerates   Lopressor 25mg q 8   Troponin - < .01 x2 neg   Echo -   TTE Echo Complete w/o Contrast w/ Doppler (09.13.23 @ 08:30) >  Summary:   1. LV Ejection Fraction by Ibarra's Method with a biplane EF of 63 %.   2. Normal global left ventricular systolic function.   3. Moderate to severe mitral valve regurgitation.   4. Mild-moderate tricuspid regurgitation.   5. Estimated pulmonary artery systolic pressure is 35.9 mmHg assuming a   right atrial pressure of 8 mmHg, which is consistent with borderline   pulmonary hypertension.   6. Normal left atrial size.   7. LA volume Index is 27.0 ml/m² ml/m2.   8. Normal right atrial size.   9. Compared to 6/13/2020 echo there is no significant change.  EKG - showing Afib with RVR     RENAL: Urinary retention / S/P NAG met acidosis- diarrhea  Monitor UO   Cardura 1 mg qhs   Bladder scan q6   D/C merida this PM   Replete electrolytes as needed   Na- 135- 145     GI:  Diet:  failed Speech and swallow eval   Peptamen af - goal 40cc/hr.    GI prophylaxis[- not indicated   Bowel regimen - held for diarrhea    ENDO:  Hyperthyroid  Methimazole - 5 mg q 12- monitor CBC   Goal euglycemia (-180)  Lipid profile - as above   HgBA1C- 5.3     HEME/ONC: Anemic -  Fe def W/U    Repeat CBC - stable   Heparin D- PTT - goal 50-70sec  Repeat at 0930.  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 Received TNK @ 1743. Taken to IR for thrombectomy,  POD #5  s/p thrombectomy of right SCA/BA with TICI 2C x3 passes.     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/ Hx of afib - noncompliant on meds in past   Gen neuro q 2   CT of Brain this am  MRI in am    Neuroendovascular following  Ct in am - brain if no heme - start eloquis - 5mg q 12   Stroke Core Measures- as above   Activity:  [X] Activities as tolerated [x] PT [x] OT     PULM:  S/P Resp failure secondary to neuro injury   Extubated 9/14/23  Wean FIO2 to maintain Sats > 94%   HOB 30 degrees    CV: Afib/ flutter - S/P RVR  S/P Hypotension   Heparin for afib  Monitor PTT   SBP goal: 110-140 - on amadeo; wean as tolerates   Lopressor 25mg q 8   Troponin - < .01 x2 neg   Echo -   TTE Echo Complete w/o Contrast w/ Doppler (09.13.23 @ 08:30) >  Summary:   1. LV Ejection Fraction by Ibarra's Method with a biplane EF of 63 %.   2. Normal global left ventricular systolic function.   3. Moderate to severe mitral valve regurgitation.   4. Mild-moderate tricuspid regurgitation.   5. Estimated pulmonary artery systolic pressure is 35.9 mmHg assuming a   right atrial pressure of 8 mmHg, which is consistent with borderline   pulmonary hypertension.   6. Normal left atrial size.   7. LA volume Index is 27.0 ml/m² ml/m2.   8. Normal right atrial size.   9. Compared to 6/13/2020 echo there is no significant change.  EKG - showing Afib with RVR     RENAL: Urinary retention / S/P NAG met acidosis- diarrhea  Monitor UO   Cardura 1 mg qhs   Bladder scan q6   D/C merida this PM   Replete electrolytes as needed   Na- 135- 145     GI:  Diet:  failed Speech and swallow eval   Peptamen af - goal 40cc/hr.    GI prophylaxis[- not indicated   Bowel regimen - held for diarrhea    ENDO:  Hyperthyroid  Methimazole - 5 mg q 12- monitor CBC   Goal euglycemia (-180)  Lipid profile - as above   HgBA1C- 5.3     HEME/ONC: Anemic -  Fe def W/U    Repeat CBC - stable   Heparin D- PTT - goal 50-70sec  Repeat at 0930.  VTE prophylaxis: [X] SCDs [X] chemoprophylaxis- Held AC   LE duplex - 9/13- neg for DVT    ID: S/P Fever- likely central   Maintain normothermia   procalcitonin- 0.15  Blood culture- neg   MRSA - neg    UA- neg , CXR , - neg   LFT - NL   Hold ABX for Now       CODE STATUS:  [X] Full Code    DISPOSITION:  [X] Stroke Unit

## 2023-09-18 DIAGNOSIS — E05.90 THYROTOXICOSIS, UNSPECIFIED WITHOUT THYROTOXIC CRISIS OR STORM: ICD-10-CM

## 2023-09-18 DIAGNOSIS — I48.91 UNSPECIFIED ATRIAL FIBRILLATION: ICD-10-CM

## 2023-09-18 DIAGNOSIS — R41.0 DISORIENTATION, UNSPECIFIED: ICD-10-CM

## 2023-09-18 DIAGNOSIS — I65.1 OCCLUSION AND STENOSIS OF BASILAR ARTERY: ICD-10-CM

## 2023-09-18 DIAGNOSIS — Z51.5 ENCOUNTER FOR PALLIATIVE CARE: ICD-10-CM

## 2023-09-18 LAB
ALBUMIN SERPL ELPH-MCNC: 3 G/DL — LOW (ref 3.5–5.2)
ALP SERPL-CCNC: 70 U/L — SIGNIFICANT CHANGE UP (ref 30–115)
ALT FLD-CCNC: 8 U/L — SIGNIFICANT CHANGE UP (ref 0–41)
ANION GAP SERPL CALC-SCNC: 10 MMOL/L — SIGNIFICANT CHANGE UP (ref 7–14)
APTT BLD: 118.6 SEC — CRITICAL HIGH (ref 27–39.2)
APTT BLD: 39.5 SEC — HIGH (ref 27–39.2)
APTT BLD: 92.7 SEC — CRITICAL HIGH (ref 27–39.2)
AST SERPL-CCNC: 22 U/L — SIGNIFICANT CHANGE UP (ref 0–41)
BASOPHILS # BLD AUTO: 0.03 K/UL — SIGNIFICANT CHANGE UP (ref 0–0.2)
BASOPHILS NFR BLD AUTO: 0.3 % — SIGNIFICANT CHANGE UP (ref 0–1)
BILIRUB SERPL-MCNC: 0.4 MG/DL — SIGNIFICANT CHANGE UP (ref 0.2–1.2)
BUN SERPL-MCNC: 12 MG/DL — SIGNIFICANT CHANGE UP (ref 10–20)
CALCIUM SERPL-MCNC: 8.3 MG/DL — LOW (ref 8.4–10.5)
CHLORIDE SERPL-SCNC: 105 MMOL/L — SIGNIFICANT CHANGE UP (ref 98–110)
CO2 SERPL-SCNC: 24 MMOL/L — SIGNIFICANT CHANGE UP (ref 17–32)
CREAT SERPL-MCNC: <0.5 MG/DL — LOW (ref 0.7–1.5)
CULTURE RESULTS: SIGNIFICANT CHANGE UP
CULTURE RESULTS: SIGNIFICANT CHANGE UP
EGFR: 99 ML/MIN/1.73M2 — SIGNIFICANT CHANGE UP
EOSINOPHIL # BLD AUTO: 0.29 K/UL — SIGNIFICANT CHANGE UP (ref 0–0.7)
EOSINOPHIL NFR BLD AUTO: 2.9 % — SIGNIFICANT CHANGE UP (ref 0–8)
FERRITIN SERPL-MCNC: 211 NG/ML — SIGNIFICANT CHANGE UP (ref 13–330)
GLUCOSE SERPL-MCNC: 126 MG/DL — HIGH (ref 70–99)
HCT VFR BLD CALC: 30.3 % — LOW (ref 37–47)
HGB BLD-MCNC: 9.8 G/DL — LOW (ref 12–16)
IMM GRANULOCYTES NFR BLD AUTO: 1 % — HIGH (ref 0.1–0.3)
LYMPHOCYTES # BLD AUTO: 2.7 K/UL — SIGNIFICANT CHANGE UP (ref 1.2–3.4)
LYMPHOCYTES # BLD AUTO: 26.6 % — SIGNIFICANT CHANGE UP (ref 20.5–51.1)
MAGNESIUM SERPL-MCNC: 1.8 MG/DL — SIGNIFICANT CHANGE UP (ref 1.8–2.4)
MCHC RBC-ENTMCNC: 29.3 PG — SIGNIFICANT CHANGE UP (ref 27–31)
MCHC RBC-ENTMCNC: 32.3 G/DL — SIGNIFICANT CHANGE UP (ref 32–37)
MCV RBC AUTO: 90.7 FL — SIGNIFICANT CHANGE UP (ref 81–99)
MONOCYTES # BLD AUTO: 0.68 K/UL — HIGH (ref 0.1–0.6)
MONOCYTES NFR BLD AUTO: 6.7 % — SIGNIFICANT CHANGE UP (ref 1.7–9.3)
NEUTROPHILS # BLD AUTO: 6.36 K/UL — SIGNIFICANT CHANGE UP (ref 1.4–6.5)
NEUTROPHILS NFR BLD AUTO: 62.5 % — SIGNIFICANT CHANGE UP (ref 42.2–75.2)
NRBC # BLD: 0 /100 WBCS — SIGNIFICANT CHANGE UP (ref 0–0)
PHOSPHATE SERPL-MCNC: 1.9 MG/DL — LOW (ref 2.1–4.9)
PLATELET # BLD AUTO: 305 K/UL — SIGNIFICANT CHANGE UP (ref 130–400)
PMV BLD: 9.3 FL — SIGNIFICANT CHANGE UP (ref 7.4–10.4)
POTASSIUM SERPL-MCNC: 4.1 MMOL/L — SIGNIFICANT CHANGE UP (ref 3.5–5)
POTASSIUM SERPL-SCNC: 4.1 MMOL/L — SIGNIFICANT CHANGE UP (ref 3.5–5)
PROT SERPL-MCNC: 5.1 G/DL — LOW (ref 6–8)
RBC # BLD: 3.34 M/UL — LOW (ref 4.2–5.4)
RBC # FLD: 13 % — SIGNIFICANT CHANGE UP (ref 11.5–14.5)
SODIUM SERPL-SCNC: 139 MMOL/L — SIGNIFICANT CHANGE UP (ref 135–146)
SPECIMEN SOURCE: SIGNIFICANT CHANGE UP
SPECIMEN SOURCE: SIGNIFICANT CHANGE UP
WBC # BLD: 10.16 K/UL — SIGNIFICANT CHANGE UP (ref 4.8–10.8)
WBC # FLD AUTO: 10.16 K/UL — SIGNIFICANT CHANGE UP (ref 4.8–10.8)

## 2023-09-18 PROCEDURE — 99232 SBSQ HOSP IP/OBS MODERATE 35: CPT | Mod: GC

## 2023-09-18 PROCEDURE — 99223 1ST HOSP IP/OBS HIGH 75: CPT

## 2023-09-18 RX ORDER — SENNA PLUS 8.6 MG/1
2 TABLET ORAL AT BEDTIME
Refills: 0 | Status: DISCONTINUED | OUTPATIENT
Start: 2023-09-18 | End: 2023-09-27

## 2023-09-18 RX ORDER — DIGOXIN 250 MCG
250 TABLET ORAL ONCE
Refills: 0 | Status: COMPLETED | OUTPATIENT
Start: 2023-09-18 | End: 2023-09-18

## 2023-09-18 RX ORDER — MAGNESIUM SULFATE 500 MG/ML
2 VIAL (ML) INJECTION ONCE
Refills: 0 | Status: COMPLETED | OUTPATIENT
Start: 2023-09-18 | End: 2023-09-18

## 2023-09-18 RX ORDER — MAGNESIUM SULFATE 500 MG/ML
2 VIAL (ML) INJECTION
Refills: 0 | Status: DISCONTINUED | OUTPATIENT
Start: 2023-09-18 | End: 2023-09-18

## 2023-09-18 RX ORDER — POTASSIUM PHOSPHATE, MONOBASIC POTASSIUM PHOSPHATE, DIBASIC 236; 224 MG/ML; MG/ML
15 INJECTION, SOLUTION INTRAVENOUS ONCE
Refills: 0 | Status: COMPLETED | OUTPATIENT
Start: 2023-09-18 | End: 2023-09-18

## 2023-09-18 RX ORDER — METOPROLOL TARTRATE 50 MG
25 TABLET ORAL EVERY 8 HOURS
Refills: 0 | Status: DISCONTINUED | OUTPATIENT
Start: 2023-09-18 | End: 2023-09-20

## 2023-09-18 RX ORDER — METOPROLOL TARTRATE 50 MG
5 TABLET ORAL ONCE
Refills: 0 | Status: COMPLETED | OUTPATIENT
Start: 2023-09-18 | End: 2023-09-18

## 2023-09-18 RX ADMIN — Medication 25 MILLIGRAM(S): at 21:16

## 2023-09-18 RX ADMIN — SENNA PLUS 2 TABLET(S): 8.6 TABLET ORAL at 21:16

## 2023-09-18 RX ADMIN — POTASSIUM PHOSPHATE, MONOBASIC POTASSIUM PHOSPHATE, DIBASIC 63.75 MILLIMOLE(S): 236; 224 INJECTION, SOLUTION INTRAVENOUS at 10:12

## 2023-09-18 RX ADMIN — Medication 25 MILLIGRAM(S): at 05:10

## 2023-09-18 RX ADMIN — Medication 250 MICROGRAM(S): at 10:12

## 2023-09-18 RX ADMIN — Medication 650 MILLIGRAM(S): at 13:05

## 2023-09-18 RX ADMIN — Medication 5 MILLIGRAM(S): at 14:10

## 2023-09-18 RX ADMIN — Medication 25 GRAM(S): at 17:34

## 2023-09-18 RX ADMIN — CHLORHEXIDINE GLUCONATE 1 APPLICATION(S): 213 SOLUTION TOPICAL at 05:11

## 2023-09-18 RX ADMIN — Medication 650 MILLIGRAM(S): at 12:17

## 2023-09-18 RX ADMIN — Medication 250 MICROGRAM(S): at 17:34

## 2023-09-18 NOTE — SWALLOW BEDSIDE ASSESSMENT ADULT - SLP GENERAL OBSERVATIONS
Pt received in bed awake and alert, pulled NGT out several times this weekend, will f/u w/ FEES to further assess jarod-pharyngeal swallow integrity

## 2023-09-18 NOTE — PROGRESS NOTE ADULT - SUBJECTIVE AND OBJECTIVE BOX
SUMMARY:HPI:  The patient is an 83-year-old female with a past history of mood disorder, hallucination, hyperthyroidism, Afib, with a prior admission for syncope/ collapse at home, who presented with dysarthria, confusion, and left weakness and was noted to have a right perfusion deficit on CTP. The patient is a code NI actual, and will be taken for a planned diagnostic cerebral angiogram + mechanical thrombectomy with Dr. Mckeon.     S/P TNK - 1743 - 9/12/23    Admission scores:   NIHSS 32  mRS- 1      OVERNIGHT EVENTS:    No  new events  Cardiazem x1 for increased HR        REVIEW OF SYSTEMS: [ X] Unable to Assess due to neurologic exam     ICU Vital Signs Last 24 Hrs  T(C): 36.8 (17 Sep 2023 08:00), Max: 37.2 (16 Sep 2023 12:00)  T(F): 98.2 (17 Sep 2023 08:00), Max: 98.9 (16 Sep 2023 12:00)  HR: 103 (17 Sep 2023 10:00) (92 - 129)- afib   BP: 98/60 (17 Sep 2023 10:00) (96/51 - 138/59)  BP(mean): 73 (17 Sep 2023 10:00) (70 - 767)  RR: 17 (17 Sep 2023 10:00) (14 - 32)  SpO2: 99% (17 Sep 2023 10:00) (97% - 100%)    O2 Parameters below as of 17 Sep 2023 10:00  Patient On (Oxygen Delivery Method): room air      16 Sep 2023 07:01  -  17 Sep 2023 07:00  --------------------------------------------------------  IN:    Enteral Tube Flush: 340 mL    Heparin: 160.5 mL    IV PiggyBack: 300 mL    Peptamen A.F.: 890 mL    sodium chloride 0.9%: 180 mL  Total IN: 1870.5 mL    OUT:    Voided (mL): 1725 mL  Total OUT: 1725 mL    Total NET: 145.5 mL      17 Sep 2023 07:01  -  17 Sep 2023 11:13  --------------------------------------------------------  IN:    Heparin: 36 mL    Peptamen A.F.: 40 mL  Total IN: 76 mL    OUT:    Voided (mL): 100 mL  Total OUT: 100 mL    Total NET: -24 mL    MEDICATIONS  (STANDING):  chlorhexidine 2% Cloths 1 Application(s) Topical daily  doxazosin 1 milliGRAM(s) Oral at bedtime  heparin  Infusion 550 Unit(s)/Hr (9 mL/Hr) IV Continuous <Continuous>  influenza  Vaccine (HIGH DOSE) 0.7 milliLiter(s) IntraMuscular once  methimazole 5 milliGRAM(s) Oral <User Schedule>  metoprolol tartrate 25 milliGRAM(s) Oral every 12 hours    MEDICATIONS  (PRN):  acetaminophen     Tablet .. 650 milliGRAM(s) Oral every 6 hours PRN Temp greater or equal to 38C (100.4F), Mild Pain (1 - 3)  albuterol/ipratropium for Nebulization 3 milliLiter(s) Nebulizer every 6 hours PRN Shortness of Breath and/or Wheezing      09-17    142  |  109  |  10  ----------------------------<  114<H>  4.1   |  25  |  <0.5<L>    Ca    8.3<L>      17 Sep 2023 05:26  Phos  2.3     09-17  Mg     2.1     09-17    TPro  4.9<L>  /  Alb  3.0<L>  /  TBili  0.4  /  DBili  x   /  AST  21  /  ALT  7   /  AlkPhos  65  09-17                          9.4    9.02  )-----------( 248      ( 17 Sep 2023 05:26 )             29.5     PTT - ( 17 Sep 2023 02:26 )  PTT:38.4 sec      STROKE CORE MEASURES:   HGBA1C- 5.3  LDL- 78   TRig- 120   TSH-.03   Free T4 - 2.1       IMAGING/DATA: [ X] Reviewed     VA Duplex Lower Ext Vein Scan, [place] (09.13.23 @ 13:02) >  IMPRESSION:  No evidence of deep venous thrombosis in either lower extremity.      CT Abdomen and Pelvis No Cont (09.15.23 @ 16:46) >  IMPRESSION:    Multiple acute right lateraland posterior rib fractures as described.    Right greater than left pleural effusions. Bilateral bronchocentric   consolidative opacities, possibly infectious in etiology. Recommend   follow-up to resolution.    No evidence of retroperitoneal hematoma.           Xray Chest 1 View-PORTABLE IMMEDIATE (Xray Chest 1 View-PORTABLE IMMEDIATE .) (09.12.23 @ 22:26) >  Impression:    No radiographic evidence of acute cardiopulmonary disease.    Xray Chest 1 View-PORTABLE IMMEDIATE (Xray Chest 1 View-PORTABLE IMMEDIATE .) (09.15.23 @ 17:34) >    Impression:    Stable cardiomegaly. Bilateral opacities/pleural effusions, increased..       Xray Chest 1 View-PORTABLE IMMEDIATE (Xray Chest 1 View-PORTABLE IMMEDIATE .) (09.15.23 @ 17:34) >    Impression:    Stable cardiomegaly. Bilateral opacities/pleural effusions, increased..        CT Head No Cont (09.12.23 @ 20:33) >  IMPRESSION:  THE FOLLOWING REFLECTS THE AMENDED FINAL IMPRESSION:    1.  Residual IV contrast present from CTA and angiogram/thrombectomy.    2.  Parenchymal hyperdensity within bilateral thalami, left occipital and   temporal lobes, and cerebellar hemispheres; and sulcal hyperdensity   within the left occipital region. This likely predominantly reflects   contrast staining; underlying hemorrhage cannotbe excluded.    3.  Patchy appearance of the cerebellum suspicious for underlying   infarcts.    4.  Stable chronic microvascular ischemic changes and chronic left   parieto-occipital infarct.        CT Angio Neck Stroke Protocol w/ IV Cont (09.12.23 @ 17:40) >  IMPRESSION:    CT PERFUSION:  Ischemic tissue volume estimate of 63 cc in the area of the right   occipital/cerebellar areas, with core infarct estimate of 11 cc in the   area of the right occipital/cerebellar areas.    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    Severe stenosis of the right superior cerebellar artery.            EXAMINATION:  PHYSICAL EXAM:    Constitutional: No Acute Distress     Neurological: Awake, hypophonic, tracks B/L pupils 2mm and reactive B/L , oriented to name     Motor exam:          Upper extremity                         Delt     Bicep     Tricep    HG                                                 R         5/5        5/5        5/5       5/5                                               L          5/5        5/5        5/5       5/5          Lower extremity                        HF         KF        KE       DF         PF                                                  R        5/5 5/5 5/5 5/5 5/5                                               L         5/5 5/5 5/5 5/5 5/5                                                 Sensation: [ ] intact to light touch  [ ] decreased:       Pulmonary: Clear to Auscultation, No rales, No rhonchi, No wheezes     Cardiovascular: S1, S2, Regular rate and rhythm     Gastrointestinal: Soft, Non-tender, Non-distended     Extremities: No calf tenderness , bruising      SUMMARY:HPI:  The patient is an 83-year-old female with a past history of mood disorder, hallucination, hyperthyroidism, Afib, with a prior admission for syncope/ collapse at home, who presented with dysarthria, confusion, and left weakness and was noted to have a right perfusion deficit on CTP. The patient is a code NI actual, and will be taken for a planned diagnostic cerebral angiogram + mechanical thrombectomy with Dr. Mckeon.     S/P TNK - 1743 - 9/12/23    Admission scores:   NIHSS 32  mRS- 1      OVERNIGHT EVENTS:  Agitated o/n    ICU Vital Signs Last 24 Hrs  T(C): 35.9 (18 Sep 2023 08:00), Max: 37.3 (18 Sep 2023 04:00)  T(F): 96.6 (18 Sep 2023 08:00), Max: 99.2 (18 Sep 2023 04:00)  HR: 125 (18 Sep 2023 08:00) (94 - 143)  BP: 108/78 (18 Sep 2023 08:00) (90/57 - 139/67)  BP(mean): 90 (18 Sep 2023 08:00) (70 - 105)  ABP: --  ABP(mean): --  RR: 29 (18 Sep 2023 08:00) (17 - 29)  SpO2: 97% (18 Sep 2023 08:00) (97% - 99%)      09-17-23 @ 07:01  -  09-18-23 @ 07:00  --------------------------------------------------------  IN: 1046 mL / OUT: 1750 mL / NET: -704 mL    09-18-23 @ 07:01  -  09-18-23 @ 08:53  --------------------------------------------------------  IN: 51 mL / OUT: 0 mL / NET: 51 mL          LABS:  Na: 139 (09-18 @ 05:03), 142 (09-17 @ 05:26), 140 (09-16 @ 05:13)  K: 4.1 (09-18 @ 05:03), 4.1 (09-17 @ 05:26), 3.8 (09-16 @ 05:13)  Cl: 105 (09-18 @ 05:03), 109 (09-17 @ 05:26), 109 (09-16 @ 05:13)  CO2: 24 (09-18 @ 05:03), 25 (09-17 @ 05:26), 20 (09-16 @ 05:13)  BUN: 12 (09-18 @ 05:03), 10 (09-17 @ 05:26), 11 (09-16 @ 05:13)  Cr: <0.5 (09-18 @ 05:03), <0.5 (09-17 @ 05:26), <0.5 (09-16 @ 05:13)  Glu: 126(09-18 @ 05:03), 114(09-17 @ 05:26), 70(09-16 @ 05:13)    Hgb: 9.8 (09-18 @ 05:03), 9.4 (09-17 @ 05:26), 9.0 (09-16 @ 05:13), 9.1 (09-15 @ 11:46)  Hct: 30.3 (09-18 @ 05:03), 29.5 (09-17 @ 05:26), 28.1 (09-16 @ 05:13), 28.9 (09-15 @ 11:46)  WBC: 10.16 (09-18 @ 05:03), 9.02 (09-17 @ 05:26), 10.32 (09-16 @ 05:13), 11.42 (09-15 @ 11:46)  Plt: 305 (09-18 @ 05:03), 248 (09-17 @ 05:26), 210 (09-16 @ 05:13), 199 (09-15 @ 11:46)    INR:   PTT: 118.6 09-18-23 @ 05:03, 44.4 09-17-23 @ 21:49, 68.5 09-17-23 @ 19:02, 32.4 09-17-23 @ 11:50, 38.4 09-17-23 @ 02:26, 32.3 09-16-23 @ 17:52, 27.2 09-16-23 @ 11:06, 30.2 09-16-23 @ 00:26    LIVER FUNCTIONS - ( 18 Sep 2023 05:03 )  Alb: 3.0 g/dL / Pro: 5.1 g/dL / ALK PHOS: 70 U/L / ALT: 8 U/L / AST: 22 U/L / GGT: x           MEDICATIONS  (STANDING):  chlorhexidine 2% Cloths 1 Application(s) Topical daily  doxazosin 1 milliGRAM(s) Oral at bedtime  heparin  Infusion 550 Unit(s)/Hr (11 mL/Hr) IV Continuous <Continuous>  influenza  Vaccine (HIGH DOSE) 0.7 milliLiter(s) IntraMuscular once  methimazole 5 milliGRAM(s) Oral <User Schedule>  metoprolol tartrate 25 milliGRAM(s) Oral every 8 hours    MEDICATIONS  (PRN):  acetaminophen     Tablet .. 650 milliGRAM(s) Oral every 6 hours PRN Temp greater or equal to 38C (100.4F), Mild Pain (1 - 3)  albuterol/ipratropium for Nebulization 3 milliLiter(s) Nebulizer every 6 hours PRN Shortness of Breath and/or Wheezing      STROKE CORE MEASURES:   HGBA1C- 5.3  LDL- 78   TRig- 120   TSH-.03   Free T4 - 2.1       IMAGING/DATA: [ X] Reviewed    EXAMINATION:  PHYSICAL EXAM:    Constitutional: No Acute Distress     Neurological: Awake, hypophonic, tracks B/L pupils 2mm and reactive B/L , oriented to name     Motor exam:          Upper extremity                         Delt     Bicep     Tricep    HG                                                 R         5/5        5/5        5/5       5/5                                               L          5/5        5/5        5/5       5/5          Lower extremity                        HF         KF        KE       DF         PF                                                  R        5/5        5/5        5/5       5/5         5/5                                               L         5/5        5/5       5/5       5/5          5/5                                                 Sensation: [ ] intact to light touch  [ ] decreased:       Pulmonary: Clear to Auscultation, No rales, No rhonchi, No wheezes     Cardiovascular: S1, S2, Regular rate and rhythm     Gastrointestinal: Soft, Non-tender, Non-distended     Extremities: No calf tenderness , bruising

## 2023-09-18 NOTE — SWALLOW BEDSIDE ASSESSMENT ADULT - SWALLOW EVAL: DIAGNOSIS
+improved cognition and responsiveness, following commands, educated on POC, pt wants to eat, agreed to further testing

## 2023-09-18 NOTE — CONSULT NOTE ADULT - ASSESSMENT
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 Patient is clinically improving; speaking and following basic commands.     Per primary team, consultation to provide support to patient and family. Will continue to develop rapport prior to having more GOC discussions. If patient's mentation continues to improve, she might be able to participate in these discussions.     # basilar artery occlusion s/p thrombectomy  # Afib  # hyperthyroidism  # delirium  # encounter for palliative care    Plan:   - remove restraints as able; per primary team  - patient/family does not endorse other symptoms at this time  - will continue goals of care discussions    Palliative care will continue to follow.   Please call [phone] with questions or concerns 24/7.

## 2023-09-18 NOTE — PHARMACOTHERAPY INTERVENTION NOTE - COMMENTS
Patient extubated, recommend to d/c pepcid 
Spoke to neurosx PA [id number] to clarify frequency and duration of Magnesium Sulfate 2 grams IV order.
Called [id number] for medication reconciliation   - risperidone 0.5 mg BID (has not picked up since May 2023)   Called son to confirm patient does not  medications from any other pharmacy

## 2023-09-18 NOTE — SWALLOW FEES ASSESSMENT ADULT - ROSENBEK'S PENETRATION ASPIRATION SCALE
w/ mildly thick (1) w/ puree and minced/(3) material remains above the vocal cords, visible residue remains (penetration) (8) material passes glottis, visible subglottic residue remains, absent patient response (aspiration)

## 2023-09-18 NOTE — PROGRESS NOTE ADULT - NS ATTEST RISK PROBLEM GEN_ALL_CORE FT
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 Received TNK @ 6156. Taken to IR for thrombectomy,  s/p thrombectomy of right SCA/BA with TICI 2C x3 passes.     c/b cerebellar strokes, agitation  afib rvr      dispo- stroke unit

## 2023-09-18 NOTE — SPEECH LANGUAGE PATHOLOGY EVALUATION - SLP DIAGNOSIS
Pt presents w/ moderate receptive/expressive language deficits and moderate dysarthria. Further testing warranted

## 2023-09-18 NOTE — CONSULT NOTE ADULT - SUBJECTIVE AND OBJECTIVE BOX
HPI:  The patient is an 83-year-old female with a past history of mood disorder, hallucination, hyperthyroidism, Afib, with a prior admission for syncope/ collapse at home, who presented with dysarthria, confusion, and left weakness and was noted to have a right perfusion deficit on CTP. The patient is a code NI actual, and will be taken for a planned diagnostic cerebral angiogram + mechanical thrombectomy with Dr. Mckeon.         PERTINENT PM/SXH:   Thyroid disease        FAMILY HISTORY:    ITEMS NOT CHECKED ARE NOT PRESENT    SOCIAL HISTORY:   Significant other/partner[ ]  Children[ ]  Jainism/Spirituality:  Substance hx:  [ ]   Tobacco hx:  [ ]   Alcohol hx: [ ]   Living Situation: [ ]Home  [ ]Long term care  [ ]Rehab [ ]Other  Home Services: [ ] HHA [ ] Hung RN [ ] Hospice  Occupation:  Home Opioid hx:  [ ] Y [ ] N [ ] I-Stop Reference No:    ADVANCE DIRECTIVES:    [ ] Full Code [ ] DNR  MOLST  [ ]  Living Will  [ ]   DECISION MAKER(s):  [ ] Health Care Proxy(s)  [ ] Surrogate(s)  [ ] Guardian           Name(s): Phone Number(s):    BASELINE (I)ADL(s) (prior to admission):  [place]: [ ]Total  [ ] Moderate [ ]Dependent  Palliative Performance Status Version 2:         %    http://npcrc.org/files/news/palliative_performance_scale_ppsv2.pdf    Allergies    No Known Allergies    Intolerances    MEDICATIONS  (STANDING):  chlorhexidine 2% Cloths 1 Application(s) Topical daily  digoxin  Injectable 250 MICROGram(s) IV Push once  heparin  Infusion 550 Unit(s)/Hr (10 mL/Hr) IV Continuous <Continuous>  influenza  Vaccine (HIGH DOSE) 0.7 milliLiter(s) IntraMuscular once  magnesium sulfate  IVPB 2 Gram(s) IV Intermittent once  methimazole 5 milliGRAM(s) Oral <User Schedule>  metoprolol tartrate 25 milliGRAM(s) Oral every 8 hours  senna 2 Tablet(s) Oral at bedtime    MEDICATIONS  (PRN):  acetaminophen     Tablet .. 650 milliGRAM(s) Oral every 6 hours PRN Temp greater or equal to 38C (100.4F), Mild Pain (1 - 3)      PRESENT SYMPTOMS: [ ]Unable to obtain due to poor mentation   Source if other than patient:  [ ]Family   [ ]Team     Pain: [ ]yes [ ]no  QOL impact -   Location -                    Aggravating factors -  Quality -  Radiation -  Timing-  Severity (0-10 scale):  Minimal acceptable level (0-10 scale):     CPOT:    https://www.[facility].org/getattachment/[email or web address]/Critical-Care-Pain-Observation-Tool-(CPOT)    PAIN AD Score:   http://geriatrictoolkit.[facility]/cog/painad.pdf     Dyspnea:                           [ ]None[ ]Mild [ ]Moderate [ ]Severe     Respiratory Distress Observation Scale (RDOS):   A score of 0 to 2 signifies little or no respiratory distress, 3 signifies mild distress, scores 4 to 6 indicate moderate distress, and scores greater than 7 signify severe distress  https://www.[facility].ca/sites/default/files/PDFS/646841-uvwujaqtmol-ftxdiomt-omoxkotmxgt-njclk.pdf    Anxiety:                             [ ]None[ ]Mild [ ]Moderate [ ]Severe   Fatigue:                             [ ]None[ ]Mild [ ]Moderate [ ]Severe   Nausea:                             [ ]None[ ]Mild [ ]Moderate [ ]Severe   Loss of appetite:              [ ]None[ ]Mild [ ]Moderate [ ]Severe   Constipation:                    [ ]None[ ]Mild [ ]Moderate [ ]Severe    Other Symptoms:  [ ]All other review of systems negative     Palliative Performance Status Version 2:         %    http://[facility].org/files/news/palliative_performance_scale_ppsv2.pdf  PHYSICAL EXAM:  Vital Signs Last 24 Hrs  T(C): 36.1 (18 Sep 2023 16:00), Max: 37.3 (18 Sep 2023 04:00)  T(F): 97 (18 Sep 2023 16:00), Max: 99.2 (18 Sep 2023 04:00)  HR: 109 (18 Sep 2023 16:00) (96 - 143)  BP: 126/81 (18 Sep 2023 16:00) (90/57 - 130/89)  BP(mean): 101 (18 Sep 2023 16:00) (70 - 917)  RR: 26 (18 Sep 2023 16:00) (17 - 29)  SpO2: 99% (18 Sep 2023 16:00) (97% - 99%)    Parameters below as of 18 Sep 2023 16:00  Patient On (Oxygen Delivery Method): room air     I&O's Summary    17 Sep 2023 07:01  -  18 Sep 2023 07:00  --------------------------------------------------------  IN: 1046 mL / OUT: 1750 mL / NET: -704 mL    18 Sep 2023 07:01  -  18 Sep 2023 16:44  --------------------------------------------------------  IN: 432 mL / OUT: 900 mL / NET: -468 mL        GENERAL:  NAD   EYES: EOMI, no scleral icterus  ENMT:  No external oral lesions  CARDIOVASCULAR:  Well perfused  PULMONARY:  Non labored breathing  GASTROINTESTINAL: Non distended  NEUROLOGIC: Grossly intact  BEHAVIORAL/PSYCH:  Calm. AOx4  SKIN: No rash on exposed skin    CRITICAL CARE:  [ ] Shock Present  [ ]Septic [ ]Cardiogenic [ ]Neurologic [ ]Hypovolemic  [ ]  Vasopressors [ ]  Inotropes   [ ]Respiratory failure present [ ]Mechanical ventilation [ ]Non-invasive ventilatory support [ ]High flow  [ ]Acute  [ ]Chronic [ ]Hypoxic  [ ]Hypercarbic [ ]Other  [ ]Other organ failure     LABS: reviewed by me                        9.8    10.16 )-----------( 305      ( 18 Sep 2023 05:03 )             30.3   09-18    139  |  105  |  12  ----------------------------<  126<H>  4.1   |  24  |  <0.5<L>    Ca    8.3<L>      18 Sep 2023 05:03  Phos  1.9     09-18  Mg     1.8     09-18    TPro  5.1<L>  /  Alb  3.0<L>  /  TBili  0.4  /  DBili  x   /  AST  22  /  ALT  8   /  AlkPhos  70  09-18  PTT - ( 18 Sep 2023 08:05 )  PTT:92.7 sec    Urinalysis Basic - ( 18 Sep 2023 05:03 )    Color: x / Appearance: x / SG: x / pH: x  Gluc: 126 mg/dL / Ketone: x  / Bili: x / Urobili: x   Blood: x / Protein: x / Nitrite: x   Leuk Esterase: x / RBC: x / WBC x   Sq Epi: x / Non Sq Epi: x / Bacteria: x      RADIOLOGY & ADDITIONAL STUDIES: reviewed by me    PROTEIN CALORIE MALNUTRITION PRESENT: [ ]mild [ ]moderate [ ]severe [ ]underweight [ ]morbid obesity  https://www.andeal.org/vault/2440/web/files/ONC/Table_Clinical%20Characteristics%20to%20Document%20Malnutrition-White%20JV%20et%20al%202012.pdf    Height (cm): 152 (09-12-23 @ 21:00)  Weight (kg): 47.1 (09-12-23 @ 21:00)  BMI (kg/m2): 20.4 (09-12-23 @ 21:00)    [ ]PPSV2 < or = to 30% [ ]significant weight loss  [ ]poor nutritional intake  [ ]anasarca      [ ]Artificial Nutrition      Palliative Care Spiritual/Emotional Screening Tool Question  Severity (0-4):                    OR                    [ x] Unable to determine. Will assess at later time if appropriate.  Score of 2 or greater indicates recommendation of Chaplaincy and/or SW referral  Chaplaincy Referral: [ ] Yes [ ] Refused [ ] Following     Caregiver [place]:  [ ] Yes [ ] No    OR    [x ] Unable to determine. Will assess at later time if appropriate.  Social Work Referral [ ]  Patient and Family Centered Care Referral [ ]    Anticipatory Grief Present: [ ] Yes [ ] No    OR     [ x] Unable to determine. Will assess at later time if appropriate.  Social Work Referral [ ]  Patient and Family Centered Care Referral [ ]    REFERRALS:   [ ]Chaplaincy  [ ]Hospice  [ ]Child Life  [ ]Social Work  [ ]Case management [ ]Holistic Therapy     Palliative care education provided to patient and/or family    _____________  Soto Ryan MD  Palliative Medicine  [facility]   of Geriatric and Palliative Medicine  [phone]   HPI:  The patient is an 83-year-old female with a past history of mood disorder, hallucination, hyperthyroidism, Afib, with a prior admission for syncope/ collapse at home, who presented with dysarthria, confusion, and left weakness and was noted to have a right perfusion deficit on CTP. The patient is a code NI actual, and will be taken for a planned diagnostic cerebral angiogram + mechanical thrombectomy with Dr. Mckeon.     NIHSS 32 on admission. Patient is clinically doing better after thrombectomy. Discussed with primary team, if she is compliant with medication/treatments, she should make good recovery.     Met with patient this afternoon. Oriented to person and place; said it is 1923. Patient is frustrated that she is in restraints. Her son is at bedside and agrees that she is improving. In terms of mentation, he thinks that she is maybe "assisted" compared to her baseline. Prior to the stroke, patient was living at home with son and . She would need some assistance with ADLs and relied on a walker. Son asks about disposition planning.     PERTINENT PM/SXH:   Thyroid disease        FAMILY HISTORY: unable to obtain    ITEMS NOT CHECKED ARE NOT PRESENT    SOCIAL HISTORY:   Lives at home with  and son.     ADVANCE DIRECTIVES:    [ ] Full Code [ ] DNR  MOLST  [ ]  Living Will  [ ]   DECISION MAKER(s):  [ ] Health Care Proxy(s)  [ ] Surrogate(s)  [ ] Guardian           Name(s): Phone Number(s):    BASELINE (I)ADL(s) (prior to admission):  [place]: [ ]Total  [ ] Moderate [ ]Dependent  Palliative Performance Status Version 2:         %    http://npcrc.org/files/news/palliative_performance_scale_ppsv2.pdf    Allergies    No Known Allergies    Intolerances    MEDICATIONS  (STANDING):  chlorhexidine 2% Cloths 1 Application(s) Topical daily  digoxin  Injectable 250 MICROGram(s) IV Push once  heparin  Infusion 550 Unit(s)/Hr (10 mL/Hr) IV Continuous <Continuous>  influenza  Vaccine (HIGH DOSE) 0.7 milliLiter(s) IntraMuscular once  magnesium sulfate  IVPB 2 Gram(s) IV Intermittent once  methimazole 5 milliGRAM(s) Oral <User Schedule>  metoprolol tartrate 25 milliGRAM(s) Oral every 8 hours  senna 2 Tablet(s) Oral at bedtime    MEDICATIONS  (PRN):  acetaminophen     Tablet .. 650 milliGRAM(s) Oral every 6 hours PRN Temp greater or equal to 38C (100.4F), Mild Pain (1 - 3)      PRESENT SYMPTOMS: [x ]Unable to obtain due to poor mentation   Source if other than patient:  [ ]Family   [ ]Team     Pain: [ ]yes [ ]no  QOL impact -   Location -                    Aggravating factors -  Quality -  Radiation -  Timing-  Severity (0-10 scale):  Minimal acceptable level (0-10 scale):     CPOT:  1  https://www.[facility].org/getattachment/[email or web address]/Critical-Care-Pain-Observation-Tool-(CPOT)    PAIN AD Score:   http://geriatrictoolkit.[facility]/cog/painad.pdf     Dyspnea:                           [ ]None[ ]Mild [ ]Moderate [ ]Severe     Respiratory Distress Observation Scale (RDOS): 2  A score of 0 to 2 signifies little or no respiratory distress, 3 signifies mild distress, scores 4 to 6 indicate moderate distress, and scores greater than 7 signify severe distress  https://www.[facility].ca/sites/default/files/PDFS/621268-chjsqnvvmuz-yhqnfanq-ruovtklyfzo-oktzm.pdf    Anxiety:                             [ ]None[ ]Mild [ ]Moderate [ ]Severe   Fatigue:                             [ ]None[ ]Mild [ ]Moderate [ ]Severe   Nausea:                             [ ]None[ ]Mild [ ]Moderate [ ]Severe   Loss of appetite:              [ ]None[ ]Mild [ ]Moderate [ ]Severe   Constipation:                    [ ]None[ ]Mild [ ]Moderate [ ]Severe    Other Symptoms:  [x ]All other review of systems negative     Palliative Performance Status Version 2:         %    http://npcrc.org/files/news/palliative_performance_scale_ppsv2.pdf  PHYSICAL EXAM:  Vital Signs Last 24 Hrs  T(C): 36.1 (18 Sep 2023 16:00), Max: 37.3 (18 Sep 2023 04:00)  T(F): 97 (18 Sep 2023 16:00), Max: 99.2 (18 Sep 2023 04:00)  HR: 109 (18 Sep 2023 16:00) (96 - 143)  BP: 126/81 (18 Sep 2023 16:00) (90/57 - 130/89)  BP(mean): 101 (18 Sep 2023 16:00) (70 - 917)  RR: 26 (18 Sep 2023 16:00) (17 - 29)  SpO2: 99% (18 Sep 2023 16:00) (97% - 99%)    Parameters below as of 18 Sep 2023 16:00  Patient On (Oxygen Delivery Method): room air     I&O's Summary    17 Sep 2023 07:01  -  18 Sep 2023 07:00  --------------------------------------------------------  IN: 1046 mL / OUT: 1750 mL / NET: -704 mL    18 Sep 2023 07:01  -  18 Sep 2023 16:44  --------------------------------------------------------  IN: 432 mL / OUT: 900 mL / NET: -468 mL        GENERAL:  NAD   EYES: EOMI, no scleral icterus  ENMT:  No external oral lesions  CARDIOVASCULAR:  Well perfused  PULMONARY:  Non labored breathing  GASTROINTESTINAL: Non distended  NEUROLOGIC: Grossly intact  BEHAVIORAL/PSYCH:  Calm. AOx2. Mild restlessness due to restraints  SKIN: No rash on exposed skin    CRITICAL CARE:  [ ] Shock Present  [ ]Septic [ ]Cardiogenic [ ]Neurologic [ ]Hypovolemic  [ ]  Vasopressors [ ]  Inotropes   [ ]Respiratory failure present [ ]Mechanical ventilation [ ]Non-invasive ventilatory support [ ]High flow  [ ]Acute  [ ]Chronic [ ]Hypoxic  [ ]Hypercarbic [ ]Other  [ ]Other organ failure     LABS: reviewed by me                        9.8    10.16 )-----------( 305      ( 18 Sep 2023 05:03 )             30.3   09-18    139  |  105  |  12  ----------------------------<  126<H>  4.1   |  24  |  <0.5<L>    Ca    8.3<L>      18 Sep 2023 05:03  Phos  1.9     09-18  Mg     1.8     09-18    TPro  5.1<L>  /  Alb  3.0<L>  /  TBili  0.4  /  DBili  x   /  AST  22  /  ALT  8   /  AlkPhos  70  09-18  PTT - ( 18 Sep 2023 08:05 )  PTT:92.7 sec    Urinalysis Basic - ( 18 Sep 2023 05:03 )    Color: x / Appearance: x / SG: x / pH: x  Gluc: 126 mg/dL / Ketone: x  / Bili: x / Urobili: x   Blood: x / Protein: x / Nitrite: x   Leuk Esterase: x / RBC: x / WBC x   Sq Epi: x / Non Sq Epi: x / Bacteria: x      RADIOLOGY & ADDITIONAL STUDIES: reviewed by me  9/17 Ct head  No significant change since recent head CT of 9/17/2023 at 4:18 AM.    Unchanged multiple evolving acute infarcts without hemorrhagic transformation or significant mass effect.    PROTEIN CALORIE MALNUTRITION PRESENT: [ ]mild [ ]moderate [ ]severe [ ]underweight [ ]morbid obesity  https://www.andeal.org/vault/2440/web/files/ONC/Table_Clinical%20Characteristics%20to%20Document%20Malnutrition-White%20JV%20et%20al%202012.pdf    Height (cm): 152 (09-12-23 @ 21:00)  Weight (kg): 47.1 (09-12-23 @ 21:00)  BMI (kg/m2): 20.4 (09-12-23 @ 21:00)    [ ]PPSV2 < or = to 30% [ ]significant weight loss  [ ]poor nutritional intake  [ ]anasarca      [ ]Artificial Nutrition      Palliative Care Spiritual/Emotional Screening Tool Question  Severity (0-4):                    OR                    [ x] Unable to determine. Will assess at later time if appropriate.  Score of 2 or greater indicates recommendation of Chaplaincy and/or SW referral  Chaplaincy Referral: [ ] Yes [ ] Refused [ ] Following     Caregiver [place]:  [ ] Yes [ ] No    OR    [x ] Unable to determine. Will assess at later time if appropriate.  Social Work Referral [ ]  Patient and Family Centered Care Referral [ ]    Anticipatory Grief Present: [ ] Yes [ ] No    OR     [ x] Unable to determine. Will assess at later time if appropriate.  Social Work Referral [ ]  Patient and Family Centered Care Referral [ ]    REFERRALS:   [ ]Chaplaincy  [ ]Hospice  [ ]Child Life  [ ]Social Work  [ ]Case management [ ]Holistic Therapy     Palliative care education provided to patient and/or family    _____________  He Rajiv Ryan MD  Palliative Medicine  [facility]   of Geriatric and Palliative Medicine  [phone]

## 2023-09-18 NOTE — SPEECH LANGUAGE PATHOLOGY EVALUATION - SLP PERTINENT HISTORY OF CURRENT PROBLEM
83-year-old female with a past history of mood disorder, hallucination, hyperthyroidism, Afib, with a prior admission for syncope/ collapse at home, who presented with dysarthria, confusion, and left weakness and was noted to have a right perfusion deficit on CTP. The patient is a code NI actual, and will be taken for a planned diagnostic cerebral angiogram + mechanical thrombectomy with Dr. Mckeon. s/p respiratory failure, intubated, extubated 9/14.

## 2023-09-18 NOTE — PROGRESS NOTE ADULT - ASSESSMENT
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 Received TNK @ 1743. Taken to IR for thrombectomy,  POD #5  s/p thrombectomy of right SCA/BA with TICI 2C x3 passes.     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/ Hx of afib - noncompliant on meds in past   Gen neuro q 2   CT of Brain this am  MRI in am    Neuroendovascular following  Ct in am - brain if no heme - start eloquis - 5mg q 12   Stroke Core Measures- as above   Activity:  [X] Activities as tolerated [x] PT [x] OT     PULM:  S/P Resp failure secondary to neuro injury   Extubated 9/14/23  Wean FIO2 to maintain Sats > 94%   HOB 30 degrees    CV: Afib/ flutter - S/P RVR  S/P Hypotension   Heparin for afib  Monitor PTT   SBP goal: 110-140 - on amadeo; wean as tolerates   Lopressor 25mg q 8   Troponin - < .01 x2 neg   Echo -   TTE Echo Complete w/o Contrast w/ Doppler (09.13.23 @ 08:30) >  Summary:   1. LV Ejection Fraction by Ibarra's Method with a biplane EF of 63 %.   2. Normal global left ventricular systolic function.   3. Moderate to severe mitral valve regurgitation.   4. Mild-moderate tricuspid regurgitation.   5. Estimated pulmonary artery systolic pressure is 35.9 mmHg assuming a   right atrial pressure of 8 mmHg, which is consistent with borderline   pulmonary hypertension.   6. Normal left atrial size.   7. LA volume Index is 27.0 ml/m² ml/m2.   8. Normal right atrial size.   9. Compared to 6/13/2020 echo there is no significant change.  EKG - showing Afib with RVR     RENAL: Urinary retention / S/P NAG met acidosis- diarrhea  Monitor UO   Cardura 1 mg qhs   Bladder scan q6   D/C merida this PM   Replete electrolytes as needed   Na- 135- 145     GI:  Diet:  failed Speech and swallow eval   Peptamen af - goal 40cc/hr.    GI prophylaxis[- not indicated   Bowel regimen - held for diarrhea    ENDO:  Hyperthyroid  Methimazole - 5 mg q 12- monitor CBC   Goal euglycemia (-180)  Lipid profile - as above   HgBA1C- 5.3     HEME/ONC: Anemic -  Fe def W/U    Repeat CBC - stable   Heparin D- PTT - goal 50-70sec  Repeat at 0930.  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 Received TNK @ 1743. Taken to IR for thrombectomy,  POD #5  s/p thrombectomy of right SCA/BA with TICI 2C x3 passes.     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 LV Ejection Fraction by Ibarra's Method with a biplane EF of 63 %.   2. Normal global left ventricular systolic function.   3. Moderate to severe mitral valve regurgitation.   4. Mild-moderate tricuspid regurgitation.   5. Estimated pulmonary artery systolic pressure is 35.9 mmHg assuming a   right atrial pressure of 8 mmHg, which is consistent with borderline   pulmonary hypertension.   6. Normal left atrial size.   7. LA volume Index is 27.0 ml/m² ml/m2.   8. Normal right atrial size.   9. Compared to 6/13/2020 echo there is no significant change.  EKG - showing Afib with RVR     RENAL: Urinary retention / S/P NAG met acidosis- diarrhea  Monitor UO   D/C Cardura  Bladder scan q6   Replete electrolytes as needed   Na- 135- 145     GI:  Diet:  failed Speech and swallow eval   -FEES Today, replace NGT if she fails  GI prophylaxis- not indicated   Bowel regimen - Senna    ENDO:  Hyperthyroid  Methimazole - 5 mg q 12- monitor CBC   Goal euglycemia (-180)  Lipid profile - as above   HgBA1C- 5.3     HEME/ONC: Anemic -  Fe def - Labs reviewed   Repeat CBC - stable   Heparin D- PTT - goal 50-70sec  - CT A/P negative for RP bleed, + acute rib fractures  VTE prophylaxis: [X] SCDs [ ] chemoprophylaxis-    LE duplex - 9/13- neg for DVT    ID: S/P Fever- likely central   Maintain normothermia   procalcitonin- 0.15  Blood culture- neg   MRSA - neg    UA- neg , CXR , - neg   LFT - NL   Hold ABX for Now       CODE STATUS:  [X] Full Code    DISPOSITION:  [X] Stroke Unit     83-year-old female with a past history of mood disorder, hallucination, hyperthyroidism, Afib no a/c  who presented with dysarthria, confusion, and left weakness, NIH 32, 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 Received TNK @ 1743. Taken to IR for thrombectomy,    s/p thrombectomy of right SCA/BA with TICI 2C x3 passes.     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 LV Ejection Fraction by Ibarra's Method with a biplane EF of 63 %.   2. Normal global left ventricular systolic function.   3. Moderate to severe mitral valve regurgitation.   4. Mild-moderate tricuspid regurgitation.   5. Estimated pulmonary artery systolic pressure is 35.9 mmHg assuming a   right atrial pressure of 8 mmHg, which is consistent with borderline   pulmonary hypertension.   6. Normal left atrial size.   7. LA volume Index is 27.0 ml/m² ml/m2.   8. Normal right atrial size.   9. Compared to 6/13/2020 echo there is no significant change.  EKG - showing Afib with RVR     RENAL: Urinary retention / S/P NAG met acidosis- diarrhea  Monitor UO   D/C Cardura  Bladder scan q6   Replete electrolytes as needed   Na- 135- 145     GI:  Diet:  failed Speech and swallow eval   -FEES Today, replace NGT if she fails  GI prophylaxis- not indicated   Bowel regimen - Senna    ENDO:  Hyperthyroid  Methimazole - 5 mg q 12- monitor CBC   Goal euglycemia (-180)  Lipid profile - as above   HgBA1C- 5.3     HEME/ONC: Anemic -  Fe def - Labs reviewed   Repeat CBC - stable   Heparin D- PTT - goal 50-70sec  - CT A/P negative for RP bleed, + acute rib fractures  VTE prophylaxis: [X] SCDs [ ] chemoprophylaxis-    LE duplex - 9/13- neg for DVT    ID: S/P Fever- likely central   Maintain normothermia   procalcitonin- 0.15  Blood culture- neg   MRSA - neg    UA- neg , CXR , - neg   LFT - NL   Hold ABX for Now       CODE STATUS:  [X] Full Code    DISPOSITION:  [X] Stroke Unit

## 2023-09-18 NOTE — CHART NOTE - NSCHARTNOTEFT_GEN_A_CORE
Spoke w/ son Naren today regarding baseline functionality and to provide update of care.     As per son, Pt has limited independence w/ her ADLs and ambulate w/ walker.   She started using a walker to ambulate approx 2yrs ago as she is in need b/l knee replacement in which she refused.  He has been administering her medication over the past yr because she was not compliant. He also admit that he has not been administering her medication as prescribed w/o consulting her doctor. eg. The Methimazole as it made her itchy.   He reported anxiety, depression and sometimes hallucinations however, does not follows w/ psychology/psychiatry as he believes the service does not help. She was taking Risperidone in which he believes was for sleep and administered intermittently as a sleep aid.   He does not recall administering medication such as AC/AT, blood thinner or recall any AC by prompts.   He also repeated LOC and falls at home. He is unaware of any visual changes or symptoms to indicate timeframe of her Right Parieto-occipital stroke as well as unaware of any signs to her rib fracture.   Currently she does not have HHA and son is interested in assistance with her care. Spoke w/ son Naren today regarding baseline functionality and to provide update of care.     As per son, Pt has limited independence w/ her ADLs and ambulate w/ walker.   She started using a walker to ambulate approx 2yrs ago as she is in need b/l knee replacement in which she refused.  He has been administering her medication over the past yr because she was not compliant. He also admit that he has not been administering her medication as prescribed w/o consulting her doctor. eg. The Methimazole as it made her itchy.   He reported anxiety, depression and sometimes hallucinations however, does not follows w/ psychology/psychiatry as he believes the service does not help. She was taking Risperidone in which he believes was for sleep and administered intermittently as a sleep aid.   He does not recall administering medication such as AC/AT, blood thinner or recall any AC by prompts.   He also repeated LOC and falls at home. He is unaware of any visual changes or symptoms to indicate timeframe of her Right Parieto-occipital stroke as well as unaware of any signs to her rib fracture.   Currently she does not have HHA and son is interested in assistance with her care.    Medication at home:  Metoprolol 25mg QD  Methimazole 5mg BID  Risperidone 5mg HS

## 2023-09-18 NOTE — SWALLOW FEES ASSESSMENT ADULT - ORAL PHASE
spillage of mildly thick over BOT prior to the swallow/Uncontrolled bolus/spillover in hypopharynx spillage to the TVC prior to the swallow/Uncontrolled bolus/spillover in hypopharynx

## 2023-09-19 LAB
ALBUMIN SERPL ELPH-MCNC: 3.4 G/DL — LOW (ref 3.5–5.2)
ALP SERPL-CCNC: 91 U/L — SIGNIFICANT CHANGE UP (ref 30–115)
ALT FLD-CCNC: 13 U/L — SIGNIFICANT CHANGE UP (ref 0–41)
ANION GAP SERPL CALC-SCNC: 12 MMOL/L — SIGNIFICANT CHANGE UP (ref 7–14)
APPEARANCE UR: ABNORMAL
APTT BLD: 118.9 SEC — CRITICAL HIGH (ref 27–39.2)
APTT BLD: 132.2 SEC — CRITICAL HIGH (ref 27–39.2)
AST SERPL-CCNC: 37 U/L — SIGNIFICANT CHANGE UP (ref 0–41)
BASOPHILS # BLD AUTO: 0.05 K/UL — SIGNIFICANT CHANGE UP (ref 0–0.2)
BASOPHILS NFR BLD AUTO: 0.4 % — SIGNIFICANT CHANGE UP (ref 0–1)
BILIRUB SERPL-MCNC: 0.6 MG/DL — SIGNIFICANT CHANGE UP (ref 0.2–1.2)
BILIRUB UR-MCNC: NEGATIVE — SIGNIFICANT CHANGE UP
BUN SERPL-MCNC: 11 MG/DL — SIGNIFICANT CHANGE UP (ref 10–20)
CALCIUM SERPL-MCNC: 8.6 MG/DL — SIGNIFICANT CHANGE UP (ref 8.4–10.5)
CHLORIDE SERPL-SCNC: 105 MMOL/L — SIGNIFICANT CHANGE UP (ref 98–110)
CO2 SERPL-SCNC: 26 MMOL/L — SIGNIFICANT CHANGE UP (ref 17–32)
COLOR SPEC: ABNORMAL
CREAT SERPL-MCNC: 0.6 MG/DL — LOW (ref 0.7–1.5)
DIFF PNL FLD: ABNORMAL
EGFR: 90 ML/MIN/1.73M2 — SIGNIFICANT CHANGE UP
EOSINOPHIL # BLD AUTO: 0.41 K/UL — SIGNIFICANT CHANGE UP (ref 0–0.7)
EOSINOPHIL NFR BLD AUTO: 3.1 % — SIGNIFICANT CHANGE UP (ref 0–8)
GLUCOSE SERPL-MCNC: 87 MG/DL — SIGNIFICANT CHANGE UP (ref 70–99)
GLUCOSE UR QL: NEGATIVE MG/DL — SIGNIFICANT CHANGE UP
HCT VFR BLD CALC: 33.8 % — LOW (ref 37–47)
HGB BLD-MCNC: 10.8 G/DL — LOW (ref 12–16)
IMM GRANULOCYTES NFR BLD AUTO: 1.6 % — HIGH (ref 0.1–0.3)
KETONES UR-MCNC: NEGATIVE MG/DL — SIGNIFICANT CHANGE UP
LEUKOCYTE ESTERASE UR-ACNC: ABNORMAL
LYMPHOCYTES # BLD AUTO: 23.7 % — SIGNIFICANT CHANGE UP (ref 20.5–51.1)
LYMPHOCYTES # BLD AUTO: 3.09 K/UL — SIGNIFICANT CHANGE UP (ref 1.2–3.4)
MAGNESIUM SERPL-MCNC: 2.2 MG/DL — SIGNIFICANT CHANGE UP (ref 1.8–2.4)
MCHC RBC-ENTMCNC: 29.2 PG — SIGNIFICANT CHANGE UP (ref 27–31)
MCHC RBC-ENTMCNC: 32 G/DL — SIGNIFICANT CHANGE UP (ref 32–37)
MCV RBC AUTO: 91.4 FL — SIGNIFICANT CHANGE UP (ref 81–99)
MONOCYTES # BLD AUTO: 0.84 K/UL — HIGH (ref 0.1–0.6)
MONOCYTES NFR BLD AUTO: 6.4 % — SIGNIFICANT CHANGE UP (ref 1.7–9.3)
NEUTROPHILS # BLD AUTO: 8.46 K/UL — HIGH (ref 1.4–6.5)
NEUTROPHILS NFR BLD AUTO: 64.8 % — SIGNIFICANT CHANGE UP (ref 42.2–75.2)
NITRITE UR-MCNC: POSITIVE
NRBC # BLD: 0 /100 WBCS — SIGNIFICANT CHANGE UP (ref 0–0)
NT-PROBNP SERPL-SCNC: 3012 PG/ML — HIGH (ref 0–300)
PH UR: 6.5 — SIGNIFICANT CHANGE UP (ref 5–8)
PHOSPHATE SERPL-MCNC: 3.2 MG/DL — SIGNIFICANT CHANGE UP (ref 2.1–4.9)
PLATELET # BLD AUTO: 383 K/UL — SIGNIFICANT CHANGE UP (ref 130–400)
PMV BLD: 10.1 FL — SIGNIFICANT CHANGE UP (ref 7.4–10.4)
POTASSIUM SERPL-MCNC: 4 MMOL/L — SIGNIFICANT CHANGE UP (ref 3.5–5)
POTASSIUM SERPL-SCNC: 4 MMOL/L — SIGNIFICANT CHANGE UP (ref 3.5–5)
PROT SERPL-MCNC: 5.9 G/DL — LOW (ref 6–8)
PROT UR-MCNC: 30 MG/DL
RBC # BLD: 3.7 M/UL — LOW (ref 4.2–5.4)
RBC # FLD: 13.1 % — SIGNIFICANT CHANGE UP (ref 11.5–14.5)
SODIUM SERPL-SCNC: 143 MMOL/L — SIGNIFICANT CHANGE UP (ref 135–146)
SP GR SPEC: 1.01 — SIGNIFICANT CHANGE UP (ref 1–1.03)
T4 FREE SERPL-MCNC: 0.8 NG/DL — LOW (ref 0.9–1.8)
TSH SERPL-MCNC: 0.07 UIU/ML — LOW (ref 0.27–4.2)
UROBILINOGEN FLD QL: 1 MG/DL — SIGNIFICANT CHANGE UP (ref 0.2–1)
WBC # BLD: 13.06 K/UL — HIGH (ref 4.8–10.8)
WBC # FLD AUTO: 13.06 K/UL — HIGH (ref 4.8–10.8)

## 2023-09-19 PROCEDURE — 93010 ELECTROCARDIOGRAM REPORT: CPT

## 2023-09-19 PROCEDURE — 99223 1ST HOSP IP/OBS HIGH 75: CPT

## 2023-09-19 PROCEDURE — 70450 CT HEAD/BRAIN W/O DYE: CPT | Mod: 26

## 2023-09-19 PROCEDURE — 99232 SBSQ HOSP IP/OBS MODERATE 35: CPT

## 2023-09-19 RX ORDER — RISPERIDONE 4 MG/1
0.5 TABLET ORAL
Refills: 0 | Status: DISCONTINUED | OUTPATIENT
Start: 2023-09-19 | End: 2023-09-20

## 2023-09-19 RX ORDER — TAMSULOSIN HYDROCHLORIDE 0.4 MG/1
0.4 CAPSULE ORAL AT BEDTIME
Refills: 0 | Status: DISCONTINUED | OUTPATIENT
Start: 2023-09-19 | End: 2023-09-27

## 2023-09-19 RX ORDER — APIXABAN 2.5 MG/1
2.5 TABLET, FILM COATED ORAL EVERY 12 HOURS
Refills: 0 | Status: DISCONTINUED | OUTPATIENT
Start: 2023-09-19 | End: 2023-09-27

## 2023-09-19 RX ORDER — CEFTRIAXONE 500 MG/1
1000 INJECTION, POWDER, FOR SOLUTION INTRAMUSCULAR; INTRAVENOUS EVERY 24 HOURS
Refills: 0 | Status: COMPLETED | OUTPATIENT
Start: 2023-09-19 | End: 2023-09-21

## 2023-09-19 RX ADMIN — CHLORHEXIDINE GLUCONATE 1 APPLICATION(S): 213 SOLUTION TOPICAL at 06:19

## 2023-09-19 RX ADMIN — APIXABAN 2.5 MILLIGRAM(S): 2.5 TABLET, FILM COATED ORAL at 17:43

## 2023-09-19 RX ADMIN — HEPARIN SODIUM 8.5 UNIT(S)/HR: 5000 INJECTION INTRAVENOUS; SUBCUTANEOUS at 09:47

## 2023-09-19 RX ADMIN — CEFTRIAXONE 100 MILLIGRAM(S): 500 INJECTION, POWDER, FOR SOLUTION INTRAMUSCULAR; INTRAVENOUS at 16:38

## 2023-09-19 RX ADMIN — Medication 25 MILLIGRAM(S): at 15:15

## 2023-09-19 RX ADMIN — Medication 25 MILLIGRAM(S): at 06:24

## 2023-09-19 RX ADMIN — RISPERIDONE 0.5 MILLIGRAM(S): 4 TABLET ORAL at 18:26

## 2023-09-19 NOTE — CHART NOTE - NSCHARTNOTEFT_GEN_A_CORE
PALLIATIVE MEDICINE INTERDISCIPLINARY TEAM NOTE    Provider:                                        Met with: [  x ] Patient  [   ] Family  [   ] Other:    Primary Language: [   ] English [ x  ] Other*: Divine                     *Interpretation provided by:    SUPPORT DIAGNOSES            (Check all that apply)    [   ] EOL issues  [  x ] Advanced Illness  [   ] Cultural / spiritual concerns  [   ] Pain / suffering  [   ] Dementia / AMS  [   ] Other:  [   ] AD issues  [   ] Grief / loss / sadness  [   ] Discharge issues  [  x ] Distress / coping    PSYCHOSOCIAL ASSESSMENT OF PATIENT         (Check all that apply)    [  x ] Initial Assessment            [   ] Reassessment          [   ] Not Applicable this visit    Pain/suffering acuity:  [ x  ] None to mild (0-3)           [   ] Moderate (4-6)        [   ] High (7-10)    Mental Status:  [   ] Alert/oriented (x3)          [ x  ] Confused/Altered(x2)         [   ] Non-resp    Functional status:  [   ] Independent w ADLs      [ x  ] Needs Assistance             [   ] Bedbound/Full Care    Coping:  [   ] Coping well                     [   ] Coping w/difficulty            [   ] Poor coping  [ x ] unable to assess     Support system:  [   ] Strong                              [  x ] Adequate                        [   ] Inadequate      Past history and medications for:     [ ] Anxiety       [ ] Depression    [ ] Sleep disorders       SERVICE PROVIDED  [   ]Discharge support / facilitation  [   ]AD / goals of care counseling                                  [   ]EOL / death / bereavement counseling  [ x  ]Counseling / support                                                [   ] Family meeting  [   ]Prayer / sacrament / ritual                                      [   ] Referral   [   ]Other                                                                       NOTE and Plan of Care (PoC):    patient is a 84 y/o F with pmhx of mood d/o , hallucination, hyperthyroidism, afib, presenting with c/o dysarthria, confusion and left weakness. visited patient earlier today. patient encountered resting in bed - sleeping - no non verbal signs of pain or discomfort. no family at bedside at time of visit. will f/u [phone]

## 2023-09-19 NOTE — CHART NOTE - NSCHARTNOTEFT_GEN_A_CORE
Acceptance Note: Stroke Unit  Downgrade from: Neuro ICU  Accepting Neurologist: Dr Ryan Del Castillo  Signout received from: Angelita Melvin Neuro [facility] [address], 9/19/2023 @ 0315  Vital signs on arrival: Temp 97.2  P 93  RR: 20  /72  02 sat 95% on RA  Patient received on heparin drip (patient specific protocol) currently at 950u/hr (9.5ml)  On b/l Wrist restraints    Patient is a 83-year-old female with a past history of mood disorder, hallucination, hyperthyroidism, Afib not compliant with  a/c  mrankin score of 1 at baseline, p.w dysarthria, confusion, and left weakness, initial NIHSS of 32, 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 Received TNK - 1743 hrs on 9/12/23 and s/p thrombectomy of the right SCA/BA with TICI 2C was admitted to neuro ICU for acute stroke management. Most recent Repeat CTH completed 9/17 was stable and redemonstrated multiple evolving acute infarcts on the right occipital, left frontal lobes as well as bilateral cerebellar hemispheres without hemorrhagic transformation. Patient was started on  IV heparin drip in ICU for afib management with goal PTT 50-70. Patient has acute rib fractures suspected to be from prior falls CT A/P negative for Retroperitoneal bleed. Patient failed speech and swallow and is s/p FEES study as of 9/18 and is now tolerating Minced and moist diet w/ mildly thick liquids. MRI brain is pending.       Neuro exam  Patient is awake alert restless trying to get oob, able to say her name , age  and place with cues , following 1-2 step commands at random   CN: Mild left facial droop        Moderate-severe dysarthric speech  Power: No drift noted in B/L UE extremities although exam fluctuates             Right LE drift noted   Sensory: Responding to pain in all 4 exts by withdrawing and grimacing  FTN: No obvious dysmetria  HKS: Right limb ataxia noted on this exam   No neglect noted, patient attentive to both sides      NIHSS 6  LOC 0    Commands 0  Questions 1  VF 0  Gaze 0  Face 1(Left facial)  RUE 0  RLE  1  LUE 0  LLE  0  Sensory 0  Speech 2  Language 0  Ataxia 1 right LE  Extinction 0 Acceptance Note: Stroke Unit  Downgrade from: Neuro ICU  Accepting Neurologist: Dr Ryan Del Castillo  Signout received from: Angelita Melvin Neuro [facility], 9/19/2023 @ 0315  Vital signs on arrival: Temp 97.2  P 93  RR: 20  /72  02 sat 95% on RA  Patient received on heparin drip (patient specific protocol) currently at 950u/hr (9.5ml)  On b/l Wrist restraints    Patient is a 83-year-old female with a past history of mood disorder, hallucination, hyperthyroidism, Afib not compliant with  a/c  mrankin score of 1 at baseline, p.w dysarthria, confusion, and left weakness, initial NIHSS of 32, 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 Received TNK - 1743 hrs on 9/12/23 and s/p thrombectomy of the right SCA/BA with TICI 2C was admitted to neuro ICU for acute stroke management. Most recent Repeat CTH completed 9/17 was stable and redemonstrated multiple evolving acute infarcts on the right occipital, left frontal lobes as well as bilateral cerebellar hemispheres without hemorrhagic transformation. Patient was started on  IV heparin drip in ICU for afib management with goal PTT 50-70. Patient has acute rib fractures suspected to be from prior falls CT A/P negative for Retroperitoneal bleed. Patient failed speech and swallow and is s/p FEES study as of 9/18 and is now tolerating Minced and moist diet w/ mildly thick liquids. MRI brain is pending.       Neuro exam  Patient is awake alert restless trying to get oob, able to say her name , age  and place with cues , following 1-2 step commands at random   CN: Mild left facial droop        Moderate-severe dysarthric speech  Power: No drift noted in B/L UE extremities although exam fluctuates             Right LE drift noted   Sensory: Responding to pain in all 4 exts by withdrawing and grimacing  FTN: No obvious dysmetria  HKS: Right limb ataxia noted on this exam   No neglect noted, patient attentive to both sides      NIHSS 7  LOC 0    Commands 0  Questions 2  VF 0  Gaze 0  Face 1(Left facial)  RUE   RLE  1  LUE 0  LLE  0  Sensory 0  Speech 2  Language 0  Ataxia 1 right LE  Extinction 0

## 2023-09-19 NOTE — PROGRESS NOTE ADULT - SUBJECTIVE AND OBJECTIVE BOX
Neurology Stroke Progress Note    INTERVAL HPI/OVERNIGHT EVENTS:  Patient seen and examined. Pt transferred from NICU overnight. Awaiting MRI. on Hep ggt. PTT was high, heparin on hold but restarted as per protocol. No events overnight.    MEDICATIONS  (STANDING):  apixaban 2.5 milliGRAM(s) Oral every 12 hours  chlorhexidine 2% Cloths 1 Application(s) Topical daily  influenza  Vaccine (HIGH DOSE) 0.7 milliLiter(s) IntraMuscular once  methimazole 5 milliGRAM(s) Oral <User Schedule>  metoprolol tartrate 25 milliGRAM(s) Oral every 8 hours  risperiDONE   Tablet 0.5 milliGRAM(s) Oral two times a day  senna 2 Tablet(s) Oral at bedtime    MEDICATIONS  (PRN):  acetaminophen     Tablet .. 650 milliGRAM(s) Oral every 6 hours PRN Temp greater or equal to 38C (100.4F), Mild Pain (1 - 3)    Allergies    No Known Allergies    Intolerances      Vital Signs Last 24 Hrs  T(C): 36.9 (19 Sep 2023 12:00), Max: 36.9 (19 Sep 2023 12:00)  T(F): 98.4 (19 Sep 2023 12:00), Max: 98.4 (19 Sep 2023 12:00)  HR: 90 (19 Sep 2023 12:00) (62 - 136)  BP: 118/53 (19 Sep 2023 12:00) (106/57 - 162/98)  BP(mean): 64 (19 Sep 2023 12:00) (64 - 917)  RR: 18 (19 Sep 2023 12:00) (18 - 27)  SpO2: 96% (19 Sep 2023 12:00) (95% - 99%)    Parameters below as of 19 Sep 2023 12:00  Patient On (Oxygen Delivery Method): room air      Neuro exam  Patient is awake alert restless trying to get oob, able to say her name , age  and place with cues , following 1-2 step commands at random   CN: Mild left facial droop        Moderate-severe dysarthric speech  Power: No drift noted in B/L UE extremities although exam fluctuates             Right LE drift noted   Sensory: Responding to pain in all 4 exts by withdrawing and grimacing  FTN: No obvious dysmetria  HKS: Right limb ataxia noted on this exam   No neglect noted, patient attentive to both sides      NIHSS 7      LABS:                        10.8   13.06 )-----------( 383      ( 19 Sep 2023 06:36 )             33.8     09-19    143  |  105  |  11  ----------------------------<  87  4.0   |  26  |  0.6<L>    Ca    8.6      19 Sep 2023 06:36  Phos  3.2     09-19  Mg     2.2     09-19    TPro  5.9<L>  /  Alb  3.4<L>  /  TBili  0.6  /  DBili  x   /  AST  37  /  ALT  13  /  AlkPhos  91  09-19    PTT - ( 19 Sep 2023 08:20 )  PTT:118.9 sec  Urinalysis Basic - ( 19 Sep 2023 06:36 )    Color: x / Appearance: x / SG: x / pH: x  Gluc: 87 mg/dL / Ketone: x  / Bili: x / Urobili: x   Blood: x / Protein: x / Nitrite: x   Leuk Esterase: x / RBC: x / WBC x   Sq Epi: x / Non Sq Epi: x / Bacteria: x        RADIOLOGY & ADDITIONAL TESTS: Reviewed

## 2023-09-19 NOTE — PROGRESS NOTE ADULT - ASSESSMENT
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 Received TNK @ 1743. Taken to IR for thrombectomy,  s/p thrombectomy of right SCA/BA with TICI 2C x3 passes.       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 Questionable left frontal lobe infarct. Chronic left parieto-occipital infarction    #UTI  - UA positive on 9/19  - start Rocephine  - send urine culture    #Mood disorder  - restart Resperidone 0.5mg BID home dose    #S/P Resp failure secondary to neuro injury   - Extubated 9/14/23  - Aspiration prec  - HOB 30 degrees  - obtain BNP, procal    #Afib  - Heparin ggt in [facility], RI'ed on 9/19  - start Eliquis 2.5mg BID   - Lopressor 25mg q 8     #Urinary retention / S/P NAG met acidosis- diarrhea  - D/C Cardura  - Bladder scan q6 today    #Hyperthyroidism  Methimazole - 5 mg q 12- monitor CBC   - get TSH, T4    #Misc  - DVT Prophylaxis: eliquis  - Diet: minced and moist as per s/s  - Code Status: Full

## 2023-09-19 NOTE — CONSULT NOTE ADULT - SUBJECTIVE AND OBJECTIVE BOX
HPI:  The patient is an 83-year-old female with a past history of mood disorder, hallucination, hyperthyroidism, Afib, with a prior admission for syncope/ collapse at home, who presented with dysarthria, confusion, and left weakness and was noted to have a right perfusion deficit on CTP. The patient is a code NI actual, and will be taken for a planned diagnostic cerebral angiogram + mechanical thrombectomy with Dr. Mckeon.     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 Received TNK @ 1743. Taken to IR for thrombectomy,  s/p thrombectomy of right SCA/BA with TICI 2C x3 passes.     < from: CT Head No Cont (23 @ 20:33) >  COMPARISON: CT head 2023 at 5:13 PM    FINDINGS:    Contrast from recent CTA and angiogram/thrombectomy is visualized   throughout the cerebral vasculature and dural reflections.    Hyperdensity within the bilateral thalami, left occipital and temporal   lobes, cerebellum, and left occipital sulci.    Patchy appearance of the cerebellar hemispheres.    Stable left parieto-occipital chronic infarct. Stable chronic   microvascular changes.    There is no evidence of hydrocephalus or midline shift.    The visualized paranasal sinuses and mastoids are clear.      IMPRESSION:  THE FOLLOWING REFLECTS THE AMENDED FINAL IMPRESSION:    1.  Residual IV contrast present from CTA and angiogram/thrombectomy.    2.  Parenchymal hyperdensity within bilateral thalami, left occipital and   temporal lobes, and cerebellar hemispheres; and sulcal hyperdensity   within the left occipital region. This likely predominantly reflects   contrast staining; underlying hemorrhage cannotbe excluded.    3.  Patchy appearance of the cerebellum suspicious for underlying   infarcts.    4.  Stable chronic microvascular ischemic changes and chronic left   parieto-occipital infarct.    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/origin of the right PCA   with distal reconstitution from a small right posterior communicating   artery  - s/p TNK on   - s/p thrombectomy  of right SCA/BA with TICI 2C x3 passes    PAST MEDICAL & SURGICAL HISTORY:  Thyroid disease          Hospital Course:    TODAY'S SUBJECTIVE & REVIEW OF SYMPTOMS:     Constitutional WNL   Cardio WNL   Resp WNL   GI WNL  Heme WNL  Endo WNL  Skin WNL  MSK WNL  Neuro dysarthria and weakness   Cognitive WNL  Psych WNL      MEDICATIONS  (STANDING):  apixaban 2.5 milliGRAM(s) Oral every 12 hours  chlorhexidine 2% Cloths 1 Application(s) Topical daily  influenza  Vaccine (HIGH DOSE) 0.7 milliLiter(s) IntraMuscular once  methimazole 5 milliGRAM(s) Oral <User Schedule>  metoprolol tartrate 25 milliGRAM(s) Oral every 8 hours  risperiDONE   Tablet 0.5 milliGRAM(s) Oral two times a day  senna 2 Tablet(s) Oral at bedtime    MEDICATIONS  (PRN):  acetaminophen     Tablet .. 650 milliGRAM(s) Oral every 6 hours PRN Temp greater or equal to 38C (100.4F), Mild Pain (1 - 3)      FAMILY HISTORY:      Allergies    No Known Allergies    Intolerances        SOCIAL HISTORY:    [  ] Etoh  [  ] Smoking  [  ] Substance abuse     Home Environment:  [   ] Home Alone  [ x  ] Lives with Family  [   ] Home Health Aid    Dwelling:  [   ] Apartment  [  x ] Private House  [   ] Adult Home  [   ] Skilled Nursing Facility      [   ] Short Term  [   ] Long Term  [ x  ] Stairs       Elevator [   ]    FUNCTIONAL STATUS PTA: (Check all that apply)  Ambulation: [  x  ]Independent    [   ] Dependent     [   ] Non-Ambulatory  Assistive Device: [   ] SA Cane  [   ]  Q Cane  [ x  ] Walker  [   ]  Wheelchair  ADL : [x   ] Independent  [    ]  Dependent       Vital Signs Last 24 Hrs  T(C): 36.9 (19 Sep 2023 12:00), Max: 36.9 (19 Sep 2023 12:00)  T(F): 98.4 (19 Sep 2023 12:00), Max: 98.4 (19 Sep 2023 12:00)  HR: 90 (19 Sep 2023 12:00) (62 - 113)  BP: 118/53 (19 Sep 2023 12:00) (106/57 - 162/98)  BP(mean): 64 (19 Sep 2023 12:00) (64 - 124)  RR: 18 (19 Sep 2023 12:00) (18 - 27)  SpO2: 96% (19 Sep 2023 12:00) (95% - 99%)    Parameters below as of 19 Sep 2023 12:00  Patient On (Oxygen Delivery Method): room air          PHYSICAL EXAM: Awake & confused   GENERAL: NAD  HEAD:  Normocephalic  CHEST/LUNG: Clear   HEART: S1S2+  ABDOMEN: Soft, Nontender  EXTREMITIES:  no calf tenderness    NERVOUS SYSTEM:  Cranial Nerves 2-12 intact [   ] Abnormal  [x   ]dysarthria   ROM: WFL all extremities [   ]  Abnormal [  x ]able to move all ext - limited participation   Motor Strength: WFL all extremities  [   ]  Abnormal [ x  ]  Sensation: intact to light touch [   ] Abnormal [   ]    FUNCTIONAL STATUS:  Bed Mobility: Independent [   ]  Supervision [   ]  Needs Assistance [ x  ]  N/A [   ]  Transfers: Independent [   ]  Supervision [   ]  Needs Assistance [   ]  N/A [   ]   Ambulation: Independent [   ]  Supervision [   ]  Needs Assistance [   ]  N/A [   ]  ADL: Independent [   ] Requires Assistance [   ] N/A [   ]      LABS:                        10.8   13.06 )-----------( 383      ( 19 Sep 2023 06:36 )             33.8         143  |  105  |  11  ----------------------------<  87  4.0   |  26  |  0.6<L>    Ca    8.6      19 Sep 2023 06:36  Phos  3.2       Mg     2.2         TPro  5.9<L>  /  Alb  3.4<L>  /  TBili  0.6  /  DBili  x   /  AST  37  /  ALT  13  /  AlkPhos  91  -    PTT - ( 19 Sep 2023 08:20 )  PTT:118.9 sec  Urinalysis Basic - ( 19 Sep 2023 11:05 )    Color: Red / Appearance: Turbid / S.010 / pH: x  Gluc: x / Ketone: Negative mg/dL  / Bili: Negative / Urobili: 1.0 mg/dL   Blood: x / Protein: 30 mg/dL / Nitrite: Positive   Leuk Esterase: Large / RBC: 79 /HPF / WBC 79 /HPF   Sq Epi: x / Non Sq Epi: 1 /HPF / Bacteria: Many /HPF        RADIOLOGY & ADDITIONAL STUDIES:

## 2023-09-19 NOTE — CONSULT NOTE ADULT - ASSESSMENT
IMPRESSION: Rehab of elidia stroke / dysarthria, weakness /  mood disorder, hallucination, hyperthyroidism, Afib no a/c      PRECAUTIONS: [   ] Cardiac  [   ] Respiratory  [   ] Seizures [   ] Contact Isolation  [   ] Droplet Isolation  [   ] Other    Weight Bearing Status:     RECOMMENDATION:    Out of Bed to Chair     DVT/Decubiti Prophylaxis    REHAB PLAN:     [ x   ] Bedside P/T 3-5 times a week   [   x ]   Bedside O/T  2-3 times a week             [  x  ] Speech Therapy               [    ]  No Rehab Therapy Indicated   Conditioning/ROM                                    ADL  Bed Mobility                                               Conditioning/ROM  Transfers                                                     Bed Mobility  Sitting /Standing Balance                         Transfers                                        Gait Training                                               Sitting/Standing Balance  Stair Training [   ]Applicable                    Home equipment Eval                                                                        Splinting  [   ] Only      GOALS:   ADL   [ x   ]   Independent                    Transfers  [  x  ] Independent                          Ambulation  [ x   ] Independent     [   x  ] With device                            [    ]  CG                                                         [    ]  CG                                                                  [    ] CG                            [    ] Min A                                                   [    ] Min A                                                              [    ] Min  A          DISCHARGE PLAN:   [    ]  Good candidate for Intensive Rehabilitation/Hospital based                                             Will tolerate 3hrs Intensive Rehab Daily                                       [     ]  Short Term Rehab in Skilled Nursing Facility                                       [     ]  Home with Outpatient or  services                                         [ x    ]  Possible Candidate for Intensive Hospital based Rehab

## 2023-09-19 NOTE — CONSULT NOTE ADULT - SUBJECTIVE AND OBJECTIVE BOX
CHILANGO PEMBERTON  83y  Female    Patient is a 83y old  Female who presents with a chief complaint of basilar occlusion (18 Sep 2023 16:42)      HPI:  The patient is an 83-year-old female with a past history of mood disorder, hallucination, hyperthyroidism, Afib, with a prior admission for syncope/ collapse at home, who presented with dysarthria, confusion, and left weakness and was noted to have a right perfusion deficit on CTP. The patient is a code NI actual, and will be taken for a planned diagnostic cerebral angiogram + mechanical thrombectomy with Dr. Mckeon. Upgraded to ICU, intubated, extubated after 2 days. Started on Heparin gtt for afib.    Admission scores:   NIHSS 32   (12 Sep 2023 21:42)    S: Patient was examined and seen at bedside. This morning pt is resting comfortably in bed and reports no new issues or overnight events. No complaints.  Denies CP, SOB, N/V/D/C/AP, cough, F, chills, dizziness, new focal weakness, HA, vision changes, dysuria, or urinary symptoms, blood in stool.  ROS: all other systems reviewed and are negative    PAST MEDICAL & SURGICAL HISTORY:  Thyroid disease  afib  Mood dz w/ psychotic features      SOCIAL HISTORY:  Tobacco: negative  Illicit drugs: negative  Alcohol: social  Family history reviewed and otherwise non-contributory No clotting disorders, CVAs at early age.  ALLERGIES: NKDA    MEDICATIONS  (STANDING):  chlorhexidine 2% Cloths 1 Application(s) Topical daily  heparin  Infusion 550 Unit(s)/Hr (8.5 mL/Hr) IV Continuous <Continuous>  influenza  Vaccine (HIGH DOSE) 0.7 milliLiter(s) IntraMuscular once  methimazole 5 milliGRAM(s) Oral <User Schedule>  metoprolol tartrate 25 milliGRAM(s) Oral every 8 hours  senna 2 Tablet(s) Oral at bedtime    MEDICATIONS  (PRN):  acetaminophen     Tablet .. 650 milliGRAM(s) Oral every 6 hours PRN Temp greater or equal to 38C (100.4F), Mild Pain (1 - 3)    Home Medications:  risperiDONE 0.5 mg oral tablet: 1 tab(s) orally 2 times a day (15 Sep 2023 16:07)          Vital Signs Last 24 Hrs  T(C): 36.3 (19 Sep 2023 08:00), Max: 36.7 (19 Sep 2023 00:00)  T(F): 97.4 (19 Sep 2023 08:00), Max: 98 (19 Sep 2023 00:00)  HR: 62 (19 Sep 2023 08:00) (62 - 136)  BP: 148/83 (19 Sep 2023 08:00) (106/57 - 162/98)  BP(mean): 110 (19 Sep 2023 08:00) (77 - 917)  RR: 18 (19 Sep 2023 08:00) (18 - 27)  SpO2: 97% (19 Sep 2023 08:00) (95% - 99%)    Parameters below as of 19 Sep 2023 08:00  Patient On (Oxygen Delivery Method): room air      CAPILLARY BLOOD GLUCOSE          General: NAD. Looks stated age.  HEENT: clean oropharynx, EOMI, no LAD  Neck: trachea midline, no thyromegaly  CV: nl S1 S2; no m/r/g  Resp: decreased breath sounds at base  GI: NT/ND/S +BS  MS: no clubbing/cyanosis/edema, +pulses  Neuro: motor, sensory intact; + reflexes  Skin: no rashes, nl turgor  Psychiatric: AA0x insight and judgement    tele: afib, nonspecific changes (on my own evaluation of tele monitor)    LABS:                        10.8   13.06 )-----------( 383      ( 19 Sep 2023 06:36 )             33.8     09-19    143  |  105  |  11  ----------------------------<  87  4.0   |  26  |  0.6<L>    Ca    8.6      19 Sep 2023 06:36  Phos  3.2     09-19  Mg     2.2     09-19    TPro  5.9<L>  /  Alb  3.4<L>  /  TBili  0.6  /  DBili  x   /  AST  37  /  ALT  13  /  AlkPhos  91  09-19    LIVER FUNCTIONS - ( 19 Sep 2023 06:36 )  Alb: 3.4 g/dL / Pro: 5.9 g/dL / ALK PHOS: 91 U/L / ALT: 13 U/L / AST: 37 U/L / GGT: x               PTT - ( 19 Sep 2023 08:20 )  PTT:118.9 sec  Urinalysis Basic - ( 19 Sep 2023 06:36 )    Color: x / Appearance: x / SG: x / pH: x  Gluc: 87 mg/dL / Ketone: x  / Bili: x / Urobili: x   Blood: x / Protein: x / Nitrite: x   Leuk Esterase: x / RBC: x / WBC x   Sq Epi: x / Non Sq Epi: x / Bacteria: x            EKG - afib, nonspecific changes (my read)  Chart and consultant noted personally reviewed.  Care Discussed with Consultants/Other Providers/ Housestaff [ x] YES [ ] NO   Radiology, labs, old records personally reviewed.      < from: CT Angio Neck Stroke Protocol w/ IV Cont (09.12.23 @ 17:40) >  CT PERFUSION:  Ischemic tissue volume estimate of 63 cc in the area of the right   occipital/cerebellar areas, with core infarct estimate of 11 cc in the   area of the right occipital/cerebellar areas.    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    Severe stenosis of the right superior cerebellar artery.    < end of copied text >         CHILANGO PEMBERTON  83y  Female    Patient is a 83y old  Female who presents with a chief complaint of basilar occlusion (18 Sep 2023 16:42)      HPI:  The patient is an 83-year-old female with a past history of mood disorder, hallucination, hyperthyroidism, Afib, with a prior admission for syncope/ collapse at home, who presented with dysarthria, confusion, and left weakness and was noted to have a right perfusion deficit on CTP. The patient is a code NI actual, and will be taken for a planned diagnostic cerebral angiogram + mechanical thrombectomy with Dr. Mckeon. Upgraded to ICU, intubated, extubated after 2 days. Started on Heparin gtt for afib.    Admission scores:   NIHSS 32   (12 Sep 2023 21:42)    S: Patient was examined and seen at bedside. This morning pt is resting comfortably in bed and pt/staff report no new issues or overnight events. No specific complaints.  Denies CP, SOB, N/V/D/C/AP, cough, F, chills, dizziness, new focal weakness, HA, vision changes, dysuria, or urinary symptoms, blood in stool.  ROS: all other systems reviewed and are negative    PAST MEDICAL & SURGICAL HISTORY:  Thyroid disease  afib  Mood dz w/ psychotic features      SOCIAL HISTORY:  Tobacco: negative  Illicit drugs: negative  Alcohol: social  Family history reviewed and otherwise non-contributory No clotting disorders, CVAs at early age.  ALLERGIES: NKDA    MEDICATIONS  (STANDING):  chlorhexidine 2% Cloths 1 Application(s) Topical daily  heparin  Infusion 550 Unit(s)/Hr (8.5 mL/Hr) IV Continuous <Continuous>  influenza  Vaccine (HIGH DOSE) 0.7 milliLiter(s) IntraMuscular once  methimazole 5 milliGRAM(s) Oral <User Schedule>  metoprolol tartrate 25 milliGRAM(s) Oral every 8 hours  senna 2 Tablet(s) Oral at bedtime    MEDICATIONS  (PRN):  acetaminophen     Tablet .. 650 milliGRAM(s) Oral every 6 hours PRN Temp greater or equal to 38C (100.4F), Mild Pain (1 - 3)    Home Medications:  risperiDONE 0.5 mg oral tablet: 1 tab(s) orally 2 times a day (15 Sep 2023 16:07)          Vital Signs Last 24 Hrs  T(C): 36.3 (19 Sep 2023 08:00), Max: 36.7 (19 Sep 2023 00:00)  T(F): 97.4 (19 Sep 2023 08:00), Max: 98 (19 Sep 2023 00:00)  HR: 62 (19 Sep 2023 08:00) (62 - 136)  BP: 148/83 (19 Sep 2023 08:00) (106/57 - 162/98)  BP(mean): 110 (19 Sep 2023 08:00) (77 - 917)  RR: 18 (19 Sep 2023 08:00) (18 - 27)  SpO2: 97% (19 Sep 2023 08:00) (95% - 99%)    Parameters below as of 19 Sep 2023 08:00  Patient On (Oxygen Delivery Method): room air      CAPILLARY BLOOD GLUCOSE          General: NAD. Looks stated age. Chronically ill appearing  HEENT: clean oropharynx, EOMI, no LAD  Neck: trachea midline, no thyromegaly  CV: nl S1 S2; no m/r/g  Resp: decreased breath sounds at base  GI: NT/ND/S +BS  MS: no clubbing/cyanosis/edema, +pulses  Neuro: moves all extremities  Skin: no rashes, nl turgor  Psychiatric: AA0x1 w/ compromised insight and judgement    tele: afib, nonspecific changes (on my own evaluation of tele monitor)    LABS:                        10.8   13.06 )-----------( 383      ( 19 Sep 2023 06:36 )             33.8     09-19    143  |  105  |  11  ----------------------------<  87  4.0   |  26  |  0.6<L>    Ca    8.6      19 Sep 2023 06:36  Phos  3.2     09-19  Mg     2.2     09-19    TPro  5.9<L>  /  Alb  3.4<L>  /  TBili  0.6  /  DBili  x   /  AST  37  /  ALT  13  /  AlkPhos  91  09-19    LIVER FUNCTIONS - ( 19 Sep 2023 06:36 )  Alb: 3.4 g/dL / Pro: 5.9 g/dL / ALK PHOS: 91 U/L / ALT: 13 U/L / AST: 37 U/L / GGT: x               PTT - ( 19 Sep 2023 08:20 )  PTT:118.9 sec  Urinalysis Basic - ( 19 Sep 2023 06:36 )    Color: x / Appearance: x / SG: x / pH: x  Gluc: 87 mg/dL / Ketone: x  / Bili: x / Urobili: x   Blood: x / Protein: x / Nitrite: x   Leuk Esterase: x / RBC: x / WBC x   Sq Epi: x / Non Sq Epi: x / Bacteria: x            EKG - afib, nonspecific changes (my read)  Chart and consultant noted personally reviewed.  Care Discussed with Consultants/Other Providers/ Housestaff [ x] YES [ ] NO   Radiology, labs, old records personally reviewed.      < from: CT Angio Neck Stroke Protocol w/ IV Cont (09.12.23 @ 17:40) >  CT PERFUSION:  Ischemic tissue volume estimate of 63 cc in the area of the right   occipital/cerebellar areas, with core infarct estimate of 11 cc in the   area of the right occipital/cerebellar areas.    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    Severe stenosis of the right superior cerebellar artery.    < end of copied text >

## 2023-09-19 NOTE — CONSULT NOTE ADULT - ASSESSMENT
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 Received TNK @ 1743. Taken to IR for thrombectomy,  s/p thrombectomy of right SCA/BA with TICI 2C x3 passes.     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 Unclear reason why was off A/c  - TTE > EF of 60 %, moderately enlarged LA  - CTH on 9/19 > Scattered acute chronic infarcts in the cerebellum bilaterally, right occipital lobes. Questionable left frontal lobe infarct. Chronic left parieto-occipital infarction    #Pyuria  - UA positive on 9/19  - start Rocephine  - f/u urine culture    #Mood disorder  - restart Resperidone 0.5mg BID home dose    #S/P Resp failure secondary to neuro injury  #B/l pleural effusions   - Extubated 9/14/23  - Aspiration prec  - HOB 30 degrees  - f/u BNP, procal  -consider low dose Lasix if BP tolerated    #Paroxysmal Afib  - Heparin ggt in NICU, OK'ed on 9/19  - start Eliquis 2.5mg BID   - Lopressor 25mg q 8     #Urinary retention / S/P NAG met acidosis- diarrhea  - Flomax  - Bladder scan q6 today    #Hyperthyroidism  Methimazole - 5 mg q 12- monitor CBC   - get TSH, FT4    #Misc  - DVT Prophylaxis: eliquis  - Diet: minced and moist as per s/s  - Code Status: Full     High risk pt. Px is guarded.    #Progress Note Handoff  Pending: Clinical improvement and stability__x___PT____x____  Disposition: Home______/SNF_______/4A______/To be determined____x____    My note supersedes the residents note should a discrepancy arise.    Chart and notes personally reviewed.  Care Discussed with Consultants/Other Providers/ Housestaff [ x] YES [ ] NO   Radiology, labs, old records personally reviewed.    discussed w/ housestaff, nursing, case management, neuro team    Attestation Statements:    Attestation Statements:  Risk Statement (NON-critical care).     On this date of service, level of risk to patient is considered: High.     Due to: acute stroke, stroke unit care, neuro checks, telemetry monitoring.    Time-based billing (NON-critical care).     75 minutes spent on total encounter. The necessity of the time spent during the encounter on this date of service was due to:     time spent on review of labs, imaging studies, old records, obtaining history, personally examining patient, counselling and communicating with patient/ family, entering orders for medications/tests/etc, discussions with other health care providers, documentation in electronic health records, independent interpretation of labs, imaging/procedure results and care coordination.

## 2023-09-20 LAB
ALBUMIN SERPL ELPH-MCNC: 3.2 G/DL — LOW (ref 3.5–5.2)
ALP SERPL-CCNC: 97 U/L — SIGNIFICANT CHANGE UP (ref 30–115)
ALT FLD-CCNC: 16 U/L — SIGNIFICANT CHANGE UP (ref 0–41)
ANION GAP SERPL CALC-SCNC: 13 MMOL/L — SIGNIFICANT CHANGE UP (ref 7–14)
AST SERPL-CCNC: 34 U/L — SIGNIFICANT CHANGE UP (ref 0–41)
BASOPHILS # BLD AUTO: 0.04 K/UL — SIGNIFICANT CHANGE UP (ref 0–0.2)
BASOPHILS NFR BLD AUTO: 0.3 % — SIGNIFICANT CHANGE UP (ref 0–1)
BILIRUB SERPL-MCNC: 0.6 MG/DL — SIGNIFICANT CHANGE UP (ref 0.2–1.2)
BUN SERPL-MCNC: 9 MG/DL — LOW (ref 10–20)
CALCIUM SERPL-MCNC: 8.4 MG/DL — SIGNIFICANT CHANGE UP (ref 8.4–10.5)
CHLORIDE SERPL-SCNC: 105 MMOL/L — SIGNIFICANT CHANGE UP (ref 98–110)
CO2 SERPL-SCNC: 24 MMOL/L — SIGNIFICANT CHANGE UP (ref 17–32)
CREAT SERPL-MCNC: 0.5 MG/DL — LOW (ref 0.7–1.5)
EGFR: 94 ML/MIN/1.73M2 — SIGNIFICANT CHANGE UP
EOSINOPHIL # BLD AUTO: 0.29 K/UL — SIGNIFICANT CHANGE UP (ref 0–0.7)
EOSINOPHIL NFR BLD AUTO: 2 % — SIGNIFICANT CHANGE UP (ref 0–8)
GLUCOSE BLDC GLUCOMTR-MCNC: 102 MG/DL — HIGH (ref 70–99)
GLUCOSE BLDC GLUCOMTR-MCNC: 102 MG/DL — HIGH (ref 70–99)
GLUCOSE SERPL-MCNC: 81 MG/DL — SIGNIFICANT CHANGE UP (ref 70–99)
HCT VFR BLD CALC: 32.9 % — LOW (ref 37–47)
HGB BLD-MCNC: 10.8 G/DL — LOW (ref 12–16)
IMM GRANULOCYTES NFR BLD AUTO: 0.8 % — HIGH (ref 0.1–0.3)
LYMPHOCYTES # BLD AUTO: 2.94 K/UL — SIGNIFICANT CHANGE UP (ref 1.2–3.4)
LYMPHOCYTES # BLD AUTO: 20.6 % — SIGNIFICANT CHANGE UP (ref 20.5–51.1)
MAGNESIUM SERPL-MCNC: 2 MG/DL — SIGNIFICANT CHANGE UP (ref 1.8–2.4)
MCHC RBC-ENTMCNC: 30.3 PG — SIGNIFICANT CHANGE UP (ref 27–31)
MCHC RBC-ENTMCNC: 32.8 G/DL — SIGNIFICANT CHANGE UP (ref 32–37)
MCV RBC AUTO: 92.4 FL — SIGNIFICANT CHANGE UP (ref 81–99)
MONOCYTES # BLD AUTO: 0.74 K/UL — HIGH (ref 0.1–0.6)
MONOCYTES NFR BLD AUTO: 5.2 % — SIGNIFICANT CHANGE UP (ref 1.7–9.3)
NEUTROPHILS # BLD AUTO: 10.16 K/UL — HIGH (ref 1.4–6.5)
NEUTROPHILS NFR BLD AUTO: 71.1 % — SIGNIFICANT CHANGE UP (ref 42.2–75.2)
NRBC # BLD: 0 /100 WBCS — SIGNIFICANT CHANGE UP (ref 0–0)
PLATELET # BLD AUTO: 381 K/UL — SIGNIFICANT CHANGE UP (ref 130–400)
PMV BLD: 9.5 FL — SIGNIFICANT CHANGE UP (ref 7.4–10.4)
POTASSIUM SERPL-MCNC: 4 MMOL/L — SIGNIFICANT CHANGE UP (ref 3.5–5)
POTASSIUM SERPL-SCNC: 4 MMOL/L — SIGNIFICANT CHANGE UP (ref 3.5–5)
PROCALCITONIN SERPL-MCNC: 0.08 NG/ML — SIGNIFICANT CHANGE UP (ref 0.02–0.1)
PROT SERPL-MCNC: 5.8 G/DL — LOW (ref 6–8)
RBC # BLD: 3.56 M/UL — LOW (ref 4.2–5.4)
RBC # FLD: 13.5 % — SIGNIFICANT CHANGE UP (ref 11.5–14.5)
SODIUM SERPL-SCNC: 142 MMOL/L — SIGNIFICANT CHANGE UP (ref 135–146)
T3FREE SERPL-MCNC: 1.22 PG/ML — LOW (ref 2–4.4)
WBC # BLD: 14.29 K/UL — HIGH (ref 4.8–10.8)
WBC # FLD AUTO: 14.29 K/UL — HIGH (ref 4.8–10.8)

## 2023-09-20 PROCEDURE — 71045 X-RAY EXAM CHEST 1 VIEW: CPT | Mod: 26

## 2023-09-20 PROCEDURE — 99233 SBSQ HOSP IP/OBS HIGH 50: CPT

## 2023-09-20 PROCEDURE — 99221 1ST HOSP IP/OBS SF/LOW 40: CPT

## 2023-09-20 PROCEDURE — 99232 SBSQ HOSP IP/OBS MODERATE 35: CPT

## 2023-09-20 PROCEDURE — 70450 CT HEAD/BRAIN W/O DYE: CPT | Mod: 26

## 2023-09-20 RX ORDER — RISPERIDONE 4 MG/1
0.25 TABLET ORAL DAILY
Refills: 0 | Status: DISCONTINUED | OUTPATIENT
Start: 2023-09-21 | End: 2023-09-22

## 2023-09-20 RX ORDER — SODIUM CHLORIDE 9 MG/ML
500 INJECTION INTRAMUSCULAR; INTRAVENOUS; SUBCUTANEOUS ONCE
Refills: 0 | Status: COMPLETED | OUTPATIENT
Start: 2023-09-20 | End: 2023-09-20

## 2023-09-20 RX ORDER — SODIUM CHLORIDE 9 MG/ML
1000 INJECTION INTRAMUSCULAR; INTRAVENOUS; SUBCUTANEOUS
Refills: 0 | Status: DISCONTINUED | OUTPATIENT
Start: 2023-09-20 | End: 2023-09-27

## 2023-09-20 RX ORDER — RISPERIDONE 4 MG/1
0.25 TABLET ORAL DAILY
Refills: 0 | Status: DISCONTINUED | OUTPATIENT
Start: 2023-09-20 | End: 2023-09-20

## 2023-09-20 RX ADMIN — SODIUM CHLORIDE 50 MILLILITER(S): 9 INJECTION INTRAMUSCULAR; INTRAVENOUS; SUBCUTANEOUS at 20:17

## 2023-09-20 RX ADMIN — SODIUM CHLORIDE 50 MILLILITER(S): 9 INJECTION INTRAMUSCULAR; INTRAVENOUS; SUBCUTANEOUS at 14:38

## 2023-09-20 RX ADMIN — RISPERIDONE 0.5 MILLIGRAM(S): 4 TABLET ORAL at 05:02

## 2023-09-20 RX ADMIN — APIXABAN 2.5 MILLIGRAM(S): 2.5 TABLET, FILM COATED ORAL at 05:02

## 2023-09-20 RX ADMIN — SODIUM CHLORIDE 1000 MILLILITER(S): 9 INJECTION INTRAMUSCULAR; INTRAVENOUS; SUBCUTANEOUS at 16:40

## 2023-09-20 RX ADMIN — SODIUM CHLORIDE 500 MILLILITER(S): 9 INJECTION INTRAMUSCULAR; INTRAVENOUS; SUBCUTANEOUS at 20:19

## 2023-09-20 RX ADMIN — Medication 25 MILLIGRAM(S): at 05:02

## 2023-09-20 RX ADMIN — APIXABAN 2.5 MILLIGRAM(S): 2.5 TABLET, FILM COATED ORAL at 18:26

## 2023-09-20 RX ADMIN — CEFTRIAXONE 100 MILLIGRAM(S): 500 INJECTION, POWDER, FOR SOLUTION INTRAMUSCULAR; INTRAVENOUS at 16:45

## 2023-09-20 RX ADMIN — CHLORHEXIDINE GLUCONATE 1 APPLICATION(S): 213 SOLUTION TOPICAL at 05:03

## 2023-09-20 NOTE — CHART NOTE - NSCHARTNOTEFT_GEN_A_CORE
visited patient earlier today. patient encountered resting in bed - sleeping - hard to arouse. discussed with bedside RN.     called and spoke with patient's son Naren. Reviewed role of palliative SW with Naren. He noted having spoken with medical team earlier today. He was asking questions regarding next steps after hospital and asked about rehab. Answered questions, encouraged Naren to speak with case management .     contact info provided. will follow. [id number]

## 2023-09-20 NOTE — CONSULT NOTE ADULT - ATTENDING COMMENTS
# hyperthyroidism   - per history she was on MMI 5 mg daily at home ? compliance , review of previous labs patient with hyperthyroidism since at least 2020   - 9/13/23: TSH 0.03  FT4 2.1 reflecting hyperthyroidism , patient was given MMI 5 mg TID   - 9/19/23 : TSH 0.07   FT4 down to 0.8 with low ft3 , likely from combination of over treatment with Methimazole and sick euthyroid syndrome, recent contrast   - hold methimazole for 2 days , check TSH and free t4 and TOTAL t3 on friday 9/22 , might need to be restarted on lower dose methimazole   - check TSI   - Lfts and WBC ok
Briefly, 83 year old woman for whom cardiology is consulted for [facility] evaluation. There are no current contraindications that would prevent this patient from going to [facility]. Patient should be NPO past midnight.     Thank you for this consult. Please call/MS teams with questions.

## 2023-09-20 NOTE — CHART NOTE - NSCHARTNOTEFT_GEN_A_CORE
Registered Dietitian Follow-Up     Patient Profile Reviewed                           Yes [x]   No []     Nutrition History Previously Obtained        Yes [x]  No []       Pertinent Subjective Information: Pt has varying appetite from poor to good. Consuming between % of meals provided in-house. Tolerating diet texture/consistency well. No nausea or vomiting reported.      Pertinent Medical Information: 84 y/o female w/ PMHx of mood disorder, hallucination, hyperthyroidism, Afib no a/c  who presented with dysarthria, confusion, and left weakness, NIH 32, CTA showed complete occlusion of basilar artery. Received TNK @ 2739. Taken to IR for thrombectomy,  s/p thrombectomy of right SCA/BA with TICI 2C x3 passes.   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     Diet order: Diet, Minced and Moist:   Mildly Thick Liquids (MILDTHICKLIQS) (23 @ 11:39) [Active]    Anthropometrics:  Weight: 152 KG  Height: 47.1 KG  BMI: 20.4  IBW: 45.5 KG    Daily Weight in k.6 (), Weight in k.5 (18), Weight in k.5 (17), Weight in k.2 (16), Weight in k (09-15), Weight in k.2 () -- wt fluctuations likely d/t fluid shifts    MEDICATIONS  (STANDING):  apixaban 2.5 milliGRAM(s) Oral every 12 hours  cefTRIAXone   IVPB 1000 milliGRAM(s) IV Intermittent every 24 hours  chlorhexidine 2% Cloths 1 Application(s) Topical daily  influenza  Vaccine (HIGH DOSE) 0.7 milliLiter(s) IntraMuscular once  metoprolol tartrate 25 milliGRAM(s) Oral every 8 hours  risperiDONE   Tablet 0.25 milliGRAM(s) Oral daily  senna 2 Tablet(s) Oral at bedtime  tamsulosin 0.4 milliGRAM(s) Oral at bedtime    MEDICATIONS  (PRN):  acetaminophen     Tablet .. 650 milliGRAM(s) Oral every 6 hours PRN Temp greater or equal to 38C (100.4F), Mild Pain (1 - 3)    Pertinent Labs:  @ 06:57: Na 142, BUN 9<L>, Cr 0.5<L>, BG 81, K+ 4.0, Phos --, Mg 2.0, Alk Phos 97, ALT/SGPT 16, AST/SGOT 34, HbA1c --    Physical Findings:  - Appearance: confused at time of visit per flow sheet  - GI function: last BM on   - Tubes: no tube feeding  - Oral/Mouth cavity: tolerating diet texture/consistency  - Skin: intact; no pressure injuries noted  - Edema: no edema noted     Nutrition Requirements:  Weight Used: 46.6 KG per initial RD assessment -- with consideration for age, BMI, extubated     Estimated Energy Needs    Continue []  Adjust [x]  ENERGY: 5011-3380 kcal/day (30-35 kcal/kg)     Estimated Protein Needs    Continue [x]  Adjust []  PROTEIN: 65.24-79.22 g/day (1.4-1.7 g/kg)    Estimated Fluid Needs        Continue []  Adjust [x]  FLUID: 1mL/kcal    [x] Previous Nutrition Diagnosis: Inadequate Protein Energy Intake                  [x] Ongoing          [] Resolved    [] No active nutrition diagnosis identified at this time     Nutrition Education: Deferred at this time     Goal/Expected Outcome: Pt to meet >75% of estimated needs within 3-5 days      Indicator/Monitoring: Diet order, PO intake, weights, labs, NFPF, body composition, BM and tolerance to medical food supplements    Recommendation:  1. ADD MAGIC CUP 3X/DAILY to optimize kcal/pro intake -- provides 870 kcal/day total, 27g pro/day total; ADD Prosource Gelatein Plus 1X/DAILY -- 160 kcal/day total, 20g pro/day total  2. Continue with current diet order - texture per SLP  3. Encourage PO intake and assist during meals prn    Pt is at high nutrition risk; will f/u in 3-5 days or prn.    RD to remain available: Baylee Chow [phone] or MARCELA

## 2023-09-20 NOTE — CHART NOTE - NSCHARTNOTEFT_GEN_A_CORE
Was called by RN around 430PM today that pt's BP is 75/52, HR 90s. No fever. During assessment, pt was still lethargic as seen during morning. She was started on IVF 50cc/hr around 2PM. She has UTI,  and on Rocephin since yesterday. Urine c/s pending. She was given IVF bolus 200ml, and made more efforts to wake her up. After 30min, her BP increased to 120s/70s.  CTH this morning was stable. Likely cause could be dehydration vs. sepsis.    Plan:  - c/w IVF 50cc/hr  - send lactate, procal, blood c/s and obtain CXR  - check finger stick q6 as pt not eating  - If BP drops again, dc Rocephin and start Vanco and Zosyn, also give 500cc bolus, and can speak with NICU team about escalation of care    Discussed with hospitalist and neuro team.

## 2023-09-20 NOTE — CONSULT NOTE ADULT - ASSESSMENT
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 Received TNK @ 5573. Taken to IR for thrombectomy,  s/p thrombectomy of right SCA/BA with TICI 2C x3 passes. Endo consulted for Hx of Hyperthyroidism on methimazole now Hypothyroid.    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 Received TNK @ 3125. Taken to IR for thrombectomy,  s/p thrombectomy of right SCA/BA with TICI 2C x3 passes. Endo consulted for Hx of Hyperthyroidism on methimazole now Hypothyroid.    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     Recs:  - ok to hold methimazole for now atleast for 3 days  - obtain TSH, Free T4 and T3 in 2-3 days  - obtain thyroid-stimulating immunoglobulin levels  - will decide on restarting dose after levels in 3 days if needed  - rest of management per primary team   83-year-old female with a past history of mood disorder, hallucination, hyperthyroidism, Afib no a/c  who presented with dysarthria, confusion, and left weakness, NIH 32, CTA showed complete occlusion of basilar artery. Received TNK @ 1420. Taken to IR for thrombectomy,  s/p thrombectomy of right SCA/BA with TICI 2C x3 passes. Endo consulted for Hx of Hyperthyroidism on methimazole now Hypothyroid.    #Hx of Hyperthyroidism on methimazole  - TSH 0.07, in the past was low as well, with high Ft4   - currently holding methimazole  - VS normal, no clinical signs of hyperthyroid or hypothyroid for now  - was on methimazole 5mg qd per pharmacy and son, prescribed by PCP per son  - son said she hasn't been compliant with her meds, and sometimes would skip methimazole due to generalized pruritis.     Recs:  - ok to hold methimazole   - obtain TSH, Free T4 and T3 in 2-3 days  - obtain thyroid-stimulating immunoglobulin levels  - will decide on restarting dose after levels in 3 days if needed  - rest of management per primary team

## 2023-09-20 NOTE — GOALS OF CARE CONVERSATION - ADVANCED CARE PLANNING - CONVERSATION DETAILS
Spoke with Michaelmichelle Liliana (son) over the phone [phone]. Son was updated about the medical management. It was also asked to son about her wishes (MOLST)  if there is a need for CPR/intubation in the future. Son mentioned that she would not want to have wished to be intubated or CPR. He has similar feelings. He wants her mother to be DNR and DNI. Son was also informed that she is not eating, and that we may have to place NGT again. Will discuss about NGT again later. For now continue with all medical management.

## 2023-09-20 NOTE — PROVIDER CONTACT NOTE (CHANGE IN STATUS NOTIFICATION) - ASSESSMENT
pt vital signs taken. noted with lowest BP of 67/46, HR 96 (see vital flow sheet). pt noted lethargic, responsive to painful stimuli.

## 2023-09-20 NOTE — PROVIDER CONTACT NOTE (OTHER) - SITUATION
Unable to thoroughly complete NIH exam and SBP outside of parameters.
Pt increasingly more restless, pulling lines and NGT w/ mitts on, con't to monitor at this time.
Pt very restless, pulling lines and biting mitts, kicking legs off bed, zyprexa IM to be ordered.
Serum glucose was 70 in this mornings 04:30 labs. I did fingerstick & it is 76.

## 2023-09-20 NOTE — PROGRESS NOTE ADULT - ASSESSMENT
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 Received TNK @ 1743. Taken to IR for thrombectomy,  s/p thrombectomy of right SCA/BA with TICI 2C x3 passes.     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 Unclear reason why was off A/c  - TTE > EF of 60 %, moderately enlarged LA  - CTH on 9/19 > Scattered acute chronic infarcts in the cerebellum bilaterally, right occipital lobes. Questionable left frontal lobe infarct. Chronic left parieto-occipital infarction    Lethargy/Hypotension on 9/20  CTH stable  d/w housestaff: obtain LA, procal, BCx, gentrle hydration  f/u UCx  if drops BP again would start Vanco, Zosyn and d/c Rocephin    #Pyuria  - UA positive on 9/19  - start Rocephin  - f/u urine culture  hold Risperdal    #Mood disorder  -at home on Resperidone 0.5mg BID (recommend holding)    #S/P Resp failure secondary to neuro injury  #B/l pleural effusions   - Extubated 9/14/23  - Aspiration prec  - HOB 30 degrees  -monitor for fluid overload    #Paroxysmal Afib  - Heparin ggt in NICU, dc'ed on 9/19  - on Eliquis 2.5mg BID   - Lopressor 25mg q 8     #Urinary retention / S/P NAG met acidosis- diarrhea  - Flomax  - Bladder scan q6 today    #Hyperthyroidism  held Methimazole  d/w endo: repeat thyroid studies in 72hrs  check TSI    #Misc  - DVT Prophylaxis: eliquis  - Diet: minced and moist as per s/s  - Code Status: Full     High risk pt. Px is guarded. DNR/DNI    #Progress Note Handoff  Pending: Clinical improvement and stability__x___PT____x____Cx, LA, Procal  Disposition: Home______/SNF_______/4A______/To be determined____x____    My note supersedes the residents note should a discrepancy arise.    Chart and notes personally reviewed.  Care Discussed with Consultants/Other Providers/ Housestaff [ x] YES [ ] NO   Radiology, labs, old records personally reviewed.    discussed w/ housestaff, nursing, case management, neuro team, endo team    Attestation Statements:    Attestation Statements:  Risk Statement (NON-critical care).     On this date of service, level of risk to patient is considered: High.     Due to: acute stroke, stroke unit care, neuro checks, telemetry monitoring, hypotension, ?toxic-metabolic encephalopathy    Time-based billing (NON-critical care).     50 minutes spent on total encounter. The necessity of the time spent during the encounter on this date of service was due to:     time spent on review of labs, imaging studies, old records, obtaining history, personally examining patient, counselling and communicating with patient/ family, entering orders for medications/tests/etc, discussions with other health care providers, documentation in electronic health records, independent interpretation of labs, imaging/procedure results and care coordination.

## 2023-09-20 NOTE — PROVIDER CONTACT NOTE (OTHER) - ASSESSMENT
Pt asymptomatic of hypoglycemia. Being fed via NGT.
Received pt somonolent, opens eyes to tactile stimulation, spontaneously moves b/l upper and lower extremities, withdraws to painful stimuli. Unable to follow commands w/ , has garbled speech, b/l pupils are equal in size and reactive to light. SBP parameter 120-160, outside of parameter (see VS).

## 2023-09-20 NOTE — PROVIDER CONTACT NOTE (CHANGE IN STATUS NOTIFICATION) - ACTION/TREATMENT ORDERED:
dr ruiz at bedside, assessed pt. second IV line placed at which time pt became more responsive, eyes remained opened and had more movement of b/l upper and lower extremities. BP checked after IV bolus. within parameters.

## 2023-09-20 NOTE — PROGRESS NOTE ADULT - ASSESSMENT
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 Received TNK @ 1743. Taken to IR for thrombectomy,  s/p thrombectomy of right SCA/BA with TICI 2C x3 passes.       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 Questionable left frontal lobe infarct. Chronic left parieto-occipital infarction    #UTI  - UA positive on 9/19  - start Rocephine  - send urine culture    #Mood disorder  - restart Resperidone 0.5mg BID home dose    #S/P Resp failure secondary to neuro injury   - Extubated 9/14/23  - Aspiration prec  - HOB 30 degrees  - obtain BNP, procal    #Afib  - Heparin ggt in 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 Received TNK @ 1743. Taken to IR for thrombectomy,  s/p thrombectomy of right SCA/BA with TICI 2C x3 passes.       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 Questionable left frontal lobe infarct. Chronic left parieto-occipital infarction  - pt more lethargic on 9/20, CTH STAT stable, not eating much, may need to start her on IVF  - -150    #UTI  - UA positive on 9/19  - c/w Rocephin  - f/u urine culture    #Mood disorder  - reduce Resperidone to 0.25mg as pt is more lethargic    #S/P Resp failure secondary to neuro injury   - Extubated 9/14/23  - Aspiration prec  - HOB 30 degrees  - BNP elevated, procal neg    #Afib  - Heparin ggt in NICU, dc'ed on 9/19  - c/w Eliquis 2.5mg BID   - Lopressor 25mg q 8     #Urinary retention / S/P NAG met acidosis- diarrhea  - D/C Cardura  - Bladder scan q6     #Hyperthyroidism  - hold Methimazole - 5 mg q 12  - TSH, T4, T3 low, total t4 was high in 2020  - endo consult    #Misc  - DVT Prophylaxis: eliquis  - Diet: minced and moist as per s/s  - Code Status: Full

## 2023-09-20 NOTE — CONSULT NOTE ADULT - SUBJECTIVE AND OBJECTIVE BOX
HPI: 83yFemale    The patient is an 83-year-old female with a past history of mood disorder, hallucination, hyperthyroidism, Afib, with a prior admission for syncope/ collapse at home, who presented with dysarthria, confusion, and left weakness and was noted to have a right perfusion deficit on CTP. The patient is a code NI actual, and will be taken for a planned diagnostic cerebral angiogram + mechanical thrombectomy with Dr. Mckeon.     NIHSS 32 on admission. Patient is clinically doing better after thrombectomy. Discussed with primary team, if she is compliant with medication/treatments, she should make good recovery.     Met with patient this afternoon. Oriented to person and place; said it is 1923. Patient is frustrated that she is in restraints. Her son is at bedside and agrees that she is improving. In terms of mentation, he thinks that she is maybe "snf" compared to her baseline. Prior to the stroke, patient was living at home with son and . She would need some assistance with ADLs and relied on a walker. Son asks about disposition planning.     Interval Hx:   Endo consulted for Hx of Hyperthyroidism on methamizole, now hypothyroid      PMH & Surgical Hx:Cerebral infarction    Handoff    MEWS Score    Thyroid disease    Stroke    Basilar artery occlusion    Unspecified atrial fibrillation    Hyperthyroidism    Delirium    Encounter for palliative care    Mechanical thrombectomy of cerebral artery    CODE STROKE    90+    [email or web address]        Current Meds:  acetaminophen     Tablet .. 650 milliGRAM(s) Oral every 6 hours PRN  apixaban 2.5 milliGRAM(s) Oral every 12 hours  cefTRIAXone   IVPB 1000 milliGRAM(s) IV Intermittent every 24 hours  chlorhexidine 2% Cloths 1 Application(s) Topical daily  influenza  Vaccine (HIGH DOSE) 0.7 milliLiter(s) IntraMuscular once  metoprolol tartrate 25 milliGRAM(s) Oral every 8 hours  risperiDONE   Tablet 0.25 milliGRAM(s) Oral daily  senna 2 Tablet(s) Oral at bedtime  tamsulosin 0.4 milliGRAM(s) Oral at bedtime      Allergies:  No Known Allergies        Vital Signs Last 24 Hrs  T(C): 36.2 (20 Sep 2023 08:00), Max: 36.3 (19 Sep 2023 20:00)  T(F): 97.1 (20 Sep 2023 08:00), Max: 97.4 (19 Sep 2023 20:00)  HR: 89 (20 Sep 2023 09:00) (78 - 98)  BP: 106/53 (20 Sep 2023 09:00) (105/54 - 140/71)  BP(mean): 75 (20 Sep 2023 09:00) (59 - 103)  RR: 16 (20 Sep 2023 08:00) (16 - 18)  SpO2: 96% (20 Sep 2023 08:00) (95% - 97%)    Parameters below as of 20 Sep 2023 08:00  Patient On (Oxygen Delivery Method): room air            Constitutional: weak, appears to be sleeping  Neck: no thyromegaly or palpable thyroid nodules   Respiratory: lungs CTAB.  Cardiovascular: regular rhythm, normal S1 and S2, no audible murmurs, no peripheral edema  GI: soft, NT/ND, no masses/HSM appreciated.  Neurology: opens eyes to physical stimuli  Skin: no visible rashes/lesions  Ext: no bilat LE edema.       LABS:                        10.8   14.29 )-----------( 381      ( 20 Sep 2023 06:57 )             32.9     09-20    142  |  105  |  9<L>  ----------------------------<  81  4.0   |  24  |  0.5<L>    Ca    8.4      20 Sep 2023 06:57  Phos  3.2     09-19  Mg     2.0     09-20    TPro  5.8<L>  /  Alb  3.2<L>  /  TBili  0.6  /  DBili  x   /  AST  34  /  ALT  16  /  AlkPhos  97  09-20    PTT - ( 19 Sep 2023 08:20 )  PTT:118.9 sec  Urinalysis Basic - ( 20 Sep 2023 06:57 )    Color: x / Appearance: x / SG: x / pH: x  Gluc: 81 mg/dL / Ketone: x  / Bili: x / Urobili: x   Blood: x / Protein: x / Nitrite: x   Leuk Esterase: x / RBC: x / WBC x   Sq Epi: x / Non Sq Epi: x / Bacteria: x        Thyroid Stimulating Hormone, Serum: 0.07 (09-19 @ 11:15)  Thyroid Stimulating Hormone, Serum: 0.03 (09-13 @ 05:32)      RADIOLOGY & ADDITIONAL STUDIES:  CAPILLARY BLOOD GLUCOSE             HPI: 83yFemale    The patient is an 83-year-old female with a past history of mood disorder, hallucination, hyperthyroidism, Afib, with a prior admission for syncope/ collapse at home, who presented with dysarthria, confusion, and left weakness and was noted to have a right perfusion deficit on CTP. The patient is a code NI actual, and will be taken for a planned diagnostic cerebral angiogram + mechanical thrombectomy with Dr. Mckeon.     NIHSS 32 on admission. Patient is clinically doing better after thrombectomy. Discussed with primary team, if she is compliant with medication/treatments, she should make good recovery.     Met with patient this afternoon. Oriented to person and place; said it is 1923. Patient is frustrated that she is in restraints. Her son is at bedside and agrees that she is improving. In terms of mentation, he thinks that she is maybe "intermediate" compared to her baseline. Prior to the stroke, patient was living at home with son and . She would need some assistance with ADLs and relied on a walker. Son asks about disposition planning.     Interval Hx:   Endo consulted for Hx of Hyperthyroidism on methimazole now hypothyroid      PMH & Surgical Hx:Cerebral infarction    Handoff    MEWS Score    Thyroid disease    Stroke    Basilar artery occlusion    Unspecified atrial fibrillation    Hyperthyroidism    Delirium    Encounter for palliative care    Mechanical thrombectomy of cerebral artery    CODE STROKE    90+    [email or web address]        Current Meds:  acetaminophen     Tablet .. 650 milliGRAM(s) Oral every 6 hours PRN  apixaban 2.5 milliGRAM(s) Oral every 12 hours  cefTRIAXone   IVPB 1000 milliGRAM(s) IV Intermittent every 24 hours  chlorhexidine 2% Cloths 1 Application(s) Topical daily  influenza  Vaccine (HIGH DOSE) 0.7 milliLiter(s) IntraMuscular once  metoprolol tartrate 25 milliGRAM(s) Oral every 8 hours  risperiDONE   Tablet 0.25 milliGRAM(s) Oral daily  senna 2 Tablet(s) Oral at bedtime  tamsulosin 0.4 milliGRAM(s) Oral at bedtime      Allergies:  No Known Allergies        Vital Signs Last 24 Hrs  T(C): 36.2 (20 Sep 2023 08:00), Max: 36.3 (19 Sep 2023 20:00)  T(F): 97.1 (20 Sep 2023 08:00), Max: 97.4 (19 Sep 2023 20:00)  HR: 89 (20 Sep 2023 09:00) (78 - 98)  BP: 106/53 (20 Sep 2023 09:00) (105/54 - 140/71)  BP(mean): 75 (20 Sep 2023 09:00) (59 - 103)  RR: 16 (20 Sep 2023 08:00) (16 - 18)  SpO2: 96% (20 Sep 2023 08:00) (95% - 97%)    Parameters below as of 20 Sep 2023 08:00  Patient On (Oxygen Delivery Method): room air            Constitutional: weak, appears to be sleeping, limited exam as patient position not optimal   Neck: no thyromegaly or palpable thyroid nodules   Respiratory: lungs CTAB.  Cardiovascular: irregular  rhythm, normal S1 and S2  GI: soft,  Neurology: opens eyes to physical stimuli  Skin: no visible rashes/lesions         LABS:                        10.8   14.29 )-----------( 381      ( 20 Sep 2023 06:57 )             32.9     09-20    142  |  105  |  9<L>  ----------------------------<  81  4.0   |  24  |  0.5<L>    Ca    8.4      20 Sep 2023 06:57  Phos  3.2     09-19  Mg     2.0     09-20    TPro  5.8<L>  /  Alb  3.2<L>  /  TBili  0.6  /  DBili  x   /  AST  34  /  ALT  16  /  AlkPhos  97  09-20    PTT - ( 19 Sep 2023 08:20 )  PTT:118.9 sec  Urinalysis Basic - ( 20 Sep 2023 06:57 )    Color: x / Appearance: x / SG: x / pH: x  Gluc: 81 mg/dL / Ketone: x  / Bili: x / Urobili: x   Blood: x / Protein: x / Nitrite: x   Leuk Esterase: x / RBC: x / WBC x   Sq Epi: x / Non Sq Epi: x / Bacteria: x        Thyroid Stimulating Hormone, Serum: 0.07 (09-19 @ 11:15)  Thyroid Stimulating Hormone, Serum: 0.03 (09-13 @ 05:32)      RADIOLOGY & ADDITIONAL STUDIES:  CAPILLARY BLOOD GLUCOSE

## 2023-09-20 NOTE — PROVIDER CONTACT NOTE (OTHER) - REASON
Serum glucose 70; POCT 76
somnolence & SBP not within parameter
Pt very restless
Pt increasingly more restless

## 2023-09-20 NOTE — PROGRESS NOTE ADULT - SUBJECTIVE AND OBJECTIVE BOX
Neurology Stroke Progress Note    INTERVAL HPI/OVERNIGHT EVENTS:  Patient seen and examined. Heparin ggt was dc'ed yesterday, and was started on Eliquis 2.5mg BID. Last night pt spitted her meds but took it this morning. No other events overnight.    MEDICATIONS  (STANDING):  apixaban 2.5 milliGRAM(s) Oral every 12 hours  cefTRIAXone   IVPB 1000 milliGRAM(s) IV Intermittent every 24 hours  chlorhexidine 2% Cloths 1 Application(s) Topical daily  influenza  Vaccine (HIGH DOSE) 0.7 milliLiter(s) IntraMuscular once  metoprolol tartrate 25 milliGRAM(s) Oral every 8 hours  risperiDONE   Tablet 0.5 milliGRAM(s) Oral two times a day  senna 2 Tablet(s) Oral at bedtime  tamsulosin 0.4 milliGRAM(s) Oral at bedtime    MEDICATIONS  (PRN):  acetaminophen     Tablet .. 650 milliGRAM(s) Oral every 6 hours PRN Temp greater or equal to 38C (100.4F), Mild Pain (1 - 3)    Allergies    No Known Allergies    Intolerances      Vital Signs Last 24 Hrs  T(C): 36.2 (20 Sep 2023 04:00), Max: 36.9 (19 Sep 2023 12:00)  T(F): 97.2 (20 Sep 2023 04:00), Max: 98.4 (19 Sep 2023 12:00)  HR: 98 (20 Sep 2023 04:00) (62 - 98)  BP: 126/75 (20 Sep 2023 04:00) (118/53 - 148/83)  BP(mean): 91 (20 Sep 2023 04:00) (64 - 110)  RR: 18 (20 Sep 2023 04:00) (18 - 18)  SpO2: 96% (20 Sep 2023 04:00) (95% - 97%)    Parameters below as of 20 Sep 2023 04:00  Patient On (Oxygen Delivery Method): room air      Neurologic:  -Mental status: Awake, alert, oriented to person, place, and time. Speech is fluent with intact naming, repetition, and comprehension, no dysarthria. Recent and remote memory intact. Follows commands. Attention/concentration intact. Fund of knowledge appropriate.  -Cranial nerves:   II: Visual fields are full to confrontation.  III, IV, VI: Extraocular movements are intact without nystagmus. Pupils equally round and reactive to light  V:  Facial sensation V1-V3 equal and intact   VII: Face is symmetric with normal eye closure and smile  VIII: Hearing is bilaterally intact to finger rub  IX, X: Uvula is midline and soft palate rises symmetrically  XI: Head turning and shoulder shrug are intact.  XII: Tongue protrudes midline  Motor: Normal bulk and tone. No pronator drift. Strength bilateral upper extremity 5/5, bilateral lower extremities 5/5.  Rapid alternating movements intact and symmetric  Sensation: Intact to light touch bilaterally. No neglect or extinction on double simultaneous testing.  Coordination: No dysmetria on finger-to-nose and heel-to-shin bilaterally  Reflexes: Downgoing toes bilaterally   Gait: Narrow gait and steady  NIHSS: 7    LABS:                        10.8   13.06 )-----------( 383      ( 19 Sep 2023 06:36 )             33.8         143  |  105  |  11  ----------------------------<  87  4.0   |  26  |  0.6<L>    Ca    8.6      19 Sep 2023 06:36  Phos  3.2       Mg     2.2         TPro  5.9<L>  /  Alb  3.4<L>  /  TBili  0.6  /  DBili  x   /  AST  37  /  ALT  13  /  AlkPhos  91  -    PTT - ( 19 Sep 2023 08:20 )  PTT:118.9 sec  Urinalysis Basic - ( 19 Sep 2023 11:05 )    Color: Red / Appearance: Turbid / S.010 / pH: x  Gluc: x / Ketone: Negative mg/dL  / Bili: Negative / Urobili: 1.0 mg/dL   Blood: x / Protein: 30 mg/dL / Nitrite: Positive   Leuk Esterase: Large / RBC: 79 /HPF / WBC 79 /HPF   Sq Epi: x / Non Sq Epi: 1 /HPF / Bacteria: Many /HPF        RADIOLOGY & ADDITIONAL TESTS: Reviewed     Neurology Stroke Progress Note    INTERVAL HPI/OVERNIGHT EVENTS:  Patient seen and examined. Heparin ggt was dc'ed yesterday, and was started on Eliquis 2.5mg BID. Last night pt spitted her meds but took it this morning. No other events overnight.    MEDICATIONS  (STANDING):  apixaban 2.5 milliGRAM(s) Oral every 12 hours  cefTRIAXone   IVPB 1000 milliGRAM(s) IV Intermittent every 24 hours  chlorhexidine 2% Cloths 1 Application(s) Topical daily  influenza  Vaccine (HIGH DOSE) 0.7 milliLiter(s) IntraMuscular once  metoprolol tartrate 25 milliGRAM(s) Oral every 8 hours  risperiDONE   Tablet 0.5 milliGRAM(s) Oral two times a day  senna 2 Tablet(s) Oral at bedtime  tamsulosin 0.4 milliGRAM(s) Oral at bedtime    MEDICATIONS  (PRN):  acetaminophen     Tablet .. 650 milliGRAM(s) Oral every 6 hours PRN Temp greater or equal to 38C (100.4F), Mild Pain (1 - 3)    Allergies    No Known Allergies    Intolerances      Vital Signs Last 24 Hrs  T(C): 36.2 (20 Sep 2023 04:00), Max: 36.9 (19 Sep 2023 12:00)  T(F): 97.2 (20 Sep 2023 04:00), Max: 98.4 (19 Sep 2023 12:00)  HR: 98 (20 Sep 2023 04:00) (62 - 98)  BP: 126/75 (20 Sep 2023 04:00) (118/53 - 148/83)  BP(mean): 91 (20 Sep 2023 04:00) (64 - 110)  RR: 18 (20 Sep 2023 04:00) (18 - 18)  SpO2: 96% (20 Sep 2023 04:00) (95% - 97%)    Parameters below as of 20 Sep 2023 04:00  Patient On (Oxygen Delivery Method): room air    Neuro exam, exam limited due to pt lethargic  Patient is lethargic but was able to wake up with repeated stimuli. not following commands today  CN: Mild left facial droop        Moderate-severe dysarthric speech  Power:  was able to move all extremities with pain against gravity   Sensory: Responding to pain in all 4 exts by withdrawing and grimacing  FTN: pt not following commands, cant assess    NIHSS: 11    LABS:                        10.8   13.06 )-----------( 383      ( 19 Sep 2023 06:36 )             33.8         143  |  105  |  11  ----------------------------<  87  4.0   |  26  |  0.6<L>    Ca    8.6      19 Sep 2023 06:36  Phos  3.2       Mg     2.2         TPro  5.9<L>  /  Alb  3.4<L>  /  TBili  0.6  /  DBili  x   /  AST  37  /  ALT  13  /  AlkPhos  91      PTT - ( 19 Sep 2023 08:20 )  PTT:118.9 sec  Urinalysis Basic - ( 19 Sep 2023 11:05 )    Color: Red / Appearance: Turbid / S.010 / pH: x  Gluc: x / Ketone: Negative mg/dL  / Bili: Negative / Urobili: 1.0 mg/dL   Blood: x / Protein: 30 mg/dL / Nitrite: Positive   Leuk Esterase: Large / RBC: 79 /HPF / WBC 79 /HPF   Sq Epi: x / Non Sq Epi: 1 /HPF / Bacteria: Many /HPF        RADIOLOGY & ADDITIONAL TESTS: Reviewed

## 2023-09-21 LAB
ALBUMIN SERPL ELPH-MCNC: 3.1 G/DL — LOW (ref 3.5–5.2)
ALP SERPL-CCNC: 90 U/L — SIGNIFICANT CHANGE UP (ref 30–115)
ALT FLD-CCNC: 20 U/L — SIGNIFICANT CHANGE UP (ref 0–41)
ANION GAP SERPL CALC-SCNC: 10 MMOL/L — SIGNIFICANT CHANGE UP (ref 7–14)
AST SERPL-CCNC: 38 U/L — SIGNIFICANT CHANGE UP (ref 0–41)
BASOPHILS # BLD AUTO: 0.05 K/UL — SIGNIFICANT CHANGE UP (ref 0–0.2)
BASOPHILS NFR BLD AUTO: 0.4 % — SIGNIFICANT CHANGE UP (ref 0–1)
BILIRUB SERPL-MCNC: 0.5 MG/DL — SIGNIFICANT CHANGE UP (ref 0.2–1.2)
BUN SERPL-MCNC: 10 MG/DL — SIGNIFICANT CHANGE UP (ref 10–20)
CALCIUM SERPL-MCNC: 8.2 MG/DL — LOW (ref 8.4–10.5)
CHLORIDE SERPL-SCNC: 106 MMOL/L — SIGNIFICANT CHANGE UP (ref 98–110)
CO2 SERPL-SCNC: 24 MMOL/L — SIGNIFICANT CHANGE UP (ref 17–32)
CREAT SERPL-MCNC: 0.5 MG/DL — LOW (ref 0.7–1.5)
EGFR: 94 ML/MIN/1.73M2 — SIGNIFICANT CHANGE UP
EOSINOPHIL # BLD AUTO: 0.36 K/UL — SIGNIFICANT CHANGE UP (ref 0–0.7)
EOSINOPHIL NFR BLD AUTO: 3.1 % — SIGNIFICANT CHANGE UP (ref 0–8)
GLUCOSE BLDC GLUCOMTR-MCNC: 123 MG/DL — HIGH (ref 70–99)
GLUCOSE BLDC GLUCOMTR-MCNC: 85 MG/DL — SIGNIFICANT CHANGE UP (ref 70–99)
GLUCOSE BLDC GLUCOMTR-MCNC: 92 MG/DL — SIGNIFICANT CHANGE UP (ref 70–99)
GLUCOSE SERPL-MCNC: 73 MG/DL — SIGNIFICANT CHANGE UP (ref 70–99)
HCT VFR BLD CALC: 33.4 % — LOW (ref 37–47)
HGB BLD-MCNC: 10.6 G/DL — LOW (ref 12–16)
IMM GRANULOCYTES NFR BLD AUTO: 1 % — HIGH (ref 0.1–0.3)
LACTATE SERPL-SCNC: 2.3 MMOL/L — HIGH (ref 0.7–2)
LACTATE SERPL-SCNC: 3.1 MMOL/L — HIGH (ref 0.7–2)
LYMPHOCYTES # BLD AUTO: 2.84 K/UL — SIGNIFICANT CHANGE UP (ref 1.2–3.4)
LYMPHOCYTES # BLD AUTO: 24.8 % — SIGNIFICANT CHANGE UP (ref 20.5–51.1)
MAGNESIUM SERPL-MCNC: 2 MG/DL — SIGNIFICANT CHANGE UP (ref 1.8–2.4)
MCHC RBC-ENTMCNC: 29.7 PG — SIGNIFICANT CHANGE UP (ref 27–31)
MCHC RBC-ENTMCNC: 31.7 G/DL — LOW (ref 32–37)
MCV RBC AUTO: 93.6 FL — SIGNIFICANT CHANGE UP (ref 81–99)
MONOCYTES # BLD AUTO: 0.94 K/UL — HIGH (ref 0.1–0.6)
MONOCYTES NFR BLD AUTO: 8.2 % — SIGNIFICANT CHANGE UP (ref 1.7–9.3)
NEUTROPHILS # BLD AUTO: 7.14 K/UL — HIGH (ref 1.4–6.5)
NEUTROPHILS NFR BLD AUTO: 62.5 % — SIGNIFICANT CHANGE UP (ref 42.2–75.2)
NRBC # BLD: 0 /100 WBCS — SIGNIFICANT CHANGE UP (ref 0–0)
PLATELET # BLD AUTO: 387 K/UL — SIGNIFICANT CHANGE UP (ref 130–400)
PMV BLD: 9.3 FL — SIGNIFICANT CHANGE UP (ref 7.4–10.4)
POTASSIUM SERPL-MCNC: 4.2 MMOL/L — SIGNIFICANT CHANGE UP (ref 3.5–5)
POTASSIUM SERPL-SCNC: 4.2 MMOL/L — SIGNIFICANT CHANGE UP (ref 3.5–5)
PROCALCITONIN SERPL-MCNC: 0.05 NG/ML — SIGNIFICANT CHANGE UP (ref 0.02–0.1)
PROT SERPL-MCNC: 5.3 G/DL — LOW (ref 6–8)
RBC # BLD: 3.57 M/UL — LOW (ref 4.2–5.4)
RBC # FLD: 14.1 % — SIGNIFICANT CHANGE UP (ref 11.5–14.5)
SODIUM SERPL-SCNC: 140 MMOL/L — SIGNIFICANT CHANGE UP (ref 135–146)
WBC # BLD: 11.44 K/UL — HIGH (ref 4.8–10.8)
WBC # FLD AUTO: 11.44 K/UL — HIGH (ref 4.8–10.8)

## 2023-09-21 PROCEDURE — 99233 SBSQ HOSP IP/OBS HIGH 50: CPT

## 2023-09-21 PROCEDURE — 99232 SBSQ HOSP IP/OBS MODERATE 35: CPT

## 2023-09-21 RX ORDER — DEXTROSE 50 % IN WATER 50 %
50 SYRINGE (ML) INTRAVENOUS ONCE
Refills: 0 | Status: COMPLETED | OUTPATIENT
Start: 2023-09-21 | End: 2023-09-21

## 2023-09-21 RX ORDER — AZITHROMYCIN 500 MG/1
500 TABLET, FILM COATED ORAL DAILY
Refills: 0 | Status: COMPLETED | OUTPATIENT
Start: 2023-09-21 | End: 2023-09-23

## 2023-09-21 RX ADMIN — TAMSULOSIN HYDROCHLORIDE 0.4 MILLIGRAM(S): 0.4 CAPSULE ORAL at 21:08

## 2023-09-21 RX ADMIN — AZITHROMYCIN 500 MILLIGRAM(S): 500 TABLET, FILM COATED ORAL at 11:45

## 2023-09-21 RX ADMIN — CEFTRIAXONE 100 MILLIGRAM(S): 500 INJECTION, POWDER, FOR SOLUTION INTRAMUSCULAR; INTRAVENOUS at 15:54

## 2023-09-21 RX ADMIN — SENNA PLUS 2 TABLET(S): 8.6 TABLET ORAL at 21:08

## 2023-09-21 RX ADMIN — CHLORHEXIDINE GLUCONATE 1 APPLICATION(S): 213 SOLUTION TOPICAL at 05:25

## 2023-09-21 RX ADMIN — RISPERIDONE 0.25 MILLIGRAM(S): 4 TABLET ORAL at 11:45

## 2023-09-21 RX ADMIN — APIXABAN 2.5 MILLIGRAM(S): 2.5 TABLET, FILM COATED ORAL at 17:28

## 2023-09-21 NOTE — CHART NOTE - NSCHARTNOTESELECT_GEN_ALL_CORE
Neuroendovascular/Event Note
Palliative Care - Social Work/Event Note
Event Note
Family Contact/Event Note
Follow Up/Nutrition Services
Palliative Care - Social Work/Event Note
Transfer Note

## 2023-09-21 NOTE — PROGRESS NOTE ADULT - SUBJECTIVE AND OBJECTIVE BOX
Patient is a 83y old  Female who presents with a chief complaint of basilar occlusion (21 Sep 2023 11:30)    HPI:  The patient is an 83-year-old female with a past history of mood disorder, hallucination, hyperthyroidism, Afib, with a prior admission for syncope/ collapse at home, who presented with dysarthria, confusion, and left weakness and was noted to have a right perfusion deficit on CTP. The patient is a code NI actual, and will be taken for a planned diagnostic cerebral angiogram + mechanical thrombectomy with Dr. Mckeon.     Admission scores:   NIHSS 32   (12 Sep 2023 21:42)    PAST MEDICAL & SURGICAL HISTORY:  Thyroid disease    patient seen and examined independently on morning rounds for the first time today in stroke unit, chart reviewed and discussed with the neurology team and on interdisciplinary rounds:      hospital day #9    awake and alert eating breakfast this morning but at time of exam was lethargic with minimum responsiveness- appeared comfortable but not cooperating     as per RN When family arrived last night patient was much more awake, alert and conversant      Vital Signs Last 24 Hrs  T(C): 36.4 (21 Sep 2023 16:00), Max: 36.6 (21 Sep 2023 12:00)  T(F): 97.6 (21 Sep 2023 16:00), Max: 97.8 (21 Sep 2023 12:00)  HR: 78 (21 Sep 2023 16:00) (78 - 92)  BP: 104/60 (21 Sep 2023 16:00) (104/60 - 140/74)  BP(mean): 78 (21 Sep 2023 16:00) (70 - 110)  RR: 16 (21 Sep 2023 16:00) (16 - 19)  SpO2: 98% (21 Sep 2023 16:00) (96% - 98%)    Parameters below as of 21 Sep 2023 16:00  Patient On (Oxygen Delivery Method): room air    PE:  GEN-NAD, resting comfortably- minimally responsive (opened eyes briefly- non-verbal with me)  PULM-  fair air entry  CVS- +s1/s2 RRR   GI- soft NT ND +bs  EXT- no edema             10.6   11.44 )-----------( 387      ( 21 Sep 2023 05:35 )             33.4     09-21    140  |  106  |  10  ----------------------------<  73  4.2   |  24  |  0.5<L>    Ca    8.2<L>      21 Sep 2023 05:35  Mg     2.0     09-21    TPro  5.3<L>  /  Alb  3.1<L>  /  TBili  0.5  /  DBili  x   /  AST  38  /  ALT  20  /  AlkPhos  90  09-21        Urinalysis Basic - ( 21 Sep 2023 05:35 )    Color: x / Appearance: x / SG: x / pH: x  Gluc: 73 mg/dL / Ketone: x  / Bili: x / Urobili: x   Blood: x / Protein: x / Nitrite: x   Leuk Esterase: x / RBC: x / WBC x   Sq Epi: x / Non Sq Epi: x / Bacteria: x          MEDICATIONS  (STANDING):  apixaban 2.5 milliGRAM(s) Oral every 12 hours  azithromycin   Tablet 500 milliGRAM(s) Oral daily  chlorhexidine 2% Cloths 1 Application(s) Topical daily  influenza  Vaccine (HIGH DOSE) 0.7 milliLiter(s) IntraMuscular once  risperiDONE   Tablet 0.25 milliGRAM(s) Oral daily  senna 2 Tablet(s) Oral at bedtime  sodium chloride 0.9%. 1000 milliLiter(s) (50 mL/Hr) IV Continuous <Continuous>  tamsulosin 0.4 milliGRAM(s) Oral at bedtime

## 2023-09-21 NOTE — PROGRESS NOTE ADULT - ASSESSMENT
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 Received TNK @ 1743. Taken to IR for thrombectomy,  s/p thrombectomy of right SCA/BA with TICI 2C x3 passes.     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 Questionable left frontal lobe infarct. Chronic left parieto-occipital infarction  -sp/swallow eval  -PT/OT    #Lethargy/Hypotension--as per team is improving (patient ate 50% of her breakfast and was more alert and awake yesterday when family arrived)  -CTH stable  -LA 3.1-->2.3  -repeat procal, LA, RVP, check AM cxr  -on iv ceftriaxone---monitor wbc and fever curve  -leukocytosis is trending down  -if any change clinically or worsening hemodynamica would broaden abx (start zosyn)--for now continue ceftriaxone and add azithromycin  -repeat CXR in am  -monitor neurochecks q4hr  -sp/swallow eval  -UA+ on 9/19---f/u urine cx and monitor wbc and fever curve    #Mood disorder  -holding home Risperidone 0.5mg BID--reassess mental status    #S/P Resp failure secondary to neuro injury  #B/l pleural effusions   - Extubated 9/14/23  - Aspiration prec  - HOB 30 degrees  -monitor for fluid overload    #Paroxysmal Afib  - on Eliquis 2.5mg BID   - Lopressor 25mg q 8     #Hyperthyroidism  -holding Methimazole  -repeat TSH   -f/u with endo     DVT/GI ppx  guarded prognosis    DNR/DNI     Total time spent to complete patient's bedside assessment, review medical chart, discuss medical plan of care with covering medical team was more than 50 minutes  with >50% of time spendt face to face with patient, discussion with patient/family and/or coordination of care

## 2023-09-21 NOTE — SWALLOW BEDSIDE ASSESSMENT ADULT - COMMENTS
As per RN, pt consumed 50% of her breakfast today FEES 9/18/23- Minced and moist diet w/ mildly thick liquids- alternate food with liquid; oral hygiene; position upright (90 degrees); small sips/bites; pinch straw to control sips (pt takes large sips)  As per RN, pt consumed 50% of her breakfast today FEES 9/18/23- Minced and moist diet w/ mildly thick liquids- alternate food with liquid; oral hygiene; position upright (90 degrees); small sips/bites; pinch straw to control sips (pt takes large sips)  As per RN, pt has been reusing to eat but today pt consumed 50% of her breakfast today

## 2023-09-21 NOTE — PROGRESS NOTE ADULT - ASSESSMENT
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 Received TNK @ 1743. Taken to IR for thrombectomy,  s/p thrombectomy of right SCA/BA with TICI 2C x3 passes. Pt now being treated for UTI.      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 Questionable left frontal lobe infarct. Chronic left parieto-occipital infarction  - pt more lethargic on 9/20, CTH STAT stable, not eating much, may need to start her on IVF  - -150    #UTI vs. PNA  - UA positive on 9/19  - c/w Rocephin, and add Azithromycin   - f/u urine and blood culture  - lactate 3.1, trend  - if BP drop, can escalate Abx to Zosyn  - check RVP, procal, cxr tomorrow AM    #Mood disorder  - reduce Resperidone to 0.25mg daily (as per son, pt takes 0.5mg once a day, but noncompliant)    #S/P Resp failure secondary to neuro injury   - Extubated 9/14/23  - Aspiration prec  - HOB 30 degrees  - BNP elevated, procal neg    #Afib  - Heparin ggt in NICU, dc'ed on 9/19  - c/w Eliquis 2.5mg BID   - Lopressor 25mg q 8     #Urinary retention / S/P NAG met acidosis- diarrhea  - D/C Cardura  - Bladder scan q6     #Hyperthyroidism  - hold Methimazole - 5 mg q 12  - TSH, T4, T3 low, total t4 was high in 2020  - endo consult appreciated > check TSI, check T4, t3, tsh after 2-3 days again, hold methimazole    #Misc  - DVT Prophylaxis: eliquis  - Diet: minced and moist as per s/s  - Code Status: Full

## 2023-09-21 NOTE — PROGRESS NOTE ADULT - SUBJECTIVE AND OBJECTIVE BOX
Neurology Stroke Progress Note    INTERVAL HPI/OVERNIGHT EVENTS:  Patient seen and examined. Pt had a hypotensive episode yesterday in the evening improved with IVF bolus. Pt looking better today, ate half of her breakfast. Made DNR/DNI yesterday by son. Overnight lactate was high. BP stable.     MEDICATIONS  (STANDING):  apixaban 2.5 milliGRAM(s) Oral every 12 hours  azithromycin   Tablet 500 milliGRAM(s) Oral daily  cefTRIAXone   IVPB 1000 milliGRAM(s) IV Intermittent every 24 hours  chlorhexidine 2% Cloths 1 Application(s) Topical daily  influenza  Vaccine (HIGH DOSE) 0.7 milliLiter(s) IntraMuscular once  risperiDONE   Tablet 0.25 milliGRAM(s) Oral daily  senna 2 Tablet(s) Oral at bedtime  sodium chloride 0.9%. 1000 milliLiter(s) (50 mL/Hr) IV Continuous <Continuous>  tamsulosin 0.4 milliGRAM(s) Oral at bedtime    MEDICATIONS  (PRN):  acetaminophen     Tablet .. 650 milliGRAM(s) Oral every 6 hours PRN Temp greater or equal to 38C (100.4F), Mild Pain (1 - 3)    Allergies    No Known Allergies    Intolerances      Vital Signs Last 24 Hrs  T(C): 36.1 (21 Sep 2023 08:00), Max: 36.2 (21 Sep 2023 04:00)  T(F): 97 (21 Sep 2023 08:00), Max: 97.2 (21 Sep 2023 04:00)  HR: 90 (21 Sep 2023 08:00) (78 - 97)  BP: 135/74 (21 Sep 2023 08:00) (67/46 - 140/74)  BP(mean): 99 (21 Sep 2023 08:00) (58 - 110)  RR: 19 (21 Sep 2023 08:00) (16 - 19)  SpO2: 98% (21 Sep 2023 08:00) (96% - 98%)    Parameters below as of 21 Sep 2023 08:00  Patient On (Oxygen Delivery Method): room air    Neuro exam, exam limited due to pt drowsy  Patient is much less lethargic today,  was able to wake up with verbal stimuli. not following much commands.  CN: Mild left facial droop        Moderate-severe dysarthric speech  Power:  was able to move all extremities with pain against gravity   Sensory: Responding to pain in all 4 exts by withdrawing and grimacing  FTN: pt not following commands, cant assess    NIHSS: 10     LABS:                        10.6   11.44 )-----------( 387      ( 21 Sep 2023 05:35 )             33.4     09-21    140  |  106  |  10  ----------------------------<  73  4.2   |  24  |  0.5<L>    Ca    8.2<L>      21 Sep 2023 05:35  Mg     2.0     09-21    TPro  5.3<L>  /  Alb  3.1<L>  /  TBili  0.5  /  DBili  x   /  AST  38  /  ALT  20  /  AlkPhos  90  09-21      Urinalysis Basic - ( 21 Sep 2023 05:35 )    Color: x / Appearance: x / SG: x / pH: x  Gluc: 73 mg/dL / Ketone: x  / Bili: x / Urobili: x   Blood: x / Protein: x / Nitrite: x   Leuk Esterase: x / RBC: x / WBC x   Sq Epi: x / Non Sq Epi: x / Bacteria: x        RADIOLOGY & ADDITIONAL TESTS: Reviewed

## 2023-09-21 NOTE — SWALLOW BEDSIDE ASSESSMENT ADULT - SLP GENERAL OBSERVATIONS
Pt received in bed awake and alert, pulled NGT out several times this weekend, will f/u w/ FEES to further assess jarod-pharyngeal swallow integrity Pt found in bed alert and awake. As per RN, pt has been more alert and awake and eating more. Pt consumed 50% of her breakfast.

## 2023-09-21 NOTE — CHART NOTE - NSCHARTNOTEFT_GEN_A_CORE
Primary team spoke to family about code status with patient's family, and changed to DNR/DNI. Discussed with primary team; no additional palliative needs at this time. We will sign off.     Please call [phone] with questions or concerns 24/7.

## 2023-09-22 LAB
ALBUMIN SERPL ELPH-MCNC: 3 G/DL — LOW (ref 3.5–5.2)
ALP SERPL-CCNC: 102 U/L — SIGNIFICANT CHANGE UP (ref 30–115)
ALT FLD-CCNC: 17 U/L — SIGNIFICANT CHANGE UP (ref 0–41)
ANION GAP SERPL CALC-SCNC: 12 MMOL/L — SIGNIFICANT CHANGE UP (ref 7–14)
AST SERPL-CCNC: 30 U/L — SIGNIFICANT CHANGE UP (ref 0–41)
BILIRUB SERPL-MCNC: 0.4 MG/DL — SIGNIFICANT CHANGE UP (ref 0.2–1.2)
BUN SERPL-MCNC: 12 MG/DL — SIGNIFICANT CHANGE UP (ref 10–20)
CALCIUM SERPL-MCNC: 8.6 MG/DL — SIGNIFICANT CHANGE UP (ref 8.4–10.4)
CHLORIDE SERPL-SCNC: 106 MMOL/L — SIGNIFICANT CHANGE UP (ref 98–110)
CO2 SERPL-SCNC: 22 MMOL/L — SIGNIFICANT CHANGE UP (ref 17–32)
CREAT SERPL-MCNC: 0.7 MG/DL — SIGNIFICANT CHANGE UP (ref 0.7–1.5)
EGFR: 87 ML/MIN/1.73M2 — SIGNIFICANT CHANGE UP
GLUCOSE BLDC GLUCOMTR-MCNC: 124 MG/DL — HIGH (ref 70–99)
GLUCOSE BLDC GLUCOMTR-MCNC: 176 MG/DL — HIGH (ref 70–99)
GLUCOSE BLDC GLUCOMTR-MCNC: 72 MG/DL — SIGNIFICANT CHANGE UP (ref 70–99)
GLUCOSE BLDC GLUCOMTR-MCNC: 92 MG/DL — SIGNIFICANT CHANGE UP (ref 70–99)
GLUCOSE BLDC GLUCOMTR-MCNC: 99 MG/DL — SIGNIFICANT CHANGE UP (ref 70–99)
GLUCOSE SERPL-MCNC: 99 MG/DL — SIGNIFICANT CHANGE UP (ref 70–99)
HCT VFR BLD CALC: 36.7 % — LOW (ref 37–47)
HGB BLD-MCNC: 11.5 G/DL — LOW (ref 12–16)
LACTATE SERPL-SCNC: 2 MMOL/L — SIGNIFICANT CHANGE UP (ref 0.7–2)
MAGNESIUM SERPL-MCNC: 2 MG/DL — SIGNIFICANT CHANGE UP (ref 1.8–2.4)
MCHC RBC-ENTMCNC: 29.6 PG — SIGNIFICANT CHANGE UP (ref 27–31)
MCHC RBC-ENTMCNC: 31.3 G/DL — LOW (ref 32–37)
MCV RBC AUTO: 94.3 FL — SIGNIFICANT CHANGE UP (ref 81–99)
NRBC # BLD: 0 /100 WBCS — SIGNIFICANT CHANGE UP (ref 0–0)
PLATELET # BLD AUTO: 419 K/UL — HIGH (ref 130–400)
PMV BLD: 9.4 FL — SIGNIFICANT CHANGE UP (ref 7.4–10.4)
POTASSIUM SERPL-MCNC: 4.4 MMOL/L — SIGNIFICANT CHANGE UP (ref 3.5–5)
POTASSIUM SERPL-SCNC: 4.4 MMOL/L — SIGNIFICANT CHANGE UP (ref 3.5–5)
PROCALCITONIN SERPL-MCNC: 0.04 NG/ML — SIGNIFICANT CHANGE UP (ref 0.02–0.1)
PROT SERPL-MCNC: 5.7 G/DL — LOW (ref 6–8)
RAPID RVP RESULT: SIGNIFICANT CHANGE UP
RBC # BLD: 3.89 M/UL — LOW (ref 4.2–5.4)
RBC # FLD: 14.6 % — HIGH (ref 11.5–14.5)
SARS-COV-2 RNA SPEC QL NAA+PROBE: SIGNIFICANT CHANGE UP
SODIUM SERPL-SCNC: 140 MMOL/L — SIGNIFICANT CHANGE UP (ref 135–146)
WBC # BLD: 11.74 K/UL — HIGH (ref 4.8–10.8)
WBC # FLD AUTO: 11.74 K/UL — HIGH (ref 4.8–10.8)

## 2023-09-22 PROCEDURE — 71045 X-RAY EXAM CHEST 1 VIEW: CPT | Mod: 26

## 2023-09-22 PROCEDURE — 99232 SBSQ HOSP IP/OBS MODERATE 35: CPT

## 2023-09-22 PROCEDURE — 99233 SBSQ HOSP IP/OBS HIGH 50: CPT

## 2023-09-22 RX ORDER — METOPROLOL TARTRATE 50 MG
25 TABLET ORAL EVERY 8 HOURS
Refills: 0 | Status: DISCONTINUED | OUTPATIENT
Start: 2023-09-22 | End: 2023-09-26

## 2023-09-22 RX ORDER — GLUCAGON INJECTION, SOLUTION 0.5 MG/.1ML
1 INJECTION, SOLUTION SUBCUTANEOUS ONCE
Refills: 0 | Status: DISCONTINUED | OUTPATIENT
Start: 2023-09-22 | End: 2023-09-27

## 2023-09-22 RX ORDER — DEXTROSE 50 % IN WATER 50 %
25 SYRINGE (ML) INTRAVENOUS ONCE
Refills: 0 | Status: DISCONTINUED | OUTPATIENT
Start: 2023-09-22 | End: 2023-09-27

## 2023-09-22 RX ORDER — DEXTROSE 50 % IN WATER 50 %
12.5 SYRINGE (ML) INTRAVENOUS ONCE
Refills: 0 | Status: DISCONTINUED | OUTPATIENT
Start: 2023-09-22 | End: 2023-09-27

## 2023-09-22 RX ORDER — DEXTROSE 50 % IN WATER 50 %
50 SYRINGE (ML) INTRAVENOUS ONCE
Refills: 0 | Status: DISCONTINUED | OUTPATIENT
Start: 2023-09-22 | End: 2023-09-22

## 2023-09-22 RX ORDER — DEXTROSE 50 % IN WATER 50 %
15 SYRINGE (ML) INTRAVENOUS ONCE
Refills: 0 | Status: DISCONTINUED | OUTPATIENT
Start: 2023-09-22 | End: 2023-09-27

## 2023-09-22 RX ORDER — RISPERIDONE 4 MG/1
0.25 TABLET ORAL ONCE
Refills: 0 | Status: DISCONTINUED | OUTPATIENT
Start: 2023-09-22 | End: 2023-09-27

## 2023-09-22 RX ORDER — DEXTROSE 50 % IN WATER 50 %
50 SYRINGE (ML) INTRAVENOUS ONCE
Refills: 0 | Status: COMPLETED | OUTPATIENT
Start: 2023-09-22 | End: 2023-09-22

## 2023-09-22 RX ADMIN — TAMSULOSIN HYDROCHLORIDE 0.4 MILLIGRAM(S): 0.4 CAPSULE ORAL at 22:03

## 2023-09-22 RX ADMIN — CHLORHEXIDINE GLUCONATE 1 APPLICATION(S): 213 SOLUTION TOPICAL at 05:51

## 2023-09-22 RX ADMIN — APIXABAN 2.5 MILLIGRAM(S): 2.5 TABLET, FILM COATED ORAL at 05:51

## 2023-09-22 RX ADMIN — Medication 50 MILLILITER(S): at 06:48

## 2023-09-22 RX ADMIN — APIXABAN 2.5 MILLIGRAM(S): 2.5 TABLET, FILM COATED ORAL at 18:30

## 2023-09-22 RX ADMIN — Medication 25 MILLIGRAM(S): at 14:55

## 2023-09-22 RX ADMIN — AZITHROMYCIN 500 MILLIGRAM(S): 500 TABLET, FILM COATED ORAL at 12:24

## 2023-09-22 RX ADMIN — SENNA PLUS 2 TABLET(S): 8.6 TABLET ORAL at 22:03

## 2023-09-22 RX ADMIN — Medication 25 MILLIGRAM(S): at 08:18

## 2023-09-22 NOTE — PROGRESS NOTE ADULT - ASSESSMENT
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 Received TNK @ 1743. Taken to IR for thrombectomy,  s/p thrombectomy of right SCA/BA with TICI 2C x3 passes.     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 Questionable left frontal lobe infarct. Chronic left parieto-occipital infarction  -sp/swallow eval  -PT/OT    #Lethargy/Hypotension-- improving  #Lactic acidosis-resolved  -CTH stable  -LA 3.1-->2.0  -wbc 11.74- procal 0.04  -finished 3 days iv ceftriaxone---monitor wbc and fever curve--currently on day #2 of azithro  -leukocytosis resolving  -if any change clinically or worsening hemodynamica would broaden abx (start vanc/zosyn)  -monitor neurochecks q4hr  -sp/swallow eval  -UA+ on 9/19---f/u urine cx and monitor wbc and fever curve    #Mood disorder  -restart low dose Risperidone--reassess mental status    #S/P Resp failure secondary to neuro injury  #B/l pleural effusions   - Extubated 9/14/23  - Aspiration precautions  - HOB 30 degrees  -monitor for fluid overload    #Paroxysmal Afib  - on Eliquis 2.5mg BID   -rate control metoprolol tartrate 25 mg q8hr    #Hyperthyroidism  -holding Methimazole  -repeat TSH   -f/u with endo     DVT/GI ppx  guarded prognosis    DNR/DNI     downgrade patient out of the stroke unit- continue neuro checks- PT/OT for dispo plan    Total time spent to complete patient's bedside assessment, review medical chart, discuss medical plan of care with covering medical team was more than 50 minutes  with >50% of time spendt face to face with patient, discussion with patient/family and/or coordination of care

## 2023-09-22 NOTE — PROGRESS NOTE ADULT - ASSESSMENT
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 Received TNK @ 1743. Taken to IR for thrombectomy,  s/p thrombectomy of right SCA/BA with TICI 2C x3 passes. Pt now being actively treated for UTI vs. PNA.    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 Questionable left frontal lobe infarct. Chronic left parieto-occipital infarction  - pt more lethargic on 9/20, CTH STAT stable, not eating much, may need to start her on IVF  - -150    #UTI vs. PNA  - UA positive on 9/19  - c/w Rocephin, and add Azithromycin   - f/u urine and blood culture  - lactate 3.1 > 2.3  - if BP drop, can escalate Abx to Zosyn  - RVP (neg), procal (neg), cxr today    #Mood disorder  - reduce Resperidone to 0.25mg daily (as per son, pt takes 0.5mg once a day, but noncompliant)    #S/P Resp failure secondary to neuro injury   - Extubated 9/14/23  - Aspiration prec  - HOB 30 degrees  - BNP elevated, procal neg    #Afib  - Heparin ggt in NICU, MS'ed on 9/19  - c/w Eliquis 2.5mg BID   - Lopressor 25mg q 8     #Urinary retention / S/P NAG met acidosis- diarrhea  - D/C Cardura  - Bladder scan q6     #Hyperthyroidism  - hold Methimazole - 5 mg q 12  - TSH, T4, T3 low, total t4 was high in 2020  - endo consult appreciated > check TSI, check T4, t3, tsh after 2-3 days again, hold methimazole    #Misc  - DVT Prophylaxis: eliquis  - Diet: minced and moist as per s/s  - Code Status: DNR and DNI 83-year-old female with a past history of mood disorder, hallucination, hyperthyroidism, Afib no a/c  who presented with dysarthria, confusion, and left weakness, Initial NIH 32, 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 Received TNK @ 1743. Taken to IR for thrombectomy,  s/p thrombectomy of right SCA/BA with TICI 2C x3 passes. Pt now being actively treated for UTI vs. PNA.    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 Questionable left frontal lobe infarct. Chronic left parieto-occipital infarction  - pt more lethargic on 9/20, CTH STAT stable, not eating much, may need to start her on IVF  - -150    #UTI vs. PNA  - UA positive on 9/19  - s/p Rocephin (3 days)  - c/w Azithromycin for days  (end date 9/23) for possible PNA  - urine c/s > E Coli; f/u blood culture  - lactate 3.1 > 2.3 > 2  - if BP drop, can escalate Abx to Zosyn  - RVP (neg), procal (neg), cxr today    #Mood disorder  - dc  Resperidone for now as pt is more drowsy (as per son, pt takes 0.5mg once a day, but noncompliant)  - can give Seraquel 12.5mg PRN if needed for agitation    #S/P Resp failure secondary to neuro injury   - Extubated 9/14/23  - Aspiration prec  - HOB 30 degrees  - BNP elevated, procal neg    #Afib  - Heparin ggt in NICU, dc'ed on 9/19  - c/w Eliquis 2.5mg BID   - c/w Lopressor 25mg q 8     #Urinary retention / S/P NAG met acidosis- diarrhea  - D/C Cardura  - Bladder scan q6     #Hyperthyroidism  - hold Methimazole - 5 mg q 12  - TSH, T4, T3 low, total t4 was high in 2020  - endo consult appreciated > check TSI, check T4, t3, tsh after 2-3 days again, hold methimazole    #Misc  - DVT Prophylaxis: eliquis  - Diet: minced and moist as per s/s  - Code Status: DNR and DNI  - Dispo: DGTF, dispo planning

## 2023-09-22 NOTE — PROGRESS NOTE ADULT - SUBJECTIVE AND OBJECTIVE BOX
Patient is a 83y old  Female who presents with a chief complaint of basilar occlusion (21 Sep 2023 11:30)    HPI:  The patient is an 83-year-old female with a past history of mood disorder, hallucination, hyperthyroidism, Afib, with a prior admission for syncope/ collapse at home, who presented with dysarthria, confusion, and left weakness and was noted to have a right perfusion deficit on CTP. The patient is a code NI actual, and will be taken for a planned diagnostic cerebral angiogram + mechanical thrombectomy with Dr. Mckeon.     Admission scores:   NIHSS 32   (12 Sep 2023 21:42)    PAST MEDICAL & SURGICAL HISTORY:  Thyroid disease    patient seen and examined independently on morning rounds in stroke unit, chart reviewed and discussed with the neurology team and on interdisciplinary rounds:      hospital day #10    sleeping with blanket over her head this morning-    appeared comfortable but not cooperating   no overnight events-      PE:  GEN-NAD, resting comfortably- minimally responsive (opened eyes briefly- nonverbal)  PULM-  fair air entry  CVS- +s1/s2 RRR   GI- soft NT ND +bs  EXT- bilateral UE ecchymosis (LUE >RUE)         Labs:                        11.5   11.74 )-----------( 419      ( 22 Sep 2023 06:33 )             36.7     CBC Full  -  ( 22 Sep 2023 06:33 )  WBC Count : 11.74 K/uL  RBC Count : 3.89 M/uL  Hemoglobin : 11.5 g/dL  Hematocrit : 36.7 %  Platelet Count - Automated : 419 K/uL  Mean Cell Volume : 94.3 fL  Mean Cell Hemoglobin : 29.6 pg  Mean Cell Hemoglobin Concentration : 31.3 g/dL  Auto Neutrophil # : x  Auto Lymphocyte # : x  Auto Monocyte # : x  Auto Eosinophil # : x  Auto Basophil # : x  Auto Neutrophil % : x  Auto Lymphocyte % : x  Auto Monocyte % : x  Auto Eosinophil % : x  Auto Basophil % : x      09-22    140  |  106  |  12  ----------------------------<  99  4.4   |  22  |  0.7    Ca    8.6      22 Sep 2023 06:33  Mg     2.0     09-22    TPro  5.7<L>  /  Alb  3.0<L>  /  TBili  0.4  /  DBili  x   /  AST  30  /  ALT  17  /  AlkPhos  102  09-22      Microbiology:    Culture - Blood (collected 20 Sep 2023 21:27)  Source: .Blood Blood  Preliminary Report (22 Sep 2023 07:01):    No growth at 24 hours        Vital Signs Last 24 Hrs  T(C): 36.6 (22 Sep 2023 12:00), Max: 36.6 (22 Sep 2023 12:00)  T(F): 97.8 (22 Sep 2023 12:00), Max: 97.8 (22 Sep 2023 12:00)  HR: 84 (22 Sep 2023 14:53) (78 - 104)  BP: 114/63 (22 Sep 2023 14:53) (100/75 - 133/62)  BP(mean): 79 (22 Sep 2023 14:53) (71 - 98)  RR: 18 (22 Sep 2023 12:00) (16 - 20)  SpO2: 97% (22 Sep 2023 12:00) (92% - 99%)    Parameters below as of 22 Sep 2023 12:00  Patient On (Oxygen Delivery Method): room air        I&O's Summary    21 Sep 2023 07:01  -  22 Sep 2023 07:00  --------------------------------------------------------  IN: 325 mL / OUT: 810 mL / NET: -485 mL            MEDICATIONS  (STANDING):  apixaban 2.5 milliGRAM(s) Oral every 12 hours  azithromycin   Tablet 500 milliGRAM(s) Oral daily  chlorhexidine 2% Cloths 1 Application(s) Topical daily  influenza  Vaccine (HIGH DOSE) 0.7 milliLiter(s) IntraMuscular once  risperiDONE   Tablet 0.25 milliGRAM(s) Oral daily  senna 2 Tablet(s) Oral at bedtime  sodium chloride 0.9%. 1000 milliLiter(s) (50 mL/Hr) IV Continuous <Continuous>  tamsulosin 0.4 milliGRAM(s) Oral at bedtime

## 2023-09-22 NOTE — PROGRESS NOTE ADULT - SUBJECTIVE AND OBJECTIVE BOX
Neurology Stroke Progress Note    INTERVAL HPI/OVERNIGHT EVENTS:  Patient seen and examined. Improved BP yesterday. Eating as well. Still lethargic. No events overnight.     MEDICATIONS  (STANDING):  apixaban 2.5 milliGRAM(s) Oral every 12 hours  azithromycin   Tablet 500 milliGRAM(s) Oral daily  chlorhexidine 2% Cloths 1 Application(s) Topical daily  dextrose 50% Injectable 25 Gram(s) IV Push once  dextrose 50% Injectable 12.5 Gram(s) IV Push once  dextrose 50% Injectable 25 Gram(s) IV Push once  dextrose Oral Gel 15 Gram(s) Oral once  glucagon  Injectable 1 milliGRAM(s) IntraMuscular once  influenza  Vaccine (HIGH DOSE) 0.7 milliLiter(s) IntraMuscular once  risperiDONE   Tablet 0.25 milliGRAM(s) Oral daily  senna 2 Tablet(s) Oral at bedtime  sodium chloride 0.9%. 1000 milliLiter(s) (50 mL/Hr) IV Continuous <Continuous>  tamsulosin 0.4 milliGRAM(s) Oral at bedtime    MEDICATIONS  (PRN):  acetaminophen     Tablet .. 650 milliGRAM(s) Oral every 6 hours PRN Temp greater or equal to 38C (100.4F), Mild Pain (1 - 3)    Allergies    No Known Allergies    Intolerances      Vital Signs Last 24 Hrs  T(C): 35.7 (22 Sep 2023 04:00), Max: 36.6 (21 Sep 2023 12:00)  T(F): 96.2 (22 Sep 2023 04:00), Max: 97.8 (21 Sep 2023 12:00)  HR: 104 (22 Sep 2023 04:00) (78 - 104)  BP: 119/68 (22 Sep 2023 04:00) (100/75 - 135/74)  BP(mean): 92 (22 Sep 2023 04:00) (71 - 99)  RR: 16 (22 Sep 2023 04:00) (16 - 19)  SpO2: 92% (22 Sep 2023 04:00) (92% - 99%)    Parameters below as of 22 Sep 2023 04:00  Patient On (Oxygen Delivery Method): room air        Physical exam:  General: No acute distress, awake and alert  Eyes: Anicteric sclerae, moist conjunctivae, see below for CNs  Neck: trachea midline, FROM, supple, no thyromegaly or lymphadenopathy  Cardiovascular: Regular rate and rhythm, no murmurs, rubs, or gallops. No carotid bruits.   Pulmonary: Anterior breath sounds clear bilaterally, no crackles or wheezing. No use of accessory muscles  GI: Abdomen soft, non-distended, non-tender  Extremities: Radial and DP pulses +2, no edema    Neurologic:  -Mental status: Awake, alert, oriented to person, place, and time. Speech is fluent with intact naming, repetition, and comprehension, no dysarthria. Recent and remote memory intact. Follows commands. Attention/concentration intact. Fund of knowledge appropriate.  -Cranial nerves:   II: Visual fields are full to confrontation.  III, IV, VI: Extraocular movements are intact without nystagmus. Pupils equally round and reactive to light  V:  Facial sensation V1-V3 equal and intact   VII: Face is symmetric with normal eye closure and smile  VIII: Hearing is bilaterally intact to finger rub  IX, X: Uvula is midline and soft palate rises symmetrically  XI: Head turning and shoulder shrug are intact.  XII: Tongue protrudes midline  Motor: Normal bulk and tone. No pronator drift. Strength bilateral upper extremity 5/5, bilateral lower extremities 5/5.  Rapid alternating movements intact and symmetric  Sensation: Intact to light touch bilaterally. No neglect or extinction on double simultaneous testing.  Coordination: No dysmetria on finger-to-nose and heel-to-shin bilaterally  Reflexes: Downgoing toes bilaterally   Gait: Narrow gait and steady    LABS:                        10.6   11.44 )-----------( 387      ( 21 Sep 2023 05:35 )             33.4     09-21    140  |  106  |  10  ----------------------------<  73  4.2   |  24  |  0.5<L>    Ca    8.2<L>      21 Sep 2023 05:35  Mg     2.0     09-21    TPro  5.3<L>  /  Alb  3.1<L>  /  TBili  0.5  /  DBili  x   /  AST  38  /  ALT  20  /  AlkPhos  90  09-21      Urinalysis Basic - ( 21 Sep 2023 05:35 )    Color: x / Appearance: x / SG: x / pH: x  Gluc: 73 mg/dL / Ketone: x  / Bili: x / Urobili: x   Blood: x / Protein: x / Nitrite: x   Leuk Esterase: x / RBC: x / WBC x   Sq Epi: x / Non Sq Epi: x / Bacteria: x        RADIOLOGY & ADDITIONAL TESTS:     Neurology Stroke Progress Note    INTERVAL HPI/OVERNIGHT EVENTS:  Patient seen and examined. Improved BP yesterday. Eating as well. Still lethargic. No events overnight.     MEDICATIONS  (STANDING):  apixaban 2.5 milliGRAM(s) Oral every 12 hours  azithromycin   Tablet 500 milliGRAM(s) Oral daily  chlorhexidine 2% Cloths 1 Application(s) Topical daily  dextrose 50% Injectable 25 Gram(s) IV Push once  dextrose 50% Injectable 12.5 Gram(s) IV Push once  dextrose 50% Injectable 25 Gram(s) IV Push once  dextrose Oral Gel 15 Gram(s) Oral once  glucagon  Injectable 1 milliGRAM(s) IntraMuscular once  influenza  Vaccine (HIGH DOSE) 0.7 milliLiter(s) IntraMuscular once  risperiDONE   Tablet 0.25 milliGRAM(s) Oral daily  senna 2 Tablet(s) Oral at bedtime  sodium chloride 0.9%. 1000 milliLiter(s) (50 mL/Hr) IV Continuous <Continuous>  tamsulosin 0.4 milliGRAM(s) Oral at bedtime    MEDICATIONS  (PRN):  acetaminophen     Tablet .. 650 milliGRAM(s) Oral every 6 hours PRN Temp greater or equal to 38C (100.4F), Mild Pain (1 - 3)    Allergies    No Known Allergies    Intolerances      Vital Signs Last 24 Hrs  T(C): 35.7 (22 Sep 2023 04:00), Max: 36.6 (21 Sep 2023 12:00)  T(F): 96.2 (22 Sep 2023 04:00), Max: 97.8 (21 Sep 2023 12:00)  HR: 104 (22 Sep 2023 04:00) (78 - 104)  BP: 119/68 (22 Sep 2023 04:00) (100/75 - 135/74)  BP(mean): 92 (22 Sep 2023 04:00) (71 - 99)  RR: 16 (22 Sep 2023 04:00) (16 - 19)  SpO2: 92% (22 Sep 2023 04:00) (92% - 99%)    Parameters below as of 22 Sep 2023 04:00  Patient On (Oxygen Delivery Method): room air      Neuro exam, exam limited due to pt limited participation  Patient is much less lethargic today,  was able to wake up with verbal stimuli. not following much commands.  CN: Mild left facial droop        Moderate-severe dysarthric speech  Power:  was able to move all extremities with pain against gravity   Sensory: Responding to pain in all 4 exts by withdrawing and grimacing  FTN: pt not following commands, cant assess  NIHSS: 10       LABS:                        10.6   11.44 )-----------( 387      ( 21 Sep 2023 05:35 )             33.4     09-21    140  |  106  |  10  ----------------------------<  73  4.2   |  24  |  0.5<L>    Ca    8.2<L>      21 Sep 2023 05:35  Mg     2.0     09-21    TPro  5.3<L>  /  Alb  3.1<L>  /  TBili  0.5  /  DBili  x   /  AST  38  /  ALT  20  /  AlkPhos  90  09-21      Urinalysis Basic - ( 21 Sep 2023 05:35 )    Color: x / Appearance: x / SG: x / pH: x  Gluc: 73 mg/dL / Ketone: x  / Bili: x / Urobili: x   Blood: x / Protein: x / Nitrite: x   Leuk Esterase: x / RBC: x / WBC x   Sq Epi: x / Non Sq Epi: x / Bacteria: x        RADIOLOGY & ADDITIONAL TESTS:Reviewed

## 2023-09-23 LAB
-  AMIKACIN: SIGNIFICANT CHANGE UP
-  AMOXICILLIN/CLAVULANIC ACID: SIGNIFICANT CHANGE UP
-  AMPICILLIN/SULBACTAM: SIGNIFICANT CHANGE UP
-  AMPICILLIN: SIGNIFICANT CHANGE UP
-  AZTREONAM: SIGNIFICANT CHANGE UP
-  CEFAZOLIN: SIGNIFICANT CHANGE UP
-  CEFEPIME: SIGNIFICANT CHANGE UP
-  CEFOXITIN: SIGNIFICANT CHANGE UP
-  CEFTRIAXONE: SIGNIFICANT CHANGE UP
-  CEFUROXIME: SIGNIFICANT CHANGE UP
-  CIPROFLOXACIN: SIGNIFICANT CHANGE UP
-  ERTAPENEM: SIGNIFICANT CHANGE UP
-  GENTAMICIN: SIGNIFICANT CHANGE UP
-  IMIPENEM: SIGNIFICANT CHANGE UP
-  LEVOFLOXACIN: SIGNIFICANT CHANGE UP
-  MEROPENEM: SIGNIFICANT CHANGE UP
-  NITROFURANTOIN: SIGNIFICANT CHANGE UP
-  PIPERACILLIN/TAZOBACTAM: SIGNIFICANT CHANGE UP
-  TOBRAMYCIN: SIGNIFICANT CHANGE UP
-  TRIMETHOPRIM/SULFAMETHOXAZOLE: SIGNIFICANT CHANGE UP
ANION GAP SERPL CALC-SCNC: 9 MMOL/L — SIGNIFICANT CHANGE UP (ref 7–14)
BUN SERPL-MCNC: 14 MG/DL — SIGNIFICANT CHANGE UP (ref 10–20)
CALCIUM SERPL-MCNC: 8.4 MG/DL — SIGNIFICANT CHANGE UP (ref 8.4–10.5)
CHLORIDE SERPL-SCNC: 105 MMOL/L — SIGNIFICANT CHANGE UP (ref 98–110)
CO2 SERPL-SCNC: 26 MMOL/L — SIGNIFICANT CHANGE UP (ref 17–32)
CREAT SERPL-MCNC: 0.6 MG/DL — LOW (ref 0.7–1.5)
CULTURE RESULTS: SIGNIFICANT CHANGE UP
EGFR: 90 ML/MIN/1.73M2 — SIGNIFICANT CHANGE UP
GLUCOSE BLDC GLUCOMTR-MCNC: 112 MG/DL — HIGH (ref 70–99)
GLUCOSE BLDC GLUCOMTR-MCNC: 112 MG/DL — HIGH (ref 70–99)
GLUCOSE BLDC GLUCOMTR-MCNC: 125 MG/DL — HIGH (ref 70–99)
GLUCOSE BLDC GLUCOMTR-MCNC: 98 MG/DL — SIGNIFICANT CHANGE UP (ref 70–99)
GLUCOSE BLDC GLUCOMTR-MCNC: 99 MG/DL — SIGNIFICANT CHANGE UP (ref 70–99)
GLUCOSE SERPL-MCNC: 91 MG/DL — SIGNIFICANT CHANGE UP (ref 70–99)
HCT VFR BLD CALC: 31.5 % — LOW (ref 37–47)
HGB BLD-MCNC: 9.9 G/DL — LOW (ref 12–16)
MAGNESIUM SERPL-MCNC: 1.9 MG/DL — SIGNIFICANT CHANGE UP (ref 1.8–2.4)
MCHC RBC-ENTMCNC: 29.1 PG — SIGNIFICANT CHANGE UP (ref 27–31)
MCHC RBC-ENTMCNC: 31.4 G/DL — LOW (ref 32–37)
MCV RBC AUTO: 92.6 FL — SIGNIFICANT CHANGE UP (ref 81–99)
METHOD TYPE: SIGNIFICANT CHANGE UP
NRBC # BLD: 0 /100 WBCS — SIGNIFICANT CHANGE UP (ref 0–0)
ORGANISM # SPEC MICROSCOPIC CNT: SIGNIFICANT CHANGE UP
ORGANISM # SPEC MICROSCOPIC CNT: SIGNIFICANT CHANGE UP
PLATELET # BLD AUTO: 469 K/UL — HIGH (ref 130–400)
PMV BLD: 9.3 FL — SIGNIFICANT CHANGE UP (ref 7.4–10.4)
POTASSIUM SERPL-MCNC: 4.1 MMOL/L — SIGNIFICANT CHANGE UP (ref 3.5–5)
POTASSIUM SERPL-SCNC: 4.1 MMOL/L — SIGNIFICANT CHANGE UP (ref 3.5–5)
RBC # BLD: 3.4 M/UL — LOW (ref 4.2–5.4)
RBC # FLD: 14.5 % — SIGNIFICANT CHANGE UP (ref 11.5–14.5)
SODIUM SERPL-SCNC: 140 MMOL/L — SIGNIFICANT CHANGE UP (ref 135–146)
SPECIMEN SOURCE: SIGNIFICANT CHANGE UP
T3 SERPL-MCNC: 53 NG/DL — LOW (ref 80–200)
T4 AB SER-ACNC: 4.4 UG/DL — LOW (ref 4.6–12)
T4 FREE SERPL-MCNC: 0.6 NG/DL — LOW (ref 0.9–1.8)
TSH SERPL-MCNC: 0.53 UIU/ML — SIGNIFICANT CHANGE UP (ref 0.27–4.2)
WBC # BLD: 12.23 K/UL — HIGH (ref 4.8–10.8)
WBC # FLD AUTO: 12.23 K/UL — HIGH (ref 4.8–10.8)

## 2023-09-23 PROCEDURE — 99232 SBSQ HOSP IP/OBS MODERATE 35: CPT

## 2023-09-23 RX ADMIN — APIXABAN 2.5 MILLIGRAM(S): 2.5 TABLET, FILM COATED ORAL at 05:29

## 2023-09-23 RX ADMIN — Medication 25 MILLIGRAM(S): at 21:34

## 2023-09-23 RX ADMIN — SENNA PLUS 2 TABLET(S): 8.6 TABLET ORAL at 21:34

## 2023-09-23 RX ADMIN — TAMSULOSIN HYDROCHLORIDE 0.4 MILLIGRAM(S): 0.4 CAPSULE ORAL at 21:34

## 2023-09-23 RX ADMIN — Medication 25 MILLIGRAM(S): at 05:29

## 2023-09-23 RX ADMIN — AZITHROMYCIN 500 MILLIGRAM(S): 500 TABLET, FILM COATED ORAL at 11:34

## 2023-09-23 RX ADMIN — Medication 25 MILLIGRAM(S): at 14:24

## 2023-09-23 RX ADMIN — CHLORHEXIDINE GLUCONATE 1 APPLICATION(S): 213 SOLUTION TOPICAL at 05:29

## 2023-09-23 RX ADMIN — APIXABAN 2.5 MILLIGRAM(S): 2.5 TABLET, FILM COATED ORAL at 17:38

## 2023-09-23 NOTE — PROGRESS NOTE ADULT - ASSESSMENT
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 Received TNK @ 1743. Taken to IR for thrombectomy,  s/p thrombectomy of right SCA/BA with TICI 2C x3 passes. Pt now being actively treated for UTI vs. PNA.    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 Questionable left frontal lobe infarct. Chronic left parieto-occipital infarction  - pt more lethargic on 9/20, CTH STAT stable, not eating much, may need to start her on IVF  - -150    #UTI vs. PNA  - UA positive on 9/19  - s/p Rocephin (3 days)  - c/w Azithromycin for days  (end date 9/23) for possible PNA  - urine c/s > E Coli; f/u blood culture  - lactate 3.1 > 2.3 > 2  - if BP drop, can escalate Abx to Zosyn  - RVP (neg), procal (neg), cxr today    #Mood disorder  - dc  Resperidone for now as pt is more drowsy (as per son, pt takes 0.5mg once a day, but noncompliant)  - can give Seraquel 12.5mg PRN if needed for agitation    #S/P Resp failure secondary to neuro injury   - Extubated 9/14/23  - Aspiration prec  - HOB 30 degrees  - BNP elevated, procal neg    #Afib  - Heparin ggt in NICU, dc'ed on 9/19  - c/w Eliquis 2.5mg BID   - c/w Lopressor 25mg q 8     #Urinary retention / S/P NAG met acidosis- diarrhea  - D/C Cardura  - Bladder scan q6     #Hyperthyroidism  - hold Methimazole - 5 mg q 12  - TSH, T4, T3 low, total t4 was high in 2020  - endo consult appreciated > check TSI, check T4, t3, tsh after 2-3 days again, hold methimazole    #Misc  - DVT Prophylaxis: eliquis  - Diet: minced and moist as per s/s  - Code Status: DNR and DNI  - Dispo: DGTF, dispo planning   83-year-old female with a past history of mood disorder, hallucination, hyperthyroidism, Afib no a/c  who presented with dysarthria, confusion, and left weakness, Initial NIH 32, 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 Received TNK @ 1743. Taken to IR for thrombectomy,  s/p thrombectomy of right SCA/BA with TICI 2C x3 passes. Pt now being actively treated for UTI vs. PNA.    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 Questionable left frontal lobe infarct. Chronic left parieto-occipital infarction  - pt more lethargic on , CTH STAT stable, not eating much, may need to start her on IVF  - -150    #UTI vs. PNA  - UA positive on   - s/p Rocephin (3 days)  - c/w Azithromycin for days  (end date ) for possible PNA  - urine c/s > E Coli;  blood culture neg  - lactate 3.1 > 2.3 > 2  - if BP drop, can escalate Abx to Zosyn  - RVP (neg), procal (neg), cxr today    #Mood disorder  - dc  Resperidone for now as pt is more drowsy (as per son, pt takes 0.5mg once a day, but noncompliant)  - pt more awake today after dc Resperidone   - can give Seraquel 12.5mg PRN if needed for agitation    #S/P Resp failure secondary to neuro injury   - Extubated 23  - Aspiration prec  - HOB 30 degrees  - BNP elevated, procal neg    #Afib  - Heparin ggt in NICU, dc'ed on   - c/w Eliquis 2.5mg BID   - c/w Lopressor 25mg q 8     #Urinary retention / S/P NAG met acidosis- diarrhea  - D/C Cardura  - Bladder scan if needed    #Hyperthyroidism  - hold Methimazole - 5 mg q 12  - TSH, T4, T3 low, total t4 was high in   - endo consult appreciated > check TSI, check T4, t3, tsh (specimen recieved today), f/u with endo once get the results, may need to restarted Methimazole as per endo    #Misc  - DVT Prophylaxis: eliquis  - Diet: minced and moist as per s/s  - Code Status: DNR and DNI  - Dispo: DGTF, dispo planning    [place]) spoke with SW, dispo planning, family needs to choose [facility], however wont be dc over the weekend

## 2023-09-23 NOTE — PROGRESS NOTE ADULT - ASSESSMENT
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 Received TNK @ 1743. Taken to IR for thrombectomy,  s/p thrombectomy of right SCA/BA with TICI 2C x3 passes.     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 Questionable left frontal lobe infarct. Chronic left parieto-occipital infarction  -sp/swallow eval  -PT/OT---dispo planning for STR    #Lethargy/Hypotension-- improving  #Lactic acidosis-resolved  -CTH stable  -LA 3.1-->2.0  -wbc 12- procal 0.04  -encourage incentive spirometry (to prevent atelectasis)  -finished 3 days iv ceftriaxone---monitor wbc and fever curve--currently on final day #3 of azithro---if any fever or clinical compensation can start iv zosyn  -monitor neurochecks q4hr  -sp/swallow eval  -UA+ on 9/19---f/u urine cx and monitor wbc and fever curve    #Mood disorder  -restart low dose Risperidone--reassess mental status    #S/P Resp failure secondary to neuro injury  #B/l pleural effusions   - Extubated 9/14/23  - Aspiration precautions  - HOB 30 degrees  -monitor for fluid overload    #Paroxysmal Afib  - on Eliquis 2.5mg BID   -rate control metoprolol tartrate 25 mg q8hr    #Hyperthyroidism  -holding Methimazole  -repeat TSH   -f/u with endo     DVT/GI ppx  guarded prognosis    DNR/DNI      PT/OT for dispo plan    Total time spent to complete patient's bedside assessment, review medical chart, discuss medical plan of care with covering medical team was more than 35 minutes  with >50% of time spendt face to face with patient, discussion with patient/family and/or coordination of care

## 2023-09-23 NOTE — PROGRESS NOTE ADULT - SUBJECTIVE AND OBJECTIVE BOX
Neurology Stroke Progress Note    INTERVAL HPI/OVERNIGHT EVENTS:  Patient seen and examined. Improved BP yesterday. Eating as well. Still lethargic. No events overnight.     MEDICATIONS  (STANDING):  apixaban 2.5 milliGRAM(s) Oral every 12 hours  azithromycin   Tablet 500 milliGRAM(s) Oral daily  chlorhexidine 2% Cloths 1 Application(s) Topical daily  dextrose 50% Injectable 25 Gram(s) IV Push once  dextrose 50% Injectable 12.5 Gram(s) IV Push once  dextrose 50% Injectable 25 Gram(s) IV Push once  dextrose Oral Gel 15 Gram(s) Oral once  glucagon  Injectable 1 milliGRAM(s) IntraMuscular once  influenza  Vaccine (HIGH DOSE) 0.7 milliLiter(s) IntraMuscular once  metoprolol tartrate 25 milliGRAM(s) Oral every 8 hours  senna 2 Tablet(s) Oral at bedtime  sodium chloride 0.9%. 1000 milliLiter(s) (50 mL/Hr) IV Continuous <Continuous>  tamsulosin 0.4 milliGRAM(s) Oral at bedtime    MEDICATIONS  (PRN):  acetaminophen     Tablet .. 650 milliGRAM(s) Oral every 6 hours PRN Temp greater or equal to 38C (100.4F), Mild Pain (1 - 3)  risperiDONE   Tablet 0.25 milliGRAM(s) Oral once PRN Agitation    Allergies    No Known Allergies    Intolerances      Vital Signs Last 24 Hrs  T(C): 36.7 (23 Sep 2023 05:00), Max: 36.7 (23 Sep 2023 05:00)  T(F): 98 (23 Sep 2023 05:00), Max: 98 (23 Sep 2023 05:00)  HR: 106 (23 Sep 2023 05:29) (82 - 108)  BP: 131/60 (23 Sep 2023 05:29) (104/55 - 133/62)  BP(mean): 97 (23 Sep 2023 05:29) (67 - 97)  RR: 17 (23 Sep 2023 05:00) (16 - 20)  SpO2: 97% (23 Sep 2023 00:00) (94% - 98%)    Parameters below as of 23 Sep 2023 00:00  Patient On (Oxygen Delivery Method): room air        Neuro exam, exam limited due to pt limited participation  Patient is much less lethargic today,  was able to wake up with verbal stimuli. not following much commands.  CN: Mild left facial droop        Moderate-severe dysarthric speech  Power:  was able to move all extremities with pain against gravity   Sensory: Responding to pain in all 4 exts by withdrawing and grimacing  FTN: pt not following commands, cant assess  NIHSS: 10       LABS:                        11.5   11.74 )-----------( 419      ( 22 Sep 2023 06:33 )             36.7     09-22    140  |  106  |  12  ----------------------------<  99  4.4   |  22  |  0.7    Ca    8.6      22 Sep 2023 06:33  Mg     2.0     09-22    TPro  5.7<L>  /  Alb  3.0<L>  /  TBili  0.4  /  DBili  x   /  AST  30  /  ALT  17  /  AlkPhos  102  09-22      Urinalysis Basic - ( 22 Sep 2023 06:33 )    Color: x / Appearance: x / SG: x / pH: x  Gluc: 99 mg/dL / Ketone: x  / Bili: x / Urobili: x   Blood: x / Protein: x / Nitrite: x   Leuk Esterase: x / RBC: x / WBC x   Sq Epi: x / Non Sq Epi: x / Bacteria: x        RADIOLOGY & ADDITIONAL TESTS: Reviewed     Neurology Stroke Progress Note    INTERVAL HPI/OVERNIGHT EVENTS:  Patient seen and examined. Improved BP. Looking much better compared to yesterday. was able to talk and speak today. Downgraded from stroke unit yesterday. No events overnight.     MEDICATIONS  (STANDING):  apixaban 2.5 milliGRAM(s) Oral every 12 hours  azithromycin   Tablet 500 milliGRAM(s) Oral daily  chlorhexidine 2% Cloths 1 Application(s) Topical daily  dextrose 50% Injectable 25 Gram(s) IV Push once  dextrose 50% Injectable 12.5 Gram(s) IV Push once  dextrose 50% Injectable 25 Gram(s) IV Push once  dextrose Oral Gel 15 Gram(s) Oral once  glucagon  Injectable 1 milliGRAM(s) IntraMuscular once  influenza  Vaccine (HIGH DOSE) 0.7 milliLiter(s) IntraMuscular once  metoprolol tartrate 25 milliGRAM(s) Oral every 8 hours  senna 2 Tablet(s) Oral at bedtime  sodium chloride 0.9%. 1000 milliLiter(s) (50 mL/Hr) IV Continuous <Continuous>  tamsulosin 0.4 milliGRAM(s) Oral at bedtime    MEDICATIONS  (PRN):  acetaminophen     Tablet .. 650 milliGRAM(s) Oral every 6 hours PRN Temp greater or equal to 38C (100.4F), Mild Pain (1 - 3)  risperiDONE   Tablet 0.25 milliGRAM(s) Oral once PRN Agitation    Allergies    No Known Allergies    Intolerances      Vital Signs Last 24 Hrs  T(C): 36.7 (23 Sep 2023 05:00), Max: 36.7 (23 Sep 2023 05:00)  T(F): 98 (23 Sep 2023 05:00), Max: 98 (23 Sep 2023 05:00)  HR: 106 (23 Sep 2023 05:29) (82 - 108)  BP: 131/60 (23 Sep 2023 05:29) (104/55 - 133/62)  BP(mean): 97 (23 Sep 2023 05:29) (67 - 97)  RR: 17 (23 Sep 2023 05:00) (16 - 20)  SpO2: 97% (23 Sep 2023 00:00) (94% - 98%)    Parameters below as of 23 Sep 2023 00:00  Patient On (Oxygen Delivery Method): room air        Neurological:  - Mental Status: Alert and oriented to name; speech is fluent with intact repetition, and comprehension  - Cranial Nerves II -XII:  II: PERRLA; visual fields are full to confrontation  III, IV, VI: EOMI, no nystagmus appreciated  V: facial sensation intact to light touch V1-V3 b/l  VII: mild right facial droop  VIII: hearing intact to finger rub b/l  XI: head turning and shoulder shrug intact b/l  XII: tongue protrusion midline  - Motor: strength is 4/5 b/l LLE & RLE, 4/5 b/l LUE & RUE. Drift in all ext  - Sensory: Intact fine touch, pain, temperature in UE and LE  - Cerebellum: dysmetria in FTN  - Gait: deferred  NIHSS 9    LABS:                        11.5   11.74 )-----------( 419      ( 22 Sep 2023 06:33 )             36.7     09-22    140  |  106  |  12  ----------------------------<  99  4.4   |  22  |  0.7    Ca    8.6      22 Sep 2023 06:33  Mg     2.0     09-22    TPro  5.7<L>  /  Alb  3.0<L>  /  TBili  0.4  /  DBili  x   /  AST  30  /  ALT  17  /  AlkPhos  102  09-22      Urinalysis Basic - ( 22 Sep 2023 06:33 )    Color: x / Appearance: x / SG: x / pH: x  Gluc: 99 mg/dL / Ketone: x  / Bili: x / Urobili: x   Blood: x / Protein: x / Nitrite: x   Leuk Esterase: x / RBC: x / WBC x   Sq Epi: x / Non Sq Epi: x / Bacteria: x        RADIOLOGY & ADDITIONAL TESTS: Reviewed

## 2023-09-23 NOTE — PROGRESS NOTE ADULT - SUBJECTIVE AND OBJECTIVE BOX
Patient is a 83y old  Female who presents with a chief complaint of basilar occlusion (21 Sep 2023 11:30)    HPI:  The patient is an 83-year-old female with a past history of mood disorder, hallucination, hyperthyroidism, Afib, with a prior admission for syncope/ collapse at home, who presented with dysarthria, confusion, and left weakness and was noted to have a right perfusion deficit on CTP. The patient is a code NI actual, and will be taken for a planned diagnostic cerebral angiogram + mechanical thrombectomy with Dr. Mckeon.     Admission scores:   NIHSS 32   (12 Sep 2023 21:42)    PAST MEDICAL & SURGICAL HISTORY:  Thyroid disease    patient seen and examined independently on morning rounds, chart reviewed and discussed with the neurology team and on interdisciplinary rounds:      hospital day #11     overnight downgrded from medical floor- more awake today- answering simple yes/no questions    PE:  GEN-NAD, resting comfortably  PULM-  fair air entry  CVS- +s1/s2 RRR   GI- soft NT ND +bs  EXT- bilateral UE ecchymosis (LUE >RUE)         Labs:                        9.9    12.23 )-----------( [id number]      ( 23 Sep 2023 07:01 )             31.5     CBC Full  -  ( 23 Sep 2023 07:01 )  WBC Count : 12.23 K/uL  RBC Count : 3.40 M/uL  Hemoglobin : 9.9 g/dL  Hematocrit : 31.5 %  Platelet Count - Automated : 469 K/uL  Mean Cell Volume : 92.6 fL  Mean Cell Hemoglobin : 29.1 pg  Mean Cell Hemoglobin Concentration : 31.4 g/dL  Auto Neutrophil # : x  Auto Lymphocyte # : x  Auto Monocyte # : x  Auto Eosinophil # : x  Auto Basophil # : x  Auto Neutrophil % : x  Auto Lymphocyte % : x  Auto Monocyte % : x  Auto Eosinophil % : x  Auto Basophil % : x      09-23    140  |  105  |  14  ----------------------------<  91  4.1   |  26  |  0.6<L>    Ca    8.4      23 Sep 2023 07:01  Mg     1.9     09-23    TPro  5.7<L>  /  Alb  3.0<L>  /  TBili  0.4  /  DBili  x   /  AST  30  /  ALT  17  /  AlkPhos  102  09-22      Microbiology:    Culture - Blood (collected 20 Sep 2023 21:27)  Source: .Blood Blood  Preliminary Report (23 Sep 2023 07:01):    No growth at 48 Hours        Vital Signs Last 24 Hrs  T(C): 36.7 (23 Sep 2023 14:00), Max: 36.7 (23 Sep 2023 05:00)  T(F): 98 (23 Sep 2023 14:00), Max: 98 (23 Sep 2023 05:00)  HR: 86 (23 Sep 2023 14:00) (86 - 108)  BP: 114/59 (23 Sep 2023 14:00) (108/56 - 131/60)  BP(mean): 97 (23 Sep 2023 05:29) (67 - 97)  RR: 19 (23 Sep 2023 14:00) (16 - 19)  SpO2: 95% (23 Sep 2023 14:00) (95% - 98%)    Parameters below as of 23 Sep 2023 00:00  Patient On (Oxygen Delivery Method): room air        I&O's Summary    22 Sep 2023 07:01  -  23 Sep 2023 07:00  --------------------------------------------------------  IN: 70 mL / OUT: 475 mL / NET: -405 mL            Vital Signs Last 24 Hrs  T(C): 36.6 (22 Sep 2023 12:00), Max: 36.6 (22 Sep 2023 12:00)  T(F): 97.8 (22 Sep 2023 12:00), Max: 97.8 (22 Sep 2023 12:00)  HR: 84 (22 Sep 2023 14:53) (78 - 104)  BP: 114/63 (22 Sep 2023 14:53) (100/75 - 133/62)  BP(mean): 79 (22 Sep 2023 14:53) (71 - 98)  RR: 18 (22 Sep 2023 12:00) (16 - 20)  SpO2: 97% (22 Sep 2023 12:00) (92% - 99%)    Parameters below as of 22 Sep 2023 12:00  Patient On (Oxygen Delivery Method): room air        I&O's Summary    21 Sep 2023 07:01  -  22 Sep 2023 07:00  --------------------------------------------------------  IN: 325 mL / OUT: 810 mL / NET: -485 mL            MEDICATIONS  (STANDING):  apixaban 2.5 milliGRAM(s) Oral every 12 hours  azithromycin   Tablet 500 milliGRAM(s) Oral daily  chlorhexidine 2% Cloths 1 Application(s) Topical daily  influenza  Vaccine (HIGH DOSE) 0.7 milliLiter(s) IntraMuscular once  risperiDONE   Tablet 0.25 milliGRAM(s) Oral daily  senna 2 Tablet(s) Oral at bedtime  sodium chloride 0.9%. 1000 milliLiter(s) (50 mL/Hr) IV Continuous <Continuous>  tamsulosin 0.4 milliGRAM(s) Oral at bedtime

## 2023-09-24 LAB
ANION GAP SERPL CALC-SCNC: 12 MMOL/L — SIGNIFICANT CHANGE UP (ref 7–14)
BUN SERPL-MCNC: 9 MG/DL — LOW (ref 10–20)
CALCIUM SERPL-MCNC: 8.9 MG/DL — SIGNIFICANT CHANGE UP (ref 8.4–10.5)
CHLORIDE SERPL-SCNC: 105 MMOL/L — SIGNIFICANT CHANGE UP (ref 98–110)
CO2 SERPL-SCNC: 23 MMOL/L — SIGNIFICANT CHANGE UP (ref 17–32)
CREAT SERPL-MCNC: 0.6 MG/DL — LOW (ref 0.7–1.5)
EGFR: 90 ML/MIN/1.73M2 — SIGNIFICANT CHANGE UP
GLUCOSE SERPL-MCNC: 107 MG/DL — HIGH (ref 70–99)
HCT VFR BLD CALC: 33.4 % — LOW (ref 37–47)
HGB BLD-MCNC: 10.9 G/DL — LOW (ref 12–16)
MAGNESIUM SERPL-MCNC: 2 MG/DL — SIGNIFICANT CHANGE UP (ref 1.8–2.4)
MCHC RBC-ENTMCNC: 29.5 PG — SIGNIFICANT CHANGE UP (ref 27–31)
MCHC RBC-ENTMCNC: 32.6 G/DL — SIGNIFICANT CHANGE UP (ref 32–37)
MCV RBC AUTO: 90.3 FL — SIGNIFICANT CHANGE UP (ref 81–99)
NRBC # BLD: 0 /100 WBCS — SIGNIFICANT CHANGE UP (ref 0–0)
PLATELET # BLD AUTO: 337 K/UL — SIGNIFICANT CHANGE UP (ref 130–400)
PMV BLD: SIGNIFICANT CHANGE UP (ref 7.4–10.4)
POTASSIUM SERPL-MCNC: 4.8 MMOL/L — SIGNIFICANT CHANGE UP (ref 3.5–5)
POTASSIUM SERPL-SCNC: 4.8 MMOL/L — SIGNIFICANT CHANGE UP (ref 3.5–5)
RBC # BLD: 3.7 M/UL — LOW (ref 4.2–5.4)
RBC # FLD: 14.7 % — HIGH (ref 11.5–14.5)
SODIUM SERPL-SCNC: 140 MMOL/L — SIGNIFICANT CHANGE UP (ref 135–146)
TSI ACT/NOR SER: 2.42 IU/L — HIGH (ref 0–0.55)
WBC # BLD: 12.56 K/UL — HIGH (ref 4.8–10.8)
WBC # FLD AUTO: 12.56 K/UL — HIGH (ref 4.8–10.8)

## 2023-09-24 PROCEDURE — 99232 SBSQ HOSP IP/OBS MODERATE 35: CPT

## 2023-09-24 RX ADMIN — TAMSULOSIN HYDROCHLORIDE 0.4 MILLIGRAM(S): 0.4 CAPSULE ORAL at 22:54

## 2023-09-24 RX ADMIN — CHLORHEXIDINE GLUCONATE 1 APPLICATION(S): 213 SOLUTION TOPICAL at 05:47

## 2023-09-24 RX ADMIN — Medication 25 MILLIGRAM(S): at 05:47

## 2023-09-24 RX ADMIN — SENNA PLUS 2 TABLET(S): 8.6 TABLET ORAL at 22:54

## 2023-09-24 RX ADMIN — APIXABAN 2.5 MILLIGRAM(S): 2.5 TABLET, FILM COATED ORAL at 05:47

## 2023-09-24 RX ADMIN — Medication 25 MILLIGRAM(S): at 22:54

## 2023-09-24 RX ADMIN — APIXABAN 2.5 MILLIGRAM(S): 2.5 TABLET, FILM COATED ORAL at 17:34

## 2023-09-24 NOTE — PROGRESS NOTE ADULT - ASSESSMENT
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 Received TNK @ 1743. Taken to IR for thrombectomy,  s/p thrombectomy of right SCA/BA with TICI 2C x3 passes.     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 Questionable left frontal lobe infarct. Chronic left parieto-occipital infarction  -sp/swallow eval  -PT/OT---dispo planning for STR    #Lethargy/Hypotension-- improving  #Lactic acidosis-resolved  -CTH stable  -LA 3.1-->2.0  -wbc 12- procal 0.04  -encourage incentive spirometry (to prevent atelectasis)  -finished 3 days iv ceftriaxone---monitor wbc and fever curve--currently on final day #3 of azithro---if any fever or clinical compensation can start iv zosyn  -monitor neurochecks q4hr  -sp/swallow eval  -UA+ on 9/19---f/u urine cx and monitor wbc and fever curve    #Mood disorder  -restart low dose Risperidone--reassess mental status    #S/P Resp failure secondary to neuro injury  #B/l pleural effusions   - Extubated 9/14/23  - Aspiration precautions  - HOB 30 degrees  -monitor for fluid overload    #Paroxysmal Afib  - on Eliquis 2.5mg BID   -rate control metoprolol tartrate 25 mg q8hr    #Hyperthyroidism  -holding Methimazole  -repeat TSH   -f/u with endo    DVT/GI ppx  guarded prognosis    DNR/DNI     PT/OT for dispo plan, D/C Planning

## 2023-09-24 NOTE — PROGRESS NOTE ADULT - ASSESSMENT
# hyperthyroidism , + TSi indicating Grave's disease   - per history she was on MMI 5 mg daily at home, with questionable  compliance , review of previous labs patient with hyperthyroidism since at least 2020   - 9/13/23: TSH 0.03  FT4 2.1 reflecting hyperthyroidism , patient was given MMI 5 mg TID by primary team   - 9/19/23 : TSH 0.07   FT4 down to 0.8 with low ft3 , likely from combination of over treatment with Methimazole and sick euthyroid syndrome, recent contrast   - Methimazole held on 9/20   - 9/23 : TSH 0.53  FT4 remain low at 0.6 and T3 low possible component of sick euthyroid ? recent stroke , TSI positive reflect Grave's disease   - keep holding methimazole for now and repeat TSH , Ft4 and TT3 in 2-3 days again

## 2023-09-24 NOTE — PROGRESS NOTE ADULT - SUBJECTIVE AND OBJECTIVE BOX
O:Vital Signs Last 24 Hrs  T(C): 36.1 (24 Sep 2023 04:46), Max: 36.1 (23 Sep 2023 19:19)  T(F): 96.9 (24 Sep 2023 04:46), Max: 96.9 (23 Sep 2023 19:19)  HR: 97 (24 Sep 2023 04:46) (77 - 97)  BP: 131/63 (24 Sep 2023 04:46) (130/60 - 131/63)  BP(mean): 89 (24 Sep 2023 04:46) (87 - 89)  RR: 18 (24 Sep 2023 04:46) (18 - 18)  SpO2: 98% (23 Sep 2023 19:19) (98% - 98%)    Parameters below as of 23 Sep 2023 19:19  Patient On (Oxygen Delivery Method): room air        09-24    140  |  105  |  9<L>  ----------------------------<  107<H>  4.8   |  23  |  0.6<L>    Ca    8.9      24 Sep 2023 07:13  Mg     2.0     09-24      MEDICATIONS  (STANDING):  apixaban 2.5 milliGRAM(s) Oral every 12 hours  chlorhexidine 2% Cloths 1 Application(s) Topical daily  dextrose 50% Injectable 25 Gram(s) IV Push once  dextrose 50% Injectable 25 Gram(s) IV Push once  dextrose 50% Injectable 12.5 Gram(s) IV Push once  dextrose Oral Gel 15 Gram(s) Oral once  glucagon  Injectable 1 milliGRAM(s) IntraMuscular once  influenza  Vaccine (HIGH DOSE) 0.7 milliLiter(s) IntraMuscular once  metoprolol tartrate 25 milliGRAM(s) Oral every 8 hours  senna 2 Tablet(s) Oral at bedtime  sodium chloride 0.9%. 1000 milliLiter(s) (50 mL/Hr) IV Continuous <Continuous>  tamsulosin 0.4 milliGRAM(s) Oral at bedtime    MEDICATIONS  (PRN):  acetaminophen     Tablet .. 650 milliGRAM(s) Oral every 6 hours PRN Temp greater or equal to 38C (100.4F), Mild Pain (1 - 3)  risperiDONE   Tablet 0.25 milliGRAM(s) Oral once PRN Agitation          Thyroid Stimulating Hormone, Serum: 0.53 uIU/mL (09.23.23 @ 07:01)  Free Thyroxine, Serum: 0.6 ng/dL (09.23.23 @ 07:01) Triiodothyronine,   Total (T3 Total): 53 ng/dL (09.23.23 @ 07:01)   Thyroid Stim. Immunoglobulin: 2.42: Performed At: [facility]

## 2023-09-24 NOTE — PROGRESS NOTE ADULT - ASSESSMENT
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 Received TNK @ 1743. Taken to IR for thrombectomy,  s/p thrombectomy of right SCA/BA with TICI 2C x3 passes. Pt now being actively treated for UTI vs. PNA.    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 Questionable left frontal lobe infarct. Chronic left parieto-occipital infarction  - pt more lethargic on , CTH STAT stable, not eating much, may need to start her on IVF  - -150    #UTI vs. PNA  - UA positive on   - s/p Rocephin (3 days)  - s/p  Azithromycin for days  (end date ) for possible PNA  - urine c/s > E Coli;  blood culture neg  - lactate 3.1 > 2.3 > 2  - if BP drop, can escalate Abx to Zosyn  - RVP (neg), procal (neg), cxr today    #Mood disorder  - dc  Resperidone for now as pt is more drowsy (as per son, pt takes 0.5mg once a day, but noncompliant)  - pt more awake after dc Resperidone   - can give Seraquel 12.5mg PRN if needed for agitation    #S/P Resp failure secondary to neuro injury   - Extubated 23  - Aspiration prec  - HOB 30 degrees  - BNP elevated, procal neg    #Afib  - Heparin ggt in NICU, dc'ed on   - c/w Eliquis 2.5mg BID   - c/w Lopressor 25mg q 8     #Urinary retention / S/P NAG met acidosis- diarrhea  - D/C Cardura  - Bladder scan if needed    #Hyperthyroidism  - hold Methimazole - 5 mg q 12  - TSH, T4, T3 low, total t4 was high in   - endo consult appreciated > keep holding methimazole for now and repeat TSH , Ft4 and TT3 in 2-3 days again     #Misc  - DVT Prophylaxis: eliquis  - Diet: minced and moist as per s/s  - Code Status: DNR and DNI  - Dispo: DGTF, dispo planning    [place]) spoke with SW, dispo planning, family needs to choose STR, however wont be dc over the weekend

## 2023-09-24 NOTE — PROGRESS NOTE ADULT - SUBJECTIVE AND OBJECTIVE BOX
Neurology Progress Note    Interval History:  The patient was seen and examined at the bedside.       PAST MEDICAL & SURGICAL HISTORY:  Thyroid disease            Medications:  acetaminophen     Tablet .. 650 milliGRAM(s) Oral every 6 hours PRN  apixaban 2.5 milliGRAM(s) Oral every 12 hours  chlorhexidine 2% Cloths 1 Application(s) Topical daily  dextrose 50% Injectable 25 Gram(s) IV Push once  dextrose 50% Injectable 12.5 Gram(s) IV Push once  dextrose 50% Injectable 25 Gram(s) IV Push once  dextrose Oral Gel 15 Gram(s) Oral once  glucagon  Injectable 1 milliGRAM(s) IntraMuscular once  influenza  Vaccine (HIGH DOSE) 0.7 milliLiter(s) IntraMuscular once  metoprolol tartrate 25 milliGRAM(s) Oral every 8 hours  risperiDONE   Tablet 0.25 milliGRAM(s) Oral once PRN  senna 2 Tablet(s) Oral at bedtime  sodium chloride 0.9%. 1000 milliLiter(s) IV Continuous <Continuous>  tamsulosin 0.4 milliGRAM(s) Oral at bedtime      Vital Signs Last 24 Hrs  T(C): 35.6 (24 Sep 2023 16:23), Max: 36.2 (24 Sep 2023 14:41)  T(F): 96.1 (24 Sep 2023 16:23), Max: 97.2 (24 Sep 2023 14:41)  HR: 80 (24 Sep 2023 16:23) (77 - 97)  BP: 97/55 (24 Sep 2023 16:23) (87/49 - 131/63)  BP(mean): 73 (24 Sep 2023 16:23) (73 - 89)  RR: 18 (24 Sep 2023 16:23) (18 - 18)  SpO2: 98% (23 Sep 2023 19:19) (98% - 98%)    Parameters below as of 23 Sep 2023 19:19  Patient On (Oxygen Delivery Method): room air        Neurological Exam:   Mental status: Awake, alert and oriented x3.  Recent and remote memory intact.  Naming, repetition and comprehension intact.  Attention/concentration intact.  No dysarthria, no aphasia.  Fund of knowledge appropriate.    Cranial nerves: Pupils equally round and reactive to light, visual fields full, no nystagmus, extraocular muscles intact, V1 through V3 intact bilaterally and symmetric, face symmetric, hearing intact to finger rub, palate elevation symmetric, tongue was midline.  Motor:  MRC grading 5/5 b/l UE/LE.   strength 5/5.  Normal tone and bulk.  No abnormal movements.    Sensation: Intact to light touch, proprioception, and pinprick.   Coordination: No dysmetria on finger-to-nose and heel-to-shin.  No dysdiadokinesia.  Reflexes: 2+ in bilateral UE/LE, downgoing toes bilaterally. (-) Son.  Gait: Narrow and steady. No ataxia.  Romberg negative    Labs:  CBC Full  -  ( 24 Sep 2023 07:13 )  WBC Count : 12.56 K/uL  RBC Count : 3.70 M/uL  Hemoglobin : 10.9 g/dL  Hematocrit : 33.4 %  Platelet Count - Automated : 337 K/uL  Mean Cell Volume : 90.3 fL  Mean Cell Hemoglobin : 29.5 pg  Mean Cell Hemoglobin Concentration : 32.6 g/dL  Auto Neutrophil # : x  Auto Lymphocyte # : x  Auto Monocyte # : x  Auto Eosinophil # : x  Auto Basophil # : x  Auto Neutrophil % : x  Auto Lymphocyte % : x  Auto Monocyte % : x  Auto Eosinophil % : x  Auto Basophil % : x    09-24    140  |  105  |  9<L>  ----------------------------<  107<H>  4.8   |  23  |  0.6<L>    Ca    8.9      24 Sep 2023 07:13  Mg     2.0     09-24          Urinalysis Basic - ( 24 Sep 2023 07:13 )    Color: x / Appearance: x / SG: x / pH: x  Gluc: 107 mg/dL / Ketone: x  / Bili: x / Urobili: x   Blood: x / Protein: x / Nitrite: x   Leuk Esterase: x / RBC: x / WBC x   Sq Epi: x / Non Sq Epi: x / Bacteria: x        Assessment:  This is a 83y Female w/ h/o     Plan:

## 2023-09-24 NOTE — PROGRESS NOTE ADULT - SUBJECTIVE AND OBJECTIVE BOX
CHILANGO PEMBERTON 83y Female  MRN#: [id number]     SUBJECTIVE  Patient is a 83y old Female who presents with a chief complaint of basilar occlusion (24 Sep 2023 10:39)  Today is hospital day 12d, and this morning she is lying in bed without distress.   No acute overnight events.     OBJECTIVE  PAST MEDICAL & SURGICAL HISTORY  Thyroid disease      ALLERGIES:  No Known Allergies    MEDICATIONS:  STANDING MEDICATIONS  apixaban 2.5 milliGRAM(s) Oral every 12 hours  chlorhexidine 2% Cloths 1 Application(s) Topical daily  dextrose 50% Injectable 25 Gram(s) IV Push once  dextrose 50% Injectable 12.5 Gram(s) IV Push once  dextrose 50% Injectable 25 Gram(s) IV Push once  dextrose Oral Gel 15 Gram(s) Oral once  glucagon  Injectable 1 milliGRAM(s) IntraMuscular once  influenza  Vaccine (HIGH DOSE) 0.7 milliLiter(s) IntraMuscular once  metoprolol tartrate 25 milliGRAM(s) Oral every 8 hours  senna 2 Tablet(s) Oral at bedtime  sodium chloride 0.9%. 1000 milliLiter(s) IV Continuous <Continuous>  tamsulosin 0.4 milliGRAM(s) Oral at bedtime    PRN MEDICATIONS  acetaminophen     Tablet .. 650 milliGRAM(s) Oral every 6 hours PRN  risperiDONE   Tablet 0.25 milliGRAM(s) Oral once PRN    HOME MEDICATIONS  Home Medications:  methIMAzole 5 mg oral tablet: 1 orally once a day (20 Sep 2023 15:12)  metoprolol succinate 50 mg oral capsule, extended release: 1 orally once a day (20 Sep 2023 15:12)  risperiDONE 0.5 mg oral tablet: 1 tab(s) orally 2 times a day (15 Sep 2023 16:07)      VITAL SIGNS: Last 24 Hours  T(C): 36.1 (24 Sep 2023 04:46), Max: 36.7 (23 Sep 2023 14:00)  T(F): 96.9 (24 Sep 2023 04:46), Max: 98 (23 Sep 2023 14:00)  HR: 97 (24 Sep 2023 04:46) (77 - 97)  BP: 131/63 (24 Sep 2023 04:46) (114/59 - 131/63)  BP(mean): 89 (24 Sep 2023 04:46) (87 - 89)  RR: 18 (24 Sep 2023 04:46) (18 - 19)  SpO2: 98% (23 Sep 2023 19:19) (95% - 98%)      LABS:                        10.9   12.56 )-----------( 337      ( 24 Sep 2023 07:13 )             33.4     09-24    140  |  105  |  9<L>  ----------------------------<  107<H>  4.8   |  23  |  0.6<L>    Ca    8.9      24 Sep 2023 07:13  Mg     2.0     09-24          Urinalysis Basic - ( 24 Sep 2023 07:13 )    Color: x / Appearance: x / SG: x / pH: x  Gluc: 107 mg/dL / Ketone: x  / Bili: x / Urobili: x   Blood: x / Protein: x / Nitrite: x   Leuk Esterase: x / RBC: x / WBC x   Sq Epi: x / Non Sq Epi: x / Bacteria: x      CAPILLARY BLOOD GLUCOSE  POCT Blood Glucose.: 125 mg/dL (23 Sep 2023 21:08)      RADIOLOGY:    PHYSICAL EXAM:  GENERAL: NAD, 83y F  HEAD:  Atraumatic, Normocephalic  EYES: EOMI, conjunctivae clear and sclerae white  NECK: Supple, No JVD  CHEST/LUNG: Clear to auscultation bilaterally; No wheeze; No crackles; No accessory muscles used  HEART: Regular rate and rhythm; No murmurs;   ABDOMEN: Soft, Nontender, Nondistended; Bowel sounds present; No guarding  EXTREMITIES:  2+ Peripheral Pulses, b/l UE ecchymosis, no edema    ADMISSION SUMMARY  Patient is a 83y old Female who presents with a chief complaint of basilar occlusion (24 Sep 2023 10:39)

## 2023-09-25 LAB
ANION GAP SERPL CALC-SCNC: 10 MMOL/L — SIGNIFICANT CHANGE UP (ref 7–14)
BUN SERPL-MCNC: 21 MG/DL — HIGH (ref 10–20)
CALCIUM SERPL-MCNC: 8.5 MG/DL — SIGNIFICANT CHANGE UP (ref 8.4–10.5)
CHLORIDE SERPL-SCNC: 105 MMOL/L — SIGNIFICANT CHANGE UP (ref 98–110)
CO2 SERPL-SCNC: 23 MMOL/L — SIGNIFICANT CHANGE UP (ref 17–32)
CREAT SERPL-MCNC: 0.6 MG/DL — LOW (ref 0.7–1.5)
EGFR: 90 ML/MIN/1.73M2 — SIGNIFICANT CHANGE UP
GLUCOSE SERPL-MCNC: 87 MG/DL — SIGNIFICANT CHANGE UP (ref 70–99)
HCT VFR BLD CALC: 32.8 % — LOW (ref 37–47)
HGB BLD-MCNC: 10.3 G/DL — LOW (ref 12–16)
MCHC RBC-ENTMCNC: 29.3 PG — SIGNIFICANT CHANGE UP (ref 27–31)
MCHC RBC-ENTMCNC: 31.4 G/DL — LOW (ref 32–37)
MCV RBC AUTO: 93.2 FL — SIGNIFICANT CHANGE UP (ref 81–99)
NRBC # BLD: 0 /100 WBCS — SIGNIFICANT CHANGE UP (ref 0–0)
PLATELET # BLD AUTO: 461 K/UL — HIGH (ref 130–400)
PMV BLD: 8.8 FL — SIGNIFICANT CHANGE UP (ref 7.4–10.4)
POTASSIUM SERPL-MCNC: 4.3 MMOL/L — SIGNIFICANT CHANGE UP (ref 3.5–5)
POTASSIUM SERPL-SCNC: 4.3 MMOL/L — SIGNIFICANT CHANGE UP (ref 3.5–5)
RBC # BLD: 3.52 M/UL — LOW (ref 4.2–5.4)
RBC # FLD: 15.1 % — HIGH (ref 11.5–14.5)
SODIUM SERPL-SCNC: 138 MMOL/L — SIGNIFICANT CHANGE UP (ref 135–146)
WBC # BLD: 10.32 K/UL — SIGNIFICANT CHANGE UP (ref 4.8–10.8)
WBC # FLD AUTO: 10.32 K/UL — SIGNIFICANT CHANGE UP (ref 4.8–10.8)

## 2023-09-25 PROCEDURE — 99232 SBSQ HOSP IP/OBS MODERATE 35: CPT

## 2023-09-25 PROCEDURE — 70450 CT HEAD/BRAIN W/O DYE: CPT | Mod: 26

## 2023-09-25 RX ORDER — SODIUM CHLORIDE 9 MG/ML
250 INJECTION INTRAMUSCULAR; INTRAVENOUS; SUBCUTANEOUS ONCE
Refills: 0 | Status: DISCONTINUED | OUTPATIENT
Start: 2023-09-25 | End: 2023-09-27

## 2023-09-25 RX ORDER — SODIUM CHLORIDE 9 MG/ML
500 INJECTION INTRAMUSCULAR; INTRAVENOUS; SUBCUTANEOUS ONCE
Refills: 0 | Status: COMPLETED | OUTPATIENT
Start: 2023-09-25 | End: 2023-09-25

## 2023-09-25 RX ADMIN — SENNA PLUS 2 TABLET(S): 8.6 TABLET ORAL at 21:31

## 2023-09-25 RX ADMIN — TAMSULOSIN HYDROCHLORIDE 0.4 MILLIGRAM(S): 0.4 CAPSULE ORAL at 21:31

## 2023-09-25 RX ADMIN — SODIUM CHLORIDE 1000 MILLILITER(S): 9 INJECTION INTRAMUSCULAR; INTRAVENOUS; SUBCUTANEOUS at 11:01

## 2023-09-25 RX ADMIN — APIXABAN 2.5 MILLIGRAM(S): 2.5 TABLET, FILM COATED ORAL at 05:42

## 2023-09-25 RX ADMIN — APIXABAN 2.5 MILLIGRAM(S): 2.5 TABLET, FILM COATED ORAL at 17:16

## 2023-09-25 RX ADMIN — Medication 25 MILLIGRAM(S): at 21:32

## 2023-09-25 RX ADMIN — CHLORHEXIDINE GLUCONATE 1 APPLICATION(S): 213 SOLUTION TOPICAL at 05:45

## 2023-09-25 NOTE — PROGRESS NOTE ADULT - SUBJECTIVE AND OBJECTIVE BOX
SUBJECTIVE:    Patient is a 83y old Female who presents with a chief complaint of basilar occlusion (25 Sep 2023 10:30)    Currently admitted to medicine with the primary diagnosis of Stroke       Today is hospital day 13d.     PAST MEDICAL & SURGICAL HISTORY  Thyroid disease      ALLERGIES:  No Known Allergies    MEDICATIONS:  STANDING MEDICATIONS  apixaban 2.5 milliGRAM(s) Oral every 12 hours  chlorhexidine 2% Cloths 1 Application(s) Topical daily  dextrose 50% Injectable 25 Gram(s) IV Push once  dextrose 50% Injectable 25 Gram(s) IV Push once  dextrose 50% Injectable 12.5 Gram(s) IV Push once  dextrose Oral Gel 15 Gram(s) Oral once  glucagon  Injectable 1 milliGRAM(s) IntraMuscular once  influenza  Vaccine (HIGH DOSE) 0.7 milliLiter(s) IntraMuscular once  metoprolol tartrate 25 milliGRAM(s) Oral every 8 hours  senna 2 Tablet(s) Oral at bedtime  sodium chloride 0.9% Bolus 250 milliLiter(s) IV Bolus once  sodium chloride 0.9%. 1000 milliLiter(s) IV Continuous <Continuous>  tamsulosin 0.4 milliGRAM(s) Oral at bedtime    PRN MEDICATIONS  acetaminophen     Tablet .. 650 milliGRAM(s) Oral every 6 hours PRN  risperiDONE   Tablet 0.25 milliGRAM(s) Oral once PRN    VITALS:   T(F): 97.4  HR: 73  BP: 119/58  RR: 18  SpO2: --    LABS:                        10.3   10.32 )-----------( 461      ( 25 Sep 2023 06:50 )             32.8     09-25    138  |  105  |  21<H>  ----------------------------<  87  4.3   |  23  |  0.6<L>    Ca    8.5      25 Sep 2023 06:50  Mg     2.0     09-24        Urinalysis Basic - ( 25 Sep 2023 06:50 )    Color: x / Appearance: x / SG: x / pH: x  Gluc: 87 mg/dL / Ketone: x  / Bili: x / Urobili: x   Blood: x / Protein: x / Nitrite: x   Leuk Esterase: x / RBC: x / WBC x   Sq Epi: x / Non Sq Epi: x / Bacteria: x                RADIOLOGY:    PHYSICAL EXAM:  GEN: No acute distress  LUNGS: Clear to auscultation bilaterally   HEART: S1/S2 present. RRR.   ABD/ GI: Soft, non-tender, non-distended. Bowel sounds present  EXT: NC/NC/NE/2+PP/VALLE  NEURO: AAOX0--

## 2023-09-25 NOTE — PROGRESS NOTE ADULT - TIME BILLING
Review of imaging and chart; obtaining history; examination of pt; discussion and coordination of care.

## 2023-09-25 NOTE — SWALLOW BEDSIDE ASSESSMENT ADULT - SWALLOW EVAL: DIAGNOSIS
Reportedly tolerating current diet. Per PCA pt ate 50% of breakfast today w/o any overt s/s of penetration/aspiration

## 2023-09-25 NOTE — PROGRESS NOTE ADULT - ASSESSMENT
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 Received TNK @ 1743. Taken to IR for thrombectomy,  s/p thrombectomy of right SCA/BA with TICI 2C x3 passes.     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 Questionable left frontal lobe infarct. Chronic left parieto-occipital infarction  -sp/swallow eval  -PT/OT---dispo planning for STR    #Lethargy--still present  #Lactic acidosis-resolved  # hypotension  -CTH stable  -LA 3.1-->2.0  -wbc 10- procal 0.04  --finished 3 days iv ceftriaxone-- Ecoli in urine---3 days of azithro---if any fever or clinical compensation can start iv zosyn  -monitor neurochecks q4hr  -sp/swallow eval-- minced and moist      #Mood disorder  -restart low dose Risperidone--reassess mental status-- can hold it if remains lethargic    #S/P Resp failure secondary to neuro injury  #B/l pleural effusions   - Extubated 9/14/23  - Aspiration precautions  - HOB 30 degrees  -monitor for fluid overload    #Paroxysmal Afib  - on Eliquis 2.5mg BID   -rate control metoprolol tartrate 25 mg q8hr    #Hyperthyroidism  -holding Methimazole  -f/u with endo--sick euthyroid-- hold methimazole-- repeat TSH in 2-3 days    DVT/GI ppx  guarded prognosis    DNR/DNI     PT/OT for dispo plan, D/C Planning

## 2023-09-25 NOTE — PROGRESS NOTE ADULT - SUBJECTIVE AND OBJECTIVE BOX
Neurology General Progress Note     CHILANGO PEMBERTON 83y Female [id number]    Hospital Day: 13d     HOSPITAL COURSE:   83F. PMH: Mood disorder w/ hallucinations, Afib (not on AC 2/ inability to tolerate), Hyperthyroid, recent admission for syncope at home.   Presented (9/12) w/ dysarthria, confusion, and left weakness. Stroke Code activated NIHSS 32, mRS 1. CTH (-) for hemorrhage, (+) for acute R occipital, BL cerebellar infarcts. CTP (+) for R cerebellar mismatch, CTA with proximal right PCA occlusion with distal reconstitution & severe right superior cerebellar stenosis. Code NI actual was activated. TNK 1743, taken for thrombectomy (right SCA/BA with TICI 2C x3 passes). Admitted to NICU, intubated. Extubated 9/14. Also had fever w/ (-) infectious workup. Scanned for bleed 2/ H&H dowtrend- ruled out. Downgraded to stroke unit (9/16). Pt developed leukocytosis and lethargy, CTH (9/20) stable, worked up for UTI vs PNA (on rocephin/azithro). Once BP and mental status improved, downgraded to floor (9/22). Endocrine consulted for hyperthyroid/grave's disease, but concerned for element of euthyroid sick syndrome - holding MMI.       Overnight events:  Pt had episodes of hypotension 87/49 (9/24), which increased to 100/52 by evening, however decreased to 91/57 (9/25) 5AM.     Subjective complaints:  Pt evaluated at bedside. Pt lethargic.       PAST MEDICAL & SURGICAL HISTORY:  Thyroid disease            Medications:  acetaminophen     Tablet .. 650 milliGRAM(s) Oral every 6 hours PRN  apixaban 2.5 milliGRAM(s) Oral every 12 hours  chlorhexidine 2% Cloths 1 Application(s) Topical daily  dextrose 50% Injectable 25 Gram(s) IV Push once  dextrose 50% Injectable 25 Gram(s) IV Push once  dextrose 50% Injectable 12.5 Gram(s) IV Push once  dextrose Oral Gel 15 Gram(s) Oral once  glucagon  Injectable 1 milliGRAM(s) IntraMuscular once  influenza  Vaccine (HIGH DOSE) 0.7 milliLiter(s) IntraMuscular once  metoprolol tartrate 25 milliGRAM(s) Oral every 8 hours  risperiDONE   Tablet 0.25 milliGRAM(s) Oral once PRN  senna 2 Tablet(s) Oral at bedtime  sodium chloride 0.9% Bolus 500 milliLiter(s) IV Bolus once  sodium chloride 0.9% Bolus 250 milliLiter(s) IV Bolus once  sodium chloride 0.9%. 1000 milliLiter(s) IV Continuous <Continuous>  tamsulosin 0.4 milliGRAM(s) Oral at bedtime      Vital Signs  Vital Signs Last 24 Hrs  T(C): 36.4 (25 Sep 2023 06:00), Max: 36.4 (25 Sep 2023 06:00)  T(F): 97.5 (25 Sep 2023 06:00), Max: 97.5 (25 Sep 2023 06:00)  HR: 79 (25 Sep 2023 05:19) (79 - 84)  BP: 91/57 (25 Sep 2023 05:19) (87/49 - 100/52)  BP(mean): 68 (25 Sep 2023 05:19) (68 - 74)  RR: 16 (25 Sep 2023 05:19) (16 - 18)  SpO2: --        Neurological Exam:   Mental status: Arouses to physical touch briefly. Says "yes" to name, but does not answer additional questions. Squeezes hands to command, but will not raise arms.   Cranial nerves: Pupils equally round and reactive to light, visual fields full to threat, extraocular muscles intact, R facial droop, hearing intact to voice   Motor: Moves all four extremities symmetrically (gripped blanket to herself, shuffled legs)    Sensation: Withdraws to pain in LUE, BLLE  Coordination: Unable to assess  Gait: Deferred    Labs:  CBC Full  -  ( 25 Sep 2023 06:50 )  WBC Count : 10.32 K/uL  RBC Count : 3.52 M/uL  Hemoglobin : 10.3 g/dL  Hematocrit : 32.8 %  Platelet Count - Automated : 461 K/uL  Mean Cell Volume : 93.2 fL  Mean Cell Hemoglobin : 29.3 pg  Mean Cell Hemoglobin Concentration : 31.4 g/dL  Auto Neutrophil # : x  Auto Lymphocyte # : x  Auto Monocyte # : x  Auto Eosinophil # : x  Auto Basophil # : x  Auto Neutrophil % : x  Auto Lymphocyte % : x  Auto Monocyte % : x  Auto Eosinophil % : x  Auto Basophil % : x    09-25    138  |  105  |  21<H>  ----------------------------<  87  4.3   |  23  |  0.6<L>    Ca    8.5      25 Sep 2023 06:50  Mg     2.0     09-24          Urinalysis Basic - ( 25 Sep 2023 06:50 )    Color: x / Appearance: x / SG: x / pH: x  Gluc: 87 mg/dL / Ketone: x  / Bili: x / Urobili: x   Blood: x / Protein: x / Nitrite: x   Leuk Esterase: x / RBC: x / WBC x   Sq Epi: x / Non Sq Epi: x / Bacteria: x

## 2023-09-25 NOTE — PROGRESS NOTE ADULT - ASSESSMENT
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 Received TNK @ 1743. Taken to IR for thrombectomy,  s/p thrombectomy of right SCA/BA with TICI 2C x3 passes. Pt now being actively treated for UTI vs. PNA.    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 Questionable left frontal lobe infarct. Chronic left parieto-occipital infarction  - s/p TNK on 9/12  - s/p thrombectomy 9/19 of right SCA/BA with TICI 2C x3 passes  - MRI cancelled, no need at this time  - neuro q8h  - -150  - Bed to chair mode  - Delirium Precautions  - Repeat CTH due to lethargy today on exam/hypotension   - eliquis 2.5mg BID    #UTI vs. PNA  - UA positive on 9/19, urine c/s > E Coli;  blood culture neg  - RVP (neg), procal (neg) , lactate 3.1 > 2.3 > 2  - s/p Rocephin (3 days)  - s/p  Azithromycin (3d) for possible PNA    #Mood disorder  - On risperidone 0.25 PRN for agitation (last received 9/21), not standing due to lethargy    #S/P Resp failure secondary to neuro injury - resolved  - Extubated 9/14/23  - Aspiration prec  - BNP elevated, procal neg    #Afib  - Heparin ggt in NICU, dc'ed on 9/19  - c/w Eliquis 2.5mg BID   - c/w Lopressor 25mg q 8     #Urinary retention / S/P NAG met acidosis- diarrhea  - D/C'd Cardura  - C/w tamsulosin 0.4  - Bladder scan if needed    #Hyperthyroidism  - TSH/T4/T3: (9/13) 0.03L/2.1L (9/19) 0.07L/0.8L/1.22L; (9/23) 0.53/0.6L/53L  - Thyroid Stim Immunoglobulin (9/21) 2.42H  - Endo following: Cont to hold methimazole, repeat TSH/T4/T3 9/26  - hold Methimazole - 5 mg q 12    #Misc  - DVT Prophylaxis: eliquis  - Diet: minced and moist as per s/s  - Code Status: DNR and DNI  - Dispo: DGTF, dispo planning    Pending: Repeat CTH, BP regulation, improved alertness, PT eval

## 2023-09-25 NOTE — SWALLOW BEDSIDE ASSESSMENT ADULT - SLP GENERAL OBSERVATIONS
Pt received in bed asleep, woke to warm cloth to face, refused po trials, no eye opening, when asked will you eat/drink for me she replied "no" verbally. Level of arousal has interfered w/ pts ability to participate in s/l f/u

## 2023-09-26 LAB
CULTURE RESULTS: SIGNIFICANT CHANGE UP
SPECIMEN SOURCE: SIGNIFICANT CHANGE UP
T3 SERPL-MCNC: 54 NG/DL — LOW (ref 80–200)
T4 AB SER-ACNC: 3.8 UG/DL — LOW (ref 4.6–12)
TSH SERPL-MCNC: 1.49 UIU/ML — SIGNIFICANT CHANGE UP (ref 0.27–4.2)

## 2023-09-26 PROCEDURE — 99232 SBSQ HOSP IP/OBS MODERATE 35: CPT | Mod: GC

## 2023-09-26 PROCEDURE — 99232 SBSQ HOSP IP/OBS MODERATE 35: CPT

## 2023-09-26 RX ORDER — SODIUM CHLORIDE 9 MG/ML
500 INJECTION INTRAMUSCULAR; INTRAVENOUS; SUBCUTANEOUS ONCE
Refills: 0 | Status: COMPLETED | OUTPATIENT
Start: 2023-09-26 | End: 2023-09-26

## 2023-09-26 RX ORDER — METOPROLOL TARTRATE 50 MG
25 TABLET ORAL EVERY 8 HOURS
Refills: 0 | Status: DISCONTINUED | OUTPATIENT
Start: 2023-09-26 | End: 2023-09-27

## 2023-09-26 RX ADMIN — APIXABAN 2.5 MILLIGRAM(S): 2.5 TABLET, FILM COATED ORAL at 05:49

## 2023-09-26 RX ADMIN — TAMSULOSIN HYDROCHLORIDE 0.4 MILLIGRAM(S): 0.4 CAPSULE ORAL at 21:30

## 2023-09-26 RX ADMIN — Medication 25 MILLIGRAM(S): at 05:49

## 2023-09-26 RX ADMIN — SODIUM CHLORIDE 500 MILLILITER(S): 9 INJECTION INTRAMUSCULAR; INTRAVENOUS; SUBCUTANEOUS at 21:23

## 2023-09-26 RX ADMIN — APIXABAN 2.5 MILLIGRAM(S): 2.5 TABLET, FILM COATED ORAL at 18:06

## 2023-09-26 RX ADMIN — Medication 25 MILLIGRAM(S): at 15:43

## 2023-09-26 RX ADMIN — SENNA PLUS 2 TABLET(S): 8.6 TABLET ORAL at 21:30

## 2023-09-26 RX ADMIN — CHLORHEXIDINE GLUCONATE 1 APPLICATION(S): 213 SOLUTION TOPICAL at 05:49

## 2023-09-26 NOTE — SWALLOW FEES ASSESSMENT ADULT - DIAGNOSTIC IMPRESSIONS
Mild-mod pharyngeal dysphagia for thin liquids. No other trials assessed 2/2 pt requesting scope be pulled.
Severe pharyngeal dysphagia for thin, mild pharyngeal dysphagia for mildly thick, puree and minced and moist solids

## 2023-09-26 NOTE — SWALLOW FEES ASSESSMENT ADULT - PRELIMINARY ENDOSCOPIC EXAMINATIONS
improved secretion management compared to prior FEES/Interarytenoid/post-commissure edema/Interarytenoid/Arytenoid erythema
Interarytenoid/post-commissure edema/Interarytenoid/Arytenoid erythema/Baseline pooling of secretions/Baseline penetration of secretions

## 2023-09-26 NOTE — PROGRESS NOTE ADULT - ASSESSMENT
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 Received TNK @ 1743. Taken to IR for thrombectomy,  s/p thrombectomy of right SCA/BA with TICI 2C x3 passes. Pt now being actively treated for UTI vs. PNA.    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 Questionable left frontal lobe infarct. Chronic left parieto-occipital infarction  - Repeat CTH due to lethargy yesterday showed no changes from previous Head CT.  - s/p TNK on 9/12  - s/p thrombectomy 9/19 of right SCA/BA with TICI 2C x3 passes  - neuro q8h  - -150  - Bed to chair mode  - Delirium Precautions  - eliquis 2.5mg BID    #UTI vs. PNA  - UA positive on 9/19, urine c/s > E Coli;  blood culture neg  - RVP (neg), procal (neg) , lactate 3.1 > 2.3 > 2  - s/p Rocephin (3 days)  - s/p  Azithromycin (3d) for possible PNA    #Mood disorder  - On risperidone 0.25 PRN for agitation (last received 9/21), not standing due to lethargy    #S/P Resp failure secondary to neuro injury - resolved  - Extubated 9/14/23  - Aspiration prec  - BNP elevated, procal neg    #Afib  - c/w Eliquis 2.5mg BID   - c/w Lopressor 25mg q 8     #Urinary retention / S/P NAG met acidosis- diarrhea  - C/w tamsulosin 0.4  - Bladder scan prn    #Hyperthyroidism  - Endo following: Cont to hold methimazole, repeat TSH/T4/T3 today  - hold Methimazole - 5 mg q 12      DVT Prophylaxis: eliquis  Diet: minced and moist as per s/s  Code Status: DNR and DNI  Dispo: DGTF, dispo planning    Pending: MOISÉS chan   83-year-old female with a past history of mood disorder, hallucination, hyperthyroidism, Afib no a/c  who presented with dysarthria, confusion, and left weakness, Initial NIH 32, 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 Received TNK @ 1743. Taken to IR for thrombectomy,  s/p thrombectomy of right SCA/BA with TICI 2C x3 passes. Pt now being actively treated for UTI vs. PNA.    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 Questionable left frontal lobe infarct. Chronic left parieto-occipital infarction  - Repeat CTH due to lethargy yesterday showed no changes from previous Head CT.  - s/p TNK on 9/12  - s/p thrombectomy 9/19 of right SCA/BA with TICI 2C x3 passes  - neuro q8h  - -150  - Bed to chair mode  - Delirium Precautions  - eliquis 2.5mg BID    #UTI vs. PNA  - UA positive on 9/19, urine c/s > E Coli;  blood culture neg  - RVP (neg), procal (neg) , lactate 3.1 > 2.3 > 2  - s/p Rocephin (3 days)  - s/p  Azithromycin (3d) for possible PNA    #Mood disorder  - On risperidone 0.25 PRN for agitation (last received 9/21), not standing due to lethargy    #S/P Resp failure secondary to neuro injury - resolved  - Extubated 9/14/23  - Aspiration prec  - BNP elevated, procal neg    #Afib  - c/w Eliquis 2.5mg BID   - c/w Lopressor 25mg q 8     #Urinary retention / S/P NAG met acidosis- diarrhea  - C/w tamsulosin 0.4  - Bladder scan prn    #Hyperthyroidism  - per Endo hyperthyroidism could be complicated by euthyroid sick syndrome continue to monitor thyroid panel and follow endo recs  - Endo following: Cont to hold methimazole, repeat TSH/T4/T3 today  - hold Methimazole - 5 mg q 12      DVT Prophylaxis: eliquis  Diet: minced and moist as per s/s  Code Status: DNR and DNI  Dispo: DGTF, dispo planning    Pending: MOISÉS chan   83-year-old female with a past history of mood disorder, hallucination, hyperthyroidism, Afib no a/c  who presented with dysarthria, confusion, and left weakness, Initial NIH 32, 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 Received TNK @ 1743. Taken to IR for thrombectomy,  s/p thrombectomy of right SCA/BA with TICI 2C x3 passes. Pt now being actively treated for UTI vs. PNA.    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 Questionable left frontal lobe infarct. Chronic left parieto-occipital infarction  - Repeat CTH due to lethargy yesterday showed no changes from previous Head CT.  - s/p TNK on 9/12  - s/p thrombectomy 9/19 of right SCA/BA with TICI 2C x3 passes  - neuro q8h  - -150  - Bed to chair mode  - Delirium Precautions  - eliquis 2.5mg BID    #UTI vs. PNA  - UA positive on 9/19, urine c/s > E Coli;  blood culture neg  - RVP (neg), procal (neg) , lactate 3.1 > 2.3 > 2  - s/p Rocephin (3 days)  - s/p  Azithromycin (3d) for possible PNA    #Mood disorder  - On risperidone 0.25 PRN for agitation (last received 9/21), not standing due to lethargy    #S/P Resp failure secondary to neuro injury - resolved  - Extubated 9/14/23  - Aspiration prec  - BNP elevated, procal neg    #Afib  - c/w Eliquis 2.5mg BID   - c/w Lopressor 25mg q 8     #Urinary retention / S/P NAG met acidosis- diarrhea  - C/w tamsulosin 0.4  - Bladder scan prn    #Hyperthyroidism  - per Endo hyperthyroidism could be complicated by euthyroid sick syndrome continue to monitor thyroid panel and follow endo recs  - Endo following: Cont to hold methimazole, repeat TSH/T4/T3 today  - hold Methimazole - 5 mg q 12      DVT Prophylaxis: eliquis  Diet: minced and moist as per s/s  Code Status: DNR and DNI  Dispo: DGTF, dispo planning    Pending: PT recommended PRASANNA pending placement

## 2023-09-26 NOTE — PROGRESS NOTE ADULT - SUBJECTIVE AND OBJECTIVE BOX
Neurology Progress Note    Interval History:    patient more alert and awake this AM. No IV access this AM no nursing report for how or when IV access was lost.      Medications:  acetaminophen     Tablet .. 650 milliGRAM(s) Oral every 6 hours PRN  apixaban 2.5 milliGRAM(s) Oral every 12 hours  chlorhexidine 2% Cloths 1 Application(s) Topical daily  dextrose 50% Injectable 25 Gram(s) IV Push once  dextrose 50% Injectable 12.5 Gram(s) IV Push once  dextrose 50% Injectable 25 Gram(s) IV Push once  dextrose Oral Gel 15 Gram(s) Oral once  glucagon  Injectable 1 milliGRAM(s) IntraMuscular once  influenza  Vaccine (HIGH DOSE) 0.7 milliLiter(s) IntraMuscular once  metoprolol tartrate 25 milliGRAM(s) Oral every 8 hours  risperiDONE   Tablet 0.25 milliGRAM(s) Oral once PRN  senna 2 Tablet(s) Oral at bedtime  sodium chloride 0.9% Bolus 250 milliLiter(s) IV Bolus once  sodium chloride 0.9%. 1000 milliLiter(s) IV Continuous <Continuous>  tamsulosin 0.4 milliGRAM(s) Oral at bedtime      Vital Signs Last 24 Hrs  T(C): 35.8 (26 Sep 2023 05:03), Max: 36.4 (25 Sep 2023 19:51)  T(F): 96.4 (26 Sep 2023 05:03), Max: 97.5 (25 Sep 2023 19:51)  HR: 83 (26 Sep 2023 05:03) (73 - 85)  BP: 120/65 (26 Sep 2023 05:03) (119/58 - 133/60)  BP(mean): 86 (25 Sep 2023 19:51) (86 - 86)  RR: 18 (25 Sep 2023 19:51) (18 - 18)  SpO2: --        Neurological Examination:  General:  Appearance is consistent with chronologic age.   Cognitive/Language:  Awake, alert, and oriented to person and time not to place.  Pt follows simple commands, moderate dysarthria.    Cranial Nerves  - Eyes: Visual fields full.  EOMI w/o nystagmus, skew or reported double vision.  PERRL.  No ptosis/weakness of eyelid closure.    - Face:  Facial sensation normal V1 - 3, mild L lower facial asymmetry  - Ears/Nose/Throat:  Hearing grossly intact b/l to finger rub.  Palate elevates midline.  Tongue and uvula midline.   Motor exam: Normal tone and bulk. No tenderness, twitching, tremors or involuntary movements. 4/5 muscle strength b/l UE and LE no drift.  Sensory examination:  Intact to light touch and pinprick, pain, temperature and proprioception and vibration in all extremities.  Reflexes: 2+ b/l biceps, triceps, patella and achilles.  Plantar response downgoing b/l.  Barby, clonus absent.  Cerebellum: FTN.  No dysmetria unable to perform HTS      Labs:  CBC Full  -  ( 25 Sep 2023 06:50 )  WBC Count : 10.32 K/uL  RBC Count : 3.52 M/uL  Hemoglobin : 10.3 g/dL  Hematocrit : 32.8 %  Platelet Count - Automated : 461 K/uL  Mean Cell Volume : 93.2 fL  Mean Cell Hemoglobin : 29.3 pg  Mean Cell Hemoglobin Concentration : 31.4 g/dL  Auto Neutrophil # : x  Auto Lymphocyte # : x  Auto Monocyte # : x  Auto Eosinophil # : x  Auto Basophil # : x  Auto Neutrophil % : x  Auto Lymphocyte % : x  Auto Monocyte % : x  Auto Eosinophil % : x  Auto Basophil % : x    09-25    138  |  105  |  21<H>  ----------------------------<  87  4.3   |  23  |  0.6<L>    Ca    8.5      25 Sep 2023 06:50          Urinalysis Basic - ( 25 Sep 2023 06:50 )    Color: x / Appearance: x / SG: x / pH: x  Gluc: 87 mg/dL / Ketone: x  / Bili: x / Urobili: x   Blood: x / Protein: x / Nitrite: x   Leuk Esterase: x / RBC: x / WBC x   Sq Epi: x / Non Sq Epi: x / Bacteria: x        RADIOLOGY & ADDITIONAL TESTS:   Neurology Progress Note    Interval History:    patient more alert and awake this AM and following commands    Medications:  acetaminophen     Tablet .. 650 milliGRAM(s) Oral every 6 hours PRN  apixaban 2.5 milliGRAM(s) Oral every 12 hours  chlorhexidine 2% Cloths 1 Application(s) Topical daily  dextrose 50% Injectable 25 Gram(s) IV Push once  dextrose 50% Injectable 12.5 Gram(s) IV Push once  dextrose 50% Injectable 25 Gram(s) IV Push once  dextrose Oral Gel 15 Gram(s) Oral once  glucagon  Injectable 1 milliGRAM(s) IntraMuscular once  influenza  Vaccine (HIGH DOSE) 0.7 milliLiter(s) IntraMuscular once  metoprolol tartrate 25 milliGRAM(s) Oral every 8 hours  risperiDONE   Tablet 0.25 milliGRAM(s) Oral once PRN  senna 2 Tablet(s) Oral at bedtime  sodium chloride 0.9% Bolus 250 milliLiter(s) IV Bolus once  sodium chloride 0.9%. 1000 milliLiter(s) IV Continuous <Continuous>  tamsulosin 0.4 milliGRAM(s) Oral at bedtime      Vital Signs Last 24 Hrs  T(C): 35.8 (26 Sep 2023 05:03), Max: 36.4 (25 Sep 2023 19:51)  T(F): 96.4 (26 Sep 2023 05:03), Max: 97.5 (25 Sep 2023 19:51)  HR: 83 (26 Sep 2023 05:03) (73 - 85)  BP: 120/65 (26 Sep 2023 05:03) (119/58 - 133/60)  BP(mean): 86 (25 Sep 2023 19:51) (86 - 86)  RR: 18 (25 Sep 2023 19:51) (18 - 18)  SpO2: --        Neurological Examination:  General:  Appearance is consistent with chronologic age.   Cognitive/Language:  Awake, alert, and oriented to person and time not to place.  Pt follows simple commands, moderate dysarthria.    Cranial Nerves  - Eyes: Visual fields full.  EOMI w/o nystagmus, skew or reported double vision.  PERRL.  No ptosis/weakness of eyelid closure.    - Face:  Facial sensation normal V1 - 3, mild L lower facial asymmetry  - Ears/Nose/Throat:  Hearing grossly intact b/l to finger rub.  Palate elevates midline.  Tongue and uvula midline.   Motor exam: Normal tone and bulk. No tenderness, twitching, tremors or involuntary movements. 4/5 muscle strength b/l UE and LE no drift.  Sensory examination:  Intact to light touch and pinprick, pain, temperature and proprioception and vibration in all extremities.  Reflexes: 2+ b/l biceps, triceps, patella and achilles.  Plantar response downgoing b/l.  Barby, clonus absent.  Cerebellum: FTN.  No dysmetria unable to perform HTS      Labs:  CBC Full  -  ( 25 Sep 2023 06:50 )  WBC Count : 10.32 K/uL  RBC Count : 3.52 M/uL  Hemoglobin : 10.3 g/dL  Hematocrit : 32.8 %  Platelet Count - Automated : 461 K/uL  Mean Cell Volume : 93.2 fL  Mean Cell Hemoglobin : 29.3 pg  Mean Cell Hemoglobin Concentration : 31.4 g/dL  Auto Neutrophil # : x  Auto Lymphocyte # : x  Auto Monocyte # : x  Auto Eosinophil # : x  Auto Basophil # : x  Auto Neutrophil % : x  Auto Lymphocyte % : x  Auto Monocyte % : x  Auto Eosinophil % : x  Auto Basophil % : x    09-25    138  |  105  |  21<H>  ----------------------------<  87  4.3   |  23  |  0.6<L>    Ca    8.5      25 Sep 2023 06:50          Urinalysis Basic - ( 25 Sep 2023 06:50 )    Color: x / Appearance: x / SG: x / pH: x  Gluc: 87 mg/dL / Ketone: x  / Bili: x / Urobili: x   Blood: x / Protein: x / Nitrite: x   Leuk Esterase: x / RBC: x / WBC x   Sq Epi: x / Non Sq Epi: x / Bacteria: x        RADIOLOGY & ADDITIONAL TESTS:

## 2023-09-26 NOTE — SWALLOW FEES ASSESSMENT ADULT - SPECIFY REASON(S)
FEES to further assess jarod-pharyngeal swallow integrity
FEES to further assess jarod-pharyngeal swallow integrity

## 2023-09-26 NOTE — SWALLOW BEDSIDE ASSESSMENT ADULT - SWALLOW EVAL: DIAGNOSIS
+ toleration observed without overt symptoms of penetration/aspiration for Soft & bite sized& thins at bedside. However pt with silent aspiration on FEES on 9/18, needs repeat FEES prior to upgrade

## 2023-09-26 NOTE — PROGRESS NOTE ADULT - ATTENDING COMMENTS
Pt is a 82 yo F, Tamazight speaking, with PMhx of afib not on AC, mood disorder with hallucination, hyperthyroidism, who presented with distal basilar and R PCA occlusion. S/p TNK and EVT, TICI 2C. Pt more alert this AM. Answered questions in English appropriately, followed verbal commands.     Impr: bilateral cerebellar and R PCA territory acute ischemic strokes  Etiology concerning for cardioembolic  Continue eliquis 2.5mg BID   Hypotension overnight with increased lethargy this AM. Fluid bolus and repeat CT head today.  PT/OT/ST, tele, -150, dispo planning- SNF
Pt is a 84 yo F, Bengali speaking, with PMhx of afib not on AC, mood disorder with hallucination, hyperthyroidism, who presented with distal basilar and R PCA occlusion. S/p TNK and EVT, TICI 2C. Pt more alert this AM. Answered questions in English appropriately, followed verbal commands.     Impr: bilateral cerebellar and R PCA territory acute ischemic strokes  Etiology concerning for cardioembolic  Continue eliquis 2.5mg BID   PT/OT/ST, tele, -150, dispo planning- awaiting SNF acceptance.
Pt is a 84 yo F, Ukrainian speaking, with PMhx of afib not on AC, mood disorder with hallucination, hyperthyroidism, who presented with distal basilar and R PCA occlusion. S/p TNK and EVT, TICI 2C.    Impr: bilateral cerebellar and R PCA territory acute ischemic strokes  Etiology concerning for cardioembolic  PTT supratherapeutic. D/c hep gtt. Start eliquis 2.5mg BID this evening  MRI brain pending  Home medication reconciliation  leukocytosis, check UA/Ucx  PT/OT/ST, tele, -150, q4 neurochecks
Pt is a 84 yo F, Syriac speaking, with PMhx of afib not on AC, mood disorder with hallucination, hyperthyroidism, who presented with distal basilar and R PCA occlusion. S/p TNK and EVT, TICI 2C. Transient hypotension overnight, BP and mentation improved today.     Impr: bilateral cerebellar and R PCA territory acute ischemic strokes  Etiology concerning for cardioembolic  Continue eliquis 2.5mg BID   ABx for UTI. infectious workup.   PT/OT/ST, tele, -150, dispo planning
Pt is a 84 yo F, Yakut speaking, with PMhx of afib not on AC, mood disorder with hallucination, hyperthyroidism, who presented with distal basilar and R PCA occlusion. S/p TNK and EVT, TICI 2C.    Impr: bilateral cerebellar and R PCA territory acute ischemic strokes  Etiology concerning for cardioembolic  Continue eliquis 2.5mg BID   ABx for UTI  Pt with increased lethargy after starting home risperidone. Decrease to 0.25mg BID. F/u CT head wihtout acute interval process  PT/OT/ST, tele, -150, q4 neurochecks .
No focal deficits ntoed on exam. Advised patient to mobilize and OOB to chair to minimize deconditioning     - PT/OT/ST  - SW for dispo  - Rest of plan as above
Pt is a 84 yo F, Bulgarian speaking, with PMhx of afib not on AC, mood disorder with hallucination, hyperthyroidism, who presented with distal basilar and R PCA occlusion. S/p TNK and EVT, TICI 2C.     Impr: bilateral cerebellar and R PCA territory acute ischemic strokes  Etiology concerning for cardioembolic  Continue eliquis 2.5mg BID   PT/OT/ST, tele, -150, dispo planning. ok to downgrade to 3E
Pt is a 84 yo F, Danish speaking, with PMhx of afib not on AC, mood disorder with hallucination, hyperthyroidism, who presented with distal basilar and R PCA occlusion. S/p TNK and EVT, TICI 2C. Pt more alert this AM. Answered questions in English appropriately, followed verbal commands.     Impr: bilateral cerebellar and R PCA territory acute ischemic strokes  Etiology concerning for cardioembolic  Continue eliquis 2.5mg BID   PT/OT/ST, tele, -150, dispo planning- SNF

## 2023-09-26 NOTE — PROGRESS NOTE ADULT - ASSESSMENT
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 Received TNK @ 1743. Taken to IR for thrombectomy,  s/p thrombectomy of right SCA/BA with TICI 2C x3 passes.     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 Unclear reason why was off A/c  - TTE > EF of 60 %, moderately enlarged LA  - CTH on 9/19 > Scattered acute chronic infarcts in the cerebellum bilaterally, right occipital lobes. Questionable left frontal lobe infarct. Chronic left parieto-occipital infarction  - pt is very BP sensitive. Avoid hypotension      #Mood disorder  -at home on Resperidone 0.5mg BID (recommend holding and using PRN)    #S/P Resp failure secondary to neuro injury  #B/l pleural effusions   - Extubated 9/14/23  - Aspiration prec  - HOB 30 degrees  -monitor for fluid overload    #Paroxysmal Afib  - Heparin ggt in NICU, MI'ed on 9/19  - on Eliquis 2.5mg BID   - Lopressor 25mg q 8     #Urinary retention / S/P NAG met acidosis- diarrhea  - Flomax  - Bladder scans periodically    #Hyperthyroidism  held Methimazole  endo f/u re: repeat thyroid studies    #Misc  - DVT Prophylaxis: eliquis  - Diet: minced and moist as per s/s  - Code Status: Full     High risk pt. Px is guarded. DNR/DNI      Chart and notes personally reviewed.  Care Discussed with Consultants/Other Providers/ Housestaff [ x] YES [ ] NO   Radiology, labs, old records personally reviewed.    discussed w/ housestaff, nursing, case management, neuro team    Attestation Statements:    Attestation Statements:  Risk Statement (NON-critical care).     On this date of service, level of risk to patient is considered: High.     Due to: acute stroke, stroke unit care, neuro checks, telemetry monitoring, hypotension, ?toxic-metabolic encephalopathy    Time-based billing (NON-critical care).     50 minutes spent on total encounter. The necessity of the time spent during the encounter on this date of service was due to:     time spent on review of labs, imaging studies, old records, obtaining history, personally examining patient, counselling and communicating with patient/ family, entering orders for medications/tests/etc, discussions with other health care providers, documentation in electronic health records, independent interpretation of labs, imaging/procedure results and care coordination.

## 2023-09-26 NOTE — SWALLOW FEES ASSESSMENT ADULT - NS SWALLOW FEES REC ASPIR MON
oral hygiene/position upright (90Y)/cough/gurgly voice/fever/pneumonia/throat clearing/upper respiratory infection
oral hygiene/position upright (90Y)/cough/gurgly voice/fever/pneumonia/throat clearing/upper respiratory infection

## 2023-09-26 NOTE — SWALLOW FEES ASSESSMENT ADULT - ROSENBEK'S PENETRATION ASPIRATION SCALE
PAS 5 for thins at the beginning of the study with thins via clinician administered tsp. Pt improved when she self administered trials. Pt has strong cough which cleared remaining residue (when cued to cough)/(5) material contacts vocal cords, visible residue remains (penetration)

## 2023-09-26 NOTE — SWALLOW FEES ASSESSMENT ADULT - RECOMMENDED FEEDING/EATING TECHNIQUES
pinch straw to control sips (pt takes large sips)/oral hygiene/position upright (90 degrees)/small sips/bites
pinch straw to control sips (pt takes large sips)/alternate food with liquid/oral hygiene/position upright (90 degrees)/small sips/bites

## 2023-09-26 NOTE — SWALLOW FEES ASSESSMENT ADULT - SLP PERTINENT HISTORY OF CURRENT PROBLEM
83-year-old female with a past history of mood disorder, hallucination, hyperthyroidism, Afib, with a prior admission for syncope/ collapse at home, who presented with dysarthria, confusion, and left weakness and was noted to have a right perfusion deficit on CTP. The patient is a code NI actual, and will be taken for a planned diagnostic cerebral angiogram + mechanical thrombectomy with Dr. Mckeon. s/p respiratory failure, intubated, extubated 9/14.
83-year-old female with a past history of mood disorder, hallucination, hyperthyroidism, Afib, with a prior admission for syncope/ collapse at home, who presented with dysarthria, confusion, and left weakness and was noted to have a right perfusion deficit on CTP. The patient is a code NI actual, and will be taken for a planned diagnostic cerebral angiogram + mechanical thrombectomy with Dr. Mckeon. s/p respiratory failure, intubated, extubated 9/14.

## 2023-09-26 NOTE — PROGRESS NOTE ADULT - SUBJECTIVE AND OBJECTIVE BOX
CHILANGO PEMBERTON  83y  Female    Patient is a 83y old  Female who presents with a chief complaint of basilar occlusion (18 Sep 2023 16:42)      HPI:  The patient is an 83-year-old female with a past history of mood disorder, hallucination, hyperthyroidism, Afib, with a prior admission for syncope/ collapse at home, who presented with dysarthria, confusion, and left weakness and was noted to have a right perfusion deficit on CTP. The patient is a code NI actual, and will be taken for a planned diagnostic cerebral angiogram + mechanical thrombectomy with Dr. Mckeon. Upgraded to ICU, intubated, extubated after 2 days. Started on Heparin gtt for afib.    Admission scores:   NIHSS 32   (12 Sep 2023 21:42)    S: Patient was examined and seen at bedside. Denies any complaints. Reportedly had low BP and lethargy yesterday but improved after IVFs.    ROS: denies CP, SOB, AP, cough    PAST MEDICAL & SURGICAL HISTORY:  Thyroid disease  afib  Mood dz w/ psychotic features      SOCIAL HISTORY:  Tobacco: negative  Illicit drugs: negative  Alcohol: social  Family history reviewed and otherwise non-contributory No clotting disorders, CVAs at early age.  ALLERGIES: NKDA    General: NAD. Looks stated age. Chronically ill appearing  HEENT: clean oropharynx, EOMI, no LAD  Neck: trachea midline, no thyromegaly  CV: nl S1 S2; no m/r/g  Resp: decreased breath sounds at base  GI: NT/ND/S +BS  MS: no clubbing/cyanosis/edema, +pulses  Neuro: moves all extremities  Skin: no rashes, nl turgor  Psychiatric: AA0x1+ w/ compromised insight and judgement    tele: afib, nonspecific changes (on my own evaluation of tele monitor)            Home Medications:  methIMAzole 5 mg oral tablet: 1 orally once a day (20 Sep 2023 15:12)  metoprolol succinate 50 mg oral capsule, extended release: 1 orally once a day (20 Sep 2023 15:12)  risperiDONE 0.5 mg oral tablet: 1 tab(s) orally 2 times a day (15 Sep 2023 16:07)    MEDICATIONS  (STANDING):  apixaban 2.5 milliGRAM(s) Oral every 12 hours  chlorhexidine 2% Cloths 1 Application(s) Topical daily  dextrose 50% Injectable 25 Gram(s) IV Push once  dextrose 50% Injectable 12.5 Gram(s) IV Push once  dextrose 50% Injectable 25 Gram(s) IV Push once  dextrose Oral Gel 15 Gram(s) Oral once  glucagon  Injectable 1 milliGRAM(s) IntraMuscular once  influenza  Vaccine (HIGH DOSE) 0.7 milliLiter(s) IntraMuscular once  metoprolol tartrate 25 milliGRAM(s) Oral every 8 hours  senna 2 Tablet(s) Oral at bedtime  sodium chloride 0.9% Bolus 250 milliLiter(s) IV Bolus once  sodium chloride 0.9% Bolus 500 milliLiter(s) IV Bolus once  sodium chloride 0.9%. 1000 milliLiter(s) (50 mL/Hr) IV Continuous <Continuous>  tamsulosin 0.4 milliGRAM(s) Oral at bedtime    MEDICATIONS  (PRN):  acetaminophen     Tablet .. 650 milliGRAM(s) Oral every 6 hours PRN Temp greater or equal to 38C (100.4F), Mild Pain (1 - 3)  risperiDONE   Tablet 0.25 milliGRAM(s) Oral once PRN Agitation    Vital Signs Last 24 Hrs  T(C): 35.8 (26 Sep 2023 20:44), Max: 35.8 (26 Sep 2023 05:03)  T(F): 96.5 (26 Sep 2023 20:44), Max: 96.5 (26 Sep 2023 20:44)  HR: 71 (26 Sep 2023 20:44) (71 - 94)  BP: 96/54 (26 Sep 2023 20:44) (96/54 - 129/87)  BP(mean): 71 (26 Sep 2023 20:44) (71 - 71)  RR: 16 (26 Sep 2023 20:44) (16 - 20)  SpO2: --      CAPILLARY BLOOD GLUCOSE        LABS:                        10.3   10.32 )-----------( 461      ( 25 Sep 2023 06:50 )             32.8     09-25    138  |  105  |  21<H>  ----------------------------<  87  4.3   |  23  |  0.6<L>    Ca    8.5      25 Sep 2023 06:50              Urinalysis Basic - ( 25 Sep 2023 06:50 )    Color: x / Appearance: x / SG: x / pH: x  Gluc: 87 mg/dL / Ketone: x  / Bili: x / Urobili: x   Blood: x / Protein: x / Nitrite: x   Leuk Esterase: x / RBC: x / WBC x   Sq Epi: x / Non Sq Epi: x / Bacteria: x              Consultant Notes Reviewed:  [x ] YES  [ ] NO  Care Discussed with Consultants/Other Providers/ Housestaff [ x] YES  [ ] NO  Radiology, labs, new studies personally reviewed.                                  < from: CT Angio Neck Stroke Protocol w/ IV Cont (09.12.23 @ 17:40) >  CT PERFUSION:  Ischemic tissue volume estimate of 63 cc in the area of the right   occipital/cerebellar areas, with core infarct estimate of 11 cc in the   area of the right occipital/cerebellar areas.    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    Severe stenosis of the right superior cerebellar artery.    < end of copied text >

## 2023-09-27 ENCOUNTER — TRANSCRIPTION ENCOUNTER (OUTPATIENT)
Age: 83
End: 2023-09-27

## 2023-09-27 VITALS
HEART RATE: 85 BPM | DIASTOLIC BLOOD PRESSURE: 72 MMHG | RESPIRATION RATE: 20 BRPM | TEMPERATURE: 96 F | SYSTOLIC BLOOD PRESSURE: 129 MMHG

## 2023-09-27 PROCEDURE — 99239 HOSP IP/OBS DSCHRG MGMT >30: CPT

## 2023-09-27 PROCEDURE — 99232 SBSQ HOSP IP/OBS MODERATE 35: CPT

## 2023-09-27 RX ORDER — APIXABAN 2.5 MG/1
1 TABLET, FILM COATED ORAL
Qty: 60 | Refills: 1
Start: 2023-09-27 | End: 2023-11-25

## 2023-09-27 RX ORDER — ATORVASTATIN CALCIUM 80 MG/1
1 TABLET, FILM COATED ORAL
Qty: 30 | Refills: 1
Start: 2023-09-27 | End: 2023-11-25

## 2023-09-27 RX ORDER — PANTOPRAZOLE SODIUM 20 MG/1
40 TABLET, DELAYED RELEASE ORAL
Refills: 0 | Status: DISCONTINUED | OUTPATIENT
Start: 2023-09-27 | End: 2023-09-27

## 2023-09-27 RX ADMIN — Medication 650 MILLIGRAM(S): at 10:00

## 2023-09-27 RX ADMIN — CHLORHEXIDINE GLUCONATE 1 APPLICATION(S): 213 SOLUTION TOPICAL at 05:12

## 2023-09-27 RX ADMIN — APIXABAN 2.5 MILLIGRAM(S): 2.5 TABLET, FILM COATED ORAL at 05:12

## 2023-09-27 RX ADMIN — Medication 25 MILLIGRAM(S): at 05:12

## 2023-09-27 RX ADMIN — Medication 650 MILLIGRAM(S): at 09:14

## 2023-09-27 NOTE — SWALLOW BEDSIDE ASSESSMENT ADULT - NS ASR SWALLOW FINDINGS DISCUS
RN-Yara/Physician/Nursing
Physician/Nursing/Patient

## 2023-09-27 NOTE — SWALLOW BEDSIDE ASSESSMENT ADULT - SWALLOW EVAL: CURRENT DIET
minced and moist w/ mildly thick liquids
Minced & Moist/ Mildly thick liquids
pulled out NGT
minced and moist w/ mildly thick liquids
NPO/NGT
Soft & bite sized/Thin liquids

## 2023-09-27 NOTE — DISCHARGE NOTE PROVIDER - NSDCFUADDINST_GEN_ALL_CORE_FT
Please take eliquis 2.5 mg twice a day morning and night to prevent your risk of stroke. Please do not miss a dose.

## 2023-09-27 NOTE — PROGRESS NOTE ADULT - REASON FOR ADMISSION
basilar occlusion

## 2023-09-27 NOTE — DISCHARGE NOTE NURSING/CASE MANAGEMENT/SOCIAL WORK - PATIENT PORTAL LINK FT
You can access the FollowMyHealth Patient Portal offered by [facility] by registering at the following website: [email or web address]. By joining [facility]’s FollowMyHealth portal, you will also be able to view your health information using other applications (apps) compatible with our system.

## 2023-09-27 NOTE — DISCHARGE NOTE PROVIDER - PROVIDER TOKENS
PROVIDER:[TOKEN:[[id number]],FOLLOWUP:[1 month]],PROVIDER:[TOKEN:[[id number]],FOLLOWUP:[1 month]]

## 2023-09-27 NOTE — PROGRESS NOTE ADULT - PROVIDER SPECIALTY LIST ADULT
Neurology
Hospitalist
NSICU
Neurology
Hospitalist
NSICU
NSICU
Neurology
Endocrinology
Hospitalist
NSICU
NSICU

## 2023-09-27 NOTE — DISCHARGE NOTE PROVIDER - CARE PROVIDER_API CALL
Magdalene Davis  Neurology  [address] [place] [address]  Phone: [phone]  Fax: [phone]  Follow Up Time: 1 month    Adiel Mckeon  Neurosurgery  [address] [place] [address], [address]  [place] [address]  Phone: [phone]  Fax: [phone]  Follow Up Time: 1 month

## 2023-09-27 NOTE — SWALLOW BEDSIDE ASSESSMENT ADULT - PHARYNGEAL PHASE
immediate cough for consecutive straw sips of thins, Otherwise tolerated/Cough post oral intake
Wet vocal quality post oral intake/Cough post oral intake/Throat clear post oral intake
Within functional limits
Within functional limits

## 2023-09-27 NOTE — PROGRESS NOTE ADULT - ASSESSMENT
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 Received TNK @ 1743. Taken to IR for thrombectomy,  s/p thrombectomy of right SCA/BA with TICI 2C x3 passes. Pt now being actively treated for UTI vs. PNA.    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 Questionable left frontal lobe infarct. Chronic left parieto-occipital infarction  - Repeat CTH due to lethargy yesterday showed no changes from previous Head CT.  - s/p TNK on 9/12  - s/p thrombectomy 9/19 of right SCA/BA with TICI 2C x3 passes  - neuro q8h  - -150  - Bed to chair mode  - Delirium Precautions  - eliquis 2.5mg BID    #UTI vs. PNA  - UA positive on 9/19, urine c/s > E Coli;  blood culture neg  - RVP (neg), procal (neg) , lactate 3.1 > 2.3 > 2  - s/p Rocephin (3 days)  - s/p  Azithromycin (3d) for possible PNA    #Mood disorder  - On risperidone 0.25 PRN for agitation (last received 9/21), not standing due to lethargy    #S/P Resp failure secondary to neuro injury - resolved  - Extubated 9/14/23  - Aspiration prec  - BNP elevated, procal neg    #Afib  - c/w Eliquis 2.5mg BID   - c/w Lopressor 25mg q 8     #Urinary retention / S/P NAG met acidosis- diarrhea  - C/w tamsulosin 0.4  - Bladder scan prn    #Hyperthyroidism  - per Endo hyperthyroidism could be complicated by euthyroid sick syndrome continue to monitor thyroid panel and follow endo recs  - Endo following: Cont to hold methimazole, repeat TSH/T4/T3 today  - hold Methimazole - 5 mg q 12      DVT Prophylaxis: eliquis  Diet: minced and moist as per s/s  Code Status: DNR and DNI  Dispo: DGTF, dispo planning    Pending: PT recommended PRASANNA pending placement

## 2023-09-27 NOTE — PROGRESS NOTE ADULT - SUBJECTIVE AND OBJECTIVE BOX
CHILANGO PEMBERTON  83y  Female    Patient is a 83y old  Female who presents with a chief complaint of basilar occlusion (18 Sep 2023 16:42)      HPI:  The patient is an 83-year-old female with a past history of mood disorder, hallucination, hyperthyroidism, Afib, with a prior admission for syncope/ collapse at home, who presented with dysarthria, confusion, and left weakness and was noted to have a right perfusion deficit on CTP. The patient is a code NI actual, and will be taken for a planned diagnostic cerebral angiogram + mechanical thrombectomy with Dr. Mckeon. Upgraded to ICU, intubated, extubated after 2 days. Started on Heparin gtt for afib.    Admission scores:   NIHSS 32   (12 Sep 2023 21:42)    S: Patient was examined and seen at bedside. No specific complaints.    ROS: denies CP, SOB, AP, cough    PAST MEDICAL & SURGICAL HISTORY:  Thyroid disease  afib  Mood dz w/ psychotic features      SOCIAL HISTORY:  Tobacco: negative  Illicit drugs: negative  Alcohol: social  Family history reviewed and otherwise non-contributory No clotting disorders, CVAs at early age.  ALLERGIES: NKDA    General: NAD. Looks stated age. Chronically ill appearing  HEENT: clean oropharynx, EOMI, no LAD  Neck: trachea midline, no thyromegaly  CV: nl S1 S2; no m/r/g  Resp: decreased breath sounds at base  GI: NT/ND/S +BS  MS: no clubbing/cyanosis/edema, +pulses  Neuro: moves all extremities  Skin: no rashes, nl turgor  Psychiatric: AA0x1+ w/ compromised insight and judgement    tele: afib, nonspecific changes (on my own evaluation of tele monitor)            Home Medications:  methIMAzole 5 mg oral tablet: 1 orally once a day (20 Sep 2023 15:12)  metoprolol succinate 50 mg oral capsule, extended release: 1 orally once a day (20 Sep 2023 15:12)  risperiDONE 0.5 mg oral tablet: 1 tab(s) orally 2 times a day (15 Sep 2023 16:07)    MEDICATIONS  (STANDING):  apixaban 2.5 milliGRAM(s) Oral every 12 hours  chlorhexidine 2% Cloths 1 Application(s) Topical daily  dextrose 50% Injectable 25 Gram(s) IV Push once  dextrose 50% Injectable 12.5 Gram(s) IV Push once  dextrose 50% Injectable 25 Gram(s) IV Push once  dextrose Oral Gel 15 Gram(s) Oral once  glucagon  Injectable 1 milliGRAM(s) IntraMuscular once  influenza  Vaccine (HIGH DOSE) 0.7 milliLiter(s) IntraMuscular once  metoprolol tartrate 25 milliGRAM(s) Oral every 8 hours  pantoprazole    Tablet 40 milliGRAM(s) Oral before breakfast  senna 2 Tablet(s) Oral at bedtime  sodium chloride 0.9% Bolus 250 milliLiter(s) IV Bolus once  sodium chloride 0.9%. 1000 milliLiter(s) (50 mL/Hr) IV Continuous <Continuous>  tamsulosin 0.4 milliGRAM(s) Oral at bedtime    MEDICATIONS  (PRN):  acetaminophen     Tablet .. 650 milliGRAM(s) Oral every 6 hours PRN Temp greater or equal to 38C (100.4F), Mild Pain (1 - 3)  risperiDONE   Tablet 0.25 milliGRAM(s) Oral once PRN Agitation    Vital Signs Last 24 Hrs  T(C): 35.3 (27 Sep 2023 14:14), Max: 35.9 (26 Sep 2023 23:00)  T(F): 95.6 (27 Sep 2023 14:14), Max: 96.7 (26 Sep 2023 23:00)  HR: 85 (27 Sep 2023 14:14) (71 - 85)  BP: 129/72 (27 Sep 2023 14:14) (96/54 - 133/59)  BP(mean): 84 (27 Sep 2023 05:00) (71 - 85)  RR: 20 (27 Sep 2023 14:14) (16 - 20)  SpO2: --      CAPILLARY BLOOD GLUCOSE        LABS:                            Consultant Notes Reviewed:  [x ] YES  [ ] NO  Care Discussed with Consultants/Other Providers/ Housestaff [ x] YES  [ ] NO  Radiology, labs, new studies personally reviewed.                                            < from: CT Angio Neck Stroke Protocol w/ IV Cont (09.12.23 @ 17:40) >  CT PERFUSION:  Ischemic tissue volume estimate of 63 cc in the area of the right   occipital/cerebellar areas, with core infarct estimate of 11 cc in the   area of the right occipital/cerebellar areas.    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    Severe stenosis of the right superior cerebellar artery.    < end of copied text >

## 2023-09-27 NOTE — SWALLOW BEDSIDE ASSESSMENT ADULT - ORAL PHASE
Within functional limits
Delayed oral transit time
Within functional limits
Within functional limits

## 2023-09-27 NOTE — PROGRESS NOTE ADULT - ASSESSMENT
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 Received TNK @ 1743. Taken to IR for thrombectomy,  s/p thrombectomy of right SCA/BA with TICI 2C x3 passes.     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 Unclear reason why was off A/c  - TTE > EF of 60 %, moderately enlarged LA  - CTH on 9/19 > Scattered acute chronic infarcts in the cerebellum bilaterally, right occipital lobes. Questionable left frontal lobe infarct. Chronic left parieto-occipital infarction  - pt is very BP sensitive. Avoid hypotension    #Mood disorder  -at home on Resperidone 0.5mg BID (recommend holding and using PRN)    #S/P Resp failure secondary to neuro injury  #B/l pleural effusions   - Extubated 9/14/23  - Aspiration prec  - HOB 30 degrees  -monitor for fluid overload    #Paroxysmal Afib  - Heparin ggt in [facility], CA'ed on 9/19  - on Eliquis 2.5mg BID   - Lopressor 25mg q 8     #Urinary retention / S/P NAG met acidosis- diarrhea  - Flomax  - Bladder scans periodically    #Hyperthyroidism  held Methimazole  endo f/u re: repeat thyroid studies    #Misc  - DVT Prophylaxis: eliquis  - Diet: minced and moist as per s/s  - Code Status: Full     High risk pt. Px is guarded. DNR/DNI      Chart and notes personally reviewed.  Care Discussed with Consultants/Other Providers/ Housestaff [ x] YES [ ] NO   Radiology, labs, old records personally reviewed.    discussed w/ housestaff, nursing, case management, neuro team      Time-based billing (NON-critical care).     35 minutes spent on total encounter. The necessity of the time spent during the encounter on this date of service was due to:     time spent on review of labs, imaging studies, old records, obtaining history, personally examining patient, counselling and communicating with patient/ family, entering orders for medications/tests/etc, discussions with other health care providers, documentation in electronic health records, independent interpretation of labs, imaging/procedure results and care coordination.

## 2023-09-27 NOTE — PROGRESS NOTE ADULT - SUBJECTIVE AND OBJECTIVE BOX
Neurology Progress Note    Interval History:    Patient was seen and examined, no acute event over night.     Medications:  acetaminophen     Tablet .. 650 milliGRAM(s) Oral every 6 hours PRN  apixaban 2.5 milliGRAM(s) Oral every 12 hours  chlorhexidine 2% Cloths 1 Application(s) Topical daily  dextrose 50% Injectable 25 Gram(s) IV Push once  dextrose 50% Injectable 12.5 Gram(s) IV Push once  dextrose 50% Injectable 25 Gram(s) IV Push once  dextrose Oral Gel 15 Gram(s) Oral once  glucagon  Injectable 1 milliGRAM(s) IntraMuscular once  influenza  Vaccine (HIGH DOSE) 0.7 milliLiter(s) IntraMuscular once  metoprolol tartrate 25 milliGRAM(s) Oral every 8 hours  risperiDONE   Tablet 0.25 milliGRAM(s) Oral once PRN  senna 2 Tablet(s) Oral at bedtime  sodium chloride 0.9% Bolus 250 milliLiter(s) IV Bolus once  sodium chloride 0.9%. 1000 milliLiter(s) IV Continuous <Continuous>  tamsulosin 0.4 milliGRAM(s) Oral at bedtime      Vital Signs Last 24 Hrs  T(C): 35.9 (27 Sep 2023 05:00), Max: 35.9 (26 Sep 2023 23:00)  T(F): 96.7 (27 Sep 2023 05:00), Max: 96.7 (26 Sep 2023 23:00)  HR: 84 (27 Sep 2023 05:00) (71 - 94)  BP: 128/58 (27 Sep 2023 05:00) (96/54 - 133/59)  BP(mean): 84 (27 Sep 2023 05:00) (71 - 85)  RR: 18 (27 Sep 2023 05:00) (16 - 20)  SpO2: --    Neurological Examination:  General:  Appearance is consistent with chronologic age.   Cognitive/Language:  Awake, alert, and oriented to person and time not to place.  Pt follows simple commands, moderate dysarthria.    Cranial Nerves  - Eyes: Visual fields full.  EOMI w/o nystagmus, skew or reported double vision.  PERRL.  No ptosis/weakness of eyelid closure.    - Face:  Facial sensation normal V1 - 3, mild L lower facial asymmetry  - Ears/Nose/Throat:  Hearing grossly intact b/l to finger rub.  Palate elevates midline.  Tongue and uvula midline.   Motor exam: Normal tone and bulk. No tenderness, twitching, tremors or involuntary movements. 4/5 muscle strength b/l UE and LE no drift.  Sensory examination:  Intact to light touch and pinprick, pain, temperature and proprioception and vibration in all extremities.  Reflexes: 2+ b/l biceps, triceps, patella and achilles.  Plantar response downgoing b/l.  Barby, clonus absent.  Cerebellum: FTN.  No dysmetria unable to perform HTS    Labs:        RADIOLOGY & ADDITIONAL TESTS:

## 2023-09-27 NOTE — DISCHARGE NOTE NURSING/CASE MANAGEMENT/SOCIAL WORK - NSDCPEFALRISK_GEN_ALL_CORE
For information on Fall & Injury Prevention, visit: [email or web address] OR  [email or web address] OR  https://www.cdc.gov/steadi/patient.html

## 2023-09-27 NOTE — DISCHARGE NOTE PROVIDER - HOSPITAL COURSE
83-year-old female with a past history of mood disorder, hallucination, hyperthyroidism, Afib, with a prior admission for syncope/ collapse at home, who presented with dysarthria, confusion, and left weakness and was noted to have a right perfusion deficit on CTP. Initial NIHSS 32, Head CT showed scattered acute chronic infarcts in the cerebellum bilaterally, right occipital lobes. Questionable left frontal lobe infarct. Chronic left parieto-occipital infarction. 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 Received TNK. Pt was then taken to IR for thrombectomy,  s/p thrombectomy of right SCA/BA with TICI 2C x3 passes. Hospital stay was complicated by pneumonia and UTi which were both treated with abx. Physical therapy evaluated the patient and recommended PRASANNA. Pt is medically stable for discharge.     During this hospital course, patient had a ischemic stroke located b/l cerebellum, right occipital lobe, L frontal lobe and chronic left parieto-occipital infarction  as seen on Head CT.  The stroke etiology is likely secondary to: small vessel disease.    Patient had the following workup done in house:  CT Head: Scattered acute chronic infarcts in the cerebellum bilaterally, right occipital lobes. Questionable left frontal lobe infarct. Chronic left parieto-occipital infarction  CT Angio Head and Neck: complete occlusion of basilar artery  TTE:  EF of 60 %, moderately enlarged LA  Labs: LDL 78, hgba1c 5.3    Physical exam at discharge:  Neurological Examination:  General:  Appearance is consistent with chronologic age.   Cognitive/Language:  Awake, alert, and oriented to person and time not to place.  Pt follows simple commands, moderate dysarthria.    Cranial Nerves  - Eyes: Visual fields full.  EOMI w/o nystagmus, skew or reported double vision.  PERRL.  No ptosis/weakness of eyelid closure.    - Face:  Facial sensation normal V1 - 3, mild L lower facial asymmetry  - Ears/Nose/Throat:  Hearing grossly intact b/l to finger rub.  Palate elevates midline.  Tongue and uvula midline.   Motor exam: Normal tone and bulk. No tenderness, twitching, tremors or involuntary movements. 4/5 muscle strength b/l UE and LE no drift.  Sensory examination:  Intact to light touch and pinprick, pain, temperature and proprioception and vibration in all extremities.  Reflexes: 2+ b/l biceps, triceps, patella and achilles.  Plantar response downgoing b/l.  Barby, clonus absent.  Cerebellum: FTN.  No dysmetria unable to perform HTS  Discharge NIHSS: 4  New medications on discharge:  eliquis 2.5 mg BID  atorvastatin 40 mg qd    Further outpatient workup:    Outpatient follow up with stroke neurology and neuroendovascular. 83-year-old female with a past history of mood disorder, hallucination, hyperthyroidism, Afib, with a prior admission for syncope/ collapse at home, who presented with dysarthria, confusion, and left weakness and was noted to have a right perfusion deficit on CTP. Initial NIHSS 32, Head CT showed scattered acute chronic infarcts in the cerebellum bilaterally, right occipital lobes. Questionable left frontal lobe infarct. Chronic left parieto-occipital infarction. 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 Received TNK. Pt was then taken to IR for thrombectomy,  s/p thrombectomy of right SCA/BA with TICI 2C x3 passes. Hospital stay was complicated by pneumonia and UTi which were both treated with abx. Physical therapy evaluated the patient and recommended PRASANNA. Pt is medically stable for discharge.     During this hospital course, patient had a ischemic stroke located b/l cerebellum, right occipital lobe, L frontal lobe and chronic left parieto-occipital infarction  as seen on Head CT.  The stroke etiology is likely secondary to: afib    Patient had the following workup done in house:  CT Head: Scattered acute chronic infarcts in the cerebellum bilaterally, right occipital lobes. Questionable left frontal lobe infarct. Chronic left parieto-occipital infarction  CT Angio Head and Neck: complete occlusion of basilar artery  TTE:  EF of 60 %, moderately enlarged LA  Labs: LDL 78, hgba1c 5.3    Physical exam at discharge:  Neurological Examination:  General:  Appearance is consistent with chronologic age.   Cognitive/Language:  Awake, alert, and oriented to person and time not to place.  Pt follows simple commands, moderate dysarthria.    Cranial Nerves  - Eyes: Visual fields full.  EOMI w/o nystagmus, skew or reported double vision.  PERRL.  No ptosis/weakness of eyelid closure.    - Face:  Facial sensation normal V1 - 3, mild L lower facial asymmetry  - Ears/Nose/Throat:  Hearing grossly intact b/l to finger rub.  Palate elevates midline.  Tongue and uvula midline.   Motor exam: Normal tone and bulk. No tenderness, twitching, tremors or involuntary movements. 4/5 muscle strength b/l UE and LE no drift.  Sensory examination:  Intact to light touch and pinprick, pain, temperature and proprioception and vibration in all extremities.  Reflexes: 2+ b/l biceps, triceps, patella and achilles.  Plantar response downgoing b/l.  Barby, clonus absent.  Cerebellum: FTN.  No dysmetria unable to perform HTS  Discharge NIHSS: 4  New medications on discharge:  eliquis 2.5 mg BID  atorvastatin 40 mg qd    Further outpatient workup:    Outpatient follow up with stroke neurology and neuroendovascular.

## 2023-09-27 NOTE — SWALLOW BEDSIDE ASSESSMENT ADULT - SWALLOW EVAL: RECOMMENDED FEEDING/EATING TECHNIQUES
oral hygiene/position upright (90 degrees)
oral hygiene/position upright (90 degrees)/small sips/bites
alternate food with liquid/oral hygiene/position upright (90 degrees)/small sips/bites
oral hygiene/position upright (90 degrees)/small sips/bites

## 2023-09-27 NOTE — DISCHARGE NOTE PROVIDER - NSDCMRMEDTOKEN_GEN_ALL_CORE_FT
apixaban 2.5 mg oral tablet: 1 tab(s) orally every 12 hours  atorvastatin 40 mg oral tablet: 1 tab(s) orally once a day (at bedtime)  methIMAzole 5 mg oral tablet: 1 orally once a day  metoprolol succinate 50 mg oral capsule, extended release: 1 orally once a day  risperiDONE 0.5 mg oral tablet: 1 tab(s) orally 2 times a day

## 2023-09-27 NOTE — SWALLOW BEDSIDE ASSESSMENT ADULT - NS SPL SWALLOW CLINIC TRIAL FT
Pt presents w/ no overt clinical s/s of aspiration/penetration while consuming soft & bite sized w/ thin liquids.
+overt s/s of penetration/aspiration w/ thin, mildly thick and puree
Pt presents w/ no overt s/s of aspiration/penetration while consuming minced & moist and mildly thick liquids via small controlled cup sips.

## 2023-09-27 NOTE — DISCHARGE NOTE PROVIDER - NSDCCPCAREPLAN_GEN_ALL_CORE_FT
PRINCIPAL DISCHARGE DIAGNOSIS  Diagnosis: Stroke  Assessment and Plan of Treatment: During this hospital admission, you had an ischemic stroke. During an ischemic stroke, blood stops flowing to part of your brain because of a blockage in the blood vessel. This can damage areas in the brain that control other parts of the body.  Please take your [aspirin and plavix///Eliquis] for blood thinning and Atorvastatin for cholesterol medication/blood vessel protection as prescribed to prevent further strokes. Do not skip doses and do not run low on your medication. If you run low on your medication, please contact your doctor.  You will follow up outpatient with the stroke Nurse Practitioner as scheduled below.  Doing your regular tasks may be difficult after you've had a stroke, but you can learn new ways to manage your daily activities. In fact, doing daily activities may help you to regain muscle strength. Be patient, give yourself time to adjust, and appreciate the progress you make. For example, when showering or bathing, test the water temperature with a hand or foot that was not affected by the stroke, use grab bars, a shower seat, a hand-held showerhead, etc. It is normal to feel fatigue after a stroke, while some days may be worse than others, you will continue to improve.  Call 911 right away if you have any of the following symptoms of another stroke:  B: Balance: Sudden: Dizziness, loss of balance, or a sense of falling, difficulty with coordinating movement  E: Eyes: Sudden double vision or trouble seeing in one or both eyes  F: Face: Sudden uneven face  A: Arms (Legs): Sudden weakness, tingling, or loss of feeling on one side of your face or body  S: Speech: Sudden trouble talking or slurred speech, sudden difficulty understanding others  T: Time: Please call 911 right away and go to the emergency room  •Sudden, severe headache  •Blackouts or seizures

## 2023-09-27 NOTE — SWALLOW BEDSIDE ASSESSMENT ADULT - CONSISTENCIES ADMINISTERED
thin liquid/mildly thick/pureed
thin liquid/mildly thick/pureed/minced & moist/soft & bite-sized
mildly thick/minced & moist
thin liquid/soft & bite-sized

## 2023-09-27 NOTE — SWALLOW BEDSIDE ASSESSMENT ADULT - SWALLOW EVAL: RECOMMENDED DIET
minced and moist diet w/ mildly thick liquids
minced and moist diet w/ mildly thick liquids
NPO, non-oral means of nutrition and hydration
Minced & Moist/ Mildly thick liquids 1:1 feed small controlled cup sips.
Soft & bite sized w/ thin liquids

## 2023-09-27 NOTE — SWALLOW BEDSIDE ASSESSMENT ADULT - SWALLOW EVAL: DIAGNOSIS
+ toleration of soft & bite sized w/ thin liquids + toleration of soft & bite sized w/ thin liquids via small cup sips w/o overt s/s of aspiration/penetration.

## 2023-09-28 PROBLEM — Z00.00 ENCOUNTER FOR PREVENTIVE HEALTH EXAMINATION: Status: ACTIVE | Noted: 2023-09-28

## 2023-10-03 DIAGNOSIS — I95.9 HYPOTENSION, UNSPECIFIED: ICD-10-CM

## 2023-10-03 DIAGNOSIS — R29.732 NIHSS SCORE 32: ICD-10-CM

## 2023-10-03 DIAGNOSIS — R33.9 RETENTION OF URINE, UNSPECIFIED: ICD-10-CM

## 2023-10-03 DIAGNOSIS — I48.0 PAROXYSMAL ATRIAL FIBRILLATION: ICD-10-CM

## 2023-10-03 DIAGNOSIS — E87.20 ACIDOSIS, UNSPECIFIED: ICD-10-CM

## 2023-10-03 DIAGNOSIS — Z78.1 PHYSICAL RESTRAINT STATUS: ICD-10-CM

## 2023-10-03 DIAGNOSIS — J96.90 RESPIRATORY FAILURE, UNSPECIFIED, UNSPECIFIED WHETHER WITH HYPOXIA OR HYPERCAPNIA: ICD-10-CM

## 2023-10-03 DIAGNOSIS — G81.94 HEMIPLEGIA, UNSPECIFIED AFFECTING LEFT NONDOMINANT SIDE: ICD-10-CM

## 2023-10-03 DIAGNOSIS — E05.90 THYROTOXICOSIS, UNSPECIFIED WITHOUT THYROTOXIC CRISIS OR STORM: ICD-10-CM

## 2023-10-03 DIAGNOSIS — E87.5 HYPERKALEMIA: ICD-10-CM

## 2023-10-03 DIAGNOSIS — J18.9 PNEUMONIA, UNSPECIFIED ORGANISM: ICD-10-CM

## 2023-10-03 DIAGNOSIS — F39 UNSPECIFIED MOOD [AFFECTIVE] DISORDER: ICD-10-CM

## 2023-10-03 DIAGNOSIS — I63.49 CEREBRAL INFARCTION DUE TO EMBOLISM OF OTHER CEREBRAL ARTERY: ICD-10-CM

## 2023-10-03 DIAGNOSIS — R47.1 DYSARTHRIA AND ANARTHRIA: ICD-10-CM

## 2023-10-03 DIAGNOSIS — R29.810 FACIAL WEAKNESS: ICD-10-CM

## 2023-10-03 DIAGNOSIS — Z66 DO NOT RESUSCITATE: ICD-10-CM

## 2023-10-03 DIAGNOSIS — I48.92 UNSPECIFIED ATRIAL FLUTTER: ICD-10-CM

## 2023-10-03 DIAGNOSIS — N39.0 URINARY TRACT INFECTION, SITE NOT SPECIFIED: ICD-10-CM

## 2023-10-25 ENCOUNTER — NON-APPOINTMENT (OUTPATIENT)
Age: 83
End: 2023-10-25

## 2023-10-25 ENCOUNTER — APPOINTMENT (OUTPATIENT)
Dept: NEUROLOGY | Facility: CLINIC | Age: 83
End: 2023-10-25

## 2023-11-17 ENCOUNTER — INPATIENT (INPATIENT)
Facility: HOSPITAL | Age: 83
LOS: 4 days | Discharge: HOME HEALTH | DRG: 562 | End: 2023-11-22
Attending: HOSPITALIST | Admitting: STUDENT IN AN ORGANIZED HEALTH CARE EDUCATION/TRAINING PROGRAM
Payer: MEDICARE

## 2023-11-17 VITALS
HEART RATE: 135 BPM | TEMPERATURE: 99 F | OXYGEN SATURATION: 99 % | HEIGHT: 62 IN | RESPIRATION RATE: 20 BRPM | WEIGHT: 125 LBS | DIASTOLIC BLOOD PRESSURE: 81 MMHG | SYSTOLIC BLOOD PRESSURE: 151 MMHG
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
## 2023-11-17 LAB
ALBUMIN SERPL ELPH-MCNC: 4 G/DL — SIGNIFICANT CHANGE UP (ref 3.5–5.2)
ALBUMIN SERPL ELPH-MCNC: 4 G/DL — SIGNIFICANT CHANGE UP (ref 3.5–5.2)
ALP SERPL-CCNC: 126 U/L — HIGH (ref 30–115)
ALP SERPL-CCNC: 126 U/L — HIGH (ref 30–115)
ALT FLD-CCNC: 8 U/L — SIGNIFICANT CHANGE UP (ref 0–41)
ALT FLD-CCNC: 8 U/L — SIGNIFICANT CHANGE UP (ref 0–41)
ANION GAP SERPL CALC-SCNC: 14 MMOL/L — SIGNIFICANT CHANGE UP (ref 7–14)
ANION GAP SERPL CALC-SCNC: 14 MMOL/L — SIGNIFICANT CHANGE UP (ref 7–14)
APTT BLD: 27.8 SEC — SIGNIFICANT CHANGE UP (ref 27–39.2)
APTT BLD: 27.8 SEC — SIGNIFICANT CHANGE UP (ref 27–39.2)
AST SERPL-CCNC: 17 U/L — SIGNIFICANT CHANGE UP (ref 0–41)
AST SERPL-CCNC: 17 U/L — SIGNIFICANT CHANGE UP (ref 0–41)
BASOPHILS # BLD AUTO: 0.03 K/UL — SIGNIFICANT CHANGE UP (ref 0–0.2)
BASOPHILS # BLD AUTO: 0.03 K/UL — SIGNIFICANT CHANGE UP (ref 0–0.2)
BASOPHILS NFR BLD AUTO: 0.2 % — SIGNIFICANT CHANGE UP (ref 0–1)
BASOPHILS NFR BLD AUTO: 0.2 % — SIGNIFICANT CHANGE UP (ref 0–1)
BILIRUB SERPL-MCNC: 0.5 MG/DL — SIGNIFICANT CHANGE UP (ref 0.2–1.2)
BILIRUB SERPL-MCNC: 0.5 MG/DL — SIGNIFICANT CHANGE UP (ref 0.2–1.2)
BUN SERPL-MCNC: 21 MG/DL — HIGH (ref 10–20)
BUN SERPL-MCNC: 21 MG/DL — HIGH (ref 10–20)
CALCIUM SERPL-MCNC: 9 MG/DL — SIGNIFICANT CHANGE UP (ref 8.4–10.5)
CALCIUM SERPL-MCNC: 9 MG/DL — SIGNIFICANT CHANGE UP (ref 8.4–10.5)
CHLORIDE SERPL-SCNC: 103 MMOL/L — SIGNIFICANT CHANGE UP (ref 98–110)
CHLORIDE SERPL-SCNC: 103 MMOL/L — SIGNIFICANT CHANGE UP (ref 98–110)
CK SERPL-CCNC: 62 U/L — SIGNIFICANT CHANGE UP (ref 0–225)
CK SERPL-CCNC: 62 U/L — SIGNIFICANT CHANGE UP (ref 0–225)
CO2 SERPL-SCNC: 22 MMOL/L — SIGNIFICANT CHANGE UP (ref 17–32)
CO2 SERPL-SCNC: 22 MMOL/L — SIGNIFICANT CHANGE UP (ref 17–32)
CREAT SERPL-MCNC: 0.8 MG/DL — SIGNIFICANT CHANGE UP (ref 0.7–1.5)
CREAT SERPL-MCNC: 0.8 MG/DL — SIGNIFICANT CHANGE UP (ref 0.7–1.5)
EGFR: 74 ML/MIN/1.73M2 — SIGNIFICANT CHANGE UP
EGFR: 74 ML/MIN/1.73M2 — SIGNIFICANT CHANGE UP
EOSINOPHIL # BLD AUTO: 0.01 K/UL — SIGNIFICANT CHANGE UP (ref 0–0.7)
EOSINOPHIL # BLD AUTO: 0.01 K/UL — SIGNIFICANT CHANGE UP (ref 0–0.7)
EOSINOPHIL NFR BLD AUTO: 0.1 % — SIGNIFICANT CHANGE UP (ref 0–8)
EOSINOPHIL NFR BLD AUTO: 0.1 % — SIGNIFICANT CHANGE UP (ref 0–8)
GLUCOSE SERPL-MCNC: 152 MG/DL — HIGH (ref 70–99)
GLUCOSE SERPL-MCNC: 152 MG/DL — HIGH (ref 70–99)
HCT VFR BLD CALC: 37.1 % — SIGNIFICANT CHANGE UP (ref 37–47)
HCT VFR BLD CALC: 37.1 % — SIGNIFICANT CHANGE UP (ref 37–47)
HGB BLD-MCNC: 12.1 G/DL — SIGNIFICANT CHANGE UP (ref 12–16)
HGB BLD-MCNC: 12.1 G/DL — SIGNIFICANT CHANGE UP (ref 12–16)
IMM GRANULOCYTES NFR BLD AUTO: 0.3 % — SIGNIFICANT CHANGE UP (ref 0.1–0.3)
IMM GRANULOCYTES NFR BLD AUTO: 0.3 % — SIGNIFICANT CHANGE UP (ref 0.1–0.3)
INR BLD: 1.09 RATIO — SIGNIFICANT CHANGE UP (ref 0.65–1.3)
INR BLD: 1.09 RATIO — SIGNIFICANT CHANGE UP (ref 0.65–1.3)
LACTATE SERPL-SCNC: 3.4 MMOL/L — HIGH (ref 0.7–2)
LACTATE SERPL-SCNC: 3.4 MMOL/L — HIGH (ref 0.7–2)
LACTATE SERPL-SCNC: 4.1 MMOL/L — CRITICAL HIGH (ref 0.7–2)
LACTATE SERPL-SCNC: 4.1 MMOL/L — CRITICAL HIGH (ref 0.7–2)
LIDOCAIN IGE QN: 11 U/L — SIGNIFICANT CHANGE UP (ref 7–60)
LIDOCAIN IGE QN: 11 U/L — SIGNIFICANT CHANGE UP (ref 7–60)
LYMPHOCYTES # BLD AUTO: 1.09 K/UL — LOW (ref 1.2–3.4)
LYMPHOCYTES # BLD AUTO: 1.09 K/UL — LOW (ref 1.2–3.4)
LYMPHOCYTES # BLD AUTO: 8.9 % — LOW (ref 20.5–51.1)
LYMPHOCYTES # BLD AUTO: 8.9 % — LOW (ref 20.5–51.1)
MCHC RBC-ENTMCNC: 29.6 PG — SIGNIFICANT CHANGE UP (ref 27–31)
MCHC RBC-ENTMCNC: 29.6 PG — SIGNIFICANT CHANGE UP (ref 27–31)
MCHC RBC-ENTMCNC: 32.6 G/DL — SIGNIFICANT CHANGE UP (ref 32–37)
MCHC RBC-ENTMCNC: 32.6 G/DL — SIGNIFICANT CHANGE UP (ref 32–37)
MCV RBC AUTO: 90.7 FL — SIGNIFICANT CHANGE UP (ref 81–99)
MCV RBC AUTO: 90.7 FL — SIGNIFICANT CHANGE UP (ref 81–99)
MONOCYTES # BLD AUTO: 0.5 K/UL — SIGNIFICANT CHANGE UP (ref 0.1–0.6)
MONOCYTES # BLD AUTO: 0.5 K/UL — SIGNIFICANT CHANGE UP (ref 0.1–0.6)
MONOCYTES NFR BLD AUTO: 4.1 % — SIGNIFICANT CHANGE UP (ref 1.7–9.3)
MONOCYTES NFR BLD AUTO: 4.1 % — SIGNIFICANT CHANGE UP (ref 1.7–9.3)
NEUTROPHILS # BLD AUTO: 10.64 K/UL — HIGH (ref 1.4–6.5)
NEUTROPHILS # BLD AUTO: 10.64 K/UL — HIGH (ref 1.4–6.5)
NEUTROPHILS NFR BLD AUTO: 86.4 % — HIGH (ref 42.2–75.2)
NEUTROPHILS NFR BLD AUTO: 86.4 % — HIGH (ref 42.2–75.2)
NRBC # BLD: 0 /100 WBCS — SIGNIFICANT CHANGE UP (ref 0–0)
NRBC # BLD: 0 /100 WBCS — SIGNIFICANT CHANGE UP (ref 0–0)
PLATELET # BLD AUTO: 298 K/UL — SIGNIFICANT CHANGE UP (ref 130–400)
PLATELET # BLD AUTO: 298 K/UL — SIGNIFICANT CHANGE UP (ref 130–400)
PMV BLD: 9.5 FL — SIGNIFICANT CHANGE UP (ref 7.4–10.4)
PMV BLD: 9.5 FL — SIGNIFICANT CHANGE UP (ref 7.4–10.4)
POTASSIUM SERPL-MCNC: 4 MMOL/L — SIGNIFICANT CHANGE UP (ref 3.5–5)
POTASSIUM SERPL-MCNC: 4 MMOL/L — SIGNIFICANT CHANGE UP (ref 3.5–5)
POTASSIUM SERPL-SCNC: 4 MMOL/L — SIGNIFICANT CHANGE UP (ref 3.5–5)
POTASSIUM SERPL-SCNC: 4 MMOL/L — SIGNIFICANT CHANGE UP (ref 3.5–5)
PROT SERPL-MCNC: 7.2 G/DL — SIGNIFICANT CHANGE UP (ref 6–8)
PROT SERPL-MCNC: 7.2 G/DL — SIGNIFICANT CHANGE UP (ref 6–8)
PROTHROM AB SERPL-ACNC: 12.4 SEC — SIGNIFICANT CHANGE UP (ref 9.95–12.87)
PROTHROM AB SERPL-ACNC: 12.4 SEC — SIGNIFICANT CHANGE UP (ref 9.95–12.87)
RBC # BLD: 4.09 M/UL — LOW (ref 4.2–5.4)
RBC # BLD: 4.09 M/UL — LOW (ref 4.2–5.4)
RBC # FLD: 13.1 % — SIGNIFICANT CHANGE UP (ref 11.5–14.5)
RBC # FLD: 13.1 % — SIGNIFICANT CHANGE UP (ref 11.5–14.5)
SODIUM SERPL-SCNC: 139 MMOL/L — SIGNIFICANT CHANGE UP (ref 135–146)
SODIUM SERPL-SCNC: 139 MMOL/L — SIGNIFICANT CHANGE UP (ref 135–146)
WBC # BLD: 12.31 K/UL — HIGH (ref 4.8–10.8)
WBC # BLD: 12.31 K/UL — HIGH (ref 4.8–10.8)
WBC # FLD AUTO: 12.31 K/UL — HIGH (ref 4.8–10.8)
WBC # FLD AUTO: 12.31 K/UL — HIGH (ref 4.8–10.8)

## 2023-11-17 PROCEDURE — 73030 X-RAY EXAM OF SHOULDER: CPT | Mod: 26,RT

## 2023-11-17 PROCEDURE — 84443 ASSAY THYROID STIM HORMONE: CPT

## 2023-11-17 PROCEDURE — 80053 COMPREHEN METABOLIC PANEL: CPT

## 2023-11-17 PROCEDURE — 80061 LIPID PANEL: CPT

## 2023-11-17 PROCEDURE — 97535 SELF CARE MNGMENT TRAINING: CPT | Mod: GO

## 2023-11-17 PROCEDURE — 73060 X-RAY EXAM OF HUMERUS: CPT | Mod: 26,RT

## 2023-11-17 PROCEDURE — 84439 ASSAY OF FREE THYROXINE: CPT

## 2023-11-17 PROCEDURE — 82607 VITAMIN B-12: CPT

## 2023-11-17 PROCEDURE — 73070 X-RAY EXAM OF ELBOW: CPT | Mod: 26,RT

## 2023-11-17 PROCEDURE — 70450 CT HEAD/BRAIN W/O DYE: CPT | Mod: 26,MA

## 2023-11-17 PROCEDURE — 74177 CT ABD & PELVIS W/CONTRAST: CPT | Mod: 26,MA

## 2023-11-17 PROCEDURE — 83036 HEMOGLOBIN GLYCOSYLATED A1C: CPT

## 2023-11-17 PROCEDURE — 0225U NFCT DS DNA&RNA 21 SARSCOV2: CPT

## 2023-11-17 PROCEDURE — 85025 COMPLETE CBC W/AUTO DIFF WBC: CPT

## 2023-11-17 PROCEDURE — 36415 COLL VENOUS BLD VENIPUNCTURE: CPT

## 2023-11-17 PROCEDURE — 71045 X-RAY EXAM CHEST 1 VIEW: CPT

## 2023-11-17 PROCEDURE — 93010 ELECTROCARDIOGRAM REPORT: CPT

## 2023-11-17 PROCEDURE — 74018 RADEX ABDOMEN 1 VIEW: CPT

## 2023-11-17 PROCEDURE — 82746 ASSAY OF FOLIC ACID SERUM: CPT

## 2023-11-17 PROCEDURE — 81003 URINALYSIS AUTO W/O SCOPE: CPT

## 2023-11-17 PROCEDURE — 71260 CT THORAX DX C+: CPT | Mod: 26,MA

## 2023-11-17 PROCEDURE — 93005 ELECTROCARDIOGRAM TRACING: CPT

## 2023-11-17 PROCEDURE — 84436 ASSAY OF TOTAL THYROXINE: CPT

## 2023-11-17 PROCEDURE — 71045 X-RAY EXAM CHEST 1 VIEW: CPT | Mod: 26

## 2023-11-17 PROCEDURE — 83735 ASSAY OF MAGNESIUM: CPT

## 2023-11-17 PROCEDURE — 72170 X-RAY EXAM OF PELVIS: CPT | Mod: 26

## 2023-11-17 PROCEDURE — 84480 ASSAY TRIIODOTHYRONINE (T3): CPT

## 2023-11-17 PROCEDURE — 87040 BLOOD CULTURE FOR BACTERIA: CPT

## 2023-11-17 PROCEDURE — 99285 EMERGENCY DEPT VISIT HI MDM: CPT

## 2023-11-17 PROCEDURE — 87086 URINE CULTURE/COLONY COUNT: CPT

## 2023-11-17 PROCEDURE — 83605 ASSAY OF LACTIC ACID: CPT

## 2023-11-17 PROCEDURE — 97167 OT EVAL HIGH COMPLEX 60 MIN: CPT | Mod: GO

## 2023-11-17 PROCEDURE — 97162 PT EVAL MOD COMPLEX 30 MIN: CPT | Mod: GP

## 2023-11-17 PROCEDURE — 85610 PROTHROMBIN TIME: CPT

## 2023-11-17 PROCEDURE — 72125 CT NECK SPINE W/O DYE: CPT | Mod: 26,MA

## 2023-11-17 PROCEDURE — 85730 THROMBOPLASTIN TIME PARTIAL: CPT

## 2023-11-17 RX ORDER — MORPHINE SULFATE 50 MG/1
2 CAPSULE, EXTENDED RELEASE ORAL ONCE
Refills: 0 | Status: DISCONTINUED | OUTPATIENT
Start: 2023-11-17 | End: 2023-11-17

## 2023-11-17 RX ORDER — SODIUM CHLORIDE 9 MG/ML
1000 INJECTION, SOLUTION INTRAVENOUS ONCE
Refills: 0 | Status: COMPLETED | OUTPATIENT
Start: 2023-11-17 | End: 2023-11-17

## 2023-11-17 RX ADMIN — SODIUM CHLORIDE 1000 MILLILITER(S): 9 INJECTION, SOLUTION INTRAVENOUS at 21:15

## 2023-11-17 RX ADMIN — MORPHINE SULFATE 2 MILLIGRAM(S): 50 CAPSULE, EXTENDED RELEASE ORAL at 19:40

## 2023-11-17 NOTE — ED ADULT NURSE NOTE - OBJECTIVE STATEMENT
Pt. HILDA s/p fall. Pt. states she was ambulating to the bathroom and she fell, landing on her R shoulder. Pt. denies HT/LOC. Pt. denies AC use. Pt. c/o R shoulder pain.

## 2023-11-17 NOTE — ED PROVIDER NOTE - PROGRESS NOTE DETAILS
MS–Ortho consulted requesting x-rays of the right humerus and elbow.  Pending UA.  Patient does not have any midline spinal tenderness at the thoracic region.

## 2023-11-17 NOTE — ED PROVIDER NOTE - CLINICAL SUMMARY MEDICAL DECISION MAKING FREE TEXT BOX
trauma w/u w/ isolated shoulder fx, pt seen by ortho and pt splinted  family has concerns with how pt ambulates at home and is a fall risk  family believes she would benefit from admission for placement

## 2023-11-17 NOTE — CONSULT NOTE ADULT - SUBJECTIVE AND OBJECTIVE BOX
ORTHOPAEDIC SURGERY CONSULT NOTE    Reason for Consult: Right proximal humerus fracture     HPI: 83y Female presents with pain in the right shoulder and arm following mechanical fall earlier today.   Patient denies head trauma or LOC. Denies pain elsewhere. Denies paresthesias.    PAST MEDICAL & SURGICAL HISTORY:  Thyroid disease    Allergies: No Known Allergies    Medications: lactated ringers Bolus 1000 milliLiter(s) IV Bolus once      PHYSICAL EXAM:  Vital Signs Last 24 Hrs  T(C): 37.1 (17 Nov 2023 18:03), Max: 37.1 (17 Nov 2023 18:03)  T(F): 98.7 (17 Nov 2023 18:03), Max: 98.7 (17 Nov 2023 18:03)  HR: 135 (17 Nov 2023 18:03) (135 - 135)  BP: 151/81 (17 Nov 2023 18:03) (151/81 - 151/81)  BP(mean): --  RR: 20 (17 Nov 2023 18:03) (20 - 20)  SpO2: 99% (17 Nov 2023 18:03) (99% - 99%)    Parameters below as of 17 Nov 2023 18:03  Patient On (Oxygen Delivery Method): room air    Physical Exam:    RUE:  No open skin or wounds  Mild swelling grossly over the shoulder   TTP over shoulder and proximal arm   NTTP over elbow, forearm, wrist or hand  ROM of shoulder limited by pain   SILT in Ax/M/U/R distributions.  AIN/PIN/U motor intact.  2+ distal pulses with normal cap refill at distal finger tips.   Compartments soft and compressible.    Labs:                        12.1   12.31 )-----------( 298      ( 17 Nov 2023 18:34 )             37.1     11-17    139  |  103  |  21<H>  ----------------------------<  152<H>  4.0   |  22  |  0.8    Ca    9.0      17 Nov 2023 18:34    TPro  7.2  /  Alb  4.0  /  TBili  0.5  /  DBili  x   /  AST  17  /  ALT  8   /  AlkPhos  126<H>  11-17    PT/INR - ( 17 Nov 2023 18:34 )   PT: 12.40 sec;   INR: 1.09 ratio         PTT - ( 17 Nov 2023 18:34 )  PTT:27.8 sec    Imaging XR:  Right shoulder/humerus xray - Displaced and shortened surgical neck proximal humerus fracture with slight varus angulation   Right elbow xray - No fractures or dislocations.    A/P: 83y Female with a proximal humerus fracture. Discussed imaging and nature of injury with patient. All questions answered. Patient placed in a RUE sling. Plan for a trial of non operative management.     - Pain control  - Activity: NWB RUE   - Keep sling in place  - Extremity icing/elevation.  - Patient instructed to return to ED if any worsening pain, numbness, tingling, fevers, chills or any other concerning symptoms.  - F/U outpatient with Dr. Sparks in 1 week. Please call [phone].   ORTHOPAEDIC SURGERY CONSULT NOTE    Reason for Consult: Right proximal humerus fracture     HPI: 83y Female RHD presents with pain in the right shoulder and arm following mechanical fall earlier today.   Patient denies head trauma or LOC. Denies pain elsewhere. Denies paresthesias. Ambulates without assistive device at baseline. Retired.     PAST MEDICAL & SURGICAL HISTORY:  Thyroid disease    Allergies: No Known Allergies    Medications: lactated ringers Bolus 1000 milliLiter(s) IV Bolus once      PHYSICAL EXAM:  Vital Signs Last 24 Hrs  T(C): 37.1 (17 Nov 2023 18:03), Max: 37.1 (17 Nov 2023 18:03)  T(F): 98.7 (17 Nov 2023 18:03), Max: 98.7 (17 Nov 2023 18:03)  HR: 135 (17 Nov 2023 18:03) (135 - 135)  BP: 151/81 (17 Nov 2023 18:03) (151/81 - 151/81)  BP(mean): --  RR: 20 (17 Nov 2023 18:03) (20 - 20)  SpO2: 99% (17 Nov 2023 18:03) (99% - 99%)    Parameters below as of 17 Nov 2023 18:03  Patient On (Oxygen Delivery Method): room air    Physical Exam:    RUE:  No open skin or wounds  Mild swelling grossly over the shoulder   TTP over shoulder and proximal arm   NTTP over elbow, forearm, wrist or hand  ROM of shoulder limited by pain   SILT in Ax/M/U/R distributions.  AIN/PIN/U motor intact.  2+ distal pulses with normal cap refill at distal finger tips.   Compartments soft and compressible.    Labs:                        12.1   12.31 )-----------( 298      ( 17 Nov 2023 18:34 )             37.1     11-17    139  |  103  |  21<H>  ----------------------------<  152<H>  4.0   |  22  |  0.8    Ca    9.0      17 Nov 2023 18:34    TPro  7.2  /  Alb  4.0  /  TBili  0.5  /  DBili  x   /  AST  17  /  ALT  8   /  AlkPhos  126<H>  11-17    PT/INR - ( 17 Nov 2023 18:34 )   PT: 12.40 sec;   INR: 1.09 ratio         PTT - ( 17 Nov 2023 18:34 )  PTT:27.8 sec    Imaging XR:  Right shoulder/humerus xray - Displaced and shortened surgical neck proximal humerus fracture with slight varus angulation   Right elbow xray - No fractures or dislocations.    A/P: 83y Female with a right proximal humerus fracture. Discussed imaging and nature of injury with patient. All questions answered. Patient placed in a RUE sling. Plan for a trial of non operative management.     - Pain control  - Activity: NWB RUE   - Keep sling in place  - Extremity icing/elevation.  - Patient instructed to return to ED if any worsening pain, numbness, tingling, fevers, chills or any other concerning symptoms.  - F/U outpatient with Dr. Sparks in 1 week. Please call [phone].

## 2023-11-17 NOTE — ED ADULT NURSE NOTE - CHIEF COMPLAINT QUOTE
Pt BIBEMS s/p fall. Pt was ambulating to bathroom when she fell onto R shoulder. Denies HT/LOC (-)AC

## 2023-11-17 NOTE — ED ADULT NURSE NOTE - NSFALLRISKINTERV_ED_ALL_ED
Assistance OOB with selected safe patient handling equipment if applicable/Assistance with ambulation/Communicate fall risk and risk factors to all staff, patient, and family/Monitor gait and stability/Provide visual cue: yellow wristband, yellow gown, etc/Reinforce activity limits and safety measures with patient and family/Call bell, personal items and telephone in reach/Instruct patient to call for assistance before getting out of bed/chair/stretcher/Non-slip footwear applied when patient is off stretcher/[place] to call system/Physically safe environment - no spills, clutter or unnecessary equipment/Purposeful Proactive Rounding/Room/bathroom lighting operational, light cord in reach

## 2023-11-17 NOTE — ED PROVIDER NOTE - PHYSICAL EXAMINATION
CONSTITUTIONAL: WDWN. NAD. Speaking in full sentences, moving all extremities.  SKIN: No lacerations or abrasions.  HEAD: NCAT  EYES: PERRLA, EOMI  ENT: TMs WNL. No hemotympanum noted.  NECK: No posterior midline cervical tenderness  CARD: +S1, S2 no murmurs, gallops, or rubs. Regular rate and rhythm. Radial, DP, PT 2+/4 bilaterally  RESP: LCTAB. No wheezes, rales or rhonchi.  ABD: Abdomen soft, nontender, nondistended.  NEURO: Alert, oriented, grossly unremarkable. Strength 5/5 in bilateral UE and LEs. CN 2-12 grossly intact. Follows commands.  MSK: No TTP in UE and LEs Aside from right shoulder with gross deformity, sensation intact over deltoid, motion limited secondary to pain. No chest wall tenderness or crepitus  BACK: No posterior midline tenderness  PSYCH: Cooperative, appropriate.

## 2023-11-17 NOTE — ED ADULT TRIAGE NOTE - CHIEF COMPLAINT QUOTE
Pt BIBEMS s/p fall. Pt was ambulating to bathroom when she fell onto R shoulder. Pt BIBEMS s/p fall. Pt was ambulating to bathroom when she fell onto R shoulder. Denies HT/LOC (-)AC

## 2023-11-17 NOTE — ED ADULT NURSE NOTE - DRUG PRE-SCREENING (DAST -1)
[de-identified] : 
Statement Selected

## 2023-11-17 NOTE — ED PROVIDER NOTE - OBJECTIVE STATEMENT
Patient is an 83-year-old female patient is a 83-year-old female with past medical history of hypertension, hyperlipidemia, hypothyroidism presenting for fall.  Fall was unwitnessed, patient was walking to the bathroom with her walker,  heard her fall.  Went out to the hallway, states patient was on the ground, complaining of right shoulder pain.  Patient denied head trauma, loss consciousness to  at that time.  Denies anticoagulation.  Afterwards, patient with swelling, continued pain in right shoulder, presented to the emergency department as a result.  Fall happened around noon today.  Denies change in mental status.  State patient otherwise well

## 2023-11-17 NOTE — ED PROVIDER NOTE - ATTENDING CONTRIBUTION TO CARE
83-year-old female patient is a 83-year-old female with past medical history of hypertension, hyperlipidemia, hypothyroidism  pt presents s/p fall. fall unwitnessed but pt states she was walking to bathroom with walker and fell.  heard the fall and saw pt on the ground and c/p R shoulder pain. pt denies ht, loc, cp. no ac use. pt w/ persistent swelling to R shoulder and pain so she was brought to ed.  no numbness/weakness.    vss  gen- NAD, aaox3  card-rrr  lungs-ctab, no wheezing or rhonchi  abd-sntnd, no guarding or rebound  neuro- full str/sensation, cn ii-xii grossly intact, normal coordination  msk- L shoulder swollen/ecchymotic, pain w/ rom, elbow/wrist rom intact, str intact mur, sens intact mur  Spine- no midline c/t/l spine ttp, neck supple, FROM to neck

## 2023-11-18 LAB
ALBUMIN SERPL ELPH-MCNC: 3.3 G/DL — LOW (ref 3.5–5.2)
ALBUMIN SERPL ELPH-MCNC: 3.3 G/DL — LOW (ref 3.5–5.2)
ALP SERPL-CCNC: 104 U/L — SIGNIFICANT CHANGE UP (ref 30–115)
ALP SERPL-CCNC: 104 U/L — SIGNIFICANT CHANGE UP (ref 30–115)
ALT FLD-CCNC: 5 U/L — SIGNIFICANT CHANGE UP (ref 0–41)
ALT FLD-CCNC: 5 U/L — SIGNIFICANT CHANGE UP (ref 0–41)
ANION GAP SERPL CALC-SCNC: 10 MMOL/L — SIGNIFICANT CHANGE UP (ref 7–14)
ANION GAP SERPL CALC-SCNC: 10 MMOL/L — SIGNIFICANT CHANGE UP (ref 7–14)
APPEARANCE UR: CLEAR — SIGNIFICANT CHANGE UP
APPEARANCE UR: CLEAR — SIGNIFICANT CHANGE UP
AST SERPL-CCNC: 14 U/L — SIGNIFICANT CHANGE UP (ref 0–41)
AST SERPL-CCNC: 14 U/L — SIGNIFICANT CHANGE UP (ref 0–41)
BASOPHILS # BLD AUTO: 0.03 K/UL — SIGNIFICANT CHANGE UP (ref 0–0.2)
BASOPHILS # BLD AUTO: 0.03 K/UL — SIGNIFICANT CHANGE UP (ref 0–0.2)
BASOPHILS NFR BLD AUTO: 0.3 % — SIGNIFICANT CHANGE UP (ref 0–1)
BASOPHILS NFR BLD AUTO: 0.3 % — SIGNIFICANT CHANGE UP (ref 0–1)
BILIRUB SERPL-MCNC: 0.6 MG/DL — SIGNIFICANT CHANGE UP (ref 0.2–1.2)
BILIRUB SERPL-MCNC: 0.6 MG/DL — SIGNIFICANT CHANGE UP (ref 0.2–1.2)
BILIRUB UR-MCNC: NEGATIVE — SIGNIFICANT CHANGE UP
BILIRUB UR-MCNC: NEGATIVE — SIGNIFICANT CHANGE UP
BUN SERPL-MCNC: 14 MG/DL — SIGNIFICANT CHANGE UP (ref 10–20)
BUN SERPL-MCNC: 14 MG/DL — SIGNIFICANT CHANGE UP (ref 10–20)
CALCIUM SERPL-MCNC: 8.8 MG/DL — SIGNIFICANT CHANGE UP (ref 8.4–10.5)
CALCIUM SERPL-MCNC: 8.8 MG/DL — SIGNIFICANT CHANGE UP (ref 8.4–10.5)
CHLORIDE SERPL-SCNC: 106 MMOL/L — SIGNIFICANT CHANGE UP (ref 98–110)
CHLORIDE SERPL-SCNC: 106 MMOL/L — SIGNIFICANT CHANGE UP (ref 98–110)
CO2 SERPL-SCNC: 24 MMOL/L — SIGNIFICANT CHANGE UP (ref 17–32)
CO2 SERPL-SCNC: 24 MMOL/L — SIGNIFICANT CHANGE UP (ref 17–32)
COLOR SPEC: YELLOW — SIGNIFICANT CHANGE UP
COLOR SPEC: YELLOW — SIGNIFICANT CHANGE UP
CREAT SERPL-MCNC: 0.6 MG/DL — LOW (ref 0.7–1.5)
CREAT SERPL-MCNC: 0.6 MG/DL — LOW (ref 0.7–1.5)
DIFF PNL FLD: NEGATIVE — SIGNIFICANT CHANGE UP
DIFF PNL FLD: NEGATIVE — SIGNIFICANT CHANGE UP
EGFR: 90 ML/MIN/1.73M2 — SIGNIFICANT CHANGE UP
EGFR: 90 ML/MIN/1.73M2 — SIGNIFICANT CHANGE UP
EOSINOPHIL # BLD AUTO: 0.08 K/UL — SIGNIFICANT CHANGE UP (ref 0–0.7)
EOSINOPHIL # BLD AUTO: 0.08 K/UL — SIGNIFICANT CHANGE UP (ref 0–0.7)
EOSINOPHIL NFR BLD AUTO: 0.9 % — SIGNIFICANT CHANGE UP (ref 0–8)
EOSINOPHIL NFR BLD AUTO: 0.9 % — SIGNIFICANT CHANGE UP (ref 0–8)
FOLATE SERPL-MCNC: 15.7 NG/ML — SIGNIFICANT CHANGE UP
FOLATE SERPL-MCNC: 15.7 NG/ML — SIGNIFICANT CHANGE UP
GLUCOSE SERPL-MCNC: 98 MG/DL — SIGNIFICANT CHANGE UP (ref 70–99)
GLUCOSE SERPL-MCNC: 98 MG/DL — SIGNIFICANT CHANGE UP (ref 70–99)
GLUCOSE UR QL: NEGATIVE MG/DL — SIGNIFICANT CHANGE UP
GLUCOSE UR QL: NEGATIVE MG/DL — SIGNIFICANT CHANGE UP
HCT VFR BLD CALC: 31.5 % — LOW (ref 37–47)
HCT VFR BLD CALC: 31.5 % — LOW (ref 37–47)
HGB BLD-MCNC: 10.2 G/DL — LOW (ref 12–16)
HGB BLD-MCNC: 10.2 G/DL — LOW (ref 12–16)
IMM GRANULOCYTES NFR BLD AUTO: 0.2 % — SIGNIFICANT CHANGE UP (ref 0.1–0.3)
IMM GRANULOCYTES NFR BLD AUTO: 0.2 % — SIGNIFICANT CHANGE UP (ref 0.1–0.3)
KETONES UR-MCNC: NEGATIVE MG/DL — SIGNIFICANT CHANGE UP
KETONES UR-MCNC: NEGATIVE MG/DL — SIGNIFICANT CHANGE UP
LACTATE SERPL-SCNC: 1.9 MMOL/L — SIGNIFICANT CHANGE UP (ref 0.7–2)
LACTATE SERPL-SCNC: 1.9 MMOL/L — SIGNIFICANT CHANGE UP (ref 0.7–2)
LEUKOCYTE ESTERASE UR-ACNC: NEGATIVE — SIGNIFICANT CHANGE UP
LEUKOCYTE ESTERASE UR-ACNC: NEGATIVE — SIGNIFICANT CHANGE UP
LYMPHOCYTES # BLD AUTO: 2.15 K/UL — SIGNIFICANT CHANGE UP (ref 1.2–3.4)
LYMPHOCYTES # BLD AUTO: 2.15 K/UL — SIGNIFICANT CHANGE UP (ref 1.2–3.4)
LYMPHOCYTES # BLD AUTO: 23.8 % — SIGNIFICANT CHANGE UP (ref 20.5–51.1)
LYMPHOCYTES # BLD AUTO: 23.8 % — SIGNIFICANT CHANGE UP (ref 20.5–51.1)
MAGNESIUM SERPL-MCNC: 1.9 MG/DL — SIGNIFICANT CHANGE UP (ref 1.8–2.4)
MAGNESIUM SERPL-MCNC: 1.9 MG/DL — SIGNIFICANT CHANGE UP (ref 1.8–2.4)
MCHC RBC-ENTMCNC: 28.8 PG — SIGNIFICANT CHANGE UP (ref 27–31)
MCHC RBC-ENTMCNC: 28.8 PG — SIGNIFICANT CHANGE UP (ref 27–31)
MCHC RBC-ENTMCNC: 32.4 G/DL — SIGNIFICANT CHANGE UP (ref 32–37)
MCHC RBC-ENTMCNC: 32.4 G/DL — SIGNIFICANT CHANGE UP (ref 32–37)
MCV RBC AUTO: 89 FL — SIGNIFICANT CHANGE UP (ref 81–99)
MCV RBC AUTO: 89 FL — SIGNIFICANT CHANGE UP (ref 81–99)
MONOCYTES # BLD AUTO: 0.83 K/UL — HIGH (ref 0.1–0.6)
MONOCYTES # BLD AUTO: 0.83 K/UL — HIGH (ref 0.1–0.6)
MONOCYTES NFR BLD AUTO: 9.2 % — SIGNIFICANT CHANGE UP (ref 1.7–9.3)
MONOCYTES NFR BLD AUTO: 9.2 % — SIGNIFICANT CHANGE UP (ref 1.7–9.3)
NEUTROPHILS # BLD AUTO: 5.94 K/UL — SIGNIFICANT CHANGE UP (ref 1.4–6.5)
NEUTROPHILS # BLD AUTO: 5.94 K/UL — SIGNIFICANT CHANGE UP (ref 1.4–6.5)
NEUTROPHILS NFR BLD AUTO: 65.6 % — SIGNIFICANT CHANGE UP (ref 42.2–75.2)
NEUTROPHILS NFR BLD AUTO: 65.6 % — SIGNIFICANT CHANGE UP (ref 42.2–75.2)
NITRITE UR-MCNC: NEGATIVE — SIGNIFICANT CHANGE UP
NITRITE UR-MCNC: NEGATIVE — SIGNIFICANT CHANGE UP
NRBC # BLD: 0 /100 WBCS — SIGNIFICANT CHANGE UP (ref 0–0)
NRBC # BLD: 0 /100 WBCS — SIGNIFICANT CHANGE UP (ref 0–0)
PH UR: 7.5 — SIGNIFICANT CHANGE UP (ref 5–8)
PH UR: 7.5 — SIGNIFICANT CHANGE UP (ref 5–8)
PLATELET # BLD AUTO: 243 K/UL — SIGNIFICANT CHANGE UP (ref 130–400)
PLATELET # BLD AUTO: 243 K/UL — SIGNIFICANT CHANGE UP (ref 130–400)
PMV BLD: 9.7 FL — SIGNIFICANT CHANGE UP (ref 7.4–10.4)
PMV BLD: 9.7 FL — SIGNIFICANT CHANGE UP (ref 7.4–10.4)
POTASSIUM SERPL-MCNC: 4 MMOL/L — SIGNIFICANT CHANGE UP (ref 3.5–5)
POTASSIUM SERPL-MCNC: 4 MMOL/L — SIGNIFICANT CHANGE UP (ref 3.5–5)
POTASSIUM SERPL-SCNC: 4 MMOL/L — SIGNIFICANT CHANGE UP (ref 3.5–5)
POTASSIUM SERPL-SCNC: 4 MMOL/L — SIGNIFICANT CHANGE UP (ref 3.5–5)
PROT SERPL-MCNC: 6.2 G/DL — SIGNIFICANT CHANGE UP (ref 6–8)
PROT SERPL-MCNC: 6.2 G/DL — SIGNIFICANT CHANGE UP (ref 6–8)
PROT UR-MCNC: NEGATIVE MG/DL — SIGNIFICANT CHANGE UP
PROT UR-MCNC: NEGATIVE MG/DL — SIGNIFICANT CHANGE UP
RBC # BLD: 3.54 M/UL — LOW (ref 4.2–5.4)
RBC # BLD: 3.54 M/UL — LOW (ref 4.2–5.4)
RBC # FLD: 13 % — SIGNIFICANT CHANGE UP (ref 11.5–14.5)
RBC # FLD: 13 % — SIGNIFICANT CHANGE UP (ref 11.5–14.5)
SODIUM SERPL-SCNC: 140 MMOL/L — SIGNIFICANT CHANGE UP (ref 135–146)
SODIUM SERPL-SCNC: 140 MMOL/L — SIGNIFICANT CHANGE UP (ref 135–146)
SP GR SPEC: >1.03 — HIGH (ref 1–1.03)
SP GR SPEC: >1.03 — HIGH (ref 1–1.03)
TSH SERPL-MCNC: 0.6 UIU/ML — SIGNIFICANT CHANGE UP (ref 0.27–4.2)
TSH SERPL-MCNC: 0.6 UIU/ML — SIGNIFICANT CHANGE UP (ref 0.27–4.2)
UROBILINOGEN FLD QL: 1 MG/DL — SIGNIFICANT CHANGE UP (ref 0.2–1)
UROBILINOGEN FLD QL: 1 MG/DL — SIGNIFICANT CHANGE UP (ref 0.2–1)
WBC # BLD: 9.05 K/UL — SIGNIFICANT CHANGE UP (ref 4.8–10.8)
WBC # BLD: 9.05 K/UL — SIGNIFICANT CHANGE UP (ref 4.8–10.8)
WBC # FLD AUTO: 9.05 K/UL — SIGNIFICANT CHANGE UP (ref 4.8–10.8)
WBC # FLD AUTO: 9.05 K/UL — SIGNIFICANT CHANGE UP (ref 4.8–10.8)

## 2023-11-18 PROCEDURE — 99223 1ST HOSP IP/OBS HIGH 75: CPT

## 2023-11-18 RX ORDER — RISPERIDONE 4 MG/1
0.5 TABLET ORAL AT BEDTIME
Refills: 0 | Status: DISCONTINUED | OUTPATIENT
Start: 2023-11-18 | End: 2023-11-22

## 2023-11-18 RX ORDER — MORPHINE SULFATE 50 MG/1
2 CAPSULE, EXTENDED RELEASE ORAL
Refills: 0 | Status: DISCONTINUED | OUTPATIENT
Start: 2023-11-18 | End: 2023-11-18

## 2023-11-18 RX ORDER — POLYETHYLENE GLYCOL 3350 17 G/17G
17 POWDER, FOR SOLUTION ORAL
Refills: 0 | Status: DISCONTINUED | OUTPATIENT
Start: 2023-11-18 | End: 2023-11-22

## 2023-11-18 RX ORDER — METOPROLOL TARTRATE 50 MG
1 TABLET ORAL
Refills: 0 | DISCHARGE

## 2023-11-18 RX ORDER — SENNA PLUS 8.6 MG/1
2 TABLET ORAL AT BEDTIME
Refills: 0 | Status: DISCONTINUED | OUTPATIENT
Start: 2023-11-18 | End: 2023-11-22

## 2023-11-18 RX ORDER — ACETAMINOPHEN 500 MG
650 TABLET ORAL EVERY 6 HOURS
Refills: 0 | Status: DISCONTINUED | OUTPATIENT
Start: 2023-11-18 | End: 2023-11-18

## 2023-11-18 RX ORDER — SODIUM CHLORIDE 9 MG/ML
1000 INJECTION, SOLUTION INTRAVENOUS
Refills: 0 | Status: DISCONTINUED | OUTPATIENT
Start: 2023-11-18 | End: 2023-11-18

## 2023-11-18 RX ORDER — SODIUM CHLORIDE 9 MG/ML
500 INJECTION INTRAMUSCULAR; INTRAVENOUS; SUBCUTANEOUS ONCE
Refills: 0 | Status: COMPLETED | OUTPATIENT
Start: 2023-11-18 | End: 2023-11-18

## 2023-11-18 RX ORDER — METOPROLOL TARTRATE 50 MG
50 TABLET ORAL DAILY
Refills: 0 | Status: DISCONTINUED | OUTPATIENT
Start: 2023-11-18 | End: 2023-11-22

## 2023-11-18 RX ORDER — ONDANSETRON 8 MG/1
4 TABLET, FILM COATED ORAL EVERY 8 HOURS
Refills: 0 | Status: DISCONTINUED | OUTPATIENT
Start: 2023-11-18 | End: 2023-11-22

## 2023-11-18 RX ORDER — ACETAMINOPHEN 500 MG
975 TABLET ORAL EVERY 8 HOURS
Refills: 0 | Status: DISCONTINUED | OUTPATIENT
Start: 2023-11-18 | End: 2023-11-22

## 2023-11-18 RX ORDER — LANOLIN ALCOHOL/MO/W.PET/CERES
3 CREAM (GRAM) TOPICAL AT BEDTIME
Refills: 0 | Status: DISCONTINUED | OUTPATIENT
Start: 2023-11-18 | End: 2023-11-22

## 2023-11-18 RX ORDER — APIXABAN 2.5 MG/1
2.5 TABLET, FILM COATED ORAL
Refills: 0 | Status: DISCONTINUED | OUTPATIENT
Start: 2023-11-18 | End: 2023-11-22

## 2023-11-18 RX ORDER — ATORVASTATIN CALCIUM 80 MG/1
40 TABLET, FILM COATED ORAL AT BEDTIME
Refills: 0 | Status: DISCONTINUED | OUTPATIENT
Start: 2023-11-18 | End: 2023-11-22

## 2023-11-18 RX ORDER — RISPERIDONE 4 MG/1
1 TABLET ORAL
Refills: 0 | DISCHARGE

## 2023-11-18 RX ORDER — ENOXAPARIN SODIUM 100 MG/ML
40 INJECTION SUBCUTANEOUS EVERY 24 HOURS
Refills: 0 | Status: DISCONTINUED | OUTPATIENT
Start: 2023-11-18 | End: 2023-11-18

## 2023-11-18 RX ORDER — OXYCODONE HYDROCHLORIDE 5 MG/1
5 TABLET ORAL EVERY 4 HOURS
Refills: 0 | Status: DISCONTINUED | OUTPATIENT
Start: 2023-11-18 | End: 2023-11-19

## 2023-11-18 RX ORDER — TAMSULOSIN HYDROCHLORIDE 0.4 MG/1
0.4 CAPSULE ORAL AT BEDTIME
Refills: 0 | Status: DISCONTINUED | OUTPATIENT
Start: 2023-11-18 | End: 2023-11-22

## 2023-11-18 RX ORDER — OXYCODONE HYDROCHLORIDE 5 MG/1
5 TABLET ORAL EVERY 4 HOURS
Refills: 0 | Status: DISCONTINUED | OUTPATIENT
Start: 2023-11-18 | End: 2023-11-18

## 2023-11-18 RX ORDER — INFLUENZA VIRUS VACCINE 15; 15; 15; 15 UG/.5ML; UG/.5ML; UG/.5ML; UG/.5ML
0.7 SUSPENSION INTRAMUSCULAR ONCE
Refills: 0 | Status: DISCONTINUED | OUTPATIENT
Start: 2023-11-18 | End: 2023-11-22

## 2023-11-18 RX ADMIN — Medication 975 MILLIGRAM(S): at 23:00

## 2023-11-18 RX ADMIN — OXYCODONE HYDROCHLORIDE 5 MILLIGRAM(S): 5 TABLET ORAL at 10:24

## 2023-11-18 RX ADMIN — MORPHINE SULFATE 2 MILLIGRAM(S): 50 CAPSULE, EXTENDED RELEASE ORAL at 02:25

## 2023-11-18 RX ADMIN — APIXABAN 2.5 MILLIGRAM(S): 2.5 TABLET, FILM COATED ORAL at 17:48

## 2023-11-18 RX ADMIN — POLYETHYLENE GLYCOL 3350 17 GRAM(S): 17 POWDER, FOR SOLUTION ORAL at 17:48

## 2023-11-18 RX ADMIN — SODIUM CHLORIDE 75 MILLILITER(S): 9 INJECTION, SOLUTION INTRAVENOUS at 05:24

## 2023-11-18 RX ADMIN — ATORVASTATIN CALCIUM 40 MILLIGRAM(S): 80 TABLET, FILM COATED ORAL at 22:39

## 2023-11-18 RX ADMIN — Medication 975 MILLIGRAM(S): at 14:20

## 2023-11-18 RX ADMIN — POLYETHYLENE GLYCOL 3350 17 GRAM(S): 17 POWDER, FOR SOLUTION ORAL at 11:08

## 2023-11-18 RX ADMIN — Medication 975 MILLIGRAM(S): at 22:39

## 2023-11-18 RX ADMIN — OXYCODONE HYDROCHLORIDE 5 MILLIGRAM(S): 5 TABLET ORAL at 23:20

## 2023-11-18 RX ADMIN — OXYCODONE HYDROCHLORIDE 5 MILLIGRAM(S): 5 TABLET ORAL at 15:53

## 2023-11-18 RX ADMIN — OXYCODONE HYDROCHLORIDE 5 MILLIGRAM(S): 5 TABLET ORAL at 22:39

## 2023-11-18 RX ADMIN — Medication 975 MILLIGRAM(S): at 13:15

## 2023-11-18 RX ADMIN — RISPERIDONE 0.5 MILLIGRAM(S): 4 TABLET ORAL at 22:39

## 2023-11-18 RX ADMIN — ENOXAPARIN SODIUM 40 MILLIGRAM(S): 100 INJECTION SUBCUTANEOUS at 13:15

## 2023-11-18 RX ADMIN — OXYCODONE HYDROCHLORIDE 5 MILLIGRAM(S): 5 TABLET ORAL at 16:55

## 2023-11-18 RX ADMIN — SODIUM CHLORIDE 75 MILLILITER(S): 9 INJECTION, SOLUTION INTRAVENOUS at 00:45

## 2023-11-18 RX ADMIN — OXYCODONE HYDROCHLORIDE 5 MILLIGRAM(S): 5 TABLET ORAL at 11:24

## 2023-11-18 RX ADMIN — SENNA PLUS 2 TABLET(S): 8.6 TABLET ORAL at 22:39

## 2023-11-18 RX ADMIN — SODIUM CHLORIDE 500 MILLILITER(S): 9 INJECTION INTRAMUSCULAR; INTRAVENOUS; SUBCUTANEOUS at 16:29

## 2023-11-18 RX ADMIN — SODIUM CHLORIDE 1000 MILLILITER(S): 9 INJECTION, SOLUTION INTRAVENOUS at 02:26

## 2023-11-18 RX ADMIN — TAMSULOSIN HYDROCHLORIDE 0.4 MILLIGRAM(S): 0.4 CAPSULE ORAL at 22:39

## 2023-11-18 NOTE — H&P ADULT - NSHPLABSRESULTS_GEN_ALL_CORE
MEDICATIONS:  STANDING MEDICATIONS  acetaminophen     Tablet .. 975 milliGRAM(s) Oral every 8 hours  enoxaparin Injectable 40 milliGRAM(s) SubCutaneous every 24 hours  influenza  Vaccine (HIGH DOSE) 0.7 milliLiter(s) IntraMuscular once  lactated ringers. 1000 milliLiter(s) IV Continuous <Continuous>  polyethylene glycol 3350 17 Gram(s) Oral two times a day  senna 2 Tablet(s) Oral at bedtime    PRN MEDICATIONS  aluminum hydroxide/magnesium hydroxide/simethicone Suspension 30 milliLiter(s) Oral every 4 hours PRN  melatonin 3 milliGRAM(s) Oral at bedtime PRN  ondansetron Injectable 4 milliGRAM(s) IV Push every 8 hours PRN  oxyCODONE    IR 5 milliGRAM(s) Oral every 4 hours PRN      LABS:                        10.2   9.05  )-----------( 243      ( 18 Nov 2023 07:20 )             31.5     11-18    140  |  106  |  14  ----------------------------<  98  4.0   |  24  |  0.6<L>    Ca    8.8      18 Nov 2023 07:20  Mg     1.9     11-18    TPro  6.2  /  Alb  3.3<L>  /  TBili  0.6  /  DBili  x   /  AST  14  /  ALT  5   /  AlkPhos  104  11-18    PT/INR - ( 17 Nov 2023 18:34 )   PT: 12.40 sec;   INR: 1.09 ratio         PTT - ( 17 Nov 2023 18:34 )  PTT:27.8 sec  Urinalysis Basic - ( 18 Nov 2023 07:20 )    Color: x / Appearance: x / SG: x / pH: x  Gluc: 98 mg/dL / Ketone: x  / Bili: x / Urobili: x   Blood: x / Protein: x / Nitrite: x   Leuk Esterase: x / RBC: x / WBC x   Sq Epi: x / Non Sq Epi: x / Bacteria: x        Lactate, Blood: 1.9 mmol/L (11-18-23 @ 07:20)  Lactate, Blood: 3.4 mmol/L *H* (11-17-23 @ 22:50)  Lactate, Blood: 4.1 mmol/L *HH* (11-17-23 @ 18:34)  Creatine Kinase, Serum: 62 U/L (11-17-23 @ 18:34)      CARDIAC MARKERS ( 17 Nov 2023 18:34 )  x     / x     / 62 U/L / x     / x          RADIOLOGY:    < from: Xray Humerus, Right (11.17.23 @ 21:22) >    Findings/  Impression:    Acute comminuted fracture right humeral neck.    Osteopenia.        --- End of Report ---    < end of copied text >    < from: CT Head No Cont (11.17.23 @ 18:56) >      IMPRESSION:    1. No acute traumatic injury to the brain or cervical spine.  2. Chronic/degenerative change as above.    --- End of Report ---      < end of copied text >    < from: CT Chest w/ IV Cont (11.17.23 @ 19:07) >        VITALS:   T(F): 97.1  HR: 83  BP: 133/65  RR: 18  SpO2: 97%

## 2023-11-18 NOTE — H&P ADULT - NSHPSOCIALHISTORY_GEN_ALL_CORE
lives with brother  non smoker lives with , son, and his family  Pt has no steps at home to navigate, uses walker at baseline  non smoker

## 2023-11-18 NOTE — H&P ADULT - WHAT MATTERS MOST
Mother wants to be independent - attributes fall to "not wanting to disturb her " to ask for help. Family is doing everything they can to keep her as independent as possible within reason. Family was very happy to see that she had regained significant functionality - almost to baseline - after prior stroke. Family concerned she may loose some independence after this fall.

## 2023-11-18 NOTE — H&P ADULT - ATTENDING COMMENTS
83-year-old woman with a past history of mood disorder, hallucination, hyperthyroidism, recent cva 2/2 pAfib no a/c  who presented from home s/p ?mechanical  fall    # fall w/ fx  - no intervention per ortho  - pt ot  - orthostatics    #Hx recent CVA  - pt is very BP sensitive. Avoid hypotension    #Mood disorder  - c/w home Risperidone 0.5mg qHS    #Paroxysmal Afib  - c/w home Eliquis 2.5mg BID   - c/w home Toprol 50    #Hx urinary retention  - start Flomax    #Hyperthyroidism  - on home methimazole       - DVT Prophylaxis: Eliquis  - Diet: regular 83-year-old woman with a past history of mood disorder, hallucination, hyperthyroidism, recent cva 2/2 pAfib no a/c  who presented from home s/p ?mechanical  fall    # fall w/ fx  - no intervention per ortho  - pt ot  - orthostatics    #Hx recent CVA  - pt is very BP sensitive. Avoid hypotension    #Mood disorder  - c/w home Risperidone 0.5mg qHS    #Paroxysmal Afib  - c/w home Eliquis 2.5mg BID   - c/w home Toprol 50    #Hx urinary retention  - start Flomax    #Hyperthyroidism  - on home methimazole       - DVT Prophylaxis: Eliquis  - Diet: regular  - Code Status: HCP nallely Hartmann - number in chart; was DNR Trial intubation on prior

## 2023-11-18 NOTE — H&P ADULT - HISTORY OF PRESENT ILLNESS
83-year-old woman with a past history of mood disorder, hallucination, hyperthyroidism, pAfib on eliquis, recent admission for CVA 2/2 holding eliquis presents s/p unwitnessed fall at home.    Per pt she was walking to the restroom using walker and tripped falling to her R shoulder.  Was heard by pt's brother, but not witnessed. Pt and her brother both deny prodrome or post ictal sxs    ED  /81  (admission ekg 88) RR 20 99% on RA T 98.7  WBC 12.3 Lac 4.1 --> 3.4 on repeat  CT H negative  XR RUE proximal hum fx    Pt seen by ortho, splint, no surgical intervention    Pt admitted to medicine for placement, pain management, syncopal w/u 83-year-old woman with a past history of mood disorder, hallucination, hyperthyroidism, pAfib on eliquis, recent admission for CVA 2/2 holding eliquis presents s/p unwitnessed fall at home.    Per pt she was walking to the restroom using walker from bed and tripped falling to her R shoulder.  While initially pt denied any prodromal syndrome did later say that she felt a little light headed prior. Pt's  confirms pt's claim that she had no LOC, no head trauma.     ED  /83 admission ekg AF 88 RR 20 99% on RA T 98.7  WBC 12.3 Lac 4.1 --> 3.4 on repeat  CT H negative  XR RUE proximal hum fx    Pt seen by ortho, splint, no surgical intervention    Pt admitted to medicine for placement, pain management, syncopal w/u

## 2023-11-18 NOTE — H&P ADULT - ASSESSMENT
83-year-old woman with a past history of mood disorder, hallucination, hyperthyroidism, recent cva 2/2 pAfib no a/c  who presented from home s/p ?mechanical vs syncopal fall    # fall w/ fx  - no intervention per ortho  - pt ot  - tele  - tte  - orthostatics    #Hx recent CVA  - pt is very BP sensitive. Avoid hypotension    #Mood disorder  - c/w home Resperidone 0.5mg qHS    #Paroxysmal Afib  - c/w home Eliquis 2.5mg BID   - c/w home toprolol 50    #Hx urinary retention  - start Flomax  - aggressive PT    #Hyperthyroidism  - f/u thyroid panel  - f/u endo cs  - continue holding home methimazole     #Misc  - DVT Prophylaxis: eliquis  - Diet: regular       83-year-old woman with a past history of mood disorder, hallucination, hyperthyroidism, recent cva 2/2 pAfib no a/c  who presented from home s/p ?mechanical vs syncopal fall    # fall w/ fx  - no intervention per ortho  - pt ot  - tele  - tte  - orthostatics    #Hx recent CVA  - pt is very BP sensitive. Avoid hypotension    #Mood disorder  - c/w home Resperidone 0.5mg qHS    #Paroxysmal Afib  - c/w home Eliquis 2.5mg BID   - c/w home toprolol 50    #Hx urinary retention  - start Flomax  - aggressive PT    #Hyperthyroidism  - f/u thyroid panel  - f/u endo cs  - continue holding home methimazole     #Misc  - DVT Prophylaxis: eliquis  - Diet: regular  - DNR/DNI     83-year-old woman with a past history of mood disorder, hallucination, hyperthyroidism, recent cva 2/2 pAfib no a/c  who presented from home s/p ?mechanical vs syncopal fall    # fall w/ fx  - no intervention per ortho  - pt ot  - tele  - tte  - orthostatics    #Hx recent CVA  - pt is very BP sensitive. Avoid hypotension    #Mood disorder  - c/w home Resperidone 0.5mg qHS    #Paroxysmal Afib  - c/w home Eliquis 2.5mg BID   - c/w home toprolol 50    #Hx urinary retention  - start Flomax  - aggressive PT    #Hyperthyroidism  - f/u thyroid panel  - f/u endo cs  - continue holding home methimazole     # incidental T5 vertebral fx  - new since 2020  - no red flag sxs, no midline tenderness  - cs neuro surgery if becomes symptomatic     #Misc  - DVT Prophylaxis: eliquis  - Diet: regular  - DNR/DNI

## 2023-11-18 NOTE — H&P ADULT - NSHPLANGTRANSLATORFT_GEN_A_CORE
After failed pacific  attempt (couldn't understand the dialect) translation completed by nallely Sneed at bedside

## 2023-11-18 NOTE — H&P ADULT - NSHPPHYSICALEXAM_GEN_ALL_CORE
CONSTITUTIONAL: NAD, frail, tearful    EYES: PERRLA and symmetric, EOMI, No conjunctival or scleral injection, non-icteric    ENMT: Oral mucosa with moist membranes. No external nasal lesions; normal dentition; no gross hearing impairment noted.    NECK: Supple, symmetric and without tracheal deviation; thyroid gland not enlarged and without palpable masses    RESPIRATORY: No respiratory distress, no use of accessory muscles; CTA b/l, no wheezes, rales or rhonchi, no dullness or hyperresonance to percussion, no tactile fremitus, no subcutaneous emphysema    CARDIOVASCULAR: irr irr, +S1S2, no murmur appreciated, no rubs, no gallops; no JVD; no peripheral edema    Vascular: no carotid bruits; carotid pulse palpable, radial pulse palpable, posterior tibialis pulse palpable    GASTROINTESTINAL: Soft, non tender, non distended, no rebound, no guarding; No palpable masses; no hepatosplenomegaly; no hernia palpated;    MUSCULOSKELETAL: no significant limitation on ROM, moves all extremities in plane of bed except RUE 2/2 pain    SKIN: No rashes or ulcers noted; no subcutaneous nodules or induration palpable    NEUROLOGIC: moves all extremities against resistance, no droop    PSYCHIATRIC: Appropriate insight/judgment; A+O x 3, tearful mood, affect appropriate, recent/remote memory intact

## 2023-11-18 NOTE — H&P ADULT - NSHPREVIEWOFSYSTEMS_GEN_ALL_CORE
General:	denies night sweats, wt changes, cold/heat intolerance    Skin: denies rashes, pruritis, nail/hair changes  	  Ophthalmologic: denies vision changes, denies eye discharge or irritation  	  ENMT: denies nasal discharge, hearing changes, mouth sores, difficulty swallowing    Respiratory and Thorax: denies cough, denies difficulty breathing, denies shortness of breath  	  Cardiovascular: denies CP, denies PARKER, denies palpitation    Gastrointestinal: denies n/v/d    Genitourinary: denies urgency, burning, nocturia    Musculoskeletal: endorses significant RUE pain    Neurological: denies dizziness, syncope, HA    Psychiatric: denies si/hi and hallucinations    Hematology/Lymphatics: denies easy bleeding/bruising    Endocrine: denies wt changes, hair/nail changes, denies cold/heat sensitivity

## 2023-11-19 LAB
ALBUMIN SERPL ELPH-MCNC: 3.7 G/DL — SIGNIFICANT CHANGE UP (ref 3.5–5.2)
ALBUMIN SERPL ELPH-MCNC: 3.7 G/DL — SIGNIFICANT CHANGE UP (ref 3.5–5.2)
ALP SERPL-CCNC: 21 U/L — LOW (ref 30–115)
ALP SERPL-CCNC: 21 U/L — LOW (ref 30–115)
ALT FLD-CCNC: 5 U/L — SIGNIFICANT CHANGE UP (ref 0–41)
ALT FLD-CCNC: 5 U/L — SIGNIFICANT CHANGE UP (ref 0–41)
ANION GAP SERPL CALC-SCNC: 9 MMOL/L — SIGNIFICANT CHANGE UP (ref 7–14)
ANION GAP SERPL CALC-SCNC: 9 MMOL/L — SIGNIFICANT CHANGE UP (ref 7–14)
APTT BLD: 33.1 SEC — SIGNIFICANT CHANGE UP (ref 27–39.2)
APTT BLD: 33.1 SEC — SIGNIFICANT CHANGE UP (ref 27–39.2)
AST SERPL-CCNC: 13 U/L — SIGNIFICANT CHANGE UP (ref 0–41)
AST SERPL-CCNC: 13 U/L — SIGNIFICANT CHANGE UP (ref 0–41)
BASOPHILS # BLD AUTO: 0.04 K/UL — SIGNIFICANT CHANGE UP (ref 0–0.2)
BASOPHILS # BLD AUTO: 0.04 K/UL — SIGNIFICANT CHANGE UP (ref 0–0.2)
BASOPHILS NFR BLD AUTO: 0.4 % — SIGNIFICANT CHANGE UP (ref 0–1)
BASOPHILS NFR BLD AUTO: 0.4 % — SIGNIFICANT CHANGE UP (ref 0–1)
BILIRUB SERPL-MCNC: 0.5 MG/DL — SIGNIFICANT CHANGE UP (ref 0.2–1.2)
BILIRUB SERPL-MCNC: 0.5 MG/DL — SIGNIFICANT CHANGE UP (ref 0.2–1.2)
BUN SERPL-MCNC: 11 MG/DL — SIGNIFICANT CHANGE UP (ref 10–20)
BUN SERPL-MCNC: 11 MG/DL — SIGNIFICANT CHANGE UP (ref 10–20)
CALCIUM SERPL-MCNC: 8.1 MG/DL — LOW (ref 8.4–10.5)
CALCIUM SERPL-MCNC: 8.1 MG/DL — LOW (ref 8.4–10.5)
CHLORIDE SERPL-SCNC: 108 MMOL/L — SIGNIFICANT CHANGE UP (ref 98–110)
CHLORIDE SERPL-SCNC: 108 MMOL/L — SIGNIFICANT CHANGE UP (ref 98–110)
CHOLEST SERPL-MCNC: 83 MG/DL — SIGNIFICANT CHANGE UP
CHOLEST SERPL-MCNC: 83 MG/DL — SIGNIFICANT CHANGE UP
CO2 SERPL-SCNC: 23 MMOL/L — SIGNIFICANT CHANGE UP (ref 17–32)
CO2 SERPL-SCNC: 23 MMOL/L — SIGNIFICANT CHANGE UP (ref 17–32)
CREAT SERPL-MCNC: 0.7 MG/DL — SIGNIFICANT CHANGE UP (ref 0.7–1.5)
CREAT SERPL-MCNC: 0.7 MG/DL — SIGNIFICANT CHANGE UP (ref 0.7–1.5)
EGFR: 87 ML/MIN/1.73M2 — SIGNIFICANT CHANGE UP
EGFR: 87 ML/MIN/1.73M2 — SIGNIFICANT CHANGE UP
EOSINOPHIL # BLD AUTO: 0.14 K/UL — SIGNIFICANT CHANGE UP (ref 0–0.7)
EOSINOPHIL # BLD AUTO: 0.14 K/UL — SIGNIFICANT CHANGE UP (ref 0–0.7)
EOSINOPHIL NFR BLD AUTO: 1.5 % — SIGNIFICANT CHANGE UP (ref 0–8)
EOSINOPHIL NFR BLD AUTO: 1.5 % — SIGNIFICANT CHANGE UP (ref 0–8)
GLUCOSE SERPL-MCNC: 104 MG/DL — HIGH (ref 70–99)
GLUCOSE SERPL-MCNC: 104 MG/DL — HIGH (ref 70–99)
HCT VFR BLD CALC: 28.9 % — LOW (ref 37–47)
HCT VFR BLD CALC: 28.9 % — LOW (ref 37–47)
HDLC SERPL-MCNC: 36 MG/DL — LOW
HDLC SERPL-MCNC: 36 MG/DL — LOW
HGB BLD-MCNC: 9.3 G/DL — LOW (ref 12–16)
HGB BLD-MCNC: 9.3 G/DL — LOW (ref 12–16)
IMM GRANULOCYTES NFR BLD AUTO: 0.3 % — SIGNIFICANT CHANGE UP (ref 0.1–0.3)
IMM GRANULOCYTES NFR BLD AUTO: 0.3 % — SIGNIFICANT CHANGE UP (ref 0.1–0.3)
INR BLD: 1.32 RATIO — HIGH (ref 0.65–1.3)
INR BLD: 1.32 RATIO — HIGH (ref 0.65–1.3)
LIPID PNL WITH DIRECT LDL SERPL: 33 MG/DL — SIGNIFICANT CHANGE UP
LIPID PNL WITH DIRECT LDL SERPL: 33 MG/DL — SIGNIFICANT CHANGE UP
LYMPHOCYTES # BLD AUTO: 1.43 K/UL — SIGNIFICANT CHANGE UP (ref 1.2–3.4)
LYMPHOCYTES # BLD AUTO: 1.43 K/UL — SIGNIFICANT CHANGE UP (ref 1.2–3.4)
LYMPHOCYTES # BLD AUTO: 15.1 % — LOW (ref 20.5–51.1)
LYMPHOCYTES # BLD AUTO: 15.1 % — LOW (ref 20.5–51.1)
MCHC RBC-ENTMCNC: 29 PG — SIGNIFICANT CHANGE UP (ref 27–31)
MCHC RBC-ENTMCNC: 29 PG — SIGNIFICANT CHANGE UP (ref 27–31)
MCHC RBC-ENTMCNC: 32.2 G/DL — SIGNIFICANT CHANGE UP (ref 32–37)
MCHC RBC-ENTMCNC: 32.2 G/DL — SIGNIFICANT CHANGE UP (ref 32–37)
MCV RBC AUTO: 90 FL — SIGNIFICANT CHANGE UP (ref 81–99)
MCV RBC AUTO: 90 FL — SIGNIFICANT CHANGE UP (ref 81–99)
MONOCYTES # BLD AUTO: 0.67 K/UL — HIGH (ref 0.1–0.6)
MONOCYTES # BLD AUTO: 0.67 K/UL — HIGH (ref 0.1–0.6)
MONOCYTES NFR BLD AUTO: 7.1 % — SIGNIFICANT CHANGE UP (ref 1.7–9.3)
MONOCYTES NFR BLD AUTO: 7.1 % — SIGNIFICANT CHANGE UP (ref 1.7–9.3)
NEUTROPHILS # BLD AUTO: 7.15 K/UL — HIGH (ref 1.4–6.5)
NEUTROPHILS # BLD AUTO: 7.15 K/UL — HIGH (ref 1.4–6.5)
NEUTROPHILS NFR BLD AUTO: 75.6 % — HIGH (ref 42.2–75.2)
NEUTROPHILS NFR BLD AUTO: 75.6 % — HIGH (ref 42.2–75.2)
NON HDL CHOLESTEROL: 47 MG/DL — SIGNIFICANT CHANGE UP
NON HDL CHOLESTEROL: 47 MG/DL — SIGNIFICANT CHANGE UP
NRBC # BLD: 0 /100 WBCS — SIGNIFICANT CHANGE UP (ref 0–0)
NRBC # BLD: 0 /100 WBCS — SIGNIFICANT CHANGE UP (ref 0–0)
PLATELET # BLD AUTO: 224 K/UL — SIGNIFICANT CHANGE UP (ref 130–400)
PLATELET # BLD AUTO: 224 K/UL — SIGNIFICANT CHANGE UP (ref 130–400)
PMV BLD: 9.5 FL — SIGNIFICANT CHANGE UP (ref 7.4–10.4)
PMV BLD: 9.5 FL — SIGNIFICANT CHANGE UP (ref 7.4–10.4)
POTASSIUM SERPL-MCNC: 3.8 MMOL/L — SIGNIFICANT CHANGE UP (ref 3.5–5)
POTASSIUM SERPL-MCNC: 3.8 MMOL/L — SIGNIFICANT CHANGE UP (ref 3.5–5)
POTASSIUM SERPL-SCNC: 3.8 MMOL/L — SIGNIFICANT CHANGE UP (ref 3.5–5)
POTASSIUM SERPL-SCNC: 3.8 MMOL/L — SIGNIFICANT CHANGE UP (ref 3.5–5)
PROT SERPL-MCNC: 5.7 G/DL — LOW (ref 6–8)
PROT SERPL-MCNC: 5.7 G/DL — LOW (ref 6–8)
PROTHROM AB SERPL-ACNC: 15.1 SEC — HIGH (ref 9.95–12.87)
PROTHROM AB SERPL-ACNC: 15.1 SEC — HIGH (ref 9.95–12.87)
RBC # BLD: 3.21 M/UL — LOW (ref 4.2–5.4)
RBC # BLD: 3.21 M/UL — LOW (ref 4.2–5.4)
RBC # FLD: 13.2 % — SIGNIFICANT CHANGE UP (ref 11.5–14.5)
RBC # FLD: 13.2 % — SIGNIFICANT CHANGE UP (ref 11.5–14.5)
SODIUM SERPL-SCNC: 140 MMOL/L — SIGNIFICANT CHANGE UP (ref 135–146)
SODIUM SERPL-SCNC: 140 MMOL/L — SIGNIFICANT CHANGE UP (ref 135–146)
TRIGL SERPL-MCNC: 71 MG/DL — SIGNIFICANT CHANGE UP
TRIGL SERPL-MCNC: 71 MG/DL — SIGNIFICANT CHANGE UP
VIT B12 SERPL-MCNC: <150 PG/ML — LOW (ref 232–1245)
VIT B12 SERPL-MCNC: <150 PG/ML — LOW (ref 232–1245)
WBC # BLD: 9.46 K/UL — SIGNIFICANT CHANGE UP (ref 4.8–10.8)
WBC # BLD: 9.46 K/UL — SIGNIFICANT CHANGE UP (ref 4.8–10.8)
WBC # FLD AUTO: 9.46 K/UL — SIGNIFICANT CHANGE UP (ref 4.8–10.8)
WBC # FLD AUTO: 9.46 K/UL — SIGNIFICANT CHANGE UP (ref 4.8–10.8)

## 2023-11-19 PROCEDURE — 99232 SBSQ HOSP IP/OBS MODERATE 35: CPT

## 2023-11-19 PROCEDURE — 74018 RADEX ABDOMEN 1 VIEW: CPT | Mod: 26

## 2023-11-19 RX ORDER — OXYCODONE HYDROCHLORIDE 5 MG/1
10 TABLET ORAL EVERY 4 HOURS
Refills: 0 | Status: DISCONTINUED | OUTPATIENT
Start: 2023-11-19 | End: 2023-11-21

## 2023-11-19 RX ORDER — METOPROLOL TARTRATE 50 MG
50 TABLET ORAL ONCE
Refills: 0 | Status: COMPLETED | OUTPATIENT
Start: 2023-11-19 | End: 2023-11-19

## 2023-11-19 RX ORDER — PREGABALIN 225 MG/1
1000 CAPSULE ORAL EVERY 24 HOURS
Refills: 0 | Status: DISCONTINUED | OUTPATIENT
Start: 2023-11-19 | End: 2023-11-22

## 2023-11-19 RX ADMIN — TAMSULOSIN HYDROCHLORIDE 0.4 MILLIGRAM(S): 0.4 CAPSULE ORAL at 22:12

## 2023-11-19 RX ADMIN — Medication 975 MILLIGRAM(S): at 06:35

## 2023-11-19 RX ADMIN — Medication 975 MILLIGRAM(S): at 13:25

## 2023-11-19 RX ADMIN — Medication 975 MILLIGRAM(S): at 14:25

## 2023-11-19 RX ADMIN — OXYCODONE HYDROCHLORIDE 10 MILLIGRAM(S): 5 TABLET ORAL at 17:37

## 2023-11-19 RX ADMIN — APIXABAN 2.5 MILLIGRAM(S): 2.5 TABLET, FILM COATED ORAL at 17:36

## 2023-11-19 RX ADMIN — APIXABAN 2.5 MILLIGRAM(S): 2.5 TABLET, FILM COATED ORAL at 06:35

## 2023-11-19 RX ADMIN — Medication 50 MILLIGRAM(S): at 14:25

## 2023-11-19 RX ADMIN — OXYCODONE HYDROCHLORIDE 5 MILLIGRAM(S): 5 TABLET ORAL at 09:53

## 2023-11-19 RX ADMIN — OXYCODONE HYDROCHLORIDE 10 MILLIGRAM(S): 5 TABLET ORAL at 22:13

## 2023-11-19 RX ADMIN — OXYCODONE HYDROCHLORIDE 10 MILLIGRAM(S): 5 TABLET ORAL at 22:55

## 2023-11-19 RX ADMIN — Medication 975 MILLIGRAM(S): at 23:01

## 2023-11-19 RX ADMIN — ATORVASTATIN CALCIUM 40 MILLIGRAM(S): 80 TABLET, FILM COATED ORAL at 22:12

## 2023-11-19 RX ADMIN — OXYCODONE HYDROCHLORIDE 10 MILLIGRAM(S): 5 TABLET ORAL at 18:37

## 2023-11-19 RX ADMIN — OXYCODONE HYDROCHLORIDE 10 MILLIGRAM(S): 5 TABLET ORAL at 14:25

## 2023-11-19 RX ADMIN — RISPERIDONE 0.5 MILLIGRAM(S): 4 TABLET ORAL at 22:12

## 2023-11-19 RX ADMIN — POLYETHYLENE GLYCOL 3350 17 GRAM(S): 17 POWDER, FOR SOLUTION ORAL at 17:36

## 2023-11-19 RX ADMIN — SENNA PLUS 2 TABLET(S): 8.6 TABLET ORAL at 22:12

## 2023-11-19 RX ADMIN — OXYCODONE HYDROCHLORIDE 10 MILLIGRAM(S): 5 TABLET ORAL at 13:24

## 2023-11-19 RX ADMIN — Medication 975 MILLIGRAM(S): at 07:53

## 2023-11-19 RX ADMIN — Medication 975 MILLIGRAM(S): at 22:12

## 2023-11-19 RX ADMIN — OXYCODONE HYDROCHLORIDE 5 MILLIGRAM(S): 5 TABLET ORAL at 10:53

## 2023-11-19 RX ADMIN — POLYETHYLENE GLYCOL 3350 17 GRAM(S): 17 POWDER, FOR SOLUTION ORAL at 06:35

## 2023-11-19 RX ADMIN — PREGABALIN 1000 MICROGRAM(S): 225 CAPSULE ORAL at 12:10

## 2023-11-19 NOTE — PROGRESS NOTE ADULT - ASSESSMENT
83-year-old woman with a past history of mood disorder, hallucination, hyperthyroidism, recent cva 2/2 pAfib no a/c  who presented from home s/p ?mechanical  fall    # Fall w/ fx  # Vitamin b12 deficiency  - no intervention per ortho  - PT/OT  - orthostatics   - cyanocobalamin 1000mcg IM daily x 7 days, then weekly x 1 month, then monthly thereafter     #Hx recent CVA  - pt is very BP sensitive. Avoid hypotension    #Mood disorder  - c/w home Risperidone 0.5mg qHS    #Paroxysmal Afib  - c/w home Eliquis 2.5mg BID   - c/w home Toprol 50    #Hx urinary retention  - Flomax    #Hyperthyroidism  - on home methimazole       - DVT Prophylaxis: Eliquis

## 2023-11-19 NOTE — PROGRESS NOTE ADULT - SUBJECTIVE AND OBJECTIVE BOX
Pt seen and examined at bedside. Complaining of arm pain.     VITAL SIGNS (Last 24 hrs):  T(C): 36.7 (11-19-23 @ 12:57), Max: 36.7 (11-19-23 @ 12:57)  HR: 130 (11-19-23 @ 12:57) (88 - 130)  BP: 130/74 (11-19-23 @ 12:57) (93/56 - 140/75)  RR: 18 (11-19-23 @ 12:57) (18 - 18)  SpO2: 100% (11-19-23 @ 07:46) (97% - 100%)  Wt(kg): --  Daily     Daily     I&O's Summary      PHYSICAL EXAM:  GENERAL: NAD   HEAD:  Atraumatic, Normocephalic  EYES:  conjunctiva and sclera clear  NECK: Supple, No JVD  CHEST/LUNG: Clear to auscultation bilaterally; No wheeze  HEART: Regular rate and rhythm; No murmurs, rubs, or gallops  ABDOMEN: Soft, Nontender, Nondistended; Bowel sounds present  EXTREMITIES:  2+ Peripheral Pulses, No clubbing, cyanosis, or edema  SKIN: No rashes or lesions    Labs Reviewed       CBC Full  -  ( 19 Nov 2023 08:20 )  WBC Count : 9.46 K/uL  Hemoglobin : 9.3 g/dL  Hematocrit : 28.9 %  Platelet Count - Automated : 224 K/uL  Mean Cell Volume : 90.0 fL  Mean Cell Hemoglobin : 29.0 pg  Mean Cell Hemoglobin Concentration : 32.2 g/dL  Auto Neutrophil # : 7.15 K/uL  Auto Lymphocyte # : 1.43 K/uL  Auto Monocyte # : 0.67 K/uL  Auto Eosinophil # : 0.14 K/uL  Auto Basophil # : 0.04 K/uL  Auto Neutrophil % : 75.6 %  Auto Lymphocyte % : 15.1 %  Auto Monocyte % : 7.1 %  Auto Eosinophil % : 1.5 %  Auto Basophil % : 0.4 %    BMP:    11-19 @ 08:20    Blood Urea Nitrogen - 11  Calcium - 8.1  Carbond Dioxide - 23  Chloride - 108  Creatinine - 0.7  Glucose - 104  Potassium - 3.8  Sodium - 140      Hemoglobin A1c -   PT/INR - ( 19 Nov 2023 08:20 )   PT: 15.10 sec;   INR: 1.32 ratio         PTT - ( 19 Nov 2023 08:20 )  PTT:33.1 sec  Urine Culture:            MEDICATIONS  (STANDING):  acetaminophen     Tablet .. 975 milliGRAM(s) Oral every 8 hours  apixaban 2.5 milliGRAM(s) Oral two times a day  atorvastatin 40 milliGRAM(s) Oral at bedtime  cyanocobalamin Injectable 1000 MICROGram(s) IntraMuscular every 24 hours  influenza  Vaccine (HIGH DOSE) 0.7 milliLiter(s) IntraMuscular once  metoprolol succinate ER 50 milliGRAM(s) Oral daily  polyethylene glycol 3350 17 Gram(s) Oral two times a day  risperiDONE   Tablet 0.5 milliGRAM(s) Oral at bedtime  senna 2 Tablet(s) Oral at bedtime  tamsulosin 0.4 milliGRAM(s) Oral at bedtime    MEDICATIONS  (PRN):  aluminum hydroxide/magnesium hydroxide/simethicone Suspension 30 milliLiter(s) Oral every 4 hours PRN Dyspepsia  melatonin 3 milliGRAM(s) Oral at bedtime PRN Insomnia  ondansetron Injectable 4 milliGRAM(s) IV Push every 8 hours PRN Nausea and/or Vomiting  oxyCODONE    IR 10 milliGRAM(s) Oral every 4 hours PRN Severe Pain (7 - 10)

## 2023-11-20 LAB
A1C WITH ESTIMATED AVERAGE GLUCOSE RESULT: 5.3 % — SIGNIFICANT CHANGE UP (ref 4–5.6)
A1C WITH ESTIMATED AVERAGE GLUCOSE RESULT: 5.3 % — SIGNIFICANT CHANGE UP (ref 4–5.6)
ALBUMIN SERPL ELPH-MCNC: 3.1 G/DL — LOW (ref 3.5–5.2)
ALBUMIN SERPL ELPH-MCNC: 3.1 G/DL — LOW (ref 3.5–5.2)
ALP SERPL-CCNC: 92 U/L — SIGNIFICANT CHANGE UP (ref 30–115)
ALP SERPL-CCNC: 92 U/L — SIGNIFICANT CHANGE UP (ref 30–115)
ALT FLD-CCNC: 5 U/L — SIGNIFICANT CHANGE UP (ref 0–41)
ALT FLD-CCNC: 5 U/L — SIGNIFICANT CHANGE UP (ref 0–41)
ANION GAP SERPL CALC-SCNC: 10 MMOL/L — SIGNIFICANT CHANGE UP (ref 7–14)
ANION GAP SERPL CALC-SCNC: 10 MMOL/L — SIGNIFICANT CHANGE UP (ref 7–14)
AST SERPL-CCNC: 13 U/L — SIGNIFICANT CHANGE UP (ref 0–41)
AST SERPL-CCNC: 13 U/L — SIGNIFICANT CHANGE UP (ref 0–41)
BASOPHILS # BLD AUTO: 0.04 K/UL — SIGNIFICANT CHANGE UP (ref 0–0.2)
BASOPHILS # BLD AUTO: 0.04 K/UL — SIGNIFICANT CHANGE UP (ref 0–0.2)
BASOPHILS NFR BLD AUTO: 0.5 % — SIGNIFICANT CHANGE UP (ref 0–1)
BASOPHILS NFR BLD AUTO: 0.5 % — SIGNIFICANT CHANGE UP (ref 0–1)
BILIRUB SERPL-MCNC: 0.4 MG/DL — SIGNIFICANT CHANGE UP (ref 0.2–1.2)
BILIRUB SERPL-MCNC: 0.4 MG/DL — SIGNIFICANT CHANGE UP (ref 0.2–1.2)
BUN SERPL-MCNC: 23 MG/DL — HIGH (ref 10–20)
BUN SERPL-MCNC: 23 MG/DL — HIGH (ref 10–20)
CALCIUM SERPL-MCNC: 8.5 MG/DL — SIGNIFICANT CHANGE UP (ref 8.4–10.5)
CALCIUM SERPL-MCNC: 8.5 MG/DL — SIGNIFICANT CHANGE UP (ref 8.4–10.5)
CHLORIDE SERPL-SCNC: 106 MMOL/L — SIGNIFICANT CHANGE UP (ref 98–110)
CHLORIDE SERPL-SCNC: 106 MMOL/L — SIGNIFICANT CHANGE UP (ref 98–110)
CO2 SERPL-SCNC: 24 MMOL/L — SIGNIFICANT CHANGE UP (ref 17–32)
CO2 SERPL-SCNC: 24 MMOL/L — SIGNIFICANT CHANGE UP (ref 17–32)
CREAT SERPL-MCNC: 0.8 MG/DL — SIGNIFICANT CHANGE UP (ref 0.7–1.5)
CREAT SERPL-MCNC: 0.8 MG/DL — SIGNIFICANT CHANGE UP (ref 0.7–1.5)
EGFR: 74 ML/MIN/1.73M2 — SIGNIFICANT CHANGE UP
EGFR: 74 ML/MIN/1.73M2 — SIGNIFICANT CHANGE UP
EOSINOPHIL # BLD AUTO: 0.26 K/UL — SIGNIFICANT CHANGE UP (ref 0–0.7)
EOSINOPHIL # BLD AUTO: 0.26 K/UL — SIGNIFICANT CHANGE UP (ref 0–0.7)
EOSINOPHIL NFR BLD AUTO: 3 % — SIGNIFICANT CHANGE UP (ref 0–8)
EOSINOPHIL NFR BLD AUTO: 3 % — SIGNIFICANT CHANGE UP (ref 0–8)
ESTIMATED AVERAGE GLUCOSE: 105 MG/DL — SIGNIFICANT CHANGE UP (ref 68–114)
ESTIMATED AVERAGE GLUCOSE: 105 MG/DL — SIGNIFICANT CHANGE UP (ref 68–114)
GLUCOSE SERPL-MCNC: 114 MG/DL — HIGH (ref 70–99)
GLUCOSE SERPL-MCNC: 114 MG/DL — HIGH (ref 70–99)
HCT VFR BLD CALC: 28.8 % — LOW (ref 37–47)
HCT VFR BLD CALC: 28.8 % — LOW (ref 37–47)
HGB BLD-MCNC: 9.1 G/DL — LOW (ref 12–16)
HGB BLD-MCNC: 9.1 G/DL — LOW (ref 12–16)
IMM GRANULOCYTES NFR BLD AUTO: 0.4 % — HIGH (ref 0.1–0.3)
IMM GRANULOCYTES NFR BLD AUTO: 0.4 % — HIGH (ref 0.1–0.3)
LYMPHOCYTES # BLD AUTO: 1.99 K/UL — SIGNIFICANT CHANGE UP (ref 1.2–3.4)
LYMPHOCYTES # BLD AUTO: 1.99 K/UL — SIGNIFICANT CHANGE UP (ref 1.2–3.4)
LYMPHOCYTES # BLD AUTO: 23.3 % — SIGNIFICANT CHANGE UP (ref 20.5–51.1)
LYMPHOCYTES # BLD AUTO: 23.3 % — SIGNIFICANT CHANGE UP (ref 20.5–51.1)
MCHC RBC-ENTMCNC: 29.2 PG — SIGNIFICANT CHANGE UP (ref 27–31)
MCHC RBC-ENTMCNC: 29.2 PG — SIGNIFICANT CHANGE UP (ref 27–31)
MCHC RBC-ENTMCNC: 31.6 G/DL — LOW (ref 32–37)
MCHC RBC-ENTMCNC: 31.6 G/DL — LOW (ref 32–37)
MCV RBC AUTO: 92.3 FL — SIGNIFICANT CHANGE UP (ref 81–99)
MCV RBC AUTO: 92.3 FL — SIGNIFICANT CHANGE UP (ref 81–99)
MONOCYTES # BLD AUTO: 0.58 K/UL — SIGNIFICANT CHANGE UP (ref 0.1–0.6)
MONOCYTES # BLD AUTO: 0.58 K/UL — SIGNIFICANT CHANGE UP (ref 0.1–0.6)
MONOCYTES NFR BLD AUTO: 6.8 % — SIGNIFICANT CHANGE UP (ref 1.7–9.3)
MONOCYTES NFR BLD AUTO: 6.8 % — SIGNIFICANT CHANGE UP (ref 1.7–9.3)
NEUTROPHILS # BLD AUTO: 5.63 K/UL — SIGNIFICANT CHANGE UP (ref 1.4–6.5)
NEUTROPHILS # BLD AUTO: 5.63 K/UL — SIGNIFICANT CHANGE UP (ref 1.4–6.5)
NEUTROPHILS NFR BLD AUTO: 66 % — SIGNIFICANT CHANGE UP (ref 42.2–75.2)
NEUTROPHILS NFR BLD AUTO: 66 % — SIGNIFICANT CHANGE UP (ref 42.2–75.2)
NRBC # BLD: 0 /100 WBCS — SIGNIFICANT CHANGE UP (ref 0–0)
NRBC # BLD: 0 /100 WBCS — SIGNIFICANT CHANGE UP (ref 0–0)
PLATELET # BLD AUTO: 237 K/UL — SIGNIFICANT CHANGE UP (ref 130–400)
PLATELET # BLD AUTO: 237 K/UL — SIGNIFICANT CHANGE UP (ref 130–400)
PMV BLD: 9.7 FL — SIGNIFICANT CHANGE UP (ref 7.4–10.4)
PMV BLD: 9.7 FL — SIGNIFICANT CHANGE UP (ref 7.4–10.4)
POTASSIUM SERPL-MCNC: 4.1 MMOL/L — SIGNIFICANT CHANGE UP (ref 3.5–5)
POTASSIUM SERPL-MCNC: 4.1 MMOL/L — SIGNIFICANT CHANGE UP (ref 3.5–5)
POTASSIUM SERPL-SCNC: 4.1 MMOL/L — SIGNIFICANT CHANGE UP (ref 3.5–5)
POTASSIUM SERPL-SCNC: 4.1 MMOL/L — SIGNIFICANT CHANGE UP (ref 3.5–5)
PROT SERPL-MCNC: 5.8 G/DL — LOW (ref 6–8)
PROT SERPL-MCNC: 5.8 G/DL — LOW (ref 6–8)
RBC # BLD: 3.12 M/UL — LOW (ref 4.2–5.4)
RBC # BLD: 3.12 M/UL — LOW (ref 4.2–5.4)
RBC # FLD: 13.2 % — SIGNIFICANT CHANGE UP (ref 11.5–14.5)
RBC # FLD: 13.2 % — SIGNIFICANT CHANGE UP (ref 11.5–14.5)
SODIUM SERPL-SCNC: 140 MMOL/L — SIGNIFICANT CHANGE UP (ref 135–146)
SODIUM SERPL-SCNC: 140 MMOL/L — SIGNIFICANT CHANGE UP (ref 135–146)
T3 SERPL-MCNC: 78 NG/DL — LOW (ref 80–200)
T3 SERPL-MCNC: 78 NG/DL — LOW (ref 80–200)
T4 AB SER-ACNC: 6.5 UG/DL — SIGNIFICANT CHANGE UP (ref 4.6–12)
T4 AB SER-ACNC: 6.5 UG/DL — SIGNIFICANT CHANGE UP (ref 4.6–12)
T4 FREE SERPL-MCNC: 1 NG/DL — SIGNIFICANT CHANGE UP (ref 0.9–1.8)
T4 FREE SERPL-MCNC: 1 NG/DL — SIGNIFICANT CHANGE UP (ref 0.9–1.8)
TSH SERPL-MCNC: 1.84 UIU/ML — SIGNIFICANT CHANGE UP (ref 0.27–4.2)
TSH SERPL-MCNC: 1.84 UIU/ML — SIGNIFICANT CHANGE UP (ref 0.27–4.2)
WBC # BLD: 8.53 K/UL — SIGNIFICANT CHANGE UP (ref 4.8–10.8)
WBC # BLD: 8.53 K/UL — SIGNIFICANT CHANGE UP (ref 4.8–10.8)
WBC # FLD AUTO: 8.53 K/UL — SIGNIFICANT CHANGE UP (ref 4.8–10.8)
WBC # FLD AUTO: 8.53 K/UL — SIGNIFICANT CHANGE UP (ref 4.8–10.8)

## 2023-11-20 PROCEDURE — 99232 SBSQ HOSP IP/OBS MODERATE 35: CPT

## 2023-11-20 RX ORDER — SODIUM CHLORIDE 9 MG/ML
1000 INJECTION, SOLUTION INTRAVENOUS
Refills: 0 | Status: DISCONTINUED | OUTPATIENT
Start: 2023-11-20 | End: 2023-11-22

## 2023-11-20 RX ORDER — LACTULOSE 10 G/15ML
10 SOLUTION ORAL EVERY 4 HOURS
Refills: 0 | Status: DISCONTINUED | OUTPATIENT
Start: 2023-11-20 | End: 2023-11-22

## 2023-11-20 RX ADMIN — LACTULOSE 10 GRAM(S): 10 SOLUTION ORAL at 13:13

## 2023-11-20 RX ADMIN — Medication 50 MILLIGRAM(S): at 05:45

## 2023-11-20 RX ADMIN — Medication 975 MILLIGRAM(S): at 21:10

## 2023-11-20 RX ADMIN — SENNA PLUS 2 TABLET(S): 8.6 TABLET ORAL at 21:09

## 2023-11-20 RX ADMIN — Medication 975 MILLIGRAM(S): at 13:06

## 2023-11-20 RX ADMIN — LACTULOSE 10 GRAM(S): 10 SOLUTION ORAL at 09:59

## 2023-11-20 RX ADMIN — APIXABAN 2.5 MILLIGRAM(S): 2.5 TABLET, FILM COATED ORAL at 18:01

## 2023-11-20 RX ADMIN — RISPERIDONE 0.5 MILLIGRAM(S): 4 TABLET ORAL at 21:09

## 2023-11-20 RX ADMIN — SODIUM CHLORIDE 75 MILLILITER(S): 9 INJECTION, SOLUTION INTRAVENOUS at 08:44

## 2023-11-20 RX ADMIN — LACTULOSE 10 GRAM(S): 10 SOLUTION ORAL at 09:06

## 2023-11-20 RX ADMIN — OXYCODONE HYDROCHLORIDE 10 MILLIGRAM(S): 5 TABLET ORAL at 06:23

## 2023-11-20 RX ADMIN — PREGABALIN 1000 MICROGRAM(S): 225 CAPSULE ORAL at 12:20

## 2023-11-20 RX ADMIN — OXYCODONE HYDROCHLORIDE 10 MILLIGRAM(S): 5 TABLET ORAL at 05:41

## 2023-11-20 RX ADMIN — Medication 975 MILLIGRAM(S): at 14:00

## 2023-11-20 RX ADMIN — SODIUM CHLORIDE 75 MILLILITER(S): 9 INJECTION, SOLUTION INTRAVENOUS at 20:08

## 2023-11-20 RX ADMIN — APIXABAN 2.5 MILLIGRAM(S): 2.5 TABLET, FILM COATED ORAL at 05:45

## 2023-11-20 RX ADMIN — LACTULOSE 10 GRAM(S): 10 SOLUTION ORAL at 18:01

## 2023-11-20 RX ADMIN — LACTULOSE 10 GRAM(S): 10 SOLUTION ORAL at 21:09

## 2023-11-20 RX ADMIN — Medication 975 MILLIGRAM(S): at 22:15

## 2023-11-20 RX ADMIN — OXYCODONE HYDROCHLORIDE 10 MILLIGRAM(S): 5 TABLET ORAL at 09:48

## 2023-11-20 RX ADMIN — OXYCODONE HYDROCHLORIDE 10 MILLIGRAM(S): 5 TABLET ORAL at 10:48

## 2023-11-20 RX ADMIN — POLYETHYLENE GLYCOL 3350 17 GRAM(S): 17 POWDER, FOR SOLUTION ORAL at 18:02

## 2023-11-20 RX ADMIN — OXYCODONE HYDROCHLORIDE 10 MILLIGRAM(S): 5 TABLET ORAL at 18:04

## 2023-11-20 RX ADMIN — TAMSULOSIN HYDROCHLORIDE 0.4 MILLIGRAM(S): 0.4 CAPSULE ORAL at 21:10

## 2023-11-20 RX ADMIN — ATORVASTATIN CALCIUM 40 MILLIGRAM(S): 80 TABLET, FILM COATED ORAL at 21:10

## 2023-11-20 RX ADMIN — POLYETHYLENE GLYCOL 3350 17 GRAM(S): 17 POWDER, FOR SOLUTION ORAL at 05:45

## 2023-11-20 RX ADMIN — OXYCODONE HYDROCHLORIDE 10 MILLIGRAM(S): 5 TABLET ORAL at 19:00

## 2023-11-20 RX ADMIN — Medication 975 MILLIGRAM(S): at 05:45

## 2023-11-20 NOTE — PROGRESS NOTE ADULT - ATTENDING COMMENTS
83-year-old woman with a past history of mood disorder, hallucination, hyperthyroidism, recent CVA 2/2 p Afib who presented from home s/p mechanical  fall.     # Fall w/ right humeral neck fx  # Vitamin b12 deficiency  - no intervention per ortho  - PT/OT  - orthostatics negative   - cyanocobalamin 1000mcg IM daily x 7 days, then weekly x 1 month, then monthly thereafter     # Constipation   - on senna and Miralax    - start lactulose     #Hx recent CVA  - c/w Eliquis and statin     #Mood disorder  - c/w home Risperidone 0.5mg qHS    #Paroxysmal Afib  - c/w home Eliquis 2.5mg BID   - c/w home Toprol 50mg     #Hx urinary retention  - Flomax    #Hyperthyroidism  - on home methimazole   - endo consulted     - DVT Prophylaxis: Eliquis    Pending: PT/OT, constipation   Spoke to son over the phone   Dispo: Home

## 2023-11-20 NOTE — OCCUPATIONAL THERAPY INITIAL EVALUATION ADULT - GENERAL OBSERVATIONS, REHAB EVAL
pt received semi colon in bed in NAD, agreeable to OT evaluation, Rupesh  # [id number] utilized throughout. however pt not actively participating in communication despite encouragement, +IV lock, left semi colon in bed, R arm sling in place

## 2023-11-20 NOTE — PHYSICAL THERAPY INITIAL EVALUATION ADULT - FOLLOWS COMMANDS/ANSWERS QUESTIONS, REHAB EVAL
59 F with multiple medical issues who presented with outpatient hypotension likely due to medication increase and polypharmacy, poor PO intake, now improved    1. Polypharmacy  - Patient with several cardiac medications. Would not make major psychiatric medication changes while patient overburdened due to her mother's death. Can try to taper ambien with 5mg dose at night, to eventually attempt stoppage of ambien. Otherwise, no major medication changes at present    2. Heart failure  - Heart failure service consulted, appreciate recs    3. DVT/PE hx  - Please call Dr. Fournier as patient follows with Coumadin clinic at [facility] - could evaluate for whether she is appropriate for Eliquis. If unable to reach, can try Dr. Ruiz      Medicine consult team to sign off at this time; please feel free to reconsult as needed with any additional questions or concerns 59 F with multiple medical issues who presented with outpatient hypotension likely due to medication increase and  poor PO intake, now improved      catrina + hypotension  -creat improved  -still above baseline  -r/u renal u/s  -diuretics held  -nephro recs    depression  -Would not make major psychiatric medication changes while patient overburdened due to her mother's death. Can try to taper ambien with 5mg dose at night, to eventually attempt stoppage of ambien.    -no SI/HI      Heart failure  - Heart failure service consulted, appreciate recs  -appears euvolemic at this time  diuretics as per HF team and nephrology      DVT/PE hx  -can cont warfarin, but  Please call Dr. Fournier as patient follows with Coumadin clinic at [facility] - could evaluate for whether she is appropriate for Eliquis. If unable to reach, can try Dr. 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

## 2023-11-20 NOTE — PROGRESS NOTE ADULT - SUBJECTIVE AND OBJECTIVE BOX
The subjective and objective sections of this note are to be completed  24H events:    Patient is a 83y old Female who presents with a chief complaint of unwitnessed fall w/ fx (20 Nov 2023 11:35)    Primary diagnosis of Fall      Today is hospital day 3d  This morning patient was seen and examined at bedside, resting comfortably in bed  Was endorsed that the following events occurred overnight, if any:  Otherwise no acute or major events reported overnight  tachycardic 130    Review of Systems:  CONSTITUTIONAL  reported (if any): pain  otherwise did not endorse difficulty sleeping, poor appetite    VOIDING  Observed and/or was endorsed that the patient is voiding freely     AMBULATION  Observed and/or was endorsed that the patient is not ambulating    Code Status:  see end of document    Family communication:  Contact date:  Name of person contacted:  Relationship to patient:  Communication details:  What matters most:    PAST MEDICAL & SURGICAL HISTORY  Thyroid disease      SOCIAL HISTORY:  Social History:  lives with , son, and his family  Pt has no steps at home to navigate, uses walker at baseline  non smoker (18 Nov 2023 14:48)      ALLERGIES:  No Known Allergies    MEDICATIONS:  STANDING MEDICATIONS  acetaminophen     Tablet .. 975 milliGRAM(s) Oral every 8 hours  apixaban 2.5 milliGRAM(s) Oral two times a day  atorvastatin 40 milliGRAM(s) Oral at bedtime  cyanocobalamin Injectable 1000 MICROGram(s) IntraMuscular every 24 hours  influenza  Vaccine (HIGH DOSE) 0.7 milliLiter(s) IntraMuscular once  lactated ringers. 1000 milliLiter(s) IV Continuous <Continuous>  lactulose Syrup 10 Gram(s) Oral every 4 hours  metoprolol succinate ER 50 milliGRAM(s) Oral daily  polyethylene glycol 3350 17 Gram(s) Oral two times a day  risperiDONE   Tablet 0.5 milliGRAM(s) Oral at bedtime  senna 2 Tablet(s) Oral at bedtime  tamsulosin 0.4 milliGRAM(s) Oral at bedtime    PRN MEDICATIONS  aluminum hydroxide/magnesium hydroxide/simethicone Suspension 30 milliLiter(s) Oral every 4 hours PRN  melatonin 3 milliGRAM(s) Oral at bedtime PRN  ondansetron Injectable 4 milliGRAM(s) IV Push every 8 hours PRN  oxyCODONE    IR 10 milliGRAM(s) Oral every 4 hours PRN    VITALS:   T(F): 96.9  HR: 88  BP: 143/65  RR: 18  SpO2: 96%    PHYSICAL EXAM:  GENERAL:   ( x ) NAD, lying in bed comfortably     (  ) obtunded     (  ) lethargic     (  ) somnolent    HEAD:   ( x ) Atraumatic     (  ) hematoma     (  ) laceration (specify location:       )     NECK:  ( x ) Supple     (  ) neck stiffness     (  ) nuchal rigidity     (  )  no JVD     (  ) JVD present ( -- cm)    HEART:  Rate -->     ( x ) normal rate     (  ) bradycardic     (  ) tachycardic  Rhythm -->     ( x ) regular     (  ) regularly irregular     (  ) irregularly irregular  Murmurs -->     ( x ) normal s1s2     (  ) systolic murmur     (  ) diastolic murmur     (  ) continuous murmur      (  ) S3 present     (  ) S4 present    LUNGS:   ( x ) Unlabored respirations     (  ) tachypnea  ( x ) B/L air entry     (  ) decreased breath sounds in:  (location     )    ( x ) no adventitious sound     (  ) crackles     (  ) wheezing      (  ) rhonchi      (specify location:       )  (  ) chest wall tenderness (specify location:       )    ABDOMEN:   ( x ) Soft     (  ) tense   |   ( x ) nondistended     (  ) distended   |   ( x ) +BS     (  ) hypoactive bowel sounds     (  ) hyperactive bowel sounds  ( x ) nontender     (  ) RUQ tenderness     (  ) RLQ tenderness     (  ) LLQ tenderness     (  ) epigastric tenderness     (  ) diffuse tenderness  (  ) Splenomegaly      (  ) Hepatomegaly      (  ) Jaundice     (  ) ecchymosis     EXTREMITIES: L arm sling 2+ peripheral pulses bilaterally. No clubbing, cyanosis, or edema  ( x ) Normal     (  ) Rash     (  ) ecchymosis     (  ) varicose veins      (  ) pitting edema     (  ) non-pitting edema   (  ) ulceration     (  ) gangrene:     (location:     )    NERVOUS SYSTEM:   (  ) A&Ox3     (  ) confused     (  ) lethargic  CN II-XII:     (  ) grossly intact     (  ) deficits found     (Specify:     )   Upper extremities:     (  ) no sensorimotor deficits     (  ) weakness     (  ) loss of proprioception/vibration     (  ) loss of touch/temperature (specify:    )  Lower extremities:     (  ) no sensorimotor deficits     (  ) weakness     (  ) loss of proprioception/vibration     (  ) loss of touch/temperature (specify:    )    SKIN:   ( x ) No observed rashes or lesions     (  ) maculopapular rash     (  ) pustules     (  ) vesicles     (  ) ulcer     (  ) ecchymosis     (specify location:     )    (  ) Indwelling Mahan Catheter:  Date inserted:    Reason (  ) Critical illness     (  ) Urinary retention    (  ) Accurate Ins/Outs Monitoring     (  ) CMO patient    (  ) Central Line:   Date inserted:  Location: (  ) Right IJ     (  ) Left IJ     (  ) Right Fem     (  ) Left Fem    (  ) SPC        (  ) pigtail       (  ) PEG tube       (  ) colostomy       (  ) jejunostomy  (  ) U-Dall    LABS:                        9.1    8.53  )-----------( 237      ( 20 Nov 2023 07:02 )             28.8     11-20    140  |  106  |  23<H>  ----------------------------<  114<H>  4.1   |  24  |  0.8    Ca    8.5      20 Nov 2023 07:02    TPro  5.8<L>  /  Alb  3.1<L>  /  TBili  0.4  /  DBili  x   /  AST  13  /  ALT  5   /  AlkPhos  92  11-20    PT/INR - ( 19 Nov 2023 08:20 )   PT: 15.10 sec;   INR: 1.32 ratio         PTT - ( 19 Nov 2023 08:20 )  PTT:33.1 sec  Urinalysis Basic - ( 20 Nov 2023 07:02 )    Color: x / Appearance: x / SG: x / pH: x  Gluc: 114 mg/dL / Ketone: x  / Bili: x / Urobili: x   Blood: x / Protein: x / Nitrite: x   Leuk Esterase: x / RBC: x / WBC x   Sq Epi: x / Non Sq Epi: x / Bacteria: x                  ECHO, ENDO, EEG, RAD:    TTE 9/13/23  1. LV Ejection Fraction by Ibarra's Method with a biplane EF of 63 %.   2. Normal global left ventricular systolic function.   3. Moderate to severe mitral valve regurgitation.   4. Mild-moderate tricuspid regurgitation.   5. Estimated pulmonary artery systolic pressure is 35.9 mmHg assuming a   right atrial pressure of 8 mmHg, which is consistent with borderline   pulmonary hypertension.   6. Normal left atrial size.   7. LA volume Index is 27.0 ml/m² ml/m2.   8. Normal right atrial size.   9. Compared to 6/13/2020 echo there is no significant change.    CTH AND C SPINE 11/17/23  1. No acute traumatic injury to the brain or cervical spine.  2. Chronic/degenerative change as above.    CT CHEST 11/17/23  1. Right proximal humeral fracture with surrounding hemorrhage/edema.  2. T5 compression fracture, new from 2020.  3. Otherwise, no acute traumatic injury to the chest, abdomen, or pelvis.    CTAP 11/17/23  1. Right proximal humeral fracture with surrounding hemorrhage/edema.  2. T5 compression fracture, new from 2020.  3. Otherwise, no acute traumatic injury to the chest, abdomen, or pelvis.    CXR 11/17/23  Support devices: None    Cardiac/ Mediastinum: Stable cardiomegaly    Lungs/ Pleura: No consolidation, pleural effusions or pneumothorax.    Skeletal/ soft tissues: Acute fracture right humeral neck, previously   described on CT thorax. Osteopenia.    XR PELVIS 11/17/23  No acute displaced fracture or dislocation.    XR SHOULDER R 11/17/23  Diffuse osteopenia.    Acute fracture right humeral neck.    XR HUMERUS R 11/17/23  Acute comminuted fracture right humeral neck.    Osteopenia.    XR ELBOW R 11/17/23  No acutefracture or malalignment.

## 2023-11-20 NOTE — OCCUPATIONAL THERAPY INITIAL EVALUATION ADULT - PERTINENT HX OF CURRENT PROBLEM, REHAB EVAL
83-year-old woman with a past history of mood disorder, hallucination, hyperthyroidism, recent CVA 2/2 p Afib who presented from home s/p mechanical  fall.     # Fall w/ right humeral neck fx  # Vitamin b12 deficiency  - no intervention per ortho

## 2023-11-20 NOTE — PHYSICAL THERAPY INITIAL EVALUATION ADULT - GENERAL OBSERVATIONS, REHAB EVAL
9:35-10:10 Pt encountered semi colon in bed in NAD with +call bell, +bed rails, +bed alarm, +RUE sling, +primafit, Yoruba speaking used  via phone #[phone]

## 2023-11-20 NOTE — OCCUPATIONAL THERAPY INITIAL EVALUATION ADULT - NSOTDISCHREC_GEN_A_CORE
Pt requires assistance with ADLs and functional transfers, will benefit from rehab facility upon d/c.

## 2023-11-20 NOTE — PHYSICAL THERAPY INITIAL EVALUATION ADULT - RANGE OF MOTION EXAMINATION, REHAB EVAL
RUE NWB in sling/bilateral upper extremity ROM was WFL (within functional limits)/bilateral lower extremity ROM was WFL (within functional limits)

## 2023-11-20 NOTE — PROGRESS NOTE ADULT - ASSESSMENT
83-year-old woman with a past history of mood disorder, hallucination, hyperthyroidism, recent cva 2/2 pAfib no a/c  who presented from home s/p ?mechanical fall    # Fall w/fracture likely secondary to Vitamin b12 deficiency induced vs orthostatic hypotension  - PT/OT as tolerated  - orthostatics as tolerated  - cyanocobalamin 1000mcg IM qd day 2/7 then weekly x 1 month, then monthly thereafter     #fracture  - no surgical intervention per ortho  - oxycodone IR 10mg qd  - lactulose  - miralax    #low urine output likely secondary to dehydration  #Hx urinary retention  - low bladder scan volume  - Flomax  - IVF    #anemia  - monitor Hgb last resulted 9.3, downtrending    #Hx recent CVA  - pt is very BP sensitive. Avoid hypotension    #Mood disorder  - c/w home Risperidone 0.5mg qHS    #Paroxysmal Afib  - c/w home Eliquis 2.5mg BID   - c/w home Toprol 50    #Hyperthyroidism  - on home methimazole     - DVT Prophylaxis: Eliquis    #Handoff: see points bolded above and any points listed below  83-year-old woman with a past history of mood disorder, hallucination, hyperthyroidism, recent cva 2/2 pAfib no a/c  who presented from home s/p ?mechanical fall    # Fall w/fracture likely secondary to Vitamin b12 deficiency induced vs orthostatic hypotension  - PT recommendations pending  - OT recommendations pending   - orthostatics as tolerated  - cyanocobalamin 1000mcg IM qd day 2/7 then weekly x 1 month, then monthly thereafter     #fracture  - no surgical intervention per ortho  - oxycodone IR 10mg qd  - lactulose  - miralax    #low urine output likely secondary to dehydration  #Hx urinary retention  - low bladder scan volume  - Flomax  - IVF    #anemia  - monitor Hgb last resulted 9.3, downtrending    #Hx recent CVA  - pt is very BP sensitive. Avoid hypotension    #Mood disorder  - c/w home Risperidone 0.5mg qHS    #Paroxysmal Afib  - c/w home Eliquis 2.5mg BID   - c/w home Toprol 50    #Hyperthyroidism  - on home methimazole     - DVT Prophylaxis: Eliquis    #Handoff: see points bolded above and any points listed below   - anticipate discontinue tomorrow to home  - son prefers taking patient home over rehab since he believes she may not benefit from rehab due to limitations secondary to humeral fracture pain 83-year-old woman with a past history of mood disorder, hallucination, hyperthyroidism, recent cva 2/2 pAfib no a/c  who presented from home s/p ?mechanical fall    # Fall w/fracture likely secondary to Vitamin b12 deficiency induced vs orthostatic hypotension  - orthostatics as tolerated  - cyanocobalamin 1000mcg IM qd day 2/7 then weekly x 1 month, then monthly thereafter     #fracture  - no surgical intervention per ortho  - oxycodone IR 10mg qd  - lactulose  - miralax    #low urine output likely secondary to dehydration  #Hx urinary retention  - low bladder scan volume  - Flomax  - IVF    #anemia  - monitor Hgb last resulted 9.3, downtrending    #Hx recent CVA  - pt is very BP sensitive. Avoid hypotension    #Mood disorder  - c/w home Risperidone 0.5mg qHS    #Paroxysmal Afib  - c/w home Eliquis 2.5mg BID   - c/w home Toprol 50    #Hyperthyroidism  - on home methimazole     - DVT Prophylaxis: Eliquis    #rehab  - PT recommendations appreciated   - OT recommendations appreciated   - Home PT vs Rehab pending progress  - Pt requires assistance with ADLs and functional transfers, will benefit from rehab facility upon d/c    #Handoff: see points bolded above and any points listed below   - anticipate discontinue tomorrow to home  - son prefers taking patient home over rehab since he believes she may not benefit from rehab due to limitations secondary to humeral fracture pain  - HCS vs STR

## 2023-11-20 NOTE — PHYSICAL THERAPY INITIAL EVALUATION ADULT - PERTINENT HX OF CURRENT PROBLEM, REHAB EVAL
83-year-old woman with a past history of mood disorder, hallucination, hyperthyroidism, pAfib on eliquis, recent admission for CVA 2/2 holding eliquis presents s/p unwitnessed fall at home.    Per pt she was walking to the restroom using walker from bed and tripped falling to her R shoulder.  While initially pt denied any prodromal syndrome did later say that she felt a little light headed prior. Pt's  confirms pt's claim that she had no LOC, no head trauma.

## 2023-11-20 NOTE — CONSULT NOTE ADULT - SUBJECTIVE AND OBJECTIVE BOX
HPI: 83yFemale  83-year-old woman with a past history of mood disorder, hallucination, hyperthyroidism, pAfib on eliquis, recent admission for CVA 2/2 holding eliquis presents s/p unwitnessed fall at home.    Per pt she was walking to the restroom using walker from bed and tripped falling to her R shoulder.  While initially pt denied any prodromal syndrome did later say that she felt a little light headed prior. Pt's  confirms pt's claim that she had no LOC, no head trauma.   We are being consulted for hyperthyroidism management    Age at Dx:  How dx:  Hx and duration of insulin:  Current Therapy:  Hx of hypoglycemia  Hx of DKA/HHS?    Home FSG:  Fasting  Lunch  Dinner  Bed    Hx of other regimens  Complications:  Outpatient Endo:    PMH & Surgical Hx:Fall    Complex Care    Yes    DNI    Handoff    MEWS Score    Thyroid disease    Fall    RIGHT SHOULDER PAIN    51    SysAdmin_VisitLink        FH:  DM:  Thyroid:  Autoimmune:  Other:    SH:  Smoking  Etoh:  Recreational Drugs:  Social Life:    Current Meds:  acetaminophen     Tablet .. 975 milliGRAM(s) Oral every 8 hours  aluminum hydroxide/magnesium hydroxide/simethicone Suspension 30 milliLiter(s) Oral every 4 hours PRN  apixaban 2.5 milliGRAM(s) Oral two times a day  atorvastatin 40 milliGRAM(s) Oral at bedtime  cyanocobalamin Injectable 1000 MICROGram(s) IntraMuscular every 24 hours  influenza  Vaccine (HIGH DOSE) 0.7 milliLiter(s) IntraMuscular once  lactated ringers. 1000 milliLiter(s) IV Continuous <Continuous>  lactulose Syrup 10 Gram(s) Oral every 4 hours  melatonin 3 milliGRAM(s) Oral at bedtime PRN  metoprolol succinate ER 50 milliGRAM(s) Oral daily  ondansetron Injectable 4 milliGRAM(s) IV Push every 8 hours PRN  oxyCODONE    IR 10 milliGRAM(s) Oral every 4 hours PRN  polyethylene glycol 3350 17 Gram(s) Oral two times a day  risperiDONE   Tablet 0.5 milliGRAM(s) Oral at bedtime  senna 2 Tablet(s) Oral at bedtime  tamsulosin 0.4 milliGRAM(s) Oral at bedtime      Allergies:  No Known Allergies      ROS:  Denies the following except as indicated.    General: weight loss/weight gain, decreased appetite, fatigue  Eyes: Blurry vision, double vision, visual changes  ENT: Throat pain, changes in voice,   CV: palpitations, SOB, CP, cough  GI: NVD, difficulty swallowing, abdominal pain  : polyuria, dysuria  Endo: abnormal menses, temperature intolerance, decreased libido  MSK: weakness, joint pain  Skin: rash, dryness, diaphoresis  Heme: Easy bruising,bleeding  Neuro: HA, dizziness, lightheadedness, numbness tingling  Psych: Anxiety, Depression    Vital Signs Last 24 Hrs  T(C): 36.1 (20 Nov 2023 04:58), Max: 36.7 (19 Nov 2023 12:57)  T(F): 97 (20 Nov 2023 04:58), Max: 98 (19 Nov 2023 12:57)  HR: 94 (20 Nov 2023 04:58) (89 - 130)  BP: 103/60 (20 Nov 2023 04:58) (103/60 - 130/74)  BP(mean): --  RR: 19 (20 Nov 2023 04:58) (18 - 19)  SpO2: 96% (20 Nov 2023 07:41) (96% - 98%)    Parameters below as of 20 Nov 2023 07:41  Patient On (Oxygen Delivery Method): room air      Height (cm): 157.5 (11-17 @ 18:03)  Weight (kg): 56.7 (11-17 @ 18:03)  BMI (kg/m2): 22.9 (11-17 @ 18:03)      Constitutional: wn/wd in NAD.   HEENT: NCAT, MMM, OP clear, EOMI, , no proptosis or lid retraction  Neck: no thyromegaly or palpable thyroid nodules   Respiratory: lungs CTAB.  Cardiovascular: regular rhythm, normal S1 and S2, no audible murmurs, no peripheral edema  GI: soft, NT/ND, no masses/HSM appreciated.  Neurology: no tremors, DTR 2+  Skin: no visible rashes/lesions  Psychiatric: AAO x 3, normal affect/mood.  Ext: radial pulses intact, DP pulses intact, extremities warm, no cyanosis, clubbing or edema.       LABS:                        9.1    8.53  )-----------( 237      ( 20 Nov 2023 07:02 )             28.8     11-20    140  |  106  |  23<H>  ----------------------------<  114<H>  4.1   |  24  |  0.8    Ca    8.5      20 Nov 2023 07:02    TPro  5.8<L>  /  Alb  3.1<L>  /  TBili  0.4  /  DBili  x   /  AST  13  /  ALT  5   /  AlkPhos  92  11-20    PT/INR - ( 19 Nov 2023 08:20 )   PT: 15.10 sec;   INR: 1.32 ratio         PTT - ( 19 Nov 2023 08:20 )  PTT:33.1 sec  Urinalysis Basic - ( 20 Nov 2023 07:02 )    Color: x / Appearance: x / SG: x / pH: x  Gluc: 114 mg/dL / Ketone: x  / Bili: x / Urobili: x   Blood: x / Protein: x / Nitrite: x   Leuk Esterase: x / RBC: x / WBC x   Sq Epi: x / Non Sq Epi: x / Bacteria: x        Thyroid Stimulating Hormone, Serum: 0.60 (11-18 @ 07:20)  Thyroid Stimulating Hormone, Serum: 1.49 (09-26 @ 03:53)  Thyroid Stimulating Hormone, Serum: 0.53 (09-23 @ 07:01)  Thyroid Stimulating Hormone, Serum: 0.07 (09-19 @ 11:15)  Thyroid Stimulating Hormone, Serum: 0.03 (09-13 @ 05:32)      RADIOLOGY & ADDITIONAL STUDIES:  CAPILLARY BLOOD GLUCOSE            A/P:83y Female  THIS IS AN INCOMPLETE NOTE, PLEASE WAIT FOR FINALIZATION    1.  Hyperthyroidism- Graves Disease  - 11/19/2023  TSH 0.6 and FT4 1.0  - 9/2023  TSI 2.42 (positive)  - Would continue methimazole 5 mg for 3 months and repeat TFTs and TSI as OP  - Patient educated on side effects of medication and to call the office if she develops a fever  - LFTs wnl    2. HLD  - Patient with previous CVA on atorva 40 mg  - LDL 33 at goal    3. Vit B12 Deficiency + normocytic anemia  - Hb 9.1 MCV 92  - can be mixed anemia with normocytic range  - Get iron/TIBC with ferritin  - Can replete with 2000 mcg cobalamin for 8 weeks and repeat levels after that  Will continue to monitor     For discharge, pt can continue    Pt can follow up at discharge with [facility] by calling  to make an appointment.   Will discuss case with     and update primary team HPI: 83yFemale  83-year-old woman with a past history of mood disorder, hallucination, hyperthyroidism, pAfib on eliquis, recent admission for CVA 2/2 holding eliquis presents s/p unwitnessed fall at home.    Per pt she was walking to the restroom using walker from bed and tripped falling to her R shoulder.  While initially pt denied any prodromal syndrome did later say that she felt a little light headed prior. Pt's  confirms pt's claim that she had no LOC, no head trauma.   We are being consulted for hyperthyroidism management        Hx of other regimens  Complications:  Outpatient Endo:    PMH & Surgical Hx:Fall    Complex Care    Yes    DNI    Handoff    MEWS Score    Thyroid disease    Fall    RIGHT SHOULDER PAIN    51    [email or web address]          Current Meds:  acetaminophen     Tablet .. 975 milliGRAM(s) Oral every 8 hours  aluminum hydroxide/magnesium hydroxide/simethicone Suspension 30 milliLiter(s) Oral every 4 hours PRN  apixaban 2.5 milliGRAM(s) Oral two times a day  atorvastatin 40 milliGRAM(s) Oral at bedtime  cyanocobalamin Injectable 1000 MICROGram(s) IntraMuscular every 24 hours  influenza  Vaccine (HIGH DOSE) 0.7 milliLiter(s) IntraMuscular once  lactated ringers. 1000 milliLiter(s) IV Continuous <Continuous>  lactulose Syrup 10 Gram(s) Oral every 4 hours  melatonin 3 milliGRAM(s) Oral at bedtime PRN  metoprolol succinate ER 50 milliGRAM(s) Oral daily  ondansetron Injectable 4 milliGRAM(s) IV Push every 8 hours PRN  oxyCODONE    IR 10 milliGRAM(s) Oral every 4 hours PRN  polyethylene glycol 3350 17 Gram(s) Oral two times a day  risperiDONE   Tablet 0.5 milliGRAM(s) Oral at bedtime  senna 2 Tablet(s) Oral at bedtime  tamsulosin 0.4 milliGRAM(s) Oral at bedtime      Allergies:  No Known Allergies      ROS:  Denies the following except as indicated.    General: weight loss/weight gain, decreased appetite, fatigue  Eyes: Blurry vision, double vision, visual changes  ENT: Throat pain, changes in voice,   CV: palpitations, SOB, CP, cough  GI: NVD, difficulty swallowing, abdominal pain  : polyuria, dysuria  Endo: abnormal menses, temperature intolerance, decreased libido  MSK: weakness, joint pain  Skin: rash, dryness, diaphoresis  Heme: Easy bruising,bleeding  Neuro: HA, dizziness, lightheadedness, numbness tingling  Psych: Anxiety, Depression    Vital Signs Last 24 Hrs  T(C): 36.1 (20 Nov 2023 04:58), Max: 36.7 (19 Nov 2023 12:57)  T(F): 97 (20 Nov 2023 04:58), Max: 98 (19 Nov 2023 12:57)  HR: 94 (20 Nov 2023 04:58) (89 - 130)  BP: 103/60 (20 Nov 2023 04:58) (103/60 - 130/74)  BP(mean): --  RR: 19 (20 Nov 2023 04:58) (18 - 19)  SpO2: 96% (20 Nov 2023 07:41) (96% - 98%)    Parameters below as of 20 Nov 2023 07:41  Patient On (Oxygen Delivery Method): room air      Height (cm): 157.5 (11-17 @ 18:03)  Weight (kg): 56.7 (11-17 @ 18:03)  BMI (kg/m2): 22.9 (11-17 @ 18:03)      Constitutional: wn/wd in NAD., lethargic, weak  HEENT: NCAT, MMM, OP clear, EOMI, , no proptosis or lid retraction  Neck: no thyromegaly or palpable thyroid nodules   Respiratory: lungs CTAB.  Cardiovascular: regular rhythm, normal S1 and S2, no audible murmurs, no peripheral edema  GI: soft, NT/ND, no masses/HSM appreciated.  Neurology: no tremors, DTR 2+  Skin: no visible rashes/lesions  Psychiatric: unable to assess  Ext: radial pulses intact, DP pulses intact, extremities warm, no cyanosis, clubbing or edema.       LABS:                        9.1    8.53  )-----------( 237      ( 20 Nov 2023 07:02 )             28.8     11-20    140  |  106  |  23<H>  ----------------------------<  114<H>  4.1   |  24  |  0.8    Ca    8.5      20 Nov 2023 07:02    TPro  5.8<L>  /  Alb  3.1<L>  /  TBili  0.4  /  DBili  x   /  AST  13  /  ALT  5   /  AlkPhos  92  11-20    PT/INR - ( 19 Nov 2023 08:20 )   PT: 15.10 sec;   INR: 1.32 ratio         PTT - ( 19 Nov 2023 08:20 )  PTT:33.1 sec  Urinalysis Basic - ( 20 Nov 2023 07:02 )    Color: x / Appearance: x / SG: x / pH: x  Gluc: 114 mg/dL / Ketone: x  / Bili: x / Urobili: x   Blood: x / Protein: x / Nitrite: x   Leuk Esterase: x / RBC: x / WBC x   Sq Epi: x / Non Sq Epi: x / Bacteria: x        Thyroid Stimulating Hormone, Serum: 0.60 (11-18 @ 07:20)  Thyroid Stimulating Hormone, Serum: 1.49 (09-26 @ 03:53)  Thyroid Stimulating Hormone, Serum: 0.53 (09-23 @ 07:01)  Thyroid Stimulating Hormone, Serum: 0.07 (09-19 @ 11:15)  Thyroid Stimulating Hormone, Serum: 0.03 (09-13 @ 05:32)      RADIOLOGY & ADDITIONAL STUDIES:  CAPILLARY BLOOD GLUCOSE            A/P:83y Female    1.  Hyperthyroidism- Graves Disease  - 11/19/2023  TSH 0.6 and FT4 1.0  - 9/2023  TSI 2.42 (positive)  - Called the son Naren who said that his mother was taking methimazole 5 mg daily  - Would continue methimazole 5 mg for 3 months and repeat TFTs and TSI as OP  - Patient educated on side effects of medication   - LFTs wnl    2. HLD  - Patient with previous CVA on atorva 40 mg  - LDL 33 at goal    3. Vit B12 Deficiency + normocytic anemia  - Hb 9.1 MCV 92  - can be mixed anemia with normocytic range  - Get iron/TIBC with ferritin  - Can replete with 2000 mcg cobalamin for 8 weeks and repeat levels after that    4. OP  - Patient with new humeral and vertebral fractures  - Should be on Prolia or bisphosphonate, can be given as OP  - DEXA as OP    Will continue to monitor       Pt can follow up at discharge with [facility] by calling  to make an appointment.   Case discussed with Dr. Figueredo, plan or care discussed with patient's son over the phone.

## 2023-11-20 NOTE — OCCUPATIONAL THERAPY INITIAL EVALUATION ADULT - ADDITIONAL COMMENTS
pt not answering questions re: social history, only states name, per chart pt lives on 1st floor of home with son on 2nd floor, has RW, w/c, commode, will continue to clarify

## 2023-11-20 NOTE — PHYSICAL THERAPY INITIAL EVALUATION ADULT - LEVEL OF INDEPENDENCE: BED TO CHAIR, REHAB EVAL
pt refusing sit to stand and ambulation/transfers, was only agreeable to sitting at EOB/unable to perform

## 2023-11-20 NOTE — PHYSICAL THERAPY INITIAL EVALUATION ADULT - ADDITIONAL COMMENTS
Pt lives in private house with family with 2 steps to enter, lives on main level, ambulates with RW at [place], dresses and showers with min assist as per pt, however poor historian .

## 2023-11-21 ENCOUNTER — TRANSCRIPTION ENCOUNTER (OUTPATIENT)
Age: 83
End: 2023-11-21

## 2023-11-21 LAB
ALBUMIN SERPL ELPH-MCNC: 3.2 G/DL — LOW (ref 3.5–5.2)
ALBUMIN SERPL ELPH-MCNC: 3.2 G/DL — LOW (ref 3.5–5.2)
ALP SERPL-CCNC: 92 U/L — SIGNIFICANT CHANGE UP (ref 30–115)
ALP SERPL-CCNC: 92 U/L — SIGNIFICANT CHANGE UP (ref 30–115)
ALT FLD-CCNC: 6 U/L — SIGNIFICANT CHANGE UP (ref 0–41)
ALT FLD-CCNC: 6 U/L — SIGNIFICANT CHANGE UP (ref 0–41)
ANION GAP SERPL CALC-SCNC: 9 MMOL/L — SIGNIFICANT CHANGE UP (ref 7–14)
ANION GAP SERPL CALC-SCNC: 9 MMOL/L — SIGNIFICANT CHANGE UP (ref 7–14)
APPEARANCE UR: CLEAR — SIGNIFICANT CHANGE UP
APPEARANCE UR: CLEAR — SIGNIFICANT CHANGE UP
AST SERPL-CCNC: 15 U/L — SIGNIFICANT CHANGE UP (ref 0–41)
AST SERPL-CCNC: 15 U/L — SIGNIFICANT CHANGE UP (ref 0–41)
BASOPHILS # BLD AUTO: 0.03 K/UL — SIGNIFICANT CHANGE UP (ref 0–0.2)
BASOPHILS # BLD AUTO: 0.03 K/UL — SIGNIFICANT CHANGE UP (ref 0–0.2)
BASOPHILS NFR BLD AUTO: 0.3 % — SIGNIFICANT CHANGE UP (ref 0–1)
BASOPHILS NFR BLD AUTO: 0.3 % — SIGNIFICANT CHANGE UP (ref 0–1)
BILIRUB SERPL-MCNC: 0.5 MG/DL — SIGNIFICANT CHANGE UP (ref 0.2–1.2)
BILIRUB SERPL-MCNC: 0.5 MG/DL — SIGNIFICANT CHANGE UP (ref 0.2–1.2)
BILIRUB UR-MCNC: NEGATIVE — SIGNIFICANT CHANGE UP
BILIRUB UR-MCNC: NEGATIVE — SIGNIFICANT CHANGE UP
BUN SERPL-MCNC: 17 MG/DL — SIGNIFICANT CHANGE UP (ref 10–20)
BUN SERPL-MCNC: 17 MG/DL — SIGNIFICANT CHANGE UP (ref 10–20)
CALCIUM SERPL-MCNC: 8.5 MG/DL — SIGNIFICANT CHANGE UP (ref 8.4–10.4)
CALCIUM SERPL-MCNC: 8.5 MG/DL — SIGNIFICANT CHANGE UP (ref 8.4–10.4)
CHLORIDE SERPL-SCNC: 105 MMOL/L — SIGNIFICANT CHANGE UP (ref 98–110)
CHLORIDE SERPL-SCNC: 105 MMOL/L — SIGNIFICANT CHANGE UP (ref 98–110)
CO2 SERPL-SCNC: 25 MMOL/L — SIGNIFICANT CHANGE UP (ref 17–32)
CO2 SERPL-SCNC: 25 MMOL/L — SIGNIFICANT CHANGE UP (ref 17–32)
COLOR SPEC: YELLOW — SIGNIFICANT CHANGE UP
COLOR SPEC: YELLOW — SIGNIFICANT CHANGE UP
CREAT SERPL-MCNC: 0.6 MG/DL — LOW (ref 0.7–1.5)
CREAT SERPL-MCNC: 0.6 MG/DL — LOW (ref 0.7–1.5)
DIFF PNL FLD: NEGATIVE — SIGNIFICANT CHANGE UP
DIFF PNL FLD: NEGATIVE — SIGNIFICANT CHANGE UP
EGFR: 90 ML/MIN/1.73M2 — SIGNIFICANT CHANGE UP
EGFR: 90 ML/MIN/1.73M2 — SIGNIFICANT CHANGE UP
EOSINOPHIL # BLD AUTO: 0.24 K/UL — SIGNIFICANT CHANGE UP (ref 0–0.7)
EOSINOPHIL # BLD AUTO: 0.24 K/UL — SIGNIFICANT CHANGE UP (ref 0–0.7)
EOSINOPHIL NFR BLD AUTO: 2.7 % — SIGNIFICANT CHANGE UP (ref 0–8)
EOSINOPHIL NFR BLD AUTO: 2.7 % — SIGNIFICANT CHANGE UP (ref 0–8)
GLUCOSE SERPL-MCNC: 130 MG/DL — HIGH (ref 70–99)
GLUCOSE SERPL-MCNC: 130 MG/DL — HIGH (ref 70–99)
GLUCOSE UR QL: NEGATIVE MG/DL — SIGNIFICANT CHANGE UP
GLUCOSE UR QL: NEGATIVE MG/DL — SIGNIFICANT CHANGE UP
HCT VFR BLD CALC: 27.1 % — LOW (ref 37–47)
HCT VFR BLD CALC: 27.1 % — LOW (ref 37–47)
HGB BLD-MCNC: 8.7 G/DL — LOW (ref 12–16)
HGB BLD-MCNC: 8.7 G/DL — LOW (ref 12–16)
IMM GRANULOCYTES NFR BLD AUTO: 0.4 % — HIGH (ref 0.1–0.3)
IMM GRANULOCYTES NFR BLD AUTO: 0.4 % — HIGH (ref 0.1–0.3)
KETONES UR-MCNC: NEGATIVE MG/DL — SIGNIFICANT CHANGE UP
KETONES UR-MCNC: NEGATIVE MG/DL — SIGNIFICANT CHANGE UP
LEUKOCYTE ESTERASE UR-ACNC: NEGATIVE — SIGNIFICANT CHANGE UP
LEUKOCYTE ESTERASE UR-ACNC: NEGATIVE — SIGNIFICANT CHANGE UP
LYMPHOCYTES # BLD AUTO: 1.45 K/UL — SIGNIFICANT CHANGE UP (ref 1.2–3.4)
LYMPHOCYTES # BLD AUTO: 1.45 K/UL — SIGNIFICANT CHANGE UP (ref 1.2–3.4)
LYMPHOCYTES # BLD AUTO: 16.2 % — LOW (ref 20.5–51.1)
LYMPHOCYTES # BLD AUTO: 16.2 % — LOW (ref 20.5–51.1)
MAGNESIUM SERPL-MCNC: 1.8 MG/DL — SIGNIFICANT CHANGE UP (ref 1.8–2.4)
MAGNESIUM SERPL-MCNC: 1.8 MG/DL — SIGNIFICANT CHANGE UP (ref 1.8–2.4)
MCHC RBC-ENTMCNC: 28.9 PG — SIGNIFICANT CHANGE UP (ref 27–31)
MCHC RBC-ENTMCNC: 28.9 PG — SIGNIFICANT CHANGE UP (ref 27–31)
MCHC RBC-ENTMCNC: 32.1 G/DL — SIGNIFICANT CHANGE UP (ref 32–37)
MCHC RBC-ENTMCNC: 32.1 G/DL — SIGNIFICANT CHANGE UP (ref 32–37)
MCV RBC AUTO: 90 FL — SIGNIFICANT CHANGE UP (ref 81–99)
MCV RBC AUTO: 90 FL — SIGNIFICANT CHANGE UP (ref 81–99)
MONOCYTES # BLD AUTO: 0.64 K/UL — HIGH (ref 0.1–0.6)
MONOCYTES # BLD AUTO: 0.64 K/UL — HIGH (ref 0.1–0.6)
MONOCYTES NFR BLD AUTO: 7.2 % — SIGNIFICANT CHANGE UP (ref 1.7–9.3)
MONOCYTES NFR BLD AUTO: 7.2 % — SIGNIFICANT CHANGE UP (ref 1.7–9.3)
NEUTROPHILS # BLD AUTO: 6.54 K/UL — HIGH (ref 1.4–6.5)
NEUTROPHILS # BLD AUTO: 6.54 K/UL — HIGH (ref 1.4–6.5)
NEUTROPHILS NFR BLD AUTO: 73.2 % — SIGNIFICANT CHANGE UP (ref 42.2–75.2)
NEUTROPHILS NFR BLD AUTO: 73.2 % — SIGNIFICANT CHANGE UP (ref 42.2–75.2)
NITRITE UR-MCNC: NEGATIVE — SIGNIFICANT CHANGE UP
NITRITE UR-MCNC: NEGATIVE — SIGNIFICANT CHANGE UP
NRBC # BLD: 0 /100 WBCS — SIGNIFICANT CHANGE UP (ref 0–0)
NRBC # BLD: 0 /100 WBCS — SIGNIFICANT CHANGE UP (ref 0–0)
PH UR: 5.5 — SIGNIFICANT CHANGE UP (ref 5–8)
PH UR: 5.5 — SIGNIFICANT CHANGE UP (ref 5–8)
PLATELET # BLD AUTO: 249 K/UL — SIGNIFICANT CHANGE UP (ref 130–400)
PLATELET # BLD AUTO: 249 K/UL — SIGNIFICANT CHANGE UP (ref 130–400)
PMV BLD: 9.3 FL — SIGNIFICANT CHANGE UP (ref 7.4–10.4)
PMV BLD: 9.3 FL — SIGNIFICANT CHANGE UP (ref 7.4–10.4)
POTASSIUM SERPL-MCNC: 4.3 MMOL/L — SIGNIFICANT CHANGE UP (ref 3.5–5)
POTASSIUM SERPL-MCNC: 4.3 MMOL/L — SIGNIFICANT CHANGE UP (ref 3.5–5)
POTASSIUM SERPL-SCNC: 4.3 MMOL/L — SIGNIFICANT CHANGE UP (ref 3.5–5)
POTASSIUM SERPL-SCNC: 4.3 MMOL/L — SIGNIFICANT CHANGE UP (ref 3.5–5)
PROT SERPL-MCNC: 5.5 G/DL — LOW (ref 6–8)
PROT SERPL-MCNC: 5.5 G/DL — LOW (ref 6–8)
PROT UR-MCNC: NEGATIVE MG/DL — SIGNIFICANT CHANGE UP
PROT UR-MCNC: NEGATIVE MG/DL — SIGNIFICANT CHANGE UP
RBC # BLD: 3.01 M/UL — LOW (ref 4.2–5.4)
RBC # BLD: 3.01 M/UL — LOW (ref 4.2–5.4)
RBC # FLD: 13.2 % — SIGNIFICANT CHANGE UP (ref 11.5–14.5)
RBC # FLD: 13.2 % — SIGNIFICANT CHANGE UP (ref 11.5–14.5)
SODIUM SERPL-SCNC: 139 MMOL/L — SIGNIFICANT CHANGE UP (ref 135–146)
SODIUM SERPL-SCNC: 139 MMOL/L — SIGNIFICANT CHANGE UP (ref 135–146)
SP GR SPEC: 1.02 — SIGNIFICANT CHANGE UP (ref 1–1.03)
SP GR SPEC: 1.02 — SIGNIFICANT CHANGE UP (ref 1–1.03)
UROBILINOGEN FLD QL: 1 MG/DL — SIGNIFICANT CHANGE UP (ref 0.2–1)
UROBILINOGEN FLD QL: 1 MG/DL — SIGNIFICANT CHANGE UP (ref 0.2–1)
WBC # BLD: 8.94 K/UL — SIGNIFICANT CHANGE UP (ref 4.8–10.8)
WBC # BLD: 8.94 K/UL — SIGNIFICANT CHANGE UP (ref 4.8–10.8)
WBC # FLD AUTO: 8.94 K/UL — SIGNIFICANT CHANGE UP (ref 4.8–10.8)
WBC # FLD AUTO: 8.94 K/UL — SIGNIFICANT CHANGE UP (ref 4.8–10.8)

## 2023-11-21 PROCEDURE — 99233 SBSQ HOSP IP/OBS HIGH 50: CPT

## 2023-11-21 PROCEDURE — 71045 X-RAY EXAM CHEST 1 VIEW: CPT | Mod: 26

## 2023-11-21 RX ORDER — OXYCODONE HYDROCHLORIDE 5 MG/1
5 TABLET ORAL EVERY 4 HOURS
Refills: 0 | Status: DISCONTINUED | OUTPATIENT
Start: 2023-11-21 | End: 2023-11-22

## 2023-11-21 RX ADMIN — LACTULOSE 10 GRAM(S): 10 SOLUTION ORAL at 02:16

## 2023-11-21 RX ADMIN — APIXABAN 2.5 MILLIGRAM(S): 2.5 TABLET, FILM COATED ORAL at 17:36

## 2023-11-21 RX ADMIN — RISPERIDONE 0.5 MILLIGRAM(S): 4 TABLET ORAL at 21:09

## 2023-11-21 RX ADMIN — SENNA PLUS 2 TABLET(S): 8.6 TABLET ORAL at 21:09

## 2023-11-21 RX ADMIN — LACTULOSE 10 GRAM(S): 10 SOLUTION ORAL at 05:30

## 2023-11-21 RX ADMIN — APIXABAN 2.5 MILLIGRAM(S): 2.5 TABLET, FILM COATED ORAL at 05:29

## 2023-11-21 RX ADMIN — Medication 975 MILLIGRAM(S): at 13:12

## 2023-11-21 RX ADMIN — LACTULOSE 10 GRAM(S): 10 SOLUTION ORAL at 09:00

## 2023-11-21 RX ADMIN — Medication 975 MILLIGRAM(S): at 05:29

## 2023-11-21 RX ADMIN — Medication 975 MILLIGRAM(S): at 01:40

## 2023-11-21 RX ADMIN — PREGABALIN 1000 MICROGRAM(S): 225 CAPSULE ORAL at 11:18

## 2023-11-21 RX ADMIN — LACTULOSE 10 GRAM(S): 10 SOLUTION ORAL at 17:35

## 2023-11-21 RX ADMIN — LACTULOSE 10 GRAM(S): 10 SOLUTION ORAL at 21:09

## 2023-11-21 RX ADMIN — OXYCODONE HYDROCHLORIDE 10 MILLIGRAM(S): 5 TABLET ORAL at 05:32

## 2023-11-21 RX ADMIN — POLYETHYLENE GLYCOL 3350 17 GRAM(S): 17 POWDER, FOR SOLUTION ORAL at 17:36

## 2023-11-21 RX ADMIN — Medication 975 MILLIGRAM(S): at 22:10

## 2023-11-21 RX ADMIN — Medication 975 MILLIGRAM(S): at 06:05

## 2023-11-21 RX ADMIN — Medication 50 MILLIGRAM(S): at 05:30

## 2023-11-21 RX ADMIN — TAMSULOSIN HYDROCHLORIDE 0.4 MILLIGRAM(S): 0.4 CAPSULE ORAL at 21:09

## 2023-11-21 RX ADMIN — LACTULOSE 10 GRAM(S): 10 SOLUTION ORAL at 13:12

## 2023-11-21 RX ADMIN — POLYETHYLENE GLYCOL 3350 17 GRAM(S): 17 POWDER, FOR SOLUTION ORAL at 05:30

## 2023-11-21 RX ADMIN — ATORVASTATIN CALCIUM 40 MILLIGRAM(S): 80 TABLET, FILM COATED ORAL at 21:09

## 2023-11-21 RX ADMIN — Medication 975 MILLIGRAM(S): at 21:08

## 2023-11-21 NOTE — PROGRESS NOTE ADULT - ASSESSMENT
83-year-old woman with a past history of mood disorder, hallucination, hyperthyroidism, recent CVA 2/2 p Afib who presented from home s/p mechanical  fall.     # Confusion, metabolic encephalopathy vs hospital acquired delirium   - reduce opiate use   - frequent reorientation   - avoid sedatives   - check blood culture, UA, urine culture   - check CXR, RVP (has cough)  - check labs (refused morning labs)   - start abx if UA is positive or CXR shows opacity     # Fall w/ right humeral neck fx  # Vitamin b12 deficiency  - no intervention per ortho  - PT/OT  - orthostatics negative   - cyanocobalamin 1000mcg IM daily x 7 days, then weekly x 1 month, then monthly thereafter     # Constipation   - on senna and Miralax    - c/w lactulose     #Hx recent CVA  - c/w Eliquis and statin     #Mood disorder  - c/w home Risperidone 0.5mg qHS    #Paroxysmal Afib  - c/w home Eliquis 2.5mg BID   - c/w home Toprol 50mg     #Hx urinary retention  - Flomax    #Hyperthyroidism  - on home methimazole   - endo consulted appreciated - continue methimazole 5 mg for 3 months and repeat TFTs and TSI as OP    DVT Prophylaxis: Eliquis    Pending: SCOTT w/u   Spoke to son over the phone 11/20  Dispo: Home, son does not want STR

## 2023-11-21 NOTE — DISCHARGE NOTE PROVIDER - NSDCMRMEDTOKEN_GEN_ALL_CORE_FT
apixaban 2.5 mg oral tablet: 1 tab(s) orally every 12 hours  atorvastatin 40 mg oral tablet: 1 tab(s) orally once a day (at bedtime)  methIMAzole 5 mg oral tablet: 1 orally once a day  metoprolol succinate 50 mg oral capsule, extended release: 1 orally once a day  risperiDONE 0.5 mg oral tablet: 1 tab(s) orally once a day (at bedtime)   acetaminophen 325 mg oral capsule: 2 cap(s) orally every 6 hours as needed for pain  alendronate 10 mg oral tablet: 1 tab(s) orally once a day  apixaban 2.5 mg oral tablet: 1 tab(s) orally every 12 hours  atorvastatin 40 mg oral tablet: 1 tab(s) orally once a day (at bedtime)  B-12 1000 mcg sublingual tablet: 1 tab(s) sublingual 2 times a day  famotidine 20 mg oral tablet: 1 tab(s) orally 2 times a day  ibuprofen 800 mg oral tablet: 1 tab(s) orally every 8 hours as needed for  pain  methIMAzole 5 mg oral tablet: 1 tab(s) orally once a day  metoprolol succinate 50 mg oral capsule, extended release: 1 orally once a day  polyethylene glycol 3350 oral powder for reconstitution: 17 gram(s) orally 2 times a day as needed for  constipation  risperiDONE 0.5 mg oral tablet: 1 tab(s) orally once a day (at bedtime)  senna leaf extract oral tablet: 2 tab(s) orally once a day (at bedtime) as needed for  constipation  tamsulosin 0.4 mg oral capsule: 1 cap(s) orally once a day (at bedtime)

## 2023-11-21 NOTE — DISCHARGE NOTE PROVIDER - NSDCCPCAREPLAN_GEN_ALL_CORE_FT
PRINCIPAL DISCHARGE DIAGNOSIS  Diagnosis: Fall  Assessment and Plan of Treatment: You were found to have a fall, possibly due to cognitive changes caused by vitamin B12 deficiency or orthostatic hypotension (blood pressure falling when you stand up). This resulted in a fracture that orthopedics evaluated but did not require any surgical intervention. Please follow up with your primary care doctor and orthopedic doctor. Also take vitamin B12 and get your B12 levels and thyroid levels checked.     PRINCIPAL DISCHARGE DIAGNOSIS  Diagnosis: Fall  Assessment and Plan of Treatment: You were found to have a fall, possibly due to cognitive changes caused by vitamin B12 deficiency or orthostatic hypotension (blood pressure falling when you stand up). This resulted in a fracture that orthopedics evaluated but did not require any surgical intervention. Please follow up with your primary care doctor and orthopedic doctor. Also take vitamin B12 and get your B12 levels and thyroid levels checked. Please return to the ED if you feel unwell or if you experience any of the following but not limited to: severe pain, confusion, fall, head injury

## 2023-11-21 NOTE — PROGRESS NOTE ADULT - SUBJECTIVE AND OBJECTIVE BOX
Pt seen and examined at bedside.         VITAL SIGNS (Last 24 hrs):  T(C): 36.4 (11-21-23 @ 12:23), Max: 36.7 (11-21-23 @ 04:14)  HR: 88 (11-21-23 @ 12:23) (73 - 97)  BP: 122/64 (11-21-23 @ 12:23) (108/57 - 157/71)  RR: 18 (11-21-23 @ 12:23) (18 - 18)  SpO2: 98% (11-20-23 @ 21:12) (98% - 98%)  Wt(kg): --  Daily     Daily     I&O's Summary      PHYSICAL EXAM:  GENERAL: NAD   HEAD:  Atraumatic, Normocephalic  EYES: conjunctiva and sclera clear  NECK: Supple, No JVD  CHEST/LUNG: Clear to auscultation bilaterally; No wheeze  HEART: Regular rate and rhythm; No murmurs, rubs, or gallops  ABDOMEN: Soft, Nontender, Nondistended; Bowel sounds present  EXTREMITIES:  2+ Peripheral Pulses, No clubbing, cyanosis, or edema  NEUROLOGY: alert, however confused, dysarthria at baseline, uncooperative with exam   SKIN: No rashes or lesions    Labs Reviewed        CBC Full  -  ( 20 Nov 2023 07:02 )  WBC Count : 8.53 K/uL  Hemoglobin : 9.1 g/dL  Hematocrit : 28.8 %  Platelet Count - Automated : 237 K/uL  Mean Cell Volume : 92.3 fL  Mean Cell Hemoglobin : 29.2 pg  Mean Cell Hemoglobin Concentration : 31.6 g/dL  Auto Neutrophil # : 5.63 K/uL  Auto Lymphocyte # : 1.99 K/uL  Auto Monocyte # : 0.58 K/uL  Auto Eosinophil # : 0.26 K/uL  Auto Basophil # : 0.04 K/uL  Auto Neutrophil % : 66.0 %  Auto Lymphocyte % : 23.3 %  Auto Monocyte % : 6.8 %  Auto Eosinophil % : 3.0 %  Auto Basophil % : 0.5 %    BMP:    11-20 @ 07:02    Blood Urea Nitrogen - 23  Calcium - 8.5  Carbond Dioxide - 24  Chloride - 106  Creatinine - 0.8  Glucose - 114  Potassium - 4.1  Sodium - 140      Hemoglobin A1c -   PT/INR - ( 19 Nov 2023 08:20 )   PT: 15.10 sec;   INR: 1.32 ratio         PTT - ( 19 Nov 2023 08:20 )  PTT:33.1 sec  Urine Culture:            MEDICATIONS  (STANDING):  acetaminophen     Tablet .. 975 milliGRAM(s) Oral every 8 hours  apixaban 2.5 milliGRAM(s) Oral two times a day  atorvastatin 40 milliGRAM(s) Oral at bedtime  cyanocobalamin Injectable 1000 MICROGram(s) IntraMuscular every 24 hours  influenza  Vaccine (HIGH DOSE) 0.7 milliLiter(s) IntraMuscular once  lactated ringers. 1000 milliLiter(s) (75 mL/Hr) IV Continuous <Continuous>  lactulose Syrup 10 Gram(s) Oral every 4 hours  metoprolol succinate ER 50 milliGRAM(s) Oral daily  polyethylene glycol 3350 17 Gram(s) Oral two times a day  risperiDONE   Tablet 0.5 milliGRAM(s) Oral at bedtime  senna 2 Tablet(s) Oral at bedtime  tamsulosin 0.4 milliGRAM(s) Oral at bedtime    MEDICATIONS  (PRN):  aluminum hydroxide/magnesium hydroxide/simethicone Suspension 30 milliLiter(s) Oral every 4 hours PRN Dyspepsia  melatonin 3 milliGRAM(s) Oral at bedtime PRN Insomnia  ondansetron Injectable 4 milliGRAM(s) IV Push every 8 hours PRN Nausea and/or Vomiting  oxyCODONE    IR 10 milliGRAM(s) Oral every 4 hours PRN Severe Pain (7 - 10)

## 2023-11-21 NOTE — DISCHARGE NOTE PROVIDER - HOSPITAL COURSE
83-year-old woman with a past history of mood disorder, hallucination, hyperthyroidism, pAfib on eliquis, recent admission for CVA 2/2 holding eliquis presents s/p unwitnessed fall at home.    Per pt she was walking to the restroom using walker from bed and tripped falling to her R shoulder.  While initially pt denied any prodromal syndrome did later say that she felt a little light headed prior. Pt's  confirms pt's claim that she had no LOC, no head trauma.     ED  /83 admission ekg AF 88 RR 20 99% on RA T 98.7  WBC 12.3 Lac 4.1 --> 3.4 on repeat  CT H negative  XR RUE proximal hum fx    Pt seen by ortho, splint, no surgical intervention    Pt admitted to medicine for placement, pain management, syncopal w/u    # Fall w/fracture likely secondary to Vitamin b12 deficiency induced vs orthostatic hypotension  - orthostatics as tolerated  - cyanocobalamin 1000mcg IM qd day 2/7 then weekly x 1 month, then monthly thereafter     #fracture  - no surgical intervention per ortho  - oxycodone IR 10mg qd  - lactulose  - miralax  - Should be on Prolia or bisphosphonate, can be given as OP  - DEXA as OP    #low urine output likely secondary to dehydration  #Hx urinary retention  - low bladder scan volume  - Flomax  - IVF    #anemia  - monitor Hgb last resulted 9.3, downtrending  - Get iron/TIBC with ferritin  - Can replete with 2000 mcg cobalamin for 8 weeks and repeat levels after that    #Hx recent CVA  - pt is very BP sensitive. Avoid hypotension    #Mood disorder  - c/w home Risperidone 0.5mg qHS    #Paroxysmal Afib  - c/w home Eliquis 2.5mg BID   - c/w home Toprol 50    #Hyperthyroidism  - continue methimazole 5 mg for 3 months and repeat TFTs and TSI as OP    - DVT Prophylaxis: Eliquis    #rehab  - PT recommendations appreciated   - OT recommendations appreciated   - Home PT vs Rehab pending progress  - Pt requires assistance with ADLs and functional transfers, will benefit from rehab facility upon d/c  - son prefers taking patient home over rehab since he believes she may not benefit from rehab due to limitations secondary to humeral fracture pain    #Handoff: see points bolded above and any points listed below   - Should be on Prolia or bisphosphonate, can be given as OP  - DEXA as OP  - oxycodone IR 10mg qd  - lactulose  - miralax  - Get iron/TIBC with ferritin  - Can replete with 2000 mcg cobalamin for 8 weeks and repeat levels after that  - continue methimazole 5 mg for 3 months and repeat TFTs and TSI as OP 83-year-old woman with a past history of mood disorder, hallucination, hyperthyroidism, pAfib on eliquis, recent admission for CVA 2/2 holding eliquis presents s/p unwitnessed fall at home.    Per pt she was walking to the restroom using walker from bed and tripped falling to her R shoulder.  While initially pt denied any prodromal syndrome did later say that she felt a little light headed prior. Pt's  confirms pt's claim that she had no LOC, no head trauma.     ED  /83 admission ekg AF 88 RR 20 99% on RA T 98.7  WBC 12.3 Lac 4.1 --> 3.4 on repeat  CT H negative  XR RUE proximal hum fx    Pt seen by ortho, splint, no surgical intervention    Pt admitted to medicine for placement, pain management, syncopal w/u    # Fall w/fracture likely secondary to Vitamin b12 deficiency induced vs orthostatic hypotension  - orthostatics as tolerated  - cyanocobalamin 1000mcg IM qd day 2/7 then weekly x 1 month, then monthly thereafter     #fracture  - no surgical intervention per ortho  - no surgical intervention per neurosurgery  - oxycodone IR 10mg qd  - lactulose  - miralax  - Should be on Prolia or bisphosphonate, can be given as OP  - DEXA as OP    #low urine output likely secondary to dehydration  #Hx urinary retention  - low bladder scan volume  - Flomax  - IVF    #anemia  - monitor Hgb last resulted 9.3, downtrending  - Get iron/TIBC with ferritin  - Can replete with 2000 mcg cobalamin for 8 weeks and repeat levels after that    #Hx recent CVA  - pt is very BP sensitive. Avoid hypotension    #Mood disorder  - c/w home Risperidone 0.5mg qHS    #Paroxysmal Afib  - c/w home Eliquis 2.5mg BID   - c/w home Toprol 50    #Hyperthyroidism  - continue methimazole 5 mg for 3 months and repeat TFTs and TSI as OP    - DVT Prophylaxis: Eliquis    #rehab  - PT recommendations appreciated   - OT recommendations appreciated   - Home PT vs Rehab pending progress  - Pt requires assistance with ADLs and functional transfers, will benefit from rehab facility upon d/c  - son prefers taking patient home over rehab since he believes she may not benefit from rehab due to limitations secondary to humeral fracture pain    #Handoff: see points bolded above and any points listed below   - Should be on Prolia or bisphosphonate, can be given as OP  - DEXA as OP  - oxycodone IR 10mg qd  - lactulose  - miralax  - Get iron/TIBC with ferritin  - Can replete with 2000 mcg cobalamin for 8 weeks and repeat levels after that  - continue methimazole 5 mg for 3 months and repeat TFTs and TSI as OP 83-year-old woman with a past history of mood disorder, hallucination, hyperthyroidism, pAfib on eliquis, recent admission for CVA 2/2 holding eliquis presents s/p unwitnessed fall at home.    Per pt she was walking to the restroom using walker from bed and tripped falling to her R shoulder.  While initially pt denied any prodromal syndrome did later say that she felt a little light headed prior. Pt's  confirms pt's claim that she had no LOC, no head trauma.     ED  /83 admission ekg AF 88 RR 20 99% on RA T 98.7  WBC 12.3 Lac 4.1 --> 3.4 on repeat  CT H negative  XR RUE proximal hum fx    Pt seen by ortho, splint, no surgical intervention    Pt admitted to medicine for placement, pain management, syncopal w/u    # Fall w/fracture likely secondary to Vitamin b12 deficiency induced vs orthostatic hypotension  # Vitamin b12 deficiency  - orthostatics as tolerated  - cyanocobalamin 1000mcg IM qd day 2/7 then weekly x 1 month, then monthly thereafter     ## Fall w/ right humeral neck fx and T5 fracture in setting of recent stroke  - no surgical intervention per ortho  - no surgical intervention per neurosurgery  - oxycodone IR 10mg qd -> dc with ibuprofen 800 mg and tylenol 650mg for Pain  - lactulose  - miralax  - Should be on Prolia or bisphosphonate, can be given as OP  - DEXA as OP    #low urine output likely secondary to dehydration  #Hx urinary retention  - low bladder scan volume  - Flomax  - IVF    #anemia  # Vitamin b12 deficiency  - monitor Hgb last resulted 9.3, downtrending  - Get iron/TIBC with ferritin  - Can replete with 2000 mcg cobalamin for 8 weeks and repeat levels after that    #Hx recent CVA  - pt is very BP sensitive. Avoid hypotension    #Mood disorder  - c/w home Risperidone 0.5mg qHS    #Paroxysmal Afib  - c/w home Eliquis 2.5mg BID   - c/w home Toprol 50    #Hyperthyroidism  - continue methimazole 5 mg for 3 months and repeat TFTs and TSI as OP    - DVT Prophylaxis: Eliquis    #rehab  - PT recommendations appreciated   - OT recommendations appreciated   - Home PT vs Rehab pending progress  - Pt requires assistance with ADLs and functional transfers, will benefit from rehab facility upon d/c  - son prefers taking patient home over rehab since he believes she may not benefit from rehab due to limitations secondary to humeral fracture pain    #Handoff: see points bolded above and any points listed below   - Should be on Prolia or bisphosphonate, can be given as OP  - DEXA as OP  - oxycodone IR 10mg qd  - lactulose  - miralax  - Get iron/TIBC with ferritin  - Can replete with 2000 mcg cobalamin for 8 weeks and repeat levels after that  - continue methimazole 5 mg for 3 months and repeat TFTs and TSI as OP

## 2023-11-21 NOTE — DISCHARGE NOTE PROVIDER - NSDCHHATTENDCERT_GEN_ALL_CORE
pt p/w c/o atraumatic b/l hip pain and unable to walk.
My signature below certifies that the above stated patient is homebound and upon completion of the Face-To-Face encounter, has the need for intermittent skilled nursing, physical therapy and/or speech or occupational therapy services in their home for their current diagnosis as outlined in their initial plan of care. These services will continue to be monitored by myself or another physician.

## 2023-11-21 NOTE — DISCHARGE NOTE PROVIDER - CARE PROVIDER_API CALL
Isreal Tee  Internal Medicine  [address] [facility] [facility]  [place] [address]  Phone: [phone]  Fax: [phone]  Follow Up Time: 2 weeks    Wilton Santos  Orthopaedic Surgery  [address] [place] [id number]  Phone: [phone]  Fax: [phone]  Follow Up Time: 2 weeks

## 2023-11-21 NOTE — DISCHARGE NOTE NURSING/CASE MANAGEMENT/SOCIAL WORK - PATIENT PORTAL LINK FT
You can access the FollowMyHealth Patient Portal offered by [facility] by registering at the following website: [email or web address]. By joining [facility]’s FollowMyHealth portal, you will also be able to view your health information using other applications (apps) compatible with our system.

## 2023-11-21 NOTE — PROVIDER CONTACT NOTE (OTHER) - SITUATION
Pt is very combative, not participating with , sligh facial droop on R side face, as per son this is normal

## 2023-11-21 NOTE — DISCHARGE NOTE PROVIDER - PROVIDER TOKENS
PROVIDER:[TOKEN:[[id number]],FOLLOWUP:[2 weeks]],PROVIDER:[TOKEN:[[id number]],FOLLOWUP:[2 weeks]]

## 2023-11-22 VITALS
DIASTOLIC BLOOD PRESSURE: 71 MMHG | SYSTOLIC BLOOD PRESSURE: 115 MMHG | RESPIRATION RATE: 18 BRPM | HEART RATE: 118 BPM | TEMPERATURE: 97 F

## 2023-11-22 LAB
RAPID RVP RESULT: SIGNIFICANT CHANGE UP
RAPID RVP RESULT: SIGNIFICANT CHANGE UP
SARS-COV-2 RNA SPEC QL NAA+PROBE: SIGNIFICANT CHANGE UP
SARS-COV-2 RNA SPEC QL NAA+PROBE: SIGNIFICANT CHANGE UP

## 2023-11-22 PROCEDURE — 99232 SBSQ HOSP IP/OBS MODERATE 35: CPT

## 2023-11-22 PROCEDURE — 99239 HOSP IP/OBS DSCHRG MGMT >30: CPT

## 2023-11-22 RX ORDER — POLYETHYLENE GLYCOL 3350 17 G/17G
17 POWDER, FOR SOLUTION ORAL
Qty: 2 | Refills: 0
Start: 2023-11-22 | End: 2023-12-21

## 2023-11-22 RX ORDER — ACETAMINOPHEN 500 MG
2 TABLET ORAL
Qty: 240 | Refills: 0
Start: 2023-11-22 | End: 2023-12-21

## 2023-11-22 RX ORDER — SENNA PLUS 8.6 MG/1
2 TABLET ORAL
Qty: 60 | Refills: 0
Start: 2023-11-22 | End: 2023-12-21

## 2023-11-22 RX ORDER — PREGABALIN 225 MG/1
1 CAPSULE ORAL
Qty: 112 | Refills: 0
Start: 2023-11-22 | End: 2024-01-16

## 2023-11-22 RX ORDER — PREGABALIN 225 MG/1
1 CAPSULE ORAL
Qty: 2 | Refills: 0
Start: 2023-11-22 | End: 2024-01-16

## 2023-11-22 RX ORDER — IBUPROFEN 200 MG
1 TABLET ORAL
Qty: 90 | Refills: 0
Start: 2023-11-22 | End: 2023-12-21

## 2023-11-22 RX ORDER — IBUPROFEN 200 MG
1 TABLET ORAL
Qty: 42 | Refills: 0
Start: 2023-11-22 | End: 2023-12-05

## 2023-11-22 RX ORDER — ALENDRONATE SODIUM 70 MG/1
1 TABLET ORAL
Qty: 30 | Refills: 0
Start: 2023-11-22 | End: 2023-12-21

## 2023-11-22 RX ORDER — POLYETHYLENE GLYCOL 3350 17 G/17G
17 POWDER, FOR SOLUTION ORAL
Qty: 1020 | Refills: 0
Start: 2023-11-22 | End: 2023-12-21

## 2023-11-22 RX ORDER — TAMSULOSIN HYDROCHLORIDE 0.4 MG/1
1 CAPSULE ORAL
Qty: 30 | Refills: 0
Start: 2023-11-22 | End: 2023-12-21

## 2023-11-22 RX ORDER — MORPHINE SULFATE 50 MG/1
30 CAPSULE, EXTENDED RELEASE ORAL
Refills: 0 | Status: DISCONTINUED | OUTPATIENT
Start: 2023-11-22 | End: 2023-11-22

## 2023-11-22 RX ORDER — ACETAMINOPHEN 500 MG
2 TABLET ORAL
Qty: 180 | Refills: 0
Start: 2023-11-22 | End: 2023-12-21

## 2023-11-22 RX ORDER — FAMOTIDINE 10 MG/ML
1 INJECTION INTRAVENOUS
Qty: 28 | Refills: 0
Start: 2023-11-22 | End: 2023-12-05

## 2023-11-22 RX ORDER — METHIMAZOLE 10 MG/1
1 TABLET ORAL
Qty: 0 | Refills: 0 | DISCHARGE
Start: 2023-11-22

## 2023-11-22 RX ADMIN — Medication 975 MILLIGRAM(S): at 05:29

## 2023-11-22 RX ADMIN — Medication 975 MILLIGRAM(S): at 06:11

## 2023-11-22 RX ADMIN — POLYETHYLENE GLYCOL 3350 17 GRAM(S): 17 POWDER, FOR SOLUTION ORAL at 18:08

## 2023-11-22 RX ADMIN — OXYCODONE HYDROCHLORIDE 5 MILLIGRAM(S): 5 TABLET ORAL at 12:50

## 2023-11-22 RX ADMIN — LACTULOSE 10 GRAM(S): 10 SOLUTION ORAL at 14:20

## 2023-11-22 RX ADMIN — POLYETHYLENE GLYCOL 3350 17 GRAM(S): 17 POWDER, FOR SOLUTION ORAL at 05:29

## 2023-11-22 RX ADMIN — Medication 50 MILLIGRAM(S): at 05:29

## 2023-11-22 RX ADMIN — MORPHINE SULFATE 30 MILLIGRAM(S): 50 CAPSULE, EXTENDED RELEASE ORAL at 18:05

## 2023-11-22 RX ADMIN — LACTULOSE 10 GRAM(S): 10 SOLUTION ORAL at 18:07

## 2023-11-22 RX ADMIN — OXYCODONE HYDROCHLORIDE 5 MILLIGRAM(S): 5 TABLET ORAL at 12:08

## 2023-11-22 RX ADMIN — LACTULOSE 10 GRAM(S): 10 SOLUTION ORAL at 05:30

## 2023-11-22 RX ADMIN — Medication 975 MILLIGRAM(S): at 15:00

## 2023-11-22 RX ADMIN — APIXABAN 2.5 MILLIGRAM(S): 2.5 TABLET, FILM COATED ORAL at 05:29

## 2023-11-22 RX ADMIN — APIXABAN 2.5 MILLIGRAM(S): 2.5 TABLET, FILM COATED ORAL at 18:05

## 2023-11-22 RX ADMIN — LACTULOSE 10 GRAM(S): 10 SOLUTION ORAL at 01:01

## 2023-11-22 RX ADMIN — PREGABALIN 1000 MICROGRAM(S): 225 CAPSULE ORAL at 12:07

## 2023-11-22 RX ADMIN — Medication 975 MILLIGRAM(S): at 14:16

## 2023-11-22 NOTE — CONSULT NOTE ADULT - NS ATTEND AMEND GEN_ALL_CORE FT
Films reviewed and patient discussed. Pt with T5 compression fx new since 2020. Pt with no sig pain on exam. Fracture is stable. No intervention is required. Pain management as needed. PT/OT. fall precautions and syncopal w/u recommended. No neurosurgical intervention needed at this time. Please reconsult as needed.     Pt can follow up as outpt if needed.

## 2023-11-22 NOTE — PROGRESS NOTE ADULT - SUBJECTIVE AND OBJECTIVE BOX
MEDICINE ATTENDING PROGRESS NOTE  83-year-old woman with a past history of mood disorder, hallucination, hyperthyroidism, recent CVA 2/2 p Afib who presented from home s/p mechanical  fall.     Interval/Overnight Events  - No acute complaints today  - f/u infectious work up for AMS  - Will call NSG for T5 fracture  - Ortho: no surgical intervention for right proximal humeral fracture  - still c/o pain; MS Contin 30mg BID and will continue oxycodone 5mg IR; pt on bowel prep    ROS  General: Denies fevers, chills, nightsweats, weight loss  HEENT: Denies headache, rhinorrhea, sore throat, eye pain  CV: Denies CP, palpitations  PULM: Denies SOB, cough  GI: Denies abdominal pain, diarrhea  : Denies dysuria, hematuria  MSK: Denies arthralgias  SKIN: Denies rash   NEURO: Denies paresthesias, weakness  PSYCH: Denies depression    MEDICATIONS  (STANDING):  acetaminophen     Tablet .. 975 milliGRAM(s) Oral every 8 hours  apixaban 2.5 milliGRAM(s) Oral two times a day  atorvastatin 40 milliGRAM(s) Oral at bedtime  cyanocobalamin Injectable 1000 MICROGram(s) IntraMuscular every 24 hours  influenza  Vaccine (HIGH DOSE) 0.7 milliLiter(s) IntraMuscular once  lactated ringers. 1000 milliLiter(s) (75 mL/Hr) IV Continuous <Continuous>  lactulose Syrup 10 Gram(s) Oral every 4 hours  methimazole 5 milliGRAM(s) Oral daily  metoprolol succinate ER 50 milliGRAM(s) Oral daily  polyethylene glycol 3350 17 Gram(s) Oral two times a day  risperiDONE   Tablet 0.5 milliGRAM(s) Oral at bedtime  senna 2 Tablet(s) Oral at bedtime  tamsulosin 0.4 milliGRAM(s) Oral at bedtime    MEDICATIONS  (PRN):  aluminum hydroxide/magnesium hydroxide/simethicone Suspension 30 milliLiter(s) Oral every 4 hours PRN Dyspepsia  melatonin 3 milliGRAM(s) Oral at bedtime PRN Insomnia  ondansetron Injectable 4 milliGRAM(s) IV Push every 8 hours PRN Nausea and/or Vomiting  oxyCODONE    IR 5 milliGRAM(s) Oral every 4 hours PRN Severe Pain (7 - 10)    ANTIBIOTICS:      VITALS:  T(F): 97.7, Max: 97.7 (23 @ 05:25)  HR: 83  BP: 110/62  RR: 18Vital Signs Last 24 Hrs  T(C): 36.5 (2023 05:25), Max: 36.5 (2023 05:25)  T(F): 97.7 (2023 05:25), Max: 97.7 (2023 05:25)  HR: 83 (2023 05:25) (83 - 94)  BP: 110/62 (2023 05:25) (101/58 - 122/64)  BP(mean): --  RR: 18 (2023 05:25) (18 - 19)  SpO2: --     I&O's Summary    PHYSICAL EXAM:  Gen: NAD, resting in bed  HEENT: Normocephalic, atraumatic  Neck: supple, no lymphadenopathy  CV: Regular rate & regular rhythm  Lungs: CTABL no wheeze  Abdomen: Soft, NTND+ BS present  Ext: Warm, well perfused no CCE  Neuro: non focal, awake, CN II-XII intact   Skin: no rash, no erythema  Psych: no SI, HI, Hallucination     LABS:                        8.7    8.94  )-----------( 249      ( 2023 15:44 )             27.1         139  |  105  |  17  ----------------------------<  130<H>  4.3   |  25  |  0.6<L>    Ca    8.5      2023 15:44  Mg     1.8         TPro  5.5<L>  /  Alb  3.2<L>  /  TBili  0.5  /  DBili  x   /  AST  15  /  ALT  6   /  AlkPhos  92      LIVER FUNCTIONS - ( 2023 15:44 )  Alb: 3.2 g/dL / Pro: 5.5 g/dL / ALK PHOS: 92 U/L / ALT: 6 U/L / AST: 15 U/L / GGT: x             MICROBIOLOGY:  Urinalysis Basic - ( 2023 16:35 )  Color: Yellow / Appearance: Clear / S.019 / pH: x  Gluc: x / Ketone: Negative mg/dL  / Bili: Negative / Urobili: 1.0 mg/dL   Blood: x / Protein: Negative mg/dL / Nitrite: Negative   Leuk Esterase: Negative / RBC: x / WBC x   Sq Epi: x / Non Sq Epi: x / Bacteria: x    SARS-CoV-2: NotDetec (2023 15:48)  SARS-CoV-2: NotDetec (21 Sep 2023 12:03)    Lactate, Blood: 1.9 mmol/L (23 @ 07:20)  Lactate, Blood: 3.4 mmol/L (23 @ 22:50)  Lactate, Blood: 4.1 mmol/L (23 @ 18:34)    MRSA PCR Result.: Negative (23 @ 05:30)      IMAGING:  CXR  Xray Chest 1 View AP/PA:   ACC: [id number] EXAM:  XR CHEST 1 VIEW   ORDERED BY: ANGIE ALEGRIA     PROCEDURE DATE:  2023          INTERPRETATION:  Clinical History / Reason for exam: Trauma.    Comparison : Chest radiograph 2023.    Technique/Positioning: Frontal portable.    Findings:    Support devices: None.    Cardiac/mediastinum/hilum: Unremarkable.    Lung parenchyma/Pleura: Within normal limits.    Skeleton/soft tissues: Posttraumatic appearance of the right bony thorax.    Impression:    Unchanged appearance of the thorax.        --- End of Report ---            KIKI OTOOLE MD; Attending Interventional Radiologist  This document has been electronically signed. 2023  6:53AM (23 @ 16:44)      CT    CARDIOLOGY TESTING  12 Lead ECG:   Ventricular Rate 88 BPM    Atrial Rate 87 BPM    QRS Duration 52 ms    Q-T Interval 360 ms    QTC Calculation(Bazett) 435 ms    R Axis 33 degrees    T Axis 27 degrees    Diagnosis Line Atrial fibrillation  Low voltage QRS  Cannot rule out Anteroseptal infarct , age undetermined  Abnormal ECG    Confirmed by Art Oliva ([id number]) on 2023 1:35:07 PM (23 @ 23:55)      ASSESSMENT/PLAN:  83-year-old woman with a past history of mood disorder, hallucination, hyperthyroidism, recent CVA 2/2 p Afib who presented from home s/p mechanical  fall.     # Confusion, metabolic encephalopathy vs hospital acquired delirium   - reduce opiate to oxycodone 5mg IR as breakthrough, add MS Contin 30mg BID  - frequent reorientation   - avoid sedatives   - check blood culture, UA, urine culture   - check CXR, RVP (has cough)  - check labs (refused morning labs)   - start abx if UA is positive or CXR shows opacity     # Fall w/ right humeral neck fx and T5 fracture in setting of recent stroke  # Vitamin b12 deficiency  - no intervention per ortho  - PT/OT  - orthostatics negative   - Will call NSG for T5 fracture  - cyanocobalamin 1000mcg IM daily x 7 days, then weekly x 1 month, then monthly thereafter     # Constipation   - on senna and Miralax    - c/w lactulose     #Hx recent CVA  - c/w Eliquis and statin     #Mood disorder  - c/w home Risperidone 0.5mg qHS    #Paroxysmal Afib  - c/w home Eliquis 2.5mg BID   - c/w home Toprol 50mg     #Hx urinary retention  - Flomax    #Hyperthyroidism  - on home methimazole   - endo consulted appreciated - continue methimazole 5 mg for 3 months and repeat TFTs and TSI as OP    Pending: AMS w/u   Spoke to son over the phone   Dispo: Home, son does not want STR     #Supportive Management:  Dispo:   DVT Ppx: apixaban 2.5 milliGRAM(s) Oral two times a day  GI Ppx: Diet:  Diet, Regular:   High Fiber  DASH/TLC Sodium & Cholesterol Restricted (23 @ 08:42) [Active]    Total time spent to complete patient's bedside assessment, review medical chart, discuss medical plan of care with covering medical team was more than 45 minutes with >50% of time spent face to face with patient, discussion with patient/family and/or coordination of care    Resident's notes reviewed. My notes supersede resident's notes in case of discrepancy.    Bravo Eaton MD/Carmen  Attending Physician

## 2023-11-22 NOTE — CHART NOTE - NSCHARTNOTEFT_GEN_A_CORE
Nurse reached out to check if patient can be discharged after NSG saw her. Called Dr Eaton and asked about the possibility. He told me to discharge the patient.

## 2023-11-22 NOTE — CONSULT NOTE ADULT - SUBJECTIVE AND OBJECTIVE BOX
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    Per pt she was walking to the restroom using walker from bed and tripped falling to her R shoulder.  While initially pt denied any prodromal syndrome did later say that she felt a little light headed prior. Pt's  confirms pt's claim that she had no LOC, no head trauma.     ED  /83 admission ekg AF 88 RR 20 99% on RA T 98.7  WBC 12.3 Lac 4.1 --> 3.4 on repeat  CT H negative  XR RUE proximal hum fx    Pt seen by ortho, splint, no surgical intervention    Pt admitted to medicine for placement, pain management, syncopal w/u (2023 14:48)    Called by medical team regarding findings on CT A/P of L2 compression fracture, new from .  Pt seen and examined at bedside with son present.  Pt awake, alert, denies back pain or radicular pain.  Has prima fit for urine. Pt walks with a walker at a baseline for many years and has had trouble walking since her stroke a couple of years ago.        PAST MEDICAL & SURGICAL HISTORY:  Thyroid disease          Imaging:  < from: CT Abdomen and Pelvis w/ IV Cont (23 @ 19:07) >    IMPRESSION:    1. Right proximal humeral fracture with surrounding hemorrhage/edema.  2. T5 compression fracture, new from .  3. Otherwise, no acute traumatic injury to the chest, abdomen, or pelvis.    < end of copied text >    Home Medications:  methIMAzole 5 mg oral tablet: 1 orally once a day (2023 14:41)  metoprolol succinate 50 mg oral capsule, extended release: 1 orally once a day (2023 14:41)  risperiDONE 0.5 mg oral tablet: 1 tab(s) orally once a day (at bedtime) (2023 14:41)      Allergies    No Known Allergies    Intolerances        ROS:  [ x ] A ten-point review of systems is negative except as noted   [  ] Due to altered mental status/intubation, subjective information were not able to be obtained from the patient. History was obtained, to the extent possible, from review of the chart and collateral sources of information    MEDICATIONS  (STANDING):  acetaminophen     Tablet .. 975 milliGRAM(s) Oral every 8 hours  apixaban 2.5 milliGRAM(s) Oral two times a day  atorvastatin 40 milliGRAM(s) Oral at bedtime  cyanocobalamin Injectable 1000 MICROGram(s) IntraMuscular every 24 hours  influenza  Vaccine (HIGH DOSE) 0.7 milliLiter(s) IntraMuscular once  lactated ringers. 1000 milliLiter(s) (75 mL/Hr) IV Continuous <Continuous>  lactulose Syrup 10 Gram(s) Oral every 4 hours  methimazole 5 milliGRAM(s) Oral daily  metoprolol succinate ER 50 milliGRAM(s) Oral daily  morphine ER Tablet 30 milliGRAM(s) Oral two times a day  polyethylene glycol 3350 17 Gram(s) Oral two times a day  risperiDONE   Tablet 0.5 milliGRAM(s) Oral at bedtime  senna 2 Tablet(s) Oral at bedtime  tamsulosin 0.4 milliGRAM(s) Oral at bedtime    MEDICATIONS  (PRN):  aluminum hydroxide/magnesium hydroxide/simethicone Suspension 30 milliLiter(s) Oral every 4 hours PRN Dyspepsia  melatonin 3 milliGRAM(s) Oral at bedtime PRN Insomnia  ondansetron Injectable 4 milliGRAM(s) IV Push every 8 hours PRN Nausea and/or Vomiting  oxyCODONE    IR 5 milliGRAM(s) Oral every 4 hours PRN Severe Pain (7 - 10)      ICU Vital Signs Last 24 Hrs  T(C): 36.1 (2023 13:30), Max: 36.5 (2023 05:25)  T(F): 97 (2023 13:30), Max: 97.7 (2023 05:25)  HR: 118 (2023 13:30) (83 - 118)  BP: 115/71 (2023 13:30) (101/58 - 115/71)  BP(mean): --  ABP: --  ABP(mean): --  RR: 18 (2023 13:30) (18 - 19)  SpO2: --        I&O's Detail      CBC Full  -  ( 2023 15:44 )  WBC Count : 8.94 K/uL  RBC Count : 3.01 M/uL  Hemoglobin : 8.7 g/dL  Hematocrit : 27.1 %  Platelet Count - Automated : 249 K/uL  Mean Cell Volume : 90.0 fL  Mean Cell Hemoglobin : 28.9 pg  Mean Cell Hemoglobin Concentration : 32.1 g/dL  Auto Neutrophil # : 6.54 K/uL  Auto Lymphocyte # : 1.45 K/uL  Auto Monocyte # : 0.64 K/uL  Auto Eosinophil # : 0.24 K/uL  Auto Basophil # : 0.03 K/uL  Auto Neutrophil % : 73.2 %  Auto Lymphocyte % : 16.2 %  Auto Monocyte % : 7.2 %  Auto Eosinophil % : 2.7 %  Auto Basophil % : 0.3 %        139  |  105  |  17  ----------------------------<  130<H>  4.3   |  25  |  0.6<L>    Ca    8.5      2023 15:44  Mg     1.8         TPro  5.5<L>  /  Alb  3.2<L>  /  TBili  0.5  /  DBili  x   /  AST  15  /  ALT  6   /  AlkPhos  92          Urinalysis Basic - ( 2023 16:35 )    Color: Yellow / Appearance: Clear / S.019 / pH: x  Gluc: x / Ketone: Negative mg/dL  / Bili: Negative / Urobili: 1.0 mg/dL   Blood: x / Protein: Negative mg/dL / Nitrite: Negative   Leuk Esterase: Negative / RBC: x / WBC x   Sq Epi: x / Non Sq Epi: x / Bacteria: x      Neuro exam:  AAOX3. Speech intact  follows commands  Motor: MAEx4  5/5 power in b/l UE   5/5 power in b/l LE  Sensation: intact to light touch b/l LE    No TTP in entire spinal axis          Assessment/Plan:  No acute neurosurgical intervention  Abdominal Binder for comfort while in hospital  Cleared for OOB with Physical Therapy now  pain medications as needed  d/w attg HPI:  83-year-old woman with a past history of mood disorder, hallucination, hyperthyroidism, pAfib on eliquis, recent admission for CVA 2/2 holding eliquis presents s/p unwitnessed fall at home.    Per pt she was walking to the restroom using walker from bed and tripped falling to her R shoulder.  While initially pt denied any prodromal syndrome did later say that she felt a little light headed prior. Pt's  confirms pt's claim that she had no LOC, no head trauma.     ED  /83 admission ekg AF 88 RR 20 99% on RA T 98.7  WBC 12.3 Lac 4.1 --> 3.4 on repeat  CT H negative  XR RUE proximal hum fx    Pt seen by ortho, splint, no surgical intervention    Pt admitted to medicine for placement, pain management, syncopal w/u (2023 14:48)    Called by medical team regarding findings on CT A/P of T5 compression fracture, new from .  Pt seen and examined at bedside with son present.  Pt awake, alert, denies back pain or radicular pain.  Has prima fit for urine. Pt walks with a walker at a baseline for many years and has had trouble walking since her stroke a couple of years ago.        PAST MEDICAL & SURGICAL HISTORY:  Thyroid disease          Imaging:  < from: CT Abdomen and Pelvis w/ IV Cont (23 @ 19:07) >    IMPRESSION:    1. Right proximal humeral fracture with surrounding hemorrhage/edema.  2. T5 compression fracture, new from .  3. Otherwise, no acute traumatic injury to the chest, abdomen, or pelvis.    < end of copied text >    Home Medications:  methIMAzole 5 mg oral tablet: 1 orally once a day (2023 14:41)  metoprolol succinate 50 mg oral capsule, extended release: 1 orally once a day (2023 14:41)  risperiDONE 0.5 mg oral tablet: 1 tab(s) orally once a day (at bedtime) (2023 14:41)      Allergies    No Known Allergies    Intolerances        ROS:  [ x ] A ten-point review of systems is negative except as noted   [  ] Due to altered mental status/intubation, subjective information were not able to be obtained from the patient. History was obtained, to the extent possible, from review of the chart and collateral sources of information    MEDICATIONS  (STANDING):  acetaminophen     Tablet .. 975 milliGRAM(s) Oral every 8 hours  apixaban 2.5 milliGRAM(s) Oral two times a day  atorvastatin 40 milliGRAM(s) Oral at bedtime  cyanocobalamin Injectable 1000 MICROGram(s) IntraMuscular every 24 hours  influenza  Vaccine (HIGH DOSE) 0.7 milliLiter(s) IntraMuscular once  lactated ringers. 1000 milliLiter(s) (75 mL/Hr) IV Continuous <Continuous>  lactulose Syrup 10 Gram(s) Oral every 4 hours  methimazole 5 milliGRAM(s) Oral daily  metoprolol succinate ER 50 milliGRAM(s) Oral daily  morphine ER Tablet 30 milliGRAM(s) Oral two times a day  polyethylene glycol 3350 17 Gram(s) Oral two times a day  risperiDONE   Tablet 0.5 milliGRAM(s) Oral at bedtime  senna 2 Tablet(s) Oral at bedtime  tamsulosin 0.4 milliGRAM(s) Oral at bedtime    MEDICATIONS  (PRN):  aluminum hydroxide/magnesium hydroxide/simethicone Suspension 30 milliLiter(s) Oral every 4 hours PRN Dyspepsia  melatonin 3 milliGRAM(s) Oral at bedtime PRN Insomnia  ondansetron Injectable 4 milliGRAM(s) IV Push every 8 hours PRN Nausea and/or Vomiting  oxyCODONE    IR 5 milliGRAM(s) Oral every 4 hours PRN Severe Pain (7 - 10)      ICU Vital Signs Last 24 Hrs  T(C): 36.1 (2023 13:30), Max: 36.5 (2023 05:25)  T(F): 97 (2023 13:30), Max: 97.7 (2023 05:25)  HR: 118 (2023 13:30) (83 - 118)  BP: 115/71 (2023 13:30) (101/58 - 115/71)  BP(mean): --  ABP: --  ABP(mean): --  RR: 18 (2023 13:30) (18 - 19)  SpO2: --        I&O's Detail      CBC Full  -  ( 2023 15:44 )  WBC Count : 8.94 K/uL  RBC Count : 3.01 M/uL  Hemoglobin : 8.7 g/dL  Hematocrit : 27.1 %  Platelet Count - Automated : 249 K/uL  Mean Cell Volume : 90.0 fL  Mean Cell Hemoglobin : 28.9 pg  Mean Cell Hemoglobin Concentration : 32.1 g/dL  Auto Neutrophil # : 6.54 K/uL  Auto Lymphocyte # : 1.45 K/uL  Auto Monocyte # : 0.64 K/uL  Auto Eosinophil # : 0.24 K/uL  Auto Basophil # : 0.03 K/uL  Auto Neutrophil % : 73.2 %  Auto Lymphocyte % : 16.2 %  Auto Monocyte % : 7.2 %  Auto Eosinophil % : 2.7 %  Auto Basophil % : 0.3 %        139  |  105  |  17  ----------------------------<  130<H>  4.3   |  25  |  0.6<L>    Ca    8.5      2023 15:44  Mg     1.8         TPro  5.5<L>  /  Alb  3.2<L>  /  TBili  0.5  /  DBili  x   /  AST  15  /  ALT  6   /  AlkPhos  92          Urinalysis Basic - ( 2023 16:35 )    Color: Yellow / Appearance: Clear / S.019 / pH: x  Gluc: x / Ketone: Negative mg/dL  / Bili: Negative / Urobili: 1.0 mg/dL   Blood: x / Protein: Negative mg/dL / Nitrite: Negative   Leuk Esterase: Negative / RBC: x / WBC x   Sq Epi: x / Non Sq Epi: x / Bacteria: x      Neuro exam:  AAOX3. Speech intact  follows commands  Motor: MAEx4  5/5 power in b/l UE   5/5 power in b/l LE  Sensation: intact to light touch b/l LE    No TTP in entire spinal axis          Assessment/Plan:  No acute neurosurgical intervention  Abdominal Binder for comfort while in hospital  Cleared for OOB with Physical Therapy now  pain medications as needed  d/w attg

## 2023-11-23 LAB
CULTURE RESULTS: SIGNIFICANT CHANGE UP
CULTURE RESULTS: SIGNIFICANT CHANGE UP
SPECIMEN SOURCE: SIGNIFICANT CHANGE UP
SPECIMEN SOURCE: SIGNIFICANT CHANGE UP

## 2023-11-23 RX ORDER — METHIMAZOLE 10 MG/1
1 TABLET ORAL
Refills: 0 | DISCHARGE

## 2023-11-27 LAB
CULTURE RESULTS: SIGNIFICANT CHANGE UP
SPECIMEN SOURCE: SIGNIFICANT CHANGE UP

## 2023-12-01 DIAGNOSIS — I10 ESSENTIAL (PRIMARY) HYPERTENSION: ICD-10-CM

## 2023-12-01 DIAGNOSIS — Z86.73 PERSONAL HISTORY OF TRANSIENT ISCHEMIC ATTACK (TIA), AND CEREBRAL INFARCTION WITHOUT RESIDUAL DEFICITS: ICD-10-CM

## 2023-12-01 DIAGNOSIS — D51.9 VITAMIN B12 DEFICIENCY ANEMIA, UNSPECIFIED: ICD-10-CM

## 2023-12-01 DIAGNOSIS — F05 DELIRIUM DUE TO KNOWN PHYSIOLOGICAL CONDITION: ICD-10-CM

## 2023-12-01 DIAGNOSIS — G93.41 METABOLIC ENCEPHALOPATHY: ICD-10-CM
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
## 2023-12-01 DIAGNOSIS — S22.059A UNSPECIFIED FRACTURE OF T5-T6 VERTEBRA, INITIAL ENCOUNTER FOR CLOSED FRACTURE: ICD-10-CM

## 2023-12-01 DIAGNOSIS — Z79.899 OTHER LONG TERM (CURRENT) DRUG THERAPY: ICD-10-CM

## 2023-12-01 DIAGNOSIS — E78.5 HYPERLIPIDEMIA, UNSPECIFIED: ICD-10-CM

## 2023-12-01 DIAGNOSIS — Z66 DO NOT RESUSCITATE: ICD-10-CM

## 2023-12-01 DIAGNOSIS — R33.9 RETENTION OF URINE, UNSPECIFIED: ICD-10-CM

## 2023-12-01 DIAGNOSIS — M81.0 AGE-RELATED OSTEOPOROSIS WITHOUT CURRENT PATHOLOGICAL FRACTURE: ICD-10-CM

## 2023-12-01 DIAGNOSIS — F39 UNSPECIFIED MOOD [AFFECTIVE] DISORDER: ICD-10-CM
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
## 2023-12-01 DIAGNOSIS — S42.291A OTHER DISPLACED FRACTURE OF UPPER END OF RIGHT HUMERUS, INITIAL ENCOUNTER FOR CLOSED FRACTURE: ICD-10-CM

## 2023-12-01 DIAGNOSIS — E86.0 DEHYDRATION: ICD-10-CM

## 2023-12-01 DIAGNOSIS — I48.0 PAROXYSMAL ATRIAL FIBRILLATION: ICD-10-CM

## 2023-12-01 DIAGNOSIS — E05.90 THYROTOXICOSIS, UNSPECIFIED WITHOUT THYROTOXIC CRISIS OR STORM: ICD-10-CM

## 2023-12-01 DIAGNOSIS — K59.00 CONSTIPATION, UNSPECIFIED: ICD-10-CM

## 2023-12-01 DIAGNOSIS — I95.1 ORTHOSTATIC HYPOTENSION: ICD-10-CM

## 2023-12-01 DIAGNOSIS — Z79.01 LONG TERM (CURRENT) USE OF ANTICOAGULANTS: ICD-10-CM

## 2024-03-25 NOTE — ED ADULT NURSE NOTE - NURSING NEURO ORIENTATION
Patient went into hospital on Thursday and was released - - patient is still in a lot of pain. Is this normal? Patient was prescribed Amoxicillin -  patient states pain is a little better. Patient states that she has pneumonia.   
disoriented to place/disoriented to time/situation

## 2024-09-11 NOTE — SWALLOW FEES ASSESSMENT ADULT - PHARYNGEAL PHASE COMMENTS
I spoke with her over the phone.  Her cervical spine x-ray showed straightening of the cervical spine, but did not show any acute displaced fracture or major malalignment  There was a finding of nonspecific bilateral asymmetric soft tissue opacity surrounding the upper cervical region which could be overlapping shadows/artifact or prominent thyroid tissue.  We will order a ultrasound of the neck to evaluate further  She is still having right arm pain, and tingling sensation in her right arm.  This could be more noticeable when she tilts her head backwards.  She is not able to have her EMG until next month  She is scheduled to see me on the 27th of this month.  We will move up her appointment to next week and discuss trying to get an MRI ordered and approved at that time to further evaluate her symptoms   Severe pharyngeal dysphagia for thin liquids Mild pharyngeal dysphagia for mildly thick, puree and minced and moist solids, residue clears w/ a liquid wash

## 2025-01-31 NOTE — PROGRESS NOTE ADULT - SUBJECTIVE AND OBJECTIVE BOX
Controlled  Reviewed labs and medications   Continue levothyroxine as prescribed      CHILANGO PEMBERTON  83y  Female    Patient is a 83y old  Female who presents with a chief complaint of basilar occlusion (18 Sep 2023 16:42)      HPI:  The patient is an 83-year-old female with a past history of mood disorder, hallucination, hyperthyroidism, Afib, with a prior admission for syncope/ collapse at home, who presented with dysarthria, confusion, and left weakness and was noted to have a right perfusion deficit on CTP. The patient is a code NI actual, and will be taken for a planned diagnostic cerebral angiogram + mechanical thrombectomy with Dr. Mckeon. Upgraded to ICU, intubated, extubated after 2 days. Started on Heparin gtt for afib.    Admission scores:   NIHSS 32   (12 Sep 2023 21:42)    S: Patient was examined and seen at bedside. This early afternoon was more lethargic than yesterday and not answering questions. Reportedly has been spitting out meds and not eating well. CTH stable. Notified in later afternoon that her BP dropped and given IVF bolus.    ROS: unable to obtain    PAST MEDICAL & SURGICAL HISTORY:  Thyroid disease  afib  Mood dz w/ psychotic features      SOCIAL HISTORY:  Tobacco: negative  Illicit drugs: negative  Alcohol: social  Family history reviewed and otherwise non-contributory No clotting disorders, CVAs at early age.  ALLERGIES: NKDA    General: NAD. Looks stated age. Chronically ill appearing  HEENT: clean oropharynx, EOMI, no LAD  Neck: trachea midline, no thyromegaly  CV: nl S1 S2; no m/r/g  Resp: decreased breath sounds at base  GI: NT/ND/S +BS  MS: no clubbing/cyanosis/edema, +pulses  Neuro: moves all extremities  Skin: no rashes, nl turgor  Psychiatric: AA0x1 w/ compromised insight and judgement    tele: afib, nonspecific changes (on my own evaluation of tele monitor)          Home Medications:  methIMAzole 5 mg oral tablet: 1 orally once a day (20 Sep 2023 15:12)  metoprolol succinate 50 mg oral capsule, extended release: 1 orally once a day (20 Sep 2023 15:12)  risperiDONE 0.5 mg oral tablet: 1 tab(s) orally 2 times a day (15 Sep 2023 16:07)    MEDICATIONS  (STANDING):  apixaban 2.5 milliGRAM(s) Oral every 12 hours  cefTRIAXone   IVPB 1000 milliGRAM(s) IV Intermittent every 24 hours  chlorhexidine 2% Cloths 1 Application(s) Topical daily  influenza  Vaccine (HIGH DOSE) 0.7 milliLiter(s) IntraMuscular once  senna 2 Tablet(s) Oral at bedtime  sodium chloride 0.9%. 1000 milliLiter(s) (50 mL/Hr) IV Continuous <Continuous>  tamsulosin 0.4 milliGRAM(s) Oral at bedtime    MEDICATIONS  (PRN):  acetaminophen     Tablet .. 650 milliGRAM(s) Oral every 6 hours PRN Temp greater or equal to 38C (100.4F), Mild Pain (1 - 3)    Vital Signs Last 24 Hrs  T(C): 35.9 (20 Sep 2023 16:00), Max: 36.3 (19 Sep 2023 20:00)  T(F): 96.7 (20 Sep 2023 16:00), Max: 97.4 (19 Sep 2023 20:00)  HR: 90 (20 Sep 2023 17:07) (82 - 98)  BP: 121/64 (20 Sep 2023 17:07) (67/46 - 128/72)  BP(mean): 81 (20 Sep 2023 17:07) (58 - 93)  RR: 18 (20 Sep 2023 17:07) (16 - 18)  SpO2: 97% (20 Sep 2023 17:07) (95% - 97%)    Parameters below as of 20 Sep 2023 16:00  Patient On (Oxygen Delivery Method): room air      CAPILLARY BLOOD GLUCOSE      POCT Blood Glucose.: 102 mg/dL (20 Sep 2023 17:13)    LABS:                        10.8   14.29 )-----------( 381      ( 20 Sep 2023 06:57 )             32.9     09-20    142  |  105  |  9<L>  ----------------------------<  81  4.0   |  24  |  0.5<L>    Ca    8.4      20 Sep 2023 06:57  Phos  3.2     09-19  Mg     2.0     09-20    TPro  5.8<L>  /  Alb  3.2<L>  /  TBili  0.6  /  DBili  x   /  AST  34  /  ALT  16  /  AlkPhos  97  09-20    LIVER FUNCTIONS - ( 20 Sep 2023 06:57 )  Alb: 3.2 g/dL / Pro: 5.8 g/dL / ALK PHOS: 97 U/L / ALT: 16 U/L / AST: 34 U/L / GGT: x               PTT - ( 19 Sep 2023 08:20 )  PTT:118.9 sec  Urinalysis Basic - ( 20 Sep 2023 06:57 )    Color: x / Appearance: x / SG: x / pH: x  Gluc: 81 mg/dL / Ketone: x  / Bili: x / Urobili: x   Blood: x / Protein: x / Nitrite: x   Leuk Esterase: x / RBC: x / WBC x   Sq Epi: x / Non Sq Epi: x / Bacteria: x              Consultant Notes Reviewed:  [x ] YES  [ ] NO  Care Discussed with Consultants/Other Providers/ Housestaff [ x] YES  [ ] NO  Radiology, labs, new studies personally reviewed.                                < from: CT Angio Neck Stroke Protocol w/ IV Cont (09.12.23 @ 17:40) >  CT PERFUSION:  Ischemic tissue volume estimate of 63 cc in the area of the right   occipital/cerebellar areas, with core infarct estimate of 11 cc in the   area of the right occipital/cerebellar areas.    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    Severe stenosis of the right superior cerebellar artery.    < end of copied text >

## 2025-06-17 NOTE — ED ADULT TRIAGE NOTE - MEANS OF ARRIVAL
Continue to feed on cue. Call for assistance as needed. Offer supplementation post feeding if mother feels that breast feeding was not effective. Pump post feeding.    stretcher